# Patient Record
Sex: FEMALE | Race: WHITE | NOT HISPANIC OR LATINO | Employment: OTHER | URBAN - METROPOLITAN AREA
[De-identification: names, ages, dates, MRNs, and addresses within clinical notes are randomized per-mention and may not be internally consistent; named-entity substitution may affect disease eponyms.]

---

## 2017-04-13 ENCOUNTER — ALLSCRIPTS OFFICE VISIT (OUTPATIENT)
Dept: OTHER | Facility: OTHER | Age: 67
End: 2017-04-13

## 2017-04-19 ENCOUNTER — ALLSCRIPTS OFFICE VISIT (OUTPATIENT)
Dept: OTHER | Facility: OTHER | Age: 67
End: 2017-04-19

## 2017-04-24 ENCOUNTER — ALLSCRIPTS OFFICE VISIT (OUTPATIENT)
Dept: OTHER | Facility: OTHER | Age: 67
End: 2017-04-24

## 2017-04-24 ENCOUNTER — APPOINTMENT (OUTPATIENT)
Dept: LAB | Facility: CLINIC | Age: 67
End: 2017-04-24
Payer: MEDICARE

## 2017-04-24 DIAGNOSIS — E78.5 HYPERLIPIDEMIA: ICD-10-CM

## 2017-04-24 DIAGNOSIS — E05.00 THYROTOXICOSIS WITH DIFFUSE GOITER AND WITHOUT THYROID STORM: ICD-10-CM

## 2017-04-24 LAB
CHOLEST SERPL-MCNC: 158 MG/DL (ref 50–200)
HDLC SERPL-MCNC: 72 MG/DL (ref 40–60)
LDLC SERPL CALC-MCNC: 70 MG/DL (ref 0–100)
T4 FREE SERPL-MCNC: 0.96 NG/DL (ref 0.76–1.46)
TRIGL SERPL-MCNC: 80 MG/DL
TSH SERPL DL<=0.05 MIU/L-ACNC: 4.08 UIU/ML (ref 0.36–3.74)

## 2017-04-24 PROCEDURE — 84439 ASSAY OF FREE THYROXINE: CPT

## 2017-04-24 PROCEDURE — 84443 ASSAY THYROID STIM HORMONE: CPT

## 2017-04-24 PROCEDURE — 80061 LIPID PANEL: CPT

## 2017-04-24 PROCEDURE — 36415 COLL VENOUS BLD VENIPUNCTURE: CPT

## 2017-05-10 ENCOUNTER — ALLSCRIPTS OFFICE VISIT (OUTPATIENT)
Dept: OTHER | Facility: OTHER | Age: 67
End: 2017-05-10

## 2017-05-24 ENCOUNTER — GENERIC CONVERSION - ENCOUNTER (OUTPATIENT)
Dept: OTHER | Facility: OTHER | Age: 67
End: 2017-05-24

## 2017-06-01 ENCOUNTER — ALLSCRIPTS OFFICE VISIT (OUTPATIENT)
Dept: OTHER | Facility: OTHER | Age: 67
End: 2017-06-01

## 2017-06-05 ENCOUNTER — TRANSCRIBE ORDERS (OUTPATIENT)
Dept: ADMINISTRATIVE | Facility: HOSPITAL | Age: 67
End: 2017-06-05

## 2017-06-05 ENCOUNTER — HOSPITAL ENCOUNTER (OUTPATIENT)
Dept: MAMMOGRAPHY | Facility: MEDICAL CENTER | Age: 67
Discharge: HOME | End: 2017-06-05
Payer: MEDICARE

## 2017-06-05 DIAGNOSIS — Z12.31 VISIT FOR SCREENING MAMMOGRAM: Primary | ICD-10-CM

## 2017-06-05 DIAGNOSIS — Z12.31 ENCOUNTER FOR MAMMOGRAM TO ESTABLISH BASELINE MAMMOGRAM: Primary | ICD-10-CM

## 2017-06-05 DIAGNOSIS — Z12.31 ENCOUNTER FOR SCREENING MAMMOGRAM FOR MALIGNANT NEOPLASM OF BREAST: ICD-10-CM

## 2017-06-05 PROCEDURE — 77063 BREAST TOMOSYNTHESIS BI: CPT

## 2017-06-05 PROCEDURE — G0202 SCR MAMMO BI INCL CAD: HCPCS

## 2017-06-14 ENCOUNTER — ALLSCRIPTS OFFICE VISIT (OUTPATIENT)
Dept: OTHER | Facility: OTHER | Age: 67
End: 2017-06-14

## 2017-08-11 ENCOUNTER — ALLSCRIPTS OFFICE VISIT (OUTPATIENT)
Dept: OTHER | Facility: OTHER | Age: 67
End: 2017-08-11

## 2017-09-26 ENCOUNTER — APPOINTMENT (OUTPATIENT)
Dept: LAB | Facility: CLINIC | Age: 67
End: 2017-09-26
Payer: MEDICARE

## 2017-09-26 ENCOUNTER — GENERIC CONVERSION - ENCOUNTER (OUTPATIENT)
Dept: OTHER | Facility: OTHER | Age: 67
End: 2017-09-26

## 2017-09-26 ENCOUNTER — ALLSCRIPTS OFFICE VISIT (OUTPATIENT)
Dept: OTHER | Facility: OTHER | Age: 67
End: 2017-09-26

## 2017-09-26 DIAGNOSIS — R39.15 URGENCY OF URINATION: ICD-10-CM

## 2017-09-26 DIAGNOSIS — R35.0 FREQUENCY OF MICTURITION: ICD-10-CM

## 2017-09-26 DIAGNOSIS — E78.5 HYPERLIPIDEMIA: ICD-10-CM

## 2017-09-26 LAB
BACTERIA UR QL AUTO: ABNORMAL /HPF
BILIRUB UR QL STRIP: NEGATIVE
CLARITY UR: ABNORMAL
COLOR UR: ABNORMAL
GLUCOSE UR STRIP-MCNC: NEGATIVE MG/DL
HGB UR QL STRIP.AUTO: ABNORMAL
HYALINE CASTS #/AREA URNS LPF: ABNORMAL /LPF
KETONES UR STRIP-MCNC: NEGATIVE MG/DL
LEUKOCYTE ESTERASE UR QL STRIP: ABNORMAL
NITRITE UR QL STRIP: POSITIVE
NON-SQ EPI CELLS URNS QL MICRO: ABNORMAL /HPF
PH UR STRIP.AUTO: 6.5 [PH] (ref 4.5–8)
PROT UR STRIP-MCNC: NEGATIVE MG/DL
RBC #/AREA URNS AUTO: ABNORMAL /HPF
SP GR UR STRIP.AUTO: 1.01 (ref 1–1.03)
UROBILINOGEN UR QL STRIP.AUTO: 1 E.U./DL
WBC #/AREA URNS AUTO: ABNORMAL /HPF

## 2017-09-26 PROCEDURE — 87186 SC STD MICRODIL/AGAR DIL: CPT

## 2017-09-26 PROCEDURE — 87077 CULTURE AEROBIC IDENTIFY: CPT

## 2017-09-26 PROCEDURE — 87086 URINE CULTURE/COLONY COUNT: CPT

## 2017-09-26 PROCEDURE — 81001 URINALYSIS AUTO W/SCOPE: CPT

## 2017-09-28 ENCOUNTER — GENERIC CONVERSION - ENCOUNTER (OUTPATIENT)
Dept: OTHER | Facility: OTHER | Age: 67
End: 2017-09-28

## 2017-09-28 LAB — BACTERIA UR CULT: NORMAL

## 2017-10-25 ENCOUNTER — ALLSCRIPTS OFFICE VISIT (OUTPATIENT)
Dept: OTHER | Facility: OTHER | Age: 67
End: 2017-10-25

## 2017-10-25 ENCOUNTER — APPOINTMENT (OUTPATIENT)
Dept: LAB | Facility: CLINIC | Age: 67
End: 2017-10-25
Payer: MEDICARE

## 2017-10-25 DIAGNOSIS — E78.5 HYPERLIPIDEMIA: ICD-10-CM

## 2017-10-25 LAB
ALBUMIN SERPL BCP-MCNC: 3.4 G/DL (ref 3.5–5)
ALP SERPL-CCNC: 79 U/L (ref 46–116)
ALT SERPL W P-5'-P-CCNC: 28 U/L (ref 12–78)
ANION GAP SERPL CALCULATED.3IONS-SCNC: 7 MMOL/L (ref 4–13)
AST SERPL W P-5'-P-CCNC: 21 U/L (ref 5–45)
BASOPHILS # BLD AUTO: 0.05 THOUSANDS/ΜL (ref 0–0.1)
BASOPHILS NFR BLD AUTO: 1 % (ref 0–1)
BILIRUB SERPL-MCNC: 0.38 MG/DL (ref 0.2–1)
BILIRUB UR QL STRIP: NEGATIVE
BUN SERPL-MCNC: 20 MG/DL (ref 5–25)
CALCIUM SERPL-MCNC: 9.3 MG/DL (ref 8.3–10.1)
CHLORIDE SERPL-SCNC: 105 MMOL/L (ref 100–108)
CHOLEST SERPL-MCNC: 145 MG/DL (ref 50–200)
CLARITY UR: CLEAR
CO2 SERPL-SCNC: 28 MMOL/L (ref 21–32)
COLOR UR: YELLOW
CREAT SERPL-MCNC: 0.68 MG/DL (ref 0.6–1.3)
EOSINOPHIL # BLD AUTO: 0.21 THOUSAND/ΜL (ref 0–0.61)
EOSINOPHIL NFR BLD AUTO: 3 % (ref 0–6)
ERYTHROCYTE [DISTWIDTH] IN BLOOD BY AUTOMATED COUNT: 12.5 % (ref 11.6–15.1)
GFR SERPL CREATININE-BSD FRML MDRD: 90 ML/MIN/1.73SQ M
GLUCOSE P FAST SERPL-MCNC: 82 MG/DL (ref 65–99)
GLUCOSE UR STRIP-MCNC: NEGATIVE MG/DL
HCT VFR BLD AUTO: 41.8 % (ref 34.8–46.1)
HDLC SERPL-MCNC: 63 MG/DL (ref 40–60)
HGB BLD-MCNC: 14.2 G/DL (ref 11.5–15.4)
HGB UR QL STRIP.AUTO: NEGATIVE
KETONES UR STRIP-MCNC: NEGATIVE MG/DL
LDLC SERPL CALC-MCNC: 64 MG/DL (ref 0–100)
LEUKOCYTE ESTERASE UR QL STRIP: NEGATIVE
LYMPHOCYTES # BLD AUTO: 2.77 THOUSANDS/ΜL (ref 0.6–4.47)
LYMPHOCYTES NFR BLD AUTO: 34 % (ref 14–44)
MCH RBC QN AUTO: 31.6 PG (ref 26.8–34.3)
MCHC RBC AUTO-ENTMCNC: 34 G/DL (ref 31.4–37.4)
MCV RBC AUTO: 93 FL (ref 82–98)
MONOCYTES # BLD AUTO: 0.47 THOUSAND/ΜL (ref 0.17–1.22)
MONOCYTES NFR BLD AUTO: 6 % (ref 4–12)
NEUTROPHILS # BLD AUTO: 4.76 THOUSANDS/ΜL (ref 1.85–7.62)
NEUTS SEG NFR BLD AUTO: 56 % (ref 43–75)
NITRITE UR QL STRIP: NEGATIVE
NRBC BLD AUTO-RTO: 0 /100 WBCS
PH UR STRIP.AUTO: 6.5 [PH] (ref 4.5–8)
PLATELET # BLD AUTO: 250 THOUSANDS/UL (ref 149–390)
PMV BLD AUTO: 9.6 FL (ref 8.9–12.7)
POTASSIUM SERPL-SCNC: 3.8 MMOL/L (ref 3.5–5.3)
PROT SERPL-MCNC: 7.1 G/DL (ref 6.4–8.2)
PROT UR STRIP-MCNC: NEGATIVE MG/DL
RBC # BLD AUTO: 4.49 MILLION/UL (ref 3.81–5.12)
SODIUM SERPL-SCNC: 140 MMOL/L (ref 136–145)
SP GR UR STRIP.AUTO: 1.02 (ref 1–1.03)
T4 FREE SERPL-MCNC: 0.97 NG/DL (ref 0.76–1.46)
TRIGL SERPL-MCNC: 90 MG/DL
TSH SERPL DL<=0.05 MIU/L-ACNC: 3.8 UIU/ML (ref 0.36–3.74)
UROBILINOGEN UR QL STRIP.AUTO: 0.2 E.U./DL
WBC # BLD AUTO: 8.27 THOUSAND/UL (ref 4.31–10.16)

## 2017-10-25 PROCEDURE — 84443 ASSAY THYROID STIM HORMONE: CPT

## 2017-10-25 PROCEDURE — 80053 COMPREHEN METABOLIC PANEL: CPT

## 2017-10-25 PROCEDURE — 36415 COLL VENOUS BLD VENIPUNCTURE: CPT

## 2017-10-25 PROCEDURE — 81003 URINALYSIS AUTO W/O SCOPE: CPT

## 2017-10-25 PROCEDURE — 85025 COMPLETE CBC W/AUTO DIFF WBC: CPT

## 2017-10-25 PROCEDURE — 84439 ASSAY OF FREE THYROXINE: CPT

## 2017-10-25 PROCEDURE — 80061 LIPID PANEL: CPT

## 2017-11-14 ENCOUNTER — GENERIC CONVERSION - ENCOUNTER (OUTPATIENT)
Dept: OTHER | Facility: OTHER | Age: 67
End: 2017-11-14

## 2018-01-10 NOTE — RESULT NOTES
Verified Results  (1) URINE CULTURE [id number] 10:34AM Carmen Collins   TW Order Number: [id number]     Test Name Result Flag Reference   CLINICAL REPORT (Report)     Test:        Urine culture  Specimen Type:   Urine  Specimen Date:   9/26/2017 10:34 AM  Result Date:    9/28/2017 11:32 AM  Result Status:   Final result  Resulting Lab:   [facility] [address] [place] [address]            Tel: [phone]      CULTURE                                       ------------------                                   50,000-59,000 cfu/ml Escherichia coli      SUSCEPTIBILITY                                   ------------------                                                       Escherichia coli  METHOD                 LEANNA  -------------------------------------  -------------------------  AMPICILLIN ($$)             <=8 00 ug/ml Susceptible  AZTREONAM ($$$)             <=8 ug/ml   Susceptible  CEFAZOLIN ($)              <=8 00 ug/ml Susceptible  CIPROFLOXACIN ($)            <=1 00 ug/ml Susceptible  GENTAMICIN ($$)             <=4 ug/ml   Susceptible  LEVOFLOXACIN ($)            <=2 00 ug/ml Susceptible  NITROFURANTOIN             <=32 ug/ml  Susceptible  PIPERACILLIN + TAZOBACTAM ($$$)     <=16 ug/ml  Susceptible  TETRACYCLINE              <=4 ug/ml   Susceptible  TOBRAMYCIN ($)             <=4 ug/ml   Susceptible  TRIMETHOPRIM + SULFAMETHOXAZOLE ($$$)  <=2/38 ug/ml Susceptible

## 2018-01-12 VITALS — SYSTOLIC BLOOD PRESSURE: 156 MMHG | DIASTOLIC BLOOD PRESSURE: 72 MMHG | TEMPERATURE: 98.2 F

## 2018-01-12 NOTE — PROGRESS NOTES
Assessment    1  Encounter for preventive health examination (V70 0) (Z00 00)    Plan  Hyperlipidemia    · (1) CBC/PLT/DIFF; Status:Active; Requested JMI:[id number];    · (1) COMPREHENSIVE METABOLIC PANEL; Status:Active; Requested XER:39GFH3197;    · (1) LIPID PANEL FASTING W DIRECT LDL REFLEX; Status:Active; Requested  YTP:66FNR1120;    · (1) TSH WITH FT4 REFLEX; Status:Active; Requested MXL:22TLB0182;    · (1) URINALYSIS (will reflex a microscopy if leukocytes, occult blood, protein or nitrites  are not within normal limits); Status:Active; Requested QWV:25PQO2147;     Discussion/Summary    With the exception of chronic headaches comma problems appear to be doing well  Routine laboratory testing  Influenza vaccine  Follow-up for your birthday  Impression: Subsequent Annual Wellness Visit, with preventive exam as well as age and risk appropriate counseling completed  Immunizations: influenza vaccination is recommended annually, pneumococcal vaccine due today, hepatitis B vaccination series is not indicated at this time due to the patient's low risk of anu the disease, Zostavax vaccination status is unknown and Tdap vaccination status is unknown  Advance Directive Planning: not complete, she was encouraged to follow-up with me to discuss her questions and/or decisions  History of Present Illness  HPI: Graves' ophthalmopathy currently being treated at [facility]  She has now had surgical treatment for this  A prior history of Graves' disease treated with radioactive iodine so she is actually hypothyroid on replacement  Reflux is treated  Hyperlipidemia is treated  Early 2016 right knee replacement surgery which went very well although she does continue to have some pain  Her principal pain is now with the left knee  Migraines are rare  Mammogram done and up to date  In November 2015 she had Hemoccult positive stool   She follows with Dr Karina Newman and she was called and recommended to go back there  She went and had a colonoscopy which revealed polyps  She will get another one in 2018  Chronic headaches although labeled "migraine "have responded well to Botox injections every 3 months; thus they are really muscle contraction headache  Right now also on Toradol and Compazine and Robaxin  Failed gabapentin  Really prolonged HA gets Decadron  The blood pressure which usually is fine is high today; the patient attributes this to stress and absolutely refuses to take any form of antihypertensive  Welcome to [employer] and Wellness Visits: The patient is being seen for the subsequent annual wellness visit  Medicare Screening and Risk Factors   Medicare Screening Tests Risk Questions   Abdominal aortic aneurysm risk assessment: none indicated  Osteoporosis risk assessment: female gender and over 48years of age  HIV risk assessment: none indicated  Drug and Alcohol Use: The patient is a former cigarette smoker and quit smoking 16 yrs ago  The patient reports never drinking alcohol  She has never used illicit drugs  Diet and Physical Activity: Current diet includes well balanced meals, 2 servings of fruit per day, 1 servings of vegetables per day, 1 servings of meat per day, 2 servings of whole grains per day, 2 servings of dairy products per day and 2 cups of coffee per day  She exercises daily and exercises 5 times per week  Exercise: walking, strength training  Mood Disorder and Cognitive Impairment Screening: PHQ-9 Depression Scale   Over the past 2 weeks, how often have you been bothered by the following problems? 1 ) Little interest or pleasure in doing things? Not at all    2 ) Feeling down, depressed or hopeless? Not at all    3 ) Trouble falling asleep or sleeping too much? Not at all    4 ) Feeling tired or having little energy? Several days  5 ) Poor appetite or overeating?  Not at all    6 ) Feeling bad about yourself, or that you are a failure, or have let yourself or your family down? Not at all    7 ) Trouble concentrating on things, such as reading a newspaper or watching television? Not at all    8 ) Moving or speaking so slowly that other people could have noticed, or the opposite, moving or speaking faster than usual? Not at all    9 ) Thoughts that you would be off dead or of hurting yourself in some way? Not at all  TOTAL SCORE: 1, severity of depression is mild  She denies feeling down, depressed, or hopeless over the past two weeks  She denies feeling little interest or pleasure in doing things over the past two weeks  Cognitive impairment screening: denies difficulty learning/retaining new information, denies difficulty handling complex tasks, denies difficulty with reasoning, denies difficulty with spatial ability and orientation, denies difficulty with language and denies difficulty with behavior  Advance Directives: Advance directives: no living will, no durable power of  for health care directives and no advance directives  Co-Managers and Medical Equipment/Suppliers: See Patient Care Team       Preventive Quality Program 65 and Older: Falls Risk: The patient fell 0 times in the past 12 months  Urinary Incontinence Symptoms includes: no urinary incontinence        Patient Care Team    Care Team Member Role Specialty Office Number   Esau BUSH  Dermatology [phone]   Hannah Man MD  Radiation Oncology [phone]   Valarie Cronin MD  Ophthalmology [phone]  Obstetrics/Gynecology [phone]     Review of Systems    Over the past 2 weeks, how often have you been bothered by the following problems? 1 ) Little interest or pleasure in doing things? Not at all    2 ) Feeling down, depressed or hopeless? Not at all    3 ) Trouble falling asleep or sleeping too much? Not at all    4 ) Feeling tired or having little energy? Several days  5 ) Poor appetite or overeating?  Not at all    6 ) Feeling bad about yourself, or that you are a failure, or have let yourself or your family down? Not at all    7 ) Trouble concentrating on things, such as reading a newspaper or watching television? Not at all    8 ) Moving or speaking so slowly that other people could have noticed, or the opposite, moving or speaking faster than usual? Not at all    9 ) Thoughts that you would be better off dead or of hurting yourself in some way? Not at all  Score 1      Active Problems    1  Acid reflux disease (530 81) (K21 9)   2  Actinic keratosis (702 0) (L57 0)   3  Basal cell carcinoma of skin of forearm (173 61) (C44 611)   4  Bone spur (726 91) (M77 9)   5  Breast screening (V76 10) (Z12 31)   6  Cervicalgia (723 1) (M54 2)   7  Chronic pain of both knees (056 06,857 63) (M25 561,M25 562,G89 29)   8  Colon cancer screening (V76 51) (Z12 11)   9  Flu vaccine need (V04 81) (Z23)   10  Graves' ophthalmopathy (242 00,376 21) (E05 00)   11  Hyperlipidemia (272 4) (E78 5)   12  Hypothyroidism (244 9) (E03 9)   13  Inflamed seborrheic keratosis (702 11) (L82 0)   14  Leukopenia (288 50) (D72 819)   15  Migraine, chronic, without aura, intractable (346 71) (G43 719)   16  Need for pneumococcal vaccination (V03 82) (Z23)   17  Need for revaccination (V05 9) (Z23)   18  No advance directive on file (V49 89) (Z78 9)   19  Palpitations (785 1) (R00 2)   20  Preoperative examination (V72 84) (Z01 818)   21  Primary osteoarthritis of both hips (715 15) (M16 0)   22  Screening for genitourinary condition (V81 6) (Z13 89)   23  Screening for skin condition (V82 0) (Z13 89)   24  Seasonal allergies (477 9) (J30 2)   25  Seborrheic keratosis (702 19) (L82 1)   26  Skin lesion (709 9) (L98 9)   27  Telangiectasia (448 9) (I78 1)   28  Urinary frequency (788 41) (R35 0)   29   Urinary urgency (788 63) (R39 15)    Past Medical History    · History of Arthritis (V13 4)   · History of Flu vaccine need (V04 81) (Z23)   · History of Flu vaccine need (V04 81) (Z23)   · H/O nonmelanoma skin cancer (V10 83) (Z85 828)   · History of Hepatitis, B Virus (070 30)   · History of gastroesophageal reflux (GERD) (V12 79) (Z87 19)   · History of Graves' disease (V12 29) (Z86 39)   · History of migraine (V12 49) (Z86 69)    Surgical History    · History of Appendectomy   · History of Arthroscopy Knee Left   · History of Biopsy Temporal Artery   · History of  Section   · History of Knee Replacement   · History of Surgery Of The Orbits   · History of Tonsillectomy    Family History  Mother    · Family history of Carcinoma Of The Pancreas   · Family history of Mother  At Age 76  Father    · Family history of Atherosclerosis (V17 49)   · Family history of Family Health Status Of Father - Alive  Maternal Grandmother    · Family history of Diabetes Mellitus (V18 0)   · Family history of Maternal Grandmother  At Age 72  Paternal Grandmother    · Family history of Maternal Grandmother  At Age 68  Maternal Grandfather    · Family history of Acute Myocardial Infarction (V17 3)   · Family history of Maternal Grandfather  At Age 61  Paternal Grandfather    · Family history of Cirrhosis   · Family history of Hyperlipidemia   · Family history of Maternal Grandfather  At Age 79    Social History    · Denied: History of Alcohol Use (History)   · Caffeine Use   · Daily Coffee Consumption (2  Cups/Day)   · Denied: History of Drug Use   · Education - Highest Level Achieved (17  Years Completed)   · Former smoker (V15 82) (U19 549)   · Marital History - Currently    · No advance directive on file (V4 89) (Z78 9)   · Retired From Work  The social history was reviewed and updated today  Current Meds   1  Aspirin 81 MG TABS; TAKE 1 TABLET DAILY; Therapy: (Recorded:77Yqi3450) to Recorded   2   Celecoxib 200 MG Oral Capsule; take 1 capsule daily  Requested for: 69SYT0384; Last   Rx:81Kwr0910; Status: [address] xenia Waller Verification Ordered   3  Dexamethasone 2 MG Oral Tablet; TAKE 1 TABLET Daily PRN headache; Therapy: 56LOA0024 to (Evaluate:04Jun2017)  Requested for: 98RRP9563; Last   Rx:01Jun2017 Ordered   4  Ketorolac Tromethamine 10 MG Oral Tablet; TAKE 1 TABLET EVERY 8 HOURS AS   NEEDED FOR PAIN MAX 10 A MONTH;   Therapy: 70IHQ8114 to (Willy Cormier)  Requested for: 12VBE0949; Last   ST:83XLH4086 Ordered   5  Levothyroxine Sodium 75 MCG Oral Tablet; TAKE 1 TABLET DAILY  Requested for:   82UOM7272; Last Rx:15Wun7573 Ordered   6  Dawood-Daphney TABS; TAKE 1 TABLET DAILY; Therapy: (Recorded:46Waw8540) to Recorded   7  Methocarbamol 500 MG Oral Tablet; Take 1 tablet 3 times daily as needed; Therapy: 84KVH0878 to (Marielos Meyers)  Requested for: 92IBR2976; Last   Rx:01Jun2017 Ordered   8  Omeprazole 20 MG Oral Capsule Delayed Release; TAKE 1 CAPSULE DAILY; Therapy: 34LMT5057 to (Last Rx:15Mar2016)  Requested for: 52HKJ3923 Ordered   9  Premarin 0 625 MG/GM Vaginal Cream; USE AS DIRECTED; Therapy: (Recorded:54Gdd1773) to Recorded   10  Probiotic Oral Capsule; Therapy: (Desiree Ji) to Recorded   11  Prochlorperazine Maleate 10 MG Oral Tablet; 1 tablet TID x 2 days, then prn for    headache and nausea (no more than 3 times per week after initial 2 days); Therapy: 83FUK3357 to (Last Rx:77Cgl3711)  Requested for: 71WKT9179 Ordered   12  Rosuvastatin Calcium 20 MG Oral Tablet; TAKE 1 TABLET DAILY; Therapy: 90GIX8350 to (Evaluate:80Jil3691)  Requested for: 22Rwh0710; Last    Rx:32Wzs8998; Status: ACTIVE - Transmit to Sridhar Bush Ordered   13  Triple Omega-3-6-9 Oral Capsule; Therapy: (Recorded:26Apr2016) to Recorded    Allergies    1  Codeine Derivatives   2   FentaNYL Citrate SOLN    Immunizations   1 2    Influenza  19-Nov-2013 20-Nov-2015    PCV  29-Sep-2016     PPSV  Permanently Deferred: Medical Deferral, rvac- no responce from patient, 84Jhu1159 26-Sep-2014    Tdap  not sure when Vitals  Signs    Heart Rate: 66  Systolic: 256  Diastolic: 84  Height: 5 ft 1 75 in  Weight: 108 lb 8 oz  BMI Calculated: 20 01  BSA Calculated: 1 47  O2 Saturation: 98    Results/Data  PHQ-9 Adult Depression Screening 91TBO1163 09:55AM User, Ahs     Test Name Result Flag Reference   PHQ-9 Adult Depression Score 1     Over the last two weeks, how often have you been bothered by any of the following problems? Little interest or pleasure in doing things: Not at all - 0  Feeling down, depressed, or hopeless: Not at all - 0  Trouble falling or staying asleep, or sleeping too much: Not at all - 0  Feeling tired or having little energy: Several days - 1  Poor appetite or over eating: Not at all - 0  Feeling bad about yourself - or that you are a failure or have let yourself or your family down: Not at all - 0  Trouble concentrating on things, such as reading the newspaper or watching television: Not at all - 0  Moving or speaking so slowly that other people could have noticed   Or the opposite -  being so fidgety or restless that you have been moving around a lot more than usual: Not at all - 0  Thoughts that you would be better off dead, or of hurting yourself in some way: Not at all - 0   PHQ-9 Adult Depression Screening Negative     PHQ-9 Difficulty Level Somewhat difficult     PHQ-9 Severity Minimal Depression       Falls Risk Assessment (Dx Z13 89 Screen for Neurologic Disorder) 25Oct2017 09:55AM User, [employer]     Test Name Result Flag Reference   Falls Risk      No falls in the past year       Future Appointments    Date/Time Provider Specialty Site   11/14/2017 01:00 PM Hellen Bradford [facility]rology [address]   06/01/2018 09:00 AM Feliz Calles MD Neurology [address]     Signatures   Electronically signed by : YENNI Whitman ; Oct 25 2017 10:37AM EST                       (Author)

## 2018-01-13 VITALS
SYSTOLIC BLOOD PRESSURE: 162 MMHG | HEART RATE: 55 BPM | WEIGHT: 110 LBS | OXYGEN SATURATION: 99 % | HEIGHT: 62 IN | BODY MASS INDEX: 20.24 KG/M2 | RESPIRATION RATE: 14 BRPM | DIASTOLIC BLOOD PRESSURE: 84 MMHG

## 2018-01-13 VITALS
HEIGHT: 62 IN | HEART RATE: 66 BPM | BODY MASS INDEX: 19.96 KG/M2 | WEIGHT: 108.5 LBS | DIASTOLIC BLOOD PRESSURE: 84 MMHG | SYSTOLIC BLOOD PRESSURE: 150 MMHG | OXYGEN SATURATION: 98 %

## 2018-01-14 VITALS
OXYGEN SATURATION: 97 % | BODY MASS INDEX: 20.08 KG/M2 | HEART RATE: 68 BPM | WEIGHT: 109.13 LBS | DIASTOLIC BLOOD PRESSURE: 60 MMHG | TEMPERATURE: 97.5 F | HEIGHT: 62 IN | SYSTOLIC BLOOD PRESSURE: 140 MMHG

## 2018-01-14 VITALS
WEIGHT: 109.5 LBS | BODY MASS INDEX: 20.03 KG/M2 | OXYGEN SATURATION: 98 % | SYSTOLIC BLOOD PRESSURE: 188 MMHG | HEART RATE: 63 BPM | DIASTOLIC BLOOD PRESSURE: 90 MMHG

## 2018-01-17 NOTE — RESULT NOTES
Verified Results  (1) URINALYSIS (will reflex a microscopy if leukocytes, occult blood, protein or nitrites are not within normal limits) 60Ftn5957 10:34AM Madalyn Credit   TW Order Number: [id number]     Test Name Result Flag Reference   COLOR Dk Yellow     CLARITY Cloudy     SPECIFIC GRAVITY UA 1 012  1 003-1 030   PH UA 6 5  4 5-8 0   LEUKOCYTE ESTERASE UA Moderate A Negative   NITRITE UA Positive A Negative   PROTEIN UA Negative mg/dl  Negative   GLUCOSE UA Negative mg/dl  Negative   KETONES UA Negative mg/dl  Negative   UROBILINOGEN UA 1 0 E U /dl  0 2, 1 0 E U /dl   BILIRUBIN UA Negative  Negative   BLOOD UA Trace A Negative   BACTERIA Occasional /hpf  None Seen, Occasional   EPITHELIAL CELLS None Seen /hpf  None Seen, Occasional   HYALINE CASTS None Seen /lpf  None Seen   RBC UA None Seen /hpf  None Seen, 0-5   WBC UA 30-50 /hpf A None Seen, 0-5, 5-55, 5-65

## 2018-01-22 VITALS — DIASTOLIC BLOOD PRESSURE: 80 MMHG | SYSTOLIC BLOOD PRESSURE: 130 MMHG | TEMPERATURE: 98.1 F

## 2018-03-07 NOTE — PROGRESS NOTES
History of Present Illness    Revaccination   Vaccine Information: Vaccine(s) Given (names): pneumovax [id number]  Unable to reach by phone  Pt called (attempt 1): [phone]  Letter Sent (Regular and Certified): [phone] sd  Other Information: busy signal every time i call phone  no alt phone numbers avail  will send letter [phone] sd  Active Problems    1  Acid reflux disease (530 81) (K21 9)   2  Actinic keratosis (702 0) (L57 0)   3  Basal cell carcinoma of skin of forearm (173 61) (C44 611)   4  Bone spur (726 91) (M77 9)   5  Breast screening (V76 10) (Z12 39)   6  Cervicalgia (723 1) (M54 2)   7  Chronic pain of both knees (430 95,637 05) (M25 561,M25 562,G89 29)   8  Colon cancer screening (V76 51) (Z12 11)   9  Flu vaccine need (V04 81) (Z23)   10  Graves' ophthalmopathy (242 00,376 21) (E05 00)   11  Hyperlipidemia (272 4) (E78 5)   12  Hypothyroidism (244 9) (E03 9)   13  Inflamed seborrheic keratosis (702 11) (L82 0)   14  Leukopenia (288 50) (D72 819)   15  Migraine, chronic, without aura, intractable (346 71) (G43 719)   16  Need for pneumococcal vaccination (V03 82) (Z23)   17  Need for revaccination (V05 9) (Z23)   18  Palpitations (785 1) (R00 2)   19  Preoperative examination (V72 84) (Z01 818)   20  Primary osteoarthritis of both hips (715 15) (M16 0)   21  Screening for skin condition (V82 0) (Z13 89)   22  Seasonal allergies (477 9) (J30 2)   23  Seborrheic keratosis (702 19) (L82 1)   24  Skin lesion (709 9) (L98 9)   25  Telangiectasia (448 9) (I78 1)    Immunizations  Influenza --- Rogelio Parris: [id number] Dessert: 20-Nov-2015   PCV --- Series1: 29-Sep-2016   PPSV --- Series1: 26-Sep-2014   Tdap --- Series1: not sure when     Current Meds   1  Aspirin 81 MG TABS; TAKE 1 TABLET DAILY   2  Celecoxib 200 MG Oral Capsule; take 1 capsule daily   3   Fluticasone Propionate 50 MCG/ACT Nasal Suspension; USE 2 SPRAYS IN EACH   NOSTRIL ONCE DAILY   4  Levothyroxine Sodium 75 MCG Oral Tablet; TAKE 1 TABLET DAILY   5  Dawood-Daphney TABS; TAKE 1 TABLET DAILY   6  Methocarbamol 500 MG Oral Tablet; Take 1 tablet 3 times daily as needed   7  Omeprazole 20 MG Oral Capsule Delayed Release; TAKE 1 CAPSULE DAILY   8  Premarin 0 625 MG/GM Vaginal Cream; USE AS DIRECTED   9  Probiotic Oral Capsule   10  Rosuvastatin Calcium 20 MG Oral Tablet; TAKE 1 TABLET DAILY   11  Triple Omega-3-6-9 Oral Capsule   12  Voltaren 1 % Transdermal Gel; APPLY SPARINGLY TO AFFECTED AREA(S) ONCE DAILY    Allergies    1  Codeine Derivatives   2  FentaNYL Citrate SOLN    Future Appointments    Date/Time Provider Specialty Site   05/11/2017 02:00 PM Annabelle Adame MD Neurology [facility]  [place]   04/24/2017 10:15 AM YENNI Davis  Internal Medicine [facility]   04/11/2017 02:00 PM [facility]   04/19/2017 09:20 AM YENNI Faulkner   Dermatology [facility]     Signatures   Electronically signed by : YENNI Kilpatrick ; Dec 22 2016 12:37PM EST

## 2018-04-13 RX ORDER — FLUTICASONE PROPIONATE 50 MCG
2 SPRAY, SUSPENSION (ML) NASAL DAILY
COMMUNITY
Start: 2017-10-25 | End: 2018-05-31 | Stop reason: SDUPTHER

## 2018-04-13 RX ORDER — DEXAMETHASONE 2 MG/1
1 TABLET ORAL DAILY PRN
COMMUNITY
Start: 2013-12-16 | End: 2018-04-18 | Stop reason: SDUPTHER

## 2018-04-13 RX ORDER — METHOCARBAMOL 500 MG/1
1 TABLET, FILM COATED ORAL 3 TIMES DAILY PRN
COMMUNITY
Start: 2014-10-22 | End: 2018-04-18 | Stop reason: SDUPTHER

## 2018-04-13 RX ORDER — CELECOXIB 200 MG/1
1 CAPSULE ORAL DAILY
COMMUNITY
End: 2018-04-16

## 2018-04-13 RX ORDER — OMEPRAZOLE 20 MG/1
1 CAPSULE, DELAYED RELEASE ORAL DAILY
COMMUNITY
Start: 2014-04-22 | End: 2018-04-16 | Stop reason: SDUPTHER

## 2018-04-13 RX ORDER — PROCHLORPERAZINE MALEATE 10 MG
TABLET ORAL
COMMUNITY
Start: 2017-09-05 | End: 2018-04-18 | Stop reason: SDUPTHER

## 2018-04-13 RX ORDER — LEVOTHYROXINE SODIUM 0.07 MG/1
1 TABLET ORAL DAILY
COMMUNITY
End: 2018-04-16 | Stop reason: SDUPTHER

## 2018-04-13 RX ORDER — ASCORBIC ACID 500 MG/ML
1 INJECTION, SOLUTION INTRAMUSCULAR; INTRAVENOUS; SUBCUTANEOUS DAILY
COMMUNITY
End: 2018-05-31 | Stop reason: CLARIF

## 2018-04-13 RX ORDER — KETOROLAC TROMETHAMINE 10 MG/1
TABLET, FILM COATED ORAL
COMMUNITY
Start: 2014-10-22 | End: 2018-04-18 | Stop reason: SDUPTHER

## 2018-04-13 RX ORDER — ROSUVASTATIN CALCIUM 20 MG/1
1 TABLET, COATED ORAL DAILY
COMMUNITY
Start: 2014-06-11 | End: 2018-04-16 | Stop reason: SDUPTHER

## 2018-04-16 ENCOUNTER — OFFICE VISIT (OUTPATIENT)
Dept: INTERNAL MEDICINE CLINIC | Facility: CLINIC | Age: 68
End: 2018-04-16
Payer: MEDICARE

## 2018-04-16 ENCOUNTER — APPOINTMENT (OUTPATIENT)
Dept: LAB | Facility: CLINIC | Age: 68
End: 2018-04-16
Payer: MEDICARE

## 2018-04-16 ENCOUNTER — ALLSCRIPTS OFFICE VISIT (OUTPATIENT)
Dept: OTHER | Facility: OTHER | Age: 68
End: 2018-04-16

## 2018-04-16 VITALS
HEART RATE: 60 BPM | OXYGEN SATURATION: 99 % | SYSTOLIC BLOOD PRESSURE: 162 MMHG | WEIGHT: 108 LBS | RESPIRATION RATE: 18 BRPM | HEIGHT: 62 IN | BODY MASS INDEX: 19.88 KG/M2 | DIASTOLIC BLOOD PRESSURE: 94 MMHG

## 2018-04-16 DIAGNOSIS — K21.00 REFLUX ESOPHAGITIS: ICD-10-CM

## 2018-04-16 DIAGNOSIS — E89.0 POSTABLATIVE HYPOTHYROIDISM: ICD-10-CM

## 2018-04-16 DIAGNOSIS — M19.90 ARTHRITIS: ICD-10-CM

## 2018-04-16 DIAGNOSIS — E78.2 HYPERLIPIDEMIA, MIXED: Primary | ICD-10-CM

## 2018-04-16 DIAGNOSIS — K21.9 GASTROESOPHAGEAL REFLUX DISEASE, ESOPHAGITIS PRESENCE NOT SPECIFIED: Primary | ICD-10-CM

## 2018-04-16 DIAGNOSIS — I10 ESSENTIAL HYPERTENSION: ICD-10-CM

## 2018-04-16 DIAGNOSIS — I51.89 DIASTOLIC DYSFUNCTION: ICD-10-CM

## 2018-04-16 DIAGNOSIS — E05.00 GRAVES' DISEASE: ICD-10-CM

## 2018-04-16 DIAGNOSIS — G44.209 TENSION HEADACHE: ICD-10-CM

## 2018-04-16 PROBLEM — G43.009 MIGRAINE WITHOUT AURA OR STATUS MIGRAINOSUS: Status: ACTIVE | Noted: 2018-04-16

## 2018-04-16 LAB
ANION GAP SERPL CALCULATED.3IONS-SCNC: 5 MMOL/L (ref 4–13)
BASOPHILS # BLD AUTO: 0.04 THOUSANDS/ΜL (ref 0–0.1)
BASOPHILS NFR BLD AUTO: 1 % (ref 0–1)
BUN SERPL-MCNC: 21 MG/DL (ref 5–25)
CALCIUM SERPL-MCNC: 9.5 MG/DL
CHLORIDE SERPL-SCNC: 103 MMOL/L (ref 100–108)
CO2 SERPL-SCNC: 30 MMOL/L (ref 21–32)
CREAT SERPL-MCNC: 0.7 MG/DL (ref 0.6–1.3)
EOSINOPHIL # BLD AUTO: 0.17 THOUSAND/ΜL (ref 0–0.61)
EOSINOPHIL NFR BLD AUTO: 2 % (ref 0–6)
ERYTHROCYTE [DISTWIDTH] IN BLOOD BY AUTOMATED COUNT: 12.8 % (ref 11.6–15.1)
GFR SERPL CREATININE-BSD FRML MDRD: 89 ML/MIN/1.73SQ M
GLUCOSE P FAST SERPL-MCNC: 83 MG/DL (ref 65–99)
HCT VFR BLD AUTO: 45 % (ref 34.8–46.1)
HGB BLD-MCNC: 14.9 G/DL (ref 11.5–15.4)
LYMPHOCYTES # BLD AUTO: 2.16 THOUSANDS/ΜL (ref 0.6–4.47)
LYMPHOCYTES NFR BLD AUTO: 27 % (ref 14–44)
MCH RBC QN AUTO: 31.8 PG (ref 26.8–34.3)
MCHC RBC AUTO-ENTMCNC: 33.1 G/DL (ref 31.4–37.4)
MCV RBC AUTO: 96 FL (ref 82–98)
MONOCYTES # BLD AUTO: 0.43 THOUSAND/ΜL (ref 0.17–1.22)
MONOCYTES NFR BLD AUTO: 5 % (ref 4–12)
NEUTROPHILS # BLD AUTO: 5.2 THOUSANDS/ΜL (ref 1.85–7.62)
NEUTS SEG NFR BLD AUTO: 65 % (ref 43–75)
NRBC BLD AUTO-RTO: 0 /100 WBCS
NT-PROBNP SERPL-MCNC: 305 PG/ML
PLATELET # BLD AUTO: 248 THOUSANDS/UL (ref 149–390)
PMV BLD AUTO: 9.5 FL (ref 8.9–12.7)
POTASSIUM SERPL-SCNC: 3.7 MMOL/L (ref 3.5–5.3)
RBC # BLD AUTO: 4.69 MILLION/UL (ref 3.81–5.12)
SODIUM SERPL-SCNC: 138 MMOL/L (ref 136–145)
T4 FREE SERPL-MCNC: 0.93 NG/DL (ref 0.76–1.46)
TSH SERPL DL<=0.05 MIU/L-ACNC: 5.71 UIU/ML (ref 0.36–3.74)
WBC # BLD AUTO: 8.01 THOUSAND/UL (ref 4.31–10.16)

## 2018-04-16 PROCEDURE — 84443 ASSAY THYROID STIM HORMONE: CPT

## 2018-04-16 PROCEDURE — 36415 COLL VENOUS BLD VENIPUNCTURE: CPT

## 2018-04-16 PROCEDURE — 80048 BASIC METABOLIC PNL TOTAL CA: CPT

## 2018-04-16 PROCEDURE — 83880 ASSAY OF NATRIURETIC PEPTIDE: CPT

## 2018-04-16 PROCEDURE — 93000 ELECTROCARDIOGRAM COMPLETE: CPT | Performed by: INTERNAL MEDICINE

## 2018-04-16 PROCEDURE — 85025 COMPLETE CBC W/AUTO DIFF WBC: CPT

## 2018-04-16 PROCEDURE — 99214 OFFICE O/P EST MOD 30 MIN: CPT | Performed by: INTERNAL MEDICINE

## 2018-04-16 PROCEDURE — 84439 ASSAY OF FREE THYROXINE: CPT

## 2018-04-16 RX ORDER — METHOCARBAMOL 500 MG/1
500 TABLET, FILM COATED ORAL EVERY 6 HOURS
COMMUNITY
Start: 2016-03-23 | End: 2018-04-16

## 2018-04-16 RX ORDER — ROSUVASTATIN CALCIUM 20 MG/1
20 TABLET, COATED ORAL DAILY
Qty: 90 TABLET | Refills: 3 | Status: SHIPPED | OUTPATIENT
Start: 2018-04-16 | End: 2018-04-17 | Stop reason: SDUPTHER

## 2018-04-16 RX ORDER — ACETAMINOPHEN 500 MG
500 TABLET ORAL EVERY 6 HOURS
COMMUNITY
Start: 2016-03-22

## 2018-04-16 RX ORDER — OMEPRAZOLE 20 MG/1
20 CAPSULE, DELAYED RELEASE ORAL DAILY
Qty: 90 CAPSULE | Refills: 3 | Status: SHIPPED | OUTPATIENT
Start: 2018-04-16 | End: 2018-04-17 | Stop reason: SDUPTHER

## 2018-04-16 RX ORDER — LEVOTHYROXINE SODIUM 0.07 MG/1
75 TABLET ORAL DAILY
Qty: 90 TABLET | Refills: 3 | Status: SHIPPED | OUTPATIENT
Start: 2018-04-16 | End: 2018-04-17 | Stop reason: SDUPTHER

## 2018-04-16 NOTE — PROGRESS NOTES
Assessment/Plan:       Diagnoses and all orders for this visit:    Gastroesophageal reflux disease, esophagitis presence not specified    Graves' disease    Tension headache    Other orders  -     aspirin 81 MG tablet; Take 1 tablet by mouth daily  -     Discontinue: celecoxib (CeleBREX) 200 mg capsule; Take 1 capsule by mouth daily  -     dexamethasone (DECADRON) 2 mg tablet; Take 1 tablet by mouth daily as needed  -     fluticasone (FLONASE) 50 mcg/act nasal spray; 2 sprays into each nostril daily  -     ketorolac (TORADOL) 10 mg tablet; Take by mouth  -     levothyroxine 75 mcg tablet; Take 1 tablet by mouth daily  -     ascorbic acid 500 mg/mL; Take 1 tablet by mouth daily  -     methocarbamol (ROBAXIN) 500 mg tablet; Take 1 tablet by mouth 3 (three) times a day as needed  -     omeprazole (PriLOSEC) 20 mg delayed release capsule; Take 1 capsule by mouth daily  -     conjugated estrogens (PREMARIN) vaginal cream; Insert into the vagina  -     Probiotic Product (PROBIOTIC-10 PO); Take by mouth  -     prochlorperazine (COMPAZINE) 10 mg tablet; Take by mouth  -     rosuvastatin (CRESTOR) 20 MG tablet; Take 1 tablet by mouth daily  -     Omega 3-6-9 Fatty Acids (TRIPLE OMEGA-3-6-9 PO); Take by mouth  -     acetaminophen (TYLENOL) 500 mg tablet; Take 500 mg by mouth every 6 (six) hours  -     Discontinue: methocarbamol (ROBAXIN) 500 mg tablet; Take 500 mg by mouth every 6 (six) hours          Subjective:      Patient ID: Akin Oviedo is a 70 y o  female  Follow-up visit of a 74-year-old very active woman  Chronic headache syndrome known for years  The only thing that works for her is Botox injections given at a 3 month interval   The message here is that she has muscle tension headache    History of nonmelanoma skin cancer    Osteoarthritis but quite functional   The patient's skis 100 days a year  Continuing to exercise 5 days a week        Mammogram up-to-date done yearly   Colonoscopy due this year in November  Followed by Dr Sommer Rizvi  The patient has essential hypertension  It is not high, but she has fought the diagnosis for years, refusing to be treated  She had an echocardiogram done in  documenting grade 1 diastolic dysfunction  Her blood pressure is high today, EKG shows changes suggesting LA 80  She needs to recheck the echo  The following portions of the patient's history were reviewed and updated as appropriate:   She has a past medical history of Arthritis; GERD (gastroesophageal reflux disease); Graves' disease; Hepatitis B virus infection; Migraine; and Nonmelanoma skin cancer  ,   does not have any pertinent problems on file  ,   has a past surgical history that includes Appendectomy; ARTHROSCOPY KNEE (Left); Temporal artery biopsy / ligation;  section; Total knee arthroplasty (Right, 2016); Eye surgery (Bilateral); and Tonsillectomy  ,  family history includes Cirrhosis in her paternal grandfather; Coronary artery disease in her father; Diabetes in her maternal grandmother; Heart attack in her maternal grandfather; Hyperlipidemia in her paternal grandfather; Pancreatic cancer in her mother  ,   reports that she has quit smoking  She has never used smokeless tobacco  She reports that she does not drink alcohol or use drugs  ,  is allergic to codeine and fentanyl     Current Outpatient Prescriptions   Medication Sig Dispense Refill    acetaminophen (TYLENOL) 500 mg tablet Take 500 mg by mouth every 6 (six) hours      aspirin 81 MG tablet Take 1 tablet by mouth daily      conjugated estrogens (PREMARIN) vaginal cream Insert into the vagina      dexamethasone (DECADRON) 2 mg tablet Take 1 tablet by mouth daily as needed      fluticasone (FLONASE) 50 mcg/act nasal spray 2 sprays into each nostril daily      ketorolac (TORADOL) 10 mg tablet Take by mouth      levothyroxine 75 mcg tablet Take 1 tablet by mouth daily      methocarbamol (ROBAXIN) 500 mg tablet Take 1 tablet by mouth 3 (three) times a day as needed      Omega 3-6-9 Fatty Acids (TRIPLE OMEGA-3-6-9 PO) Take by mouth      omeprazole (PriLOSEC) 20 mg delayed release capsule Take 1 capsule by mouth daily      Probiotic Product (PROBIOTIC-10 PO) Take by mouth      prochlorperazine (COMPAZINE) 10 mg tablet Take by mouth      rosuvastatin (CRESTOR) 20 MG tablet Take 1 tablet by mouth daily      ascorbic acid 500 mg/mL Take 1 tablet by mouth daily       No current facility-administered medications for this visit  Review of Systems   Constitutional: Negative for chills and fever  HENT: Negative for sore throat and trouble swallowing  Eyes: Negative for pain  Respiratory: Negative for cough, shortness of breath and wheezing  Gastrointestinal: Negative for abdominal pain, diarrhea, nausea and vomiting  Endocrine: Negative for cold intolerance and heat intolerance  Genitourinary: Negative for dysuria, frequency and pelvic pain  Musculoskeletal: Positive for arthralgias  Negative for joint swelling  Skin: Negative for rash and wound  Allergic/Immunologic: Negative for immunocompromised state  Neurological: Positive for headaches  Negative for dizziness, seizures and syncope  Psychiatric/Behavioral: Negative for dysphoric mood  The patient is not nervous/anxious  Objective:  Vitals:    04/16/18 1013   BP: 162/94   Pulse: 60   Resp: 18   SpO2: 99%      Physical Exam   Constitutional: She is oriented to person, place, and time  She appears well-developed and well-nourished  HENT:   Head: Normocephalic and atraumatic  Eyes: EOM are normal  Pupils are equal, round, and reactive to light  Neck: Normal range of motion  Neck supple  No tracheal deviation present  No thyromegaly present  Cardiovascular: Normal rate, regular rhythm and normal heart sounds  Exam reveals no gallop  No murmur heard  Pulmonary/Chest: No respiratory distress  She has no wheezes  She has no rales     Abdominal: Soft  Bowel sounds are normal  There is no tenderness  Musculoskeletal: Normal range of motion  She exhibits no tenderness or deformity  Neurological: She is alert and oriented to person, place, and time  Coordination normal    Skin: Skin is warm  Psychiatric: She has a normal mood and affect   Judgment normal

## 2018-04-17 ENCOUNTER — TELEPHONE (OUTPATIENT)
Dept: INTERNAL MEDICINE CLINIC | Facility: CLINIC | Age: 68
End: 2018-04-17

## 2018-04-17 DIAGNOSIS — E78.2 HYPERLIPIDEMIA, MIXED: ICD-10-CM

## 2018-04-17 DIAGNOSIS — E89.0 POSTABLATIVE HYPOTHYROIDISM: ICD-10-CM

## 2018-04-17 DIAGNOSIS — K21.00 REFLUX ESOPHAGITIS: ICD-10-CM

## 2018-04-17 RX ORDER — OMEPRAZOLE 20 MG/1
20 CAPSULE, DELAYED RELEASE ORAL DAILY
Qty: 90 CAPSULE | Refills: 0 | Status: SHIPPED | OUTPATIENT
Start: 2018-04-17 | End: 2018-04-18 | Stop reason: SDUPTHER

## 2018-04-17 RX ORDER — ROSUVASTATIN CALCIUM 20 MG/1
20 TABLET, COATED ORAL DAILY
Qty: 90 TABLET | Refills: 0 | Status: SHIPPED | OUTPATIENT
Start: 2018-04-17 | End: 2018-04-18 | Stop reason: SDUPTHER

## 2018-04-17 RX ORDER — LEVOTHYROXINE SODIUM 0.07 MG/1
75 TABLET ORAL DAILY
Qty: 90 TABLET | Refills: 0 | Status: SHIPPED | OUTPATIENT
Start: 2018-04-17 | End: 2018-04-18 | Stop reason: SDUPTHER

## 2018-04-17 NOTE — TELEPHONE ENCOUNTER
PATIENT WAS HERE TO SEE DR AND HER MEDS WERE REFILLED BUT SENT TO THE WRONG PHARMACY  SHE DOES NOT USE [facility]    SHE DOES THE   [facility] BY MAIL  THERE # is [phone]  PLEASE CORRECT THIS

## 2018-04-18 ENCOUNTER — PROCEDURE VISIT (OUTPATIENT)
Dept: NEUROLOGY | Facility: CLINIC | Age: 68
End: 2018-04-18
Payer: MEDICARE

## 2018-04-18 VITALS — SYSTOLIC BLOOD PRESSURE: 151 MMHG | TEMPERATURE: 98.5 F | DIASTOLIC BLOOD PRESSURE: 70 MMHG | HEART RATE: 61 BPM

## 2018-04-18 DIAGNOSIS — G43.709 CHRONIC MIGRAINE WITHOUT AURA WITHOUT STATUS MIGRAINOSUS, NOT INTRACTABLE: Primary | ICD-10-CM

## 2018-04-18 DIAGNOSIS — E89.0 POSTABLATIVE HYPOTHYROIDISM: ICD-10-CM

## 2018-04-18 DIAGNOSIS — K21.00 REFLUX ESOPHAGITIS: ICD-10-CM

## 2018-04-18 DIAGNOSIS — E78.2 HYPERLIPIDEMIA, MIXED: ICD-10-CM

## 2018-04-18 PROCEDURE — 64615 CHEMODENERV MUSC MIGRAINE: CPT | Performed by: PSYCHIATRY & NEUROLOGY

## 2018-04-18 RX ORDER — KETOROLAC TROMETHAMINE 10 MG/1
10 TABLET, FILM COATED ORAL 3 TIMES DAILY PRN
Qty: 10 TABLET | Refills: 3 | Status: SHIPPED | OUTPATIENT
Start: 2018-04-18 | End: 2018-10-19 | Stop reason: SDUPTHER

## 2018-04-18 RX ORDER — DEXAMETHASONE 2 MG/1
2 TABLET ORAL
Qty: 5 TABLET | Refills: 3 | Status: SHIPPED | OUTPATIENT
Start: 2018-04-18 | End: 2018-05-14 | Stop reason: SDUPTHER

## 2018-04-18 RX ORDER — METHOCARBAMOL 500 MG/1
500 TABLET, FILM COATED ORAL 3 TIMES DAILY
Qty: 270 TABLET | Refills: 3 | Status: SHIPPED | OUTPATIENT
Start: 2018-04-18 | End: 2018-10-19 | Stop reason: SDUPTHER

## 2018-04-18 RX ORDER — PROCHLORPERAZINE MALEATE 10 MG
10 TABLET ORAL EVERY 8 HOURS PRN
Qty: 10 TABLET | Refills: 0 | Status: SHIPPED | OUTPATIENT
Start: 2018-04-18 | End: 2018-07-20 | Stop reason: SDUPTHER

## 2018-04-18 NOTE — PROGRESS NOTES
Chemodenervation  Date/Time: 4/18/2018 11:13 AM  Performed by: Rosemary Elder by: Michelle Fortune details:     Prepped With: Alcohol    Anesthesia (see MAR for exact dosages): Anesthesia method:  None  Procedure details:     Position:  Upright  Botox:     Botox Type:  Type A    Brand:  Botox    mL's of Botulinum Toxin:  200    Final Concentration per CC:  200 units    Needle Gauge:  30 G 2 5 inch  Procedures:     Botox Procedures: chronic headache      Indications: migraines    Injection Location:     Head / Face:  L superior cervical paraspinal, R superior cervical paraspinal, L , R , L frontalis, R frontalis, L medial occipitalis, R medial occipitalis, procerus, R temporalis, L temporalis, R superior trapezius and L superior trapezius    L temporalis injection amount:  25 unit(s)    R temporalis injection amount:  25 unit(s)    L medial occipitalis injection amount:  20 unit(s)    R medial occipitalis injection amount:  20 unit(s)    L superior cervical paraspinal injection amount:  10 unit(s)    R superior cervical paraspinal injection amount:  10 unit(s)    L superior trapezius injection amount:  20 unit(s)    R superior trapezius injection amount:  20 unit(s)  Total Units:     Total units used:  200    Total units discarded:  0  Post-procedure details:     Chemodenervation:  Chronic migraine    Patient tolerance of procedure: Tolerated well, no immediate complications  Comments:      5 units in the TMJ bilaterally, 5 units in there [place] muscle bilaterally, 30 units on the apex of his head  This was medically necessary

## 2018-04-18 NOTE — TELEPHONE ENCOUNTER
Pt was here for an appt on 4/16 with dr Karrie Cormier, she has medications refilled  They were supposed to be sent to her mail order pharmacy [facility]  All meds were sent to [facility]  [facility] already called [facility] and canceled the scripts   Please re-send to [facility]     Any questions call pt   [phone]

## 2018-04-19 ENCOUNTER — HOSPITAL ENCOUNTER (OUTPATIENT)
Dept: NON INVASIVE DIAGNOSTICS | Facility: CLINIC | Age: 68
Discharge: HOME | End: 2018-04-19
Payer: MEDICARE

## 2018-04-19 DIAGNOSIS — I51.89 DIASTOLIC DYSFUNCTION: ICD-10-CM

## 2018-04-19 DIAGNOSIS — E78.2 HYPERLIPIDEMIA, MIXED: ICD-10-CM

## 2018-04-19 DIAGNOSIS — E89.0 POSTABLATIVE HYPOTHYROIDISM: ICD-10-CM

## 2018-04-19 DIAGNOSIS — K21.00 REFLUX ESOPHAGITIS: ICD-10-CM

## 2018-04-19 PROCEDURE — 93306 TTE W/DOPPLER COMPLETE: CPT | Performed by: INTERNAL MEDICINE

## 2018-04-19 PROCEDURE — 93306 TTE W/DOPPLER COMPLETE: CPT

## 2018-04-19 RX ORDER — ROSUVASTATIN CALCIUM 20 MG/1
20 TABLET, COATED ORAL DAILY
Qty: 90 TABLET | Refills: 3 | Status: SHIPPED | OUTPATIENT
Start: 2018-04-19 | End: 2018-11-13 | Stop reason: SDUPTHER

## 2018-04-19 RX ORDER — OMEPRAZOLE 20 MG/1
20 CAPSULE, DELAYED RELEASE ORAL DAILY
Qty: 90 CAPSULE | Refills: 3 | Status: SHIPPED | OUTPATIENT
Start: 2018-04-19 | End: 2018-11-26 | Stop reason: SDUPTHER

## 2018-04-19 RX ORDER — LEVOTHYROXINE SODIUM 0.07 MG/1
75 TABLET ORAL DAILY
Qty: 90 TABLET | Refills: 0 | Status: CANCELLED | OUTPATIENT
Start: 2018-04-19

## 2018-04-19 RX ORDER — OMEPRAZOLE 20 MG/1
20 CAPSULE, DELAYED RELEASE ORAL DAILY
Qty: 90 CAPSULE | Refills: 0 | Status: CANCELLED | OUTPATIENT
Start: 2018-04-19

## 2018-04-19 RX ORDER — ROSUVASTATIN CALCIUM 20 MG/1
20 TABLET, COATED ORAL DAILY
Qty: 90 TABLET | Refills: 0 | Status: CANCELLED | OUTPATIENT
Start: 2018-04-19

## 2018-04-19 RX ORDER — LEVOTHYROXINE SODIUM 0.07 MG/1
75 TABLET ORAL DAILY
Qty: 90 TABLET | Refills: 3 | Status: SHIPPED | OUTPATIENT
Start: 2018-04-19 | End: 2018-05-01 | Stop reason: SDUPTHER

## 2018-04-19 NOTE — TELEPHONE ENCOUNTER
Pt called back, left message on the script line stating that [facility] hasnt received any script requests as of today   She needs the following sent to [facility]     fluticasone 50mcg/act   Levothyroxine 75mcg   Omeprazole 20mg  Rosuvastatin 20mg     Any questions call pt   [phone]

## 2018-04-21 ENCOUNTER — TELEPHONE (OUTPATIENT)
Dept: INTERNAL MEDICINE CLINIC | Facility: CLINIC | Age: 68
End: 2018-04-21

## 2018-04-21 NOTE — TELEPHONE ENCOUNTER
----- Message from Carlton Nichols MD sent at 4/21/2018 10:47 AM EDT -----  Echocardiogram shows mild mitral valve prolapse, and diastolic dysfunction  This means that the heart is a little stiff due to the fact of high blood pressure  Requirement here is to get the blood pressure down as low as possible

## 2018-05-01 ENCOUNTER — OFFICE VISIT (OUTPATIENT)
Dept: INTERNAL MEDICINE CLINIC | Facility: CLINIC | Age: 68
End: 2018-05-01
Payer: MEDICARE

## 2018-05-01 VITALS
HEIGHT: 62 IN | RESPIRATION RATE: 18 BRPM | DIASTOLIC BLOOD PRESSURE: 90 MMHG | HEART RATE: 60 BPM | SYSTOLIC BLOOD PRESSURE: 132 MMHG | BODY MASS INDEX: 19.69 KG/M2 | OXYGEN SATURATION: 98 % | WEIGHT: 107 LBS

## 2018-05-01 DIAGNOSIS — I10 ESSENTIAL HYPERTENSION: Primary | ICD-10-CM

## 2018-05-01 DIAGNOSIS — E89.0 POSTABLATIVE HYPOTHYROIDISM: ICD-10-CM

## 2018-05-01 PROCEDURE — 99214 OFFICE O/P EST MOD 30 MIN: CPT | Performed by: PHYSICIAN ASSISTANT

## 2018-05-01 RX ORDER — LEVOTHYROXINE SODIUM 88 UG/1
88 TABLET ORAL DAILY
Qty: 90 TABLET | Refills: 3 | Status: SHIPPED | OUTPATIENT
Start: 2018-05-01 | End: 2018-05-01 | Stop reason: SDUPTHER

## 2018-05-01 RX ORDER — DILTIAZEM HYDROCHLORIDE 120 MG/1
120 CAPSULE, COATED, EXTENDED RELEASE ORAL DAILY
Qty: 30 CAPSULE | Refills: 0 | Status: SHIPPED | OUTPATIENT
Start: 2018-05-01 | End: 2018-05-31 | Stop reason: SDUPTHER

## 2018-05-01 RX ORDER — LEVOTHYROXINE SODIUM 88 UG/1
88 TABLET ORAL DAILY
Qty: 90 TABLET | Refills: 0 | Status: SHIPPED | OUTPATIENT
Start: 2018-05-01 | End: 2018-05-31 | Stop reason: SDUPTHER

## 2018-05-01 NOTE — PROGRESS NOTES
Assessment/Plan:  Increasing her thyroid slightly an adding diltiazem follow-up in a month I reviewed all of her labs and echo with her       Diagnoses and all orders for this visit:    Essential hypertension  -     diltiazem (CARDIZEM CD) 120 mg 24 hr capsule; Take 1 capsule (120 mg total) by mouth daily for 30 days    Postablative hypothyroidism  -     Discontinue: levothyroxine 88 mcg tablet; Take 1 tablet (88 mcg total) by mouth daily for 90 days  -     levothyroxine 88 mcg tablet; Take 1 tablet (88 mcg total) by mouth daily for 90 days        No problem-specific Assessment & Plan notes found for this encounter  Subjective:      Patient ID: Dimitris Knapp is a 70 y o  female  She is here for follow-up  Recent echo showed some minor abnormalities  She has a history of post ablated hypothyroidism  No palpitation weight loss  She is eating well normally active  Feeling slightly sluggish in just not herself over the past few months blood pressures have been persistently high for a couple of years  No chest pain or shortness of breath occasional migraines she manages this with Tylenol she is followed by Neurology and she has had Botox injections which have helped  She is intolerant of nonsteroidals chronic pain arthritis of her knee        The following portions of the patient's history were reviewed and updated as appropriate:   She has a past medical history of Arthritis; GERD (gastroesophageal reflux disease); Graves' disease; Hepatitis B virus infection; Migraine; and Nonmelanoma skin cancer  ,   does not have any pertinent problems on file  ,   has a past surgical history that includes Appendectomy; ARTHROSCOPY KNEE (Left); Temporal artery biopsy / ligation;  section; Total knee arthroplasty (Right, 2016); Eye surgery (Bilateral); and Tonsillectomy  ,  family history includes Cirrhosis in her paternal grandfather; Coronary artery disease in her father; Diabetes in her maternal grandmother; Heart attack in her maternal grandfather; Hyperlipidemia in her paternal grandfather; Pancreatic cancer in her mother  ,   reports that she has quit smoking  She has never used smokeless tobacco  She reports that she does not drink alcohol or use drugs  ,  is allergic to codeine; fentanyl; and methimazole     Current Outpatient Prescriptions   Medication Sig Dispense Refill    acetaminophen (TYLENOL) 500 mg tablet Take 500 mg by mouth every 6 (six) hours      aspirin 81 MG tablet Take 1 tablet by mouth daily      dexamethasone (DECADRON) 2 mg tablet Take 1 tablet (2 mg total) by mouth daily with breakfast 5 tablet 3    diclofenac sodium (VOLTAREN) 1 % Apply 4 g topically 4 (four) times a day 3 Tube 3    fluticasone (FLONASE) 50 mcg/act nasal spray 2 sprays into each nostril daily      ketorolac (TORADOL) 10 mg tablet Take 1 tablet (10 mg total) by mouth 3 (three) times a day as needed for moderate pain 10 tablet 3    levothyroxine 88 mcg tablet Take 1 tablet (88 mcg total) by mouth daily for 90 days 90 tablet 0    methocarbamol (ROBAXIN) 500 mg tablet Take 1 tablet (500 mg total) by mouth 3 (three) times a day 270 tablet 3    Omega 3-6-9 Fatty Acids (TRIPLE OMEGA-3-6-9 PO) Take by mouth      omeprazole (PriLOSEC) 20 mg delayed release capsule Take 1 capsule (20 mg total) by mouth daily 90 capsule 3    Probiotic Product (PROBIOTIC-10 PO) Take by mouth      prochlorperazine (COMPAZINE) 10 mg tablet Take 1 tablet (10 mg total) by mouth every 8 (eight) hours as needed for nausea or vomiting (headache and nausea) Limit 10/month 10 tablet 0    rosuvastatin (CRESTOR) 20 MG tablet Take 1 tablet (20 mg total) by mouth daily 90 tablet 3    ascorbic acid 500 mg/mL Take 1 tablet by mouth daily      cephalexin (KEFLEX) 500 mg capsule   0    conjugated estrogens (PREMARIN) vaginal cream Insert into the vagina      diltiazem (CARDIZEM CD) 120 mg 24 hr capsule Take 1 capsule (120 mg total) by mouth daily for 30 days 30 capsule 0     No current facility-administered medications for this visit  Review of Systems   Constitutional: Negative for activity change, appetite change, chills, diaphoresis, fatigue, fever and unexpected weight change  HENT: Negative for congestion, dental problem, drooling, ear discharge, ear pain, facial swelling, hearing loss, nosebleeds, postnasal drip, rhinorrhea, sinus pain, sinus pressure, sneezing, sore throat, tinnitus, trouble swallowing and voice change  Eyes: Negative for photophobia, pain, discharge, redness, itching and visual disturbance  Respiratory: Negative for apnea, cough, choking, chest tightness, shortness of breath, wheezing and stridor  Cardiovascular: Negative for chest pain, palpitations and leg swelling  Gastrointestinal: Negative for abdominal distention, abdominal pain, anal bleeding, blood in stool, constipation, diarrhea, nausea, rectal pain and vomiting  Endocrine: Negative for cold intolerance, heat intolerance, polydipsia, polyphagia and polyuria  Genitourinary: Negative for decreased urine volume, difficulty urinating, dysuria, enuresis, flank pain, frequency, genital sores, hematuria and urgency  Musculoskeletal: Negative for arthralgias, back pain, gait problem, joint swelling, myalgias, neck pain and neck stiffness  Skin: Negative for color change, pallor, rash and wound  Neurological: Negative for dizziness, tremors, seizures, syncope, facial asymmetry, speech difficulty, weakness, light-headedness, numbness and headaches  Hematological: Negative for adenopathy  Does not bruise/bleed easily  Psychiatric/Behavioral: Negative for agitation, behavioral problems, confusion, decreased concentration, dysphoric mood, hallucinations, self-injury, sleep disturbance and suicidal ideas  The patient is not nervous/anxious and is not hyperactive            Objective:  Vitals:    05/01/18 1046   BP: 132/90   Pulse: 60   Resp: 18   SpO2: 98% Weight: 48 5 kg (107 lb)   Height: 5' 2" (1 575 m)     Body mass index is 19 57 kg/m²  Physical Exam   Constitutional: She is oriented to person, place, and time  She appears well-developed  HENT:   Head: Normocephalic  Right Ear: External ear normal    Left Ear: External ear normal    Mouth/Throat: Oropharynx is clear and moist  No oropharyngeal exudate  Eyes: Conjunctivae are normal  Pupils are equal, round, and reactive to light  Neck: Neck supple  No thyromegaly present  Cardiovascular: Normal rate, regular rhythm, normal heart sounds and intact distal pulses  Pulmonary/Chest: Effort normal and breath sounds normal  No respiratory distress  She has no wheezes  She has no rales  Abdominal: Soft  Bowel sounds are normal  She exhibits no distension  There is no tenderness  There is no rebound  Musculoskeletal: Normal range of motion  Neurological: She is alert and oriented to person, place, and time  She has normal reflexes  She displays normal reflexes  No cranial nerve deficit  She exhibits normal muscle tone  Coordination normal    Skin: Skin is warm and dry

## 2018-05-14 ENCOUNTER — OFFICE VISIT (OUTPATIENT)
Dept: NEUROLOGY | Facility: CLINIC | Age: 68
End: 2018-05-14
Payer: MEDICARE

## 2018-05-14 VITALS
DIASTOLIC BLOOD PRESSURE: 84 MMHG | BODY MASS INDEX: 19.65 KG/M2 | HEART RATE: 67 BPM | HEIGHT: 62 IN | SYSTOLIC BLOOD PRESSURE: 139 MMHG | WEIGHT: 106.8 LBS

## 2018-05-14 DIAGNOSIS — I10 ESSENTIAL HYPERTENSION: ICD-10-CM

## 2018-05-14 DIAGNOSIS — G44.209 TENSION HEADACHE: ICD-10-CM

## 2018-05-14 DIAGNOSIS — E89.0 POSTABLATIVE HYPOTHYROIDISM: ICD-10-CM

## 2018-05-14 DIAGNOSIS — G43.709 CHRONIC MIGRAINE WITHOUT AURA WITHOUT STATUS MIGRAINOSUS, NOT INTRACTABLE: Primary | ICD-10-CM

## 2018-05-14 PROCEDURE — 96372 THER/PROPH/DIAG INJ SC/IM: CPT | Performed by: PHYSICIAN ASSISTANT

## 2018-05-14 PROCEDURE — 99214 OFFICE O/P EST MOD 30 MIN: CPT | Performed by: PHYSICIAN ASSISTANT

## 2018-05-14 RX ORDER — DIVALPROEX SODIUM 250 MG/1
250 TABLET, EXTENDED RELEASE ORAL
Qty: 8 TABLET | Refills: 0 | Status: SHIPPED | OUTPATIENT
Start: 2018-05-14 | End: 2018-11-13 | Stop reason: ALTCHOICE

## 2018-05-14 RX ORDER — KETOROLAC TROMETHAMINE 30 MG/ML
60 INJECTION, SOLUTION INTRAMUSCULAR; INTRAVENOUS ONCE
Status: COMPLETED | OUTPATIENT
Start: 2018-05-14 | End: 2018-05-14

## 2018-05-14 RX ORDER — DEXAMETHASONE 2 MG/1
2 TABLET ORAL
Qty: 5 TABLET | Refills: 0 | Status: SHIPPED | OUTPATIENT
Start: 2018-05-14 | End: 2018-10-19 | Stop reason: SDUPTHER

## 2018-05-14 RX ADMIN — KETOROLAC TROMETHAMINE 60 MG: 30 INJECTION, SOLUTION INTRAMUSCULAR; INTRAVENOUS at 11:24

## 2018-05-14 NOTE — PATIENT INSTRUCTIONS
Continue Botox every 3 months  Continue Diltiazem     Abortive:   We will start Decadron 2 mg for 5 days  Depakote 250 mg, 2 tabs for 3 nights and 1 tab for 2 nights  Toradol 60 mg IM given in office today, do not take again until this evening  You may use the Toradol as needed for migraines  Continue Robaxin as needed for migraines  Continue Prochlorperazine as needed for migraines  Call Monday if no improvement, we may need to add a medication daily until your next Botox  Please limit Tylenol to less than 3 doses a week

## 2018-05-14 NOTE — PROGRESS NOTES
Patient ID: Dione Workman is a 70 y o  female  Assessment/Plan:    No problem-specific Assessment & Plan notes found for this encounter  [facility]}       Subjective:    HPI  We had the pleasure of evaluating Dione Workman in neurological follow up today  As you know she is a 70year old right handed female  She is a retired anesthesia RN and is here today for evaluation of her headaches  She notes that she continues to do well with the Botox injections  She states that she tried to go 5 months in between injections but this did not go well  She states she had a 2 week migraine after trying to do that  She states she took Toradol and Robaxin for this and a few days later when the headache persisted, she took Decadron 2 mg for 3 days and that relieved the headache  She notes she has recently had issues with her teeth and needed a root canal and an extraction  She also has follow-up tomorrow with [facility] [employer]  Chronic Migraine headaches:   What medications do you take or have you taken for your headaches? PREVENTIVE: amitriptyline, Gabapentin, venlafaxine, Tizanidine, atenolol, Voltaren gel, Methocarbamol  ABORTIVE: Toradol, aspirin, dexamethasone, Fioricet  Non-Medical/Alternative Treatments used in the past for headaches: PT    How often do the headaches occur  one a month  What time of the day do the headaches start  during the day  How long do the headaches last - it can last until she takes something for it  It will last for up to two weeks     Are you ever headache free - yes  Where are they located  Top and back of head, neck  What is the intensity of pain  it can get up 7-8/10   Any warning prior to headache and how long do they last - sparkles in her peripheral vision intermittently  Describe your usual headache - Throbbing, Pounding  Associated symptoms:   - Decrease of appetite, nausea  - Photophobia, phonophobia  - Problem with concentration  - prefer to be alone and in a dark room, unable to work  Headache trigger: smells, weather, lack of sleep    {[facility] HPI texts:37688}    {Common ambulatory SmartLinks:19399}         Objective: There were no vitals taken for this visit      Physical Exam    Neurological Exam      ROS:    Review of Systems

## 2018-05-14 NOTE — ASSESSMENT & PLAN NOTE
Continue Botox every 3 months  Continue Diltiazem     Abortive:   We will start Decadron 2 mg for 5 days  Depakote 250 mg, 2 tabs for 3 nights and 1 tab for 2 nights  Toradol 60 mg IM given in office today, do not take again until this evening  You may use the Toradol as needed for migraines  Continue Robaxin as needed for migraines  Prochlorperazine as needed for migraines  Call Monday if no improvement  Please limit Tylenol to less than 3 doses a week

## 2018-05-14 NOTE — PROGRESS NOTES
Patient ID: Susan Kumar is a 70 y o  female  Assessment/Plan:    Chronic migraine without aura without status migrainosus, not intractable  Continue Botox every 3 months  Continue Diltiazem     Abortive: We will start Decadron 2 mg for 5 days  Depakote 250 mg, 2 tabs for 3 nights and 1 tab for 2 nights  Toradol 60 mg IM given in office today, do not take again until this evening  You may use the Toradol as needed for migraines  Continue Robaxin as needed for migraines  Prochlorperazine as needed for migraines  Call Monday if no improvement  Please limit Tylenol to less than 3 doses a week         Problem List Items Addressed This Visit        Endocrine    Postablative hypothyroidism    Relevant Medications    dexamethasone (DECADRON) 2 mg tablet       Cardiovascular and Mediastinum    Essential hypertension    Chronic migraine without aura without status migrainosus, not intractable - Primary     Continue Botox every 3 months  Continue Diltiazem     Abortive: We will start Decadron 2 mg for 5 days  Depakote 250 mg, 2 tabs for 3 nights and 1 tab for 2 nights  Toradol 60 mg IM given in office today, do not take again until this evening  You may use the Toradol as needed for migraines  Continue Robaxin as needed for migraines  Prochlorperazine as needed for migraines  Call Monday if no improvement  Please limit Tylenol to less than 3 doses a week         Relevant Medications    divalproex sodium (DEPAKOTE ER) 250 mg 24 hr tablet    dexamethasone (DECADRON) 2 mg tablet    ketorolac (TORADOL) 60 mg/2 mL IM injection 60 mg       Other    Tension headache    Relevant Medications    divalproex sodium (DEPAKOTE ER) 250 mg 24 hr tablet    ketorolac (TORADOL) 60 mg/2 mL IM injection 60 mg             Subjective:    HPI  We had the pleasure of evaluating Susan Kumar in neurological follow up today  As you know she is a 70year old right handed female   She is a retired anesthesia RN and is here today for evaluation of her headaches  She notes that she continues to do well with the Botox injections  She states that she tried to go 5 months in between injections but this did not go well  She reports that her headaches have not improved since she had her last injection  Chronic Migraine headaches:   What medications do you take or have you taken for your headaches? PREVENTIVE: amitriptyline, Gabapentin, venlafaxine, Tizanidine, atenolol, Voltaren gel, Methocarbamol, Diltiazem  ABORTIVE: Toradol, aspirin, dexamethasone, Fioricet, Compazine, Tylenol-4-6 pills daily  Non-Medical/Alternative Treatments used in the past for headaches: PT     How often do the headaches occur - daily because didn't receive Botox on time  What time of the day do the headaches start - during the day  How long do the headaches last - it can last until she takes something for it  Are you ever headache free - maybe 1 hour during day  Where are they located - Top and back of head, neck  What is the intensity of pain - it can get up 8-9/10, average 2-4/10   Any warning prior to headache and how long do they last - sparkles in her peripheral vision intermittently  Describe your usual headache - Throbbing, Pounding  Associated symptoms:   -       Decrease of appetite, nausea  -       Photophobia, phonophobia  -       Problem with concentration  - Dizziness  - Tinnitus  -       prefer to be alone and in a dark room, unable to work  Headache trigger: smells, weather, lack of sleep      The following portions of the patient's history were reviewed and updated as appropriate: allergies, current medications, past family history, past medical history, past social history, past surgical history and problem list          Objective:    Blood pressure 139/84, pulse 67, height 5' 2" (1 575 m), weight 48 4 kg (106 lb 12 8 oz)  Physical Exam   Constitutional: She appears well-developed and well-nourished  HENT:   Head: Normocephalic and atraumatic     Eyes: EOM are normal  Pupils are equal, round, and reactive to light  Neck: Normal range of motion  Cardiovascular: Normal rate  Pulmonary/Chest: Effort normal    Musculoskeletal: Normal range of motion  Neurological: She has normal strength and normal reflexes  Gait and coordination normal    Skin: Skin is warm and dry  Psychiatric: She has a normal mood and affect  Her speech is normal    Nursing note and vitals reviewed  Neurological Exam    Mental Status  The patient is alert and oriented to person, place, time, and situation  Her recent and remote memory are normal  She has no visuospatial neglect  Her speech is normal  Her language is fluent with no aphasia  She has normal attention span and concentration  She has a normal fund of knowledge  Cranial Nerves    CN II: The patient's visual acuity and visual fields are normal   CN III, IV, VI: The patient's pupils are equally round and reactive to light and ocular movements are normal   CN V: The patient has normal facial sensation  CN VII:  The patient has symmetric facial movement  CN VIII:  The patient's hearing is normal   CN IX, X: The patient has symmetric palate movement and normal gag reflex  CN XI: The patient's shoulder shrug strength is normal   CN XII: The patient's tongue is midline without atrophy or fasciculations  Motor  The patient has normal muscle bulk throughout  Her overall muscle tone is normal throughout  Her strength is 5/5 throughout all four extremities  Sensory  The patient's sensation is normal in all four extremities  She has normal cortical sensation    Reflexes  Deep tendon reflexes are 2+ and symmetric in all four extremities with downgoing toes bilaterally  Gait and Coordination  The patient has normal gait and station and normal casual, toe, heel, and tandem gait  She has normal coordination bilaterally  ROS:    Review of Systems   Constitutional: Negative  HENT: Positive for tinnitus      Eyes: Positive for photophobia  Respiratory: Negative  Cardiovascular: Negative  Gastrointestinal: Negative  Endocrine: Negative  Genitourinary: Negative  Musculoskeletal: Positive for neck pain  Skin: Negative  Allergic/Immunologic: Negative  Neurological: Positive for dizziness and headaches  Hematological: Negative  Psychiatric/Behavioral: Positive for sleep disturbance

## 2018-05-17 ENCOUNTER — DOCUMENTATION (OUTPATIENT)
Dept: NEUROLOGY | Facility: CLINIC | Age: 68
End: 2018-05-17

## 2018-05-17 NOTE — PROGRESS NOTES
Decadron rx was printed at pt appt and was never handed to pt  Rx was printed and voided  I did call in rx to her [facility] listed in the EHR

## 2018-05-21 ENCOUNTER — TELEPHONE (OUTPATIENT)
Dept: NEUROLOGY | Facility: CLINIC | Age: 68
End: 2018-05-21

## 2018-05-21 NOTE — TELEPHONE ENCOUNTER
Patient states she completed her 5 days of decadron and depakote  States she felt a little bit better on Friday but once she was out of Rxs, she felt "like crap again" Saturday  Currently migraine 4/10 with vertigo and nausea  Patient states she is looking for alternative    [phone]

## 2018-05-22 DIAGNOSIS — G43.709 CHRONIC MIGRAINE WITHOUT AURA WITHOUT STATUS MIGRAINOSUS, NOT INTRACTABLE: Primary | ICD-10-CM

## 2018-05-22 NOTE — TELEPHONE ENCOUNTER
Pt made aware  She states that she has been taking robaxin and compazine  She takes them off and on  She took a robaxin this am at 8am   She will take compazine now  Currently 4/10, worse in the afternoon  +nausea, vertigo

## 2018-05-22 NOTE — TELEPHONE ENCOUNTER
She should also call her PCP as they recently (5/1/18) started her on Diltiazem which can cause these side effects    She needs to be on a blood pressure medication but she should discuss with them as well

## 2018-05-22 NOTE — TELEPHONE ENCOUNTER
Called and spoke to pt, she states she had these sx prior to starting new bp med so she does not believe the sx are related   She does have an greg with her PCP 5/30 so she will discuss it then

## 2018-05-31 ENCOUNTER — OFFICE VISIT (OUTPATIENT)
Dept: INTERNAL MEDICINE CLINIC | Facility: CLINIC | Age: 68
End: 2018-05-31
Payer: MEDICARE

## 2018-05-31 VITALS
SYSTOLIC BLOOD PRESSURE: 138 MMHG | DIASTOLIC BLOOD PRESSURE: 78 MMHG | HEART RATE: 72 BPM | OXYGEN SATURATION: 95 % | BODY MASS INDEX: 19.73 KG/M2 | WEIGHT: 107.2 LBS | HEIGHT: 62 IN

## 2018-05-31 DIAGNOSIS — E89.0 POSTABLATIVE HYPOTHYROIDISM: ICD-10-CM

## 2018-05-31 DIAGNOSIS — I10 ESSENTIAL HYPERTENSION: ICD-10-CM

## 2018-05-31 DIAGNOSIS — T78.40XA ALLERGIC STATE, INITIAL ENCOUNTER: Primary | ICD-10-CM

## 2018-05-31 PROCEDURE — 99213 OFFICE O/P EST LOW 20 MIN: CPT | Performed by: INTERNAL MEDICINE

## 2018-05-31 RX ORDER — FLUTICASONE PROPIONATE 50 MCG
2 SPRAY, SUSPENSION (ML) NASAL DAILY
Qty: 16 G | Refills: 0 | Status: SHIPPED | OUTPATIENT
Start: 2018-05-31 | End: 2018-11-13 | Stop reason: SDUPTHER

## 2018-05-31 RX ORDER — DILTIAZEM HYDROCHLORIDE 120 MG/1
120 CAPSULE, COATED, EXTENDED RELEASE ORAL DAILY
Qty: 30 CAPSULE | Refills: 0 | Status: SHIPPED | OUTPATIENT
Start: 2018-05-31 | End: 2018-05-31 | Stop reason: SDUPTHER

## 2018-05-31 RX ORDER — LEVOTHYROXINE SODIUM 88 UG/1
88 TABLET ORAL DAILY
Qty: 90 TABLET | Refills: 0 | Status: CANCELLED | OUTPATIENT
Start: 2018-05-31 | End: 2018-08-29

## 2018-05-31 RX ORDER — LEVOTHYROXINE SODIUM 88 UG/1
88 TABLET ORAL DAILY
Qty: 90 TABLET | Refills: 3 | Status: SHIPPED | OUTPATIENT
Start: 2018-05-31 | End: 2018-11-13 | Stop reason: SDUPTHER

## 2018-05-31 RX ORDER — DILTIAZEM HYDROCHLORIDE 120 MG/1
120 CAPSULE, COATED, EXTENDED RELEASE ORAL DAILY
Qty: 90 CAPSULE | Refills: 3 | Status: SHIPPED | OUTPATIENT
Start: 2018-05-31 | End: 2018-11-26

## 2018-05-31 NOTE — PROGRESS NOTES
Assessment/Plan:       Diagnoses and all orders for this visit:    Allergic state, initial encounter  -     fluticasone (FLONASE) 50 mcg/act nasal spray; 2 sprays into each nostril daily    Essential hypertension  -     diltiazem (CARDIZEM CD) 120 mg 24 hr capsule; Take 1 capsule (120 mg total) by mouth daily for 30 days    Postablative hypothyroidism    Other orders  -     Cancel: levothyroxine 88 mcg tablet; Take 1 tablet (88 mcg total) by mouth daily for 90 days              Subjective:      Patient ID: Alphonse Triana is a 70 y o  female  Essential hypertension and post ablated hypothyroidism    Seen here a couple of weeks ago  The patient had been having severe migraine and blood pressure was a bit up  Diltiazem was added to her regime  Blood pressure is now under good control  Headaches have persisted  For the past several days they have been better; whether or not this is due to diltiazem is unknown what we can be optimistic    Meanwhile, the patient was taking levothyroxine 75 mcg daily  History of radioactive iodine for Graves disease  TSH was a little bit up so the dose of the levothyroxine was increased to 88 mcg  She does not feel any different but does not want to bother with the blood testing today; I would can agree with that  I do not think it will make much of a difference  The following portions of the patient's history were reviewed and updated as appropriate:   She has a past medical history of Arthritis; GERD (gastroesophageal reflux disease); Graves' disease; Hepatitis B virus infection; Migraine; and Nonmelanoma skin cancer  ,   does not have any pertinent problems on file  ,   has a past surgical history that includes Appendectomy; ARTHROSCOPY KNEE (Left); Temporal artery biopsy / ligation;  section; Total knee arthroplasty (Right, 2016); Eye surgery (Bilateral); and Tonsillectomy  ,  family history includes Cirrhosis in her paternal grandfather; Coronary artery disease in her father; Diabetes in her maternal grandmother; Heart attack in her maternal grandfather; Hyperlipidemia in her paternal grandfather; Pancreatic cancer in her mother  ,   reports that she has quit smoking  She has never used smokeless tobacco  She reports that she does not drink alcohol or use drugs  ,  is allergic to codeine; fentanyl; and methimazole     Current Outpatient Prescriptions   Medication Sig Dispense Refill    acetaminophen (TYLENOL) 500 mg tablet Take 500 mg by mouth every 6 (six) hours      aspirin 81 MG tablet Take 1 tablet by mouth daily      dexamethasone (DECADRON) 2 mg tablet Take 1 tablet (2 mg total) by mouth daily with breakfast 5 tablet 0    diclofenac sodium (VOLTAREN) 1 % Apply 4 g topically 4 (four) times a day 3 Tube 3    diltiazem (CARDIZEM CD) 120 mg 24 hr capsule Take 1 capsule (120 mg total) by mouth daily for 30 days 30 capsule 0    divalproex sodium (DEPAKOTE ER) 250 mg 24 hr tablet Take 1 tablet (250 mg total) by mouth daily at bedtime 2 tablets at bedtime for 3 days then 1 tablet at bedtime for 2 days 8 tablet 0    fluticasone (FLONASE) 50 mcg/act nasal spray 2 sprays into each nostril daily      ketorolac (TORADOL) 10 mg tablet Take 1 tablet (10 mg total) by mouth 3 (three) times a day as needed for moderate pain (Patient taking differently: Take 10 mg by mouth as needed for moderate pain  ) 10 tablet 3    levothyroxine 88 mcg tablet Take 1 tablet (88 mcg total) by mouth daily for 90 days 90 tablet 0    methocarbamol (ROBAXIN) 500 mg tablet Take 1 tablet (500 mg total) by mouth 3 (three) times a day (Patient taking differently: Take 500 mg by mouth 2 (two) times a day as needed  ) 270 tablet 3    Omega 3-6-9 Fatty Acids (TRIPLE OMEGA-3-6-9 PO) Take by mouth      omeprazole (PriLOSEC) 20 mg delayed release capsule Take 1 capsule (20 mg total) by mouth daily 90 capsule 3    Probiotic Product (PROBIOTIC-10 PO) Take by mouth      prochlorperazine (COMPAZINE) 10 mg tablet Take 1 tablet (10 mg total) by mouth every 8 (eight) hours as needed for nausea or vomiting (headache and nausea) Limit 10/month 10 tablet 0    rosuvastatin (CRESTOR) 20 MG tablet Take 1 tablet (20 mg total) by mouth daily 90 tablet 3     No current facility-administered medications for this visit  Review of Systems   Respiratory: Negative for cough, chest tightness and wheezing  Cardiovascular: Negative for chest pain, palpitations and leg swelling  Neurological: Positive for headaches  Objective:  Vitals:    05/31/18 1009   BP: 138/78   Pulse: 72   SpO2: 95%      Physical Exam   Constitutional: No distress  A thin patient, approaching underweight, who nevertheless looks well  Appears stated age  No distress noted  HENT:   Head: Normocephalic  Pulmonary/Chest: Effort normal  No respiratory distress  Musculoskeletal: Normal range of motion  Neurological: She is alert  Psychiatric: She has a normal mood and affect   Judgment normal

## 2018-06-06 ENCOUNTER — HOSPITAL ENCOUNTER (OUTPATIENT)
Dept: MAMMOGRAPHY | Facility: MEDICAL CENTER | Age: 68
Discharge: HOME | End: 2018-06-06
Payer: MEDICARE

## 2018-06-06 DIAGNOSIS — Z12.31 ENCOUNTER FOR MAMMOGRAM TO ESTABLISH BASELINE MAMMOGRAM: ICD-10-CM

## 2018-06-06 PROCEDURE — 77063 BREAST TOMOSYNTHESIS BI: CPT

## 2018-06-06 PROCEDURE — 77067 SCR MAMMO BI INCL CAD: CPT

## 2018-07-16 NOTE — PROGRESS NOTES
Chemodenervation  Date/Time: 7/20/2018 11:18 AM  Performed by: Bentley Turcios  Authorized by: Elizabet Martinez details:     Prepped With: Alcohol    Anesthesia (see MAR for exact dosages): Anesthesia method:  None  Procedure details:     Position:  Upright  Botox:     Botox Type:  Type A    Brand:  Botox    mL's of Botulinum Toxin:  200    Final Concentration per CC:  200 units    Needle Gauge:  30 G 2 5 inch  Procedures:     Botox Procedures: chronic headache      Indications: migraines    Injection Location:     Head / Face:  L superior cervical paraspinal, R superior cervical paraspinal, L medial occipitalis, R medial occipitalis, R temporalis, L temporalis, R superior trapezius and L superior trapezius    L temporalis injection amount:  25 unit(s)    R temporalis injection amount:  25 unit(s)    L medial occipitalis injection amount:  20 unit(s)    R medial occipitalis injection amount:  20 unit(s)    L superior cervical paraspinal injection amount:  10 unit(s)    R superior cervical paraspinal injection amount:  10 unit(s)    L superior trapezius injection amount:  20 unit(s)    R superior trapezius injection amount:  20 unit(s)  Total Units:     Total units used:  200    Total units discarded:  0  Post-procedure details:     Chemodenervation:  Chronic migraine    Patient tolerance of procedure: Tolerated well, no immediate complications  Comments:      5 units TMJ bilaterally  5 units [place] bilaterally  30 units on the apex of his head  This was medically necessary

## 2018-07-20 ENCOUNTER — PROCEDURE VISIT (OUTPATIENT)
Dept: NEUROLOGY | Facility: CLINIC | Age: 68
End: 2018-07-20
Payer: MEDICARE

## 2018-07-20 VITALS — SYSTOLIC BLOOD PRESSURE: 135 MMHG | TEMPERATURE: 98 F | DIASTOLIC BLOOD PRESSURE: 67 MMHG | HEART RATE: 69 BPM

## 2018-07-20 DIAGNOSIS — G43.709 CHRONIC MIGRAINE WITHOUT AURA WITHOUT STATUS MIGRAINOSUS, NOT INTRACTABLE: Primary | ICD-10-CM

## 2018-07-20 PROCEDURE — 64615 CHEMODENERV MUSC MIGRAINE: CPT | Performed by: PHYSICIAN ASSISTANT

## 2018-07-20 RX ORDER — PROCHLORPERAZINE MALEATE 10 MG
10 TABLET ORAL EVERY 8 HOURS PRN
Qty: 10 TABLET | Refills: 0 | Status: SHIPPED | OUTPATIENT
Start: 2018-07-20 | End: 2018-10-19 | Stop reason: SDUPTHER

## 2018-10-09 ENCOUNTER — OFFICE VISIT (OUTPATIENT)
Dept: DERMATOLOGY | Facility: CLINIC | Age: 68
End: 2018-10-09
Payer: MEDICARE

## 2018-10-09 DIAGNOSIS — L82.1 SEBORRHEIC KERATOSIS: ICD-10-CM

## 2018-10-09 DIAGNOSIS — L98.9 UNKNOWN SKIN LESION: ICD-10-CM

## 2018-10-09 DIAGNOSIS — Z85.828 HISTORY OF SKIN CANCER: ICD-10-CM

## 2018-10-09 DIAGNOSIS — L57.0 ACTINIC KERATOSIS: Primary | ICD-10-CM

## 2018-10-09 DIAGNOSIS — L56.5 DSAP (DISSEMINATED SUPERFICIAL ACTINIC POROKERATOSIS): ICD-10-CM

## 2018-10-09 DIAGNOSIS — Z13.89 SCREENING FOR SKIN CONDITION: ICD-10-CM

## 2018-10-09 PROCEDURE — 88305 TISSUE EXAM BY PATHOLOGIST: CPT | Performed by: PATHOLOGY

## 2018-10-09 PROCEDURE — 99213 OFFICE O/P EST LOW 20 MIN: CPT | Performed by: DERMATOLOGY

## 2018-10-09 PROCEDURE — 17000 DESTRUCT PREMALG LESION: CPT | Performed by: DERMATOLOGY

## 2018-10-09 PROCEDURE — 11100 PR BIOPSY OF SKIN LESION: CPT | Performed by: DERMATOLOGY

## 2018-10-09 NOTE — PATIENT INSTRUCTIONS
Actinic Keratosis:  Patient advised lesions are precancers  These should resolve with cryosurgery if not to let us know sun block recommended  disseminated superficial actinic porokeratosis advised that this is a genetic condition which causes the dry scaling areas on the usually on the legs and arms no real treatment is  really helpful encourage continue sun protection   skin possible basal cell carcinoma will plan on further treatment pending results probably could be amenable to  Curettage  Seborrheic keratosis patient reassured these are normal growths we acquire with age no treatment needed  History of skin cancer in no recurrence nothing else atypical sunblock recommended follow-up in 1 year  Screening for dermatologic disorders nothing else of concern noted on complete exam follow-up in 1 year  Wound care instructions given to patient  Treatment with Cryotherapy    The doctor has treated your skin with nitrogen, which is 320 degrees Fahrenheit below zero  He has given the treated area "frostbite "    Stinging should subside within a few hours  You can take Tylenol for pain, if needed  Over the next few days, it is normal if the area becomes reddened, a blood blister, or swollen with fluid  If the lesion treated was near the eye - you could get a swollen eye over the next few days  Do not panic! This is all temporary, and will resolve with time  There is no special treatment - just keep the area clean  Makeup and BandAids can be used, if preferred  When the area starts to dry up and peel off, using Vaseline can help healing  It usually takes up to a month for it to heal   Some lesions are recurrent and may require repeat treatments  If a lesion has not healed in one month, please don't hesitate to contact us  If you have any further questions that are not answered here, please call us  [phone]    Thank you for allowing us to care for you

## 2018-10-09 NOTE — PROGRESS NOTES
[facility]  [address] [place] [address]  [phone]  [phone]     MRN: [id number] : 1950  Encounter: [id number]  Patient Information: Jo Ann Winter  Chief complaint:Irritation on legs growth on left shoulder and overall skin check    History of present illness:  70-year-old female who had not seen for year and a half presents for overall checkup patient with several concerns a lesion on her left shoulder that she has noticed for awhile also concerned regarding a rash and irritation on lower leg patient previously treated with 5% fluorouracil with great results on her face  Past Medical History:   Diagnosis Date    Arthritis     GERD (gastroesophageal reflux disease)     Graves' disease     TREATED WITH RADIOACTIVE IODINE ON 13 (10 7 [facility]) RESOLVED:     Hepatitis B virus infection     Migraine     RECEIVED BOTOX INJECTIONS Q 3 MONS BY DR Matilde Alaniz    Nonmelanoma skin cancer     LAST ASSESSED:      Past Surgical History:   Procedure Laterality Date    APPENDECTOMY      ARTHROSCOPY KNEE Left      SECTION      EYE SURGERY Bilateral     SURGERY OF THE ORBITS - ORBITAL DECOMPRESSION    TEMPORAL ARTERY BIOPSY / LIGATION      TONSILLECTOMY      TOTAL KNEE ARTHROPLASTY Right 2016    TKR     Social History   History   Alcohol Use No     Comment: (HISTORY)     History   Drug Use No     History   Smoking Status    Former Smoker   Smokeless Tobacco    Never Used     Family History   Problem Relation Age of Onset    Pancreatic cancer Mother     Coronary artery disease Father     Diabetes Maternal Grandmother     Heart attack Maternal Grandfather         ACUTE MI    Hyperlipidemia Paternal Grandfather     Cirrhosis Paternal Grandfather      Meds/Allergies   Allergies   Allergen Reactions    Codeine GI Intolerance     Other reaction(s): Nausea and/or vomiting    Fentanyl      Action Taken: vomiting; Category: Adverse Reaction;     Methimazole        Meds:  Prior to Admission medications    Medication Sig Start Date End Date Taking?  Authorizing Provider   acetaminophen (TYLENOL) 500 mg tablet Take 500 mg by mouth every 6 (six) hours 3/22/16  Yes Historical Provider, MD   aspirin 81 MG tablet Take 1 tablet by mouth daily   Yes Historical Provider, MD   dexamethasone (DECADRON) 2 mg tablet Take 1 tablet (2 mg total) by mouth daily with breakfast 5/14/18  Yes Major Fairbanks PA-C   diclofenac sodium (VOLTAREN) 1 % Apply 4 g topically 4 (four) times a day 4/18/18  Yes Tiffany Montanez MD   diltiazem (CARDIZEM) 30 mg tablet Take 30 mg by mouth daily   Yes Historical Provider, MD   fluticasone (FLONASE) 50 mcg/act nasal spray 2 sprays into each nostril daily 5/31/18  Yes Shana Vaughan MD   ketorolac (TORADOL) 10 mg tablet Take 1 tablet (10 mg total) by mouth 3 (three) times a day as needed for moderate pain  Patient taking differently: Take 10 mg by mouth as needed for moderate pain   4/18/18  Yes Tiffany Montanez MD   methocarbamol (ROBAXIN) 500 mg tablet Take 1 tablet (500 mg total) by mouth 3 (three) times a day  Patient taking differently: Take 500 mg by mouth 2 (two) times a day as needed   4/18/18  Yes Tiffany Montanez MD   Omega 3-6-9 Fatty Acids (TRIPLE OMEGA-3-6-9 PO) Take by mouth   Yes Historical Provider, MD   omeprazole (PriLOSEC) 20 mg delayed release capsule Take 1 capsule (20 mg total) by mouth daily 4/19/18  Yes Shana Vaughan MD   Probiotic Product (PROBIOTIC-10 PO) Take by mouth   Yes Historical Provider, MD   prochlorperazine (COMPAZINE) 10 mg tablet Take 1 tablet (10 mg total) by mouth every 8 (eight) hours as needed for nausea or vomiting (headache and nausea) Limit 10/month 7/20/18  Yes Major Fairbanks PA-C   rosuvastatin (CRESTOR) 20 MG tablet Take 1 tablet (20 mg total) by mouth daily 4/19/18  Yes Shana Vaughan MD   diltiazem (CARDIZEM CD) 120 mg 24 hr capsule Take 1 capsule (120 mg total) by mouth daily for 30 days 5/31/18 6/30/18  Vimal Gonzalez MD   divalproex sodium (DEPAKOTE ER) 250 mg 24 hr tablet Take 1 tablet (250 mg total) by mouth daily at bedtime 2 tablets at bedtime for 3 days then 1 tablet at bedtime for 2 days  Patient not taking: Reported on 10/9/2018  5/14/18   Jerrica Ingram PA-C   levothyroxine 88 mcg tablet Take 1 tablet (88 mcg total) by mouth daily for 90 days 5/31/18 8/29/18  Vimal Gonzalez MD       Subjective:     Review of Systems:    General: negative for - chills, fatigue, fever,  weight gain or weight loss  Psychological: negative for - anxiety, behavioral disorder, concentration difficulties, decreased libido, depression, irritability, memory difficulties, mood swings, sleep disturbances or suicidal ideation  ENT: negative for - hearing difficulties , nasal congestion, nasal discharge, oral lesions, sinus pain, sneezing, sore throat  Allergy and Immunology: negative for - hives, insect bite sensitivity,  Hematological and Lymphatic: negative for - bleeding problems, blood clots,bruising, swollen lymph nodes  Endocrine: negative for - hair pattern changes, hot flashes, malaise/lethargy, mood swings, palpitations, polydipsia/polyuria, skin changes, temperature intolerance or unexpected weight change  Respiratory: negative for - cough, hemoptysis, orthopnea, shortness of breath, or wheezing  Cardiovascular: negative for - chest pain, dyspnea on exertion, edema,  Gastrointestinal: negative for - abdominal pain, nausea/vomiting  Genito-Urinary: negative for - dysuria, incontinence, irregular/heavy menses or urinary frequency/urgency  Musculoskeletal: negative for - gait disturbance, joint pain, joint stiffness, joint swelling, muscle pain, muscular weakness  Dermatological:  As in HPI  Neurological: negative for confusion, dizziness, headaches, impaired coordination/balance, memory loss, numbness/tingling, seizures, speech problems, tremors or weakness       Objective:    There were no vitals taken for this visit  Physical Exam:    General Appearance:    Alert, cooperative, no distress   Head:    Normocephalic, without obvious abnormality, atraumatic           Skin:   A full skin exam was performed including scalp, head scalp, eyes, ears, nose, lips, neck, chest, axilla, abdomen, back, buttocks, bilateral upper extremities, bilateral lower extremities, hands, feet, fingers, toes, fingernails, and toenails  5 mm pearly papule left posterior shoulder scaling area noted on the mid back platelet scaling areas noted on the lower legs normal keratotic papules with greasy stuck on appearance previous sites of skin cancer well healed without recurrence      Shave Biopsy Procedure Note    Pre-operative Diagnosis: rule out basal cell carcinoma    Plan:  1  Instructed to keep the wound dry and covered for 24 and clean thereafter  2  Warning signs of infection were reviewed  3  Recommended that the patient use OTC acetaminophen as needed for pain  4  Return  Pending results of biopsy(ies)    Locations left posterior    Indications:  Suspicious lesion    Anesthesia: Lidocaine 1% without epinephrine without added sodium bicarbonate    Procedure Details     Patient informed of the risks (including bleeding and infection) and benefits of the   procedure and Verbal informed consent obtained  The lesion and surrounding area were given a sterile prep using alcohol and draped in the usual sterile fashion  A Blue blade razor was used to obtain a specimen  Hemostasis achieved with aluminum chloride  Petrolatum and a sterile dressing applied  The specimen was sent for pathologic examination  The patient tolerated the procedure(s) well  Complications:  none  Cryotherapy Procedure Note    Pre-operative Diagnosis: actinic keratosis    Plan:  1  Instructed to keep the area dry and clean thereafter  Apply petrolatum if area gets crusty        2  Recommended that the patient use acetaminophen  as needed for pain  Locations:  Mid back    Indications: Destruction of  Actinic keratosis x1    Patient informed of risks (permanent scarring, infection, light or dark discoloration, bleeding, infection, weakness, numbness and recurrence of the lesion) and benefits of the procedure and verbal informed consent obtained  The areas are treated with liquid nitrogen therapy, frozen until ice ball extended 2 mm beyond lesion, allowed to thaw, and treated again  The patient tolerated procedure well  The patient was instructed on post-op care, warned that there may be blister formation, redness and pain  Recommend OTC analgesia as needed for pain  Condition:  Stable    Complications:  none  Assessment:     1  Actinic keratosis     2  Seborrheic keratosis     3  Screening for skin condition     4  History of skin cancer     5  DSAP (disseminated superficial actinic porokeratosis)     6  Unknown skin lesion           Plan:   Actinic Keratosis:  Patient advised lesions are precancers  These should resolve with cryosurgery if not to let us know sun block recommended     disseminated superficial actinic porokeratosis advised that this is a genetic condition which causes the dry scaling areas on the usually on the legs and arms no real treatment is  really helpful encourage continue sun protection   skin possible basal cell carcinoma will plan on further treatment pending results probably could be amenable to  Curettage  Seborrheic keratosis patient reassured these are normal growths we acquire with age no treatment needed  History of skin cancer in no recurrence nothing else atypical sunblock recommended follow-up in 1 year  Screening for dermatologic disorders nothing else of concern noted on complete exam follow-up in 1 year      Jennifer Sandhu MD  10/9/2018,1:53 PM    Portions of the record may have been created with voice recognition software   Occasional wrong word or "sound a like" substitutions may have occurred due to the inherent limitations of voice recognition software   Read the chart carefully and recognize, using context, where substitutions have occurred

## 2018-10-16 ENCOUNTER — TELEPHONE (OUTPATIENT)
Dept: DERMATOLOGY | Facility: CLINIC | Age: 68
End: 2018-10-16

## 2018-10-16 NOTE — TELEPHONE ENCOUNTER
----- Message from Duong Najera MD sent at 10/15/2018 12:49 PM EDT -----  Please call her of normal pathology

## 2018-10-19 ENCOUNTER — PROCEDURE VISIT (OUTPATIENT)
Dept: NEUROLOGY | Facility: CLINIC | Age: 68
End: 2018-10-19
Payer: MEDICARE

## 2018-10-19 VITALS — HEART RATE: 70 BPM | TEMPERATURE: 98.2 F | DIASTOLIC BLOOD PRESSURE: 63 MMHG | SYSTOLIC BLOOD PRESSURE: 140 MMHG

## 2018-10-19 DIAGNOSIS — G43.709 CHRONIC MIGRAINE WITHOUT AURA WITHOUT STATUS MIGRAINOSUS, NOT INTRACTABLE: Primary | ICD-10-CM

## 2018-10-19 PROCEDURE — 64615 CHEMODENERV MUSC MIGRAINE: CPT | Performed by: PHYSICIAN ASSISTANT

## 2018-10-19 RX ORDER — DEXAMETHASONE 2 MG/1
2 TABLET ORAL
Qty: 5 TABLET | Refills: 0 | Status: SHIPPED | OUTPATIENT
Start: 2018-10-19 | End: 2019-01-14

## 2018-10-19 RX ORDER — PROCHLORPERAZINE MALEATE 10 MG
10 TABLET ORAL EVERY 8 HOURS PRN
Qty: 10 TABLET | Refills: 0 | Status: SHIPPED | OUTPATIENT
Start: 2018-10-19 | End: 2019-01-14 | Stop reason: SDUPTHER

## 2018-10-19 RX ORDER — KETOROLAC TROMETHAMINE 10 MG/1
10 TABLET, FILM COATED ORAL AS NEEDED
Qty: 10 TABLET | Refills: 0 | Status: SHIPPED | OUTPATIENT
Start: 2018-10-19 | End: 2019-01-28 | Stop reason: SDUPTHER

## 2018-10-19 RX ORDER — METHOCARBAMOL 500 MG/1
500 TABLET, FILM COATED ORAL 3 TIMES DAILY
Qty: 270 TABLET | Refills: 0 | Status: SHIPPED | OUTPATIENT
Start: 2018-10-19 | End: 2019-01-28 | Stop reason: SDUPTHER

## 2018-10-19 NOTE — PROGRESS NOTES
Chemodenervation  Date/Time: 10/19/2018 10:12 AM  Performed by: Chas Garcia by: Sonal Canales details:     Preparation: Patient was prepped and draped in usual sterile fashion      Prepped With: Alcohol    Anesthesia (see MAR for exact dosages): Anesthesia method:  None  Procedure details:     Position:  Upright  Botox:     Botox Type:  Type A    Brand:  Botox    mL's of Botulinum Toxin:  200    Final Concentration per CC:  200 units    Needle Gauge:  30 G 2 5 inch  Procedures:     Botox Procedures: chronic headache      Indications: migraines    Injection Location:     Head / Face:  L superior cervical paraspinal, R superior cervical paraspinal, R medial occipitalis, L medial occipitalis, L temporalis, R temporalis, L superior trapezius and R superior trapezius    L temporalis injection amount:  25 unit(s)    R temporalis injection amount:  25 unit(s)    L medial occipitalis injection amount:  20 unit(s)    R medial occipitalis injection amount:  20 unit(s)    L superior cervical paraspinal injection amount:  10 unit(s)    R superior cervical paraspinal injection amount:  10 unit(s)    L superior trapezius injection amount:  20 unit(s)    R superior trapezius injection amount:  20 unit(s)  Total Units:     Total units used:  200    Total units discarded:  0  Post-procedure details:     Chemodenervation:  Chronic migraine    Facial Nerve Location[de-identified]  Bilateral facial nerve    Patient tolerance of procedure: Tolerated well, no immediate complications  Comments:      5 units TMJ bilaterally  5 units [place] bilaterally  30 units on the apex of his head  All medically necessary

## 2018-11-13 ENCOUNTER — APPOINTMENT (OUTPATIENT)
Dept: LAB | Facility: CLINIC | Age: 68
End: 2018-11-13
Payer: MEDICARE

## 2018-11-13 ENCOUNTER — OFFICE VISIT (OUTPATIENT)
Dept: INTERNAL MEDICINE CLINIC | Facility: CLINIC | Age: 68
End: 2018-11-13
Payer: MEDICARE

## 2018-11-13 VITALS
WEIGHT: 103.8 LBS | HEART RATE: 63 BPM | HEIGHT: 62 IN | BODY MASS INDEX: 19.1 KG/M2 | OXYGEN SATURATION: 97 % | SYSTOLIC BLOOD PRESSURE: 134 MMHG | DIASTOLIC BLOOD PRESSURE: 78 MMHG

## 2018-11-13 DIAGNOSIS — E78.2 HYPERLIPIDEMIA, MIXED: ICD-10-CM

## 2018-11-13 DIAGNOSIS — G43.009 MIGRAINE WITHOUT AURA AND WITHOUT STATUS MIGRAINOSUS, NOT INTRACTABLE: ICD-10-CM

## 2018-11-13 DIAGNOSIS — R11.0 NAUSEA: ICD-10-CM

## 2018-11-13 DIAGNOSIS — E89.0 POSTABLATIVE HYPOTHYROIDISM: ICD-10-CM

## 2018-11-13 DIAGNOSIS — E78.2 MIXED HYPERLIPIDEMIA: ICD-10-CM

## 2018-11-13 DIAGNOSIS — T78.40XA ALLERGIC STATE, INITIAL ENCOUNTER: ICD-10-CM

## 2018-11-13 DIAGNOSIS — G44.209 TENSION HEADACHE: ICD-10-CM

## 2018-11-13 DIAGNOSIS — I10 ESSENTIAL HYPERTENSION: ICD-10-CM

## 2018-11-13 DIAGNOSIS — G43.709 CHRONIC MIGRAINE WITHOUT AURA WITHOUT STATUS MIGRAINOSUS, NOT INTRACTABLE: ICD-10-CM

## 2018-11-13 DIAGNOSIS — M19.90 ARTHRITIS: Primary | ICD-10-CM

## 2018-11-13 LAB
ALBUMIN SERPL BCP-MCNC: 3.8 G/DL (ref 3.5–5)
ALP SERPL-CCNC: 98 U/L (ref 46–116)
ALT SERPL W P-5'-P-CCNC: 37 U/L (ref 12–78)
AMYLASE SERPL-CCNC: 51 IU/L (ref 25–115)
ANION GAP SERPL CALCULATED.3IONS-SCNC: 3 MMOL/L (ref 4–13)
AST SERPL W P-5'-P-CCNC: 30 U/L (ref 5–45)
BASOPHILS # BLD AUTO: 0.06 THOUSANDS/ΜL (ref 0–0.1)
BASOPHILS NFR BLD AUTO: 1 % (ref 0–1)
BILIRUB SERPL-MCNC: 0.25 MG/DL (ref 0.2–1)
BILIRUB UR QL STRIP: NEGATIVE
BUN SERPL-MCNC: 23 MG/DL (ref 5–25)
CALCIUM SERPL-MCNC: 8.7 MG/DL (ref 8.3–10.1)
CHLORIDE SERPL-SCNC: 104 MMOL/L (ref 100–108)
CLARITY UR: CLEAR
CO2 SERPL-SCNC: 29 MMOL/L (ref 21–32)
COLOR UR: YELLOW
CREAT SERPL-MCNC: 0.74 MG/DL (ref 0.6–1.3)
EOSINOPHIL # BLD AUTO: 0.27 THOUSAND/ΜL (ref 0–0.61)
EOSINOPHIL NFR BLD AUTO: 3 % (ref 0–6)
ERYTHROCYTE [DISTWIDTH] IN BLOOD BY AUTOMATED COUNT: 12 % (ref 11.6–15.1)
GFR SERPL CREATININE-BSD FRML MDRD: 83 ML/MIN/1.73SQ M
GLUCOSE SERPL-MCNC: 63 MG/DL (ref 65–140)
GLUCOSE UR STRIP-MCNC: NEGATIVE MG/DL
HCT VFR BLD AUTO: 44.9 % (ref 34.8–46.1)
HGB BLD-MCNC: 14.8 G/DL (ref 11.5–15.4)
HGB UR QL STRIP.AUTO: NEGATIVE
IMM GRANULOCYTES # BLD AUTO: 0.01 THOUSAND/UL (ref 0–0.2)
IMM GRANULOCYTES NFR BLD AUTO: 0 % (ref 0–2)
KETONES UR STRIP-MCNC: NEGATIVE MG/DL
LEUKOCYTE ESTERASE UR QL STRIP: NEGATIVE
LIPASE SERPL-CCNC: 120 U/L (ref 73–393)
LYMPHOCYTES # BLD AUTO: 3.25 THOUSANDS/ΜL (ref 0.6–4.47)
LYMPHOCYTES NFR BLD AUTO: 40 % (ref 14–44)
MCH RBC QN AUTO: 31.2 PG (ref 26.8–34.3)
MCHC RBC AUTO-ENTMCNC: 33 G/DL (ref 31.4–37.4)
MCV RBC AUTO: 95 FL (ref 82–98)
MONOCYTES # BLD AUTO: 0.53 THOUSAND/ΜL (ref 0.17–1.22)
MONOCYTES NFR BLD AUTO: 7 % (ref 4–12)
NEUTROPHILS # BLD AUTO: 4.01 THOUSANDS/ΜL (ref 1.85–7.62)
NEUTS SEG NFR BLD AUTO: 49 % (ref 43–75)
NITRITE UR QL STRIP: NEGATIVE
NRBC BLD AUTO-RTO: 0 /100 WBCS
PH UR STRIP.AUTO: 6 [PH] (ref 4.5–8)
PLATELET # BLD AUTO: 262 THOUSANDS/UL (ref 149–390)
PMV BLD AUTO: 9.3 FL (ref 8.9–12.7)
POTASSIUM SERPL-SCNC: 3.9 MMOL/L (ref 3.5–5.3)
PROT SERPL-MCNC: 7.1 G/DL (ref 6.4–8.2)
PROT UR STRIP-MCNC: NEGATIVE MG/DL
RBC # BLD AUTO: 4.75 MILLION/UL (ref 3.81–5.12)
SODIUM SERPL-SCNC: 136 MMOL/L (ref 136–145)
SP GR UR STRIP.AUTO: 1.02 (ref 1–1.03)
UROBILINOGEN UR QL STRIP.AUTO: 0.2 E.U./DL
WBC # BLD AUTO: 8.13 THOUSAND/UL (ref 4.31–10.16)

## 2018-11-13 PROCEDURE — 84443 ASSAY THYROID STIM HORMONE: CPT

## 2018-11-13 PROCEDURE — 80061 LIPID PANEL: CPT

## 2018-11-13 PROCEDURE — 85025 COMPLETE CBC W/AUTO DIFF WBC: CPT

## 2018-11-13 PROCEDURE — 36415 COLL VENOUS BLD VENIPUNCTURE: CPT

## 2018-11-13 PROCEDURE — 83690 ASSAY OF LIPASE: CPT

## 2018-11-13 PROCEDURE — 80053 COMPREHEN METABOLIC PANEL: CPT

## 2018-11-13 PROCEDURE — 82150 ASSAY OF AMYLASE: CPT

## 2018-11-13 PROCEDURE — 99214 OFFICE O/P EST MOD 30 MIN: CPT | Performed by: INTERNAL MEDICINE

## 2018-11-13 PROCEDURE — 81003 URINALYSIS AUTO W/O SCOPE: CPT | Performed by: INTERNAL MEDICINE

## 2018-11-13 RX ORDER — ROSUVASTATIN CALCIUM 20 MG/1
20 TABLET, COATED ORAL DAILY
Qty: 90 TABLET | Refills: 0 | Status: SHIPPED | OUTPATIENT
Start: 2018-11-13 | End: 2018-11-13 | Stop reason: SDUPTHER

## 2018-11-13 RX ORDER — DILTIAZEM HYDROCHLORIDE 120 MG/1
120 CAPSULE, COATED, EXTENDED RELEASE ORAL DAILY
Qty: 90 CAPSULE | Refills: 3 | Status: CANCELLED | OUTPATIENT
Start: 2018-11-13 | End: 2018-12-13

## 2018-11-13 RX ORDER — LEVOTHYROXINE SODIUM 88 UG/1
88 TABLET ORAL DAILY
Qty: 90 TABLET | Refills: 3 | Status: SHIPPED | OUTPATIENT
Start: 2018-11-13 | End: 2019-05-04

## 2018-11-13 RX ORDER — CELECOXIB 200 MG/1
200 CAPSULE ORAL DAILY
COMMUNITY
End: 2018-11-13 | Stop reason: SDUPTHER

## 2018-11-13 RX ORDER — ROSUVASTATIN CALCIUM 20 MG/1
20 TABLET, COATED ORAL DAILY
Qty: 90 TABLET | Refills: 0 | Status: SHIPPED | OUTPATIENT
Start: 2018-11-13 | End: 2019-01-29 | Stop reason: SDUPTHER

## 2018-11-13 RX ORDER — CELECOXIB 200 MG/1
200 CAPSULE ORAL DAILY
Qty: 90 CAPSULE | Refills: 3 | Status: SHIPPED | OUTPATIENT
Start: 2018-11-13 | End: 2019-01-28

## 2018-11-13 RX ORDER — FLUTICASONE PROPIONATE 50 MCG
2 SPRAY, SUSPENSION (ML) NASAL DAILY
Qty: 1 BOTTLE | Refills: 6 | Status: SHIPPED | OUTPATIENT
Start: 2018-11-13 | End: 2019-05-04 | Stop reason: SDUPTHER

## 2018-11-13 NOTE — PATIENT INSTRUCTIONS
Diltiazem in nausea: The best way to look at this is to get off the drug to see what happens  Go back to the short-acting diltiazem 30 mg  Take 1 3 times a day for 4 days, 1 twice a day for 4 days, 1 once a day for 4 days, and then stop  Monitor your blood pressure at home to make sure it does not go too high  Come back to see me again in about 2 weeks where we can reassess both how you feel and high the blood pressure is  Regarding nausea:  Do some laboratory testing including LFTs and do a complete abdominal ultrasound  We should be able to get this done within the next week or so  Return follow-up visit should be in the several days after Thanksgiving either Friday Saturday or Monday

## 2018-11-13 NOTE — PROGRESS NOTES
Assessment/Plan:       Diagnoses and all orders for this visit:    Arthritis  -     celecoxib (CeleBREX) 200 mg capsule; Take 1 capsule (200 mg total) by mouth daily    Essential hypertension  -     diltiazem (CARDIZEM) 30 mg tablet; 1 3 times a day  Hyperlipidemia, mixed  -     rosuvastatin (CRESTOR) 20 MG tablet; Take 1 tablet (20 mg total) by mouth daily    Postablative hypothyroidism  -     levothyroxine 88 mcg tablet; Take 1 tablet (88 mcg total) by mouth daily for 90 days    Allergic state, initial encounter  -     fluticasone (FLONASE) 50 mcg/act nasal spray; 2 sprays into each nostril daily    Chronic migraine without aura without status migrainosus, not intractable    Migraine without aura and without status migrainosus, not intractable    Nausea  -     CBC and differential; Future  -     Comprehensive metabolic panel; Future  -     Urinalysis with reflex to microscopic  -     Amylase; Future  -     Lipase; Future  -     US abdomen complete; Future    Tension headache    Other orders  -     Discontinue: celecoxib (CeleBREX) 200 mg capsule; Take 200 mg by mouth daily  -     Cancel: diltiazem (CARDIZEM CD) 120 mg 24 hr capsule; Take 1 capsule (120 mg total) by mouth daily for 30 days          Patient Instructions   Diltiazem in nausea: The best way to look at this is to get off the drug to see what happens  Go back to the short-acting diltiazem 30 mg  Take 1 3 times a day for 4 days, 1 twice a day for 4 days, 1 once a day for 4 days, and then stop  Monitor your blood pressure at home to make sure it does not go too high  Come back to see me again in about 2 weeks where we can reassess both how you feel and high the blood pressure is  Regarding nausea:  Do some laboratory testing including LFTs and do a complete abdominal ultrasound  We should be able to get this done within the next week or so      Return follow-up visit should be in the several days after Thanksgiving either Friday Saturday or Monday  Subjective:      Patient ID: Mundo August is a 70 y o  female  Essential hypertension and post ablated hypothyroidism    I saw the patient a number of months ago  She had complaint of migraine headache and was receiving Botox  Her blood pressure was a little up so we decided to try diltiazem  The blood pressure is good but the patient has had a complaint of intermittent episodes of nausea since then  She wonders if it is that drug  In the interval, she is just about headache free  1-2 headaches every 3 months  Reports arthritic pain of the right ankle which is currently treated with p o  Celebrex  An orthopedic physician has mentioned ankle fusion as a potential but the patient does not want to do a because it she skis          The following portions of the patient's history were reviewed and updated as appropriate:   She has a past medical history of Arthritis; GERD (gastroesophageal reflux disease); Graves' disease; Hepatitis B virus infection; Migraine; and Nonmelanoma skin cancer  ,   does not have any pertinent problems on file  ,   has a past surgical history that includes Appendectomy; ARTHROSCOPY KNEE (Left); Temporal artery biopsy / ligation;  section; Total knee arthroplasty (Right, 2016); Eye surgery (Bilateral); and Tonsillectomy  ,  family history includes Cirrhosis in her paternal grandfather; Coronary artery disease in her father; Diabetes in her maternal grandmother; Heart attack in her maternal grandfather; Hyperlipidemia in her paternal grandfather; Pancreatic cancer in her mother  ,   reports that she has quit smoking  She has never used smokeless tobacco  She reports that she does not drink alcohol or use drugs  ,  is allergic to codeine; fentanyl; and methimazole     Current Outpatient Prescriptions   Medication Sig Dispense Refill    acetaminophen (TYLENOL) 500 mg tablet Take 500 mg by mouth every 6 (six) hours      aspirin 81 MG tablet Take 1 tablet by mouth daily      celecoxib (CeleBREX) 200 mg capsule Take 1 capsule (200 mg total) by mouth daily 90 capsule 3    dexamethasone (DECADRON) 2 mg tablet Take 1 tablet (2 mg total) by mouth daily with breakfast 5 tablet 0    diclofenac sodium (VOLTAREN) 1 % Apply 4 g topically 4 (four) times a day 3 Tube 0    diltiazem (CARDIZEM CD) 120 mg 24 hr capsule Take 1 capsule (120 mg total) by mouth daily for 30 days 90 capsule 3    fluticasone (FLONASE) 50 mcg/act nasal spray 2 sprays into each nostril daily 1 Bottle 6    ketorolac (TORADOL) 10 mg tablet Take 1 tablet (10 mg total) by mouth as needed for moderate pain 10 tablet 0    levothyroxine 88 mcg tablet Take 1 tablet (88 mcg total) by mouth daily for 90 days 90 tablet 3    methocarbamol (ROBAXIN) 500 mg tablet Take 1 tablet (500 mg total) by mouth 3 (three) times a day 270 tablet 0    Omega 3-6-9 Fatty Acids (TRIPLE OMEGA-3-6-9 PO) Take by mouth      omeprazole (PriLOSEC) 20 mg delayed release capsule Take 1 capsule (20 mg total) by mouth daily 90 capsule 3    Probiotic Product (PROBIOTIC-10 PO) Take by mouth      prochlorperazine (COMPAZINE) 10 mg tablet Take 1 tablet (10 mg total) by mouth every 8 (eight) hours as needed for nausea or vomiting (headache and nausea) Limit 10/month 10 tablet 0    rosuvastatin (CRESTOR) 20 MG tablet Take 1 tablet (20 mg total) by mouth daily 90 tablet 0    diltiazem (CARDIZEM) 30 mg tablet 1 3 times a day  90 tablet 0     No current facility-administered medications for this visit  Review of Systems   Constitutional: Negative for chills and fever  HENT: Negative for sore throat and trouble swallowing  Eyes: Negative for pain  Respiratory: Negative for cough, shortness of breath and wheezing  Cardiovascular: Negative for chest pain and leg swelling  Gastrointestinal: Positive for nausea  Negative for abdominal pain, diarrhea and vomiting  Endocrine: Negative for cold intolerance and heat intolerance  Genitourinary: Negative for dysuria, frequency and pelvic pain  Musculoskeletal: Negative for arthralgias and joint swelling  Skin: Negative for rash and wound  Allergic/Immunologic: Negative for immunocompromised state  Neurological: Positive for headaches  Negative for dizziness, seizures and syncope  Psychiatric/Behavioral: Negative for dysphoric mood  The patient is not nervous/anxious  Objective:  Vitals:    11/13/18 1251   BP: 134/78   Pulse: 63   SpO2: 97%      Physical Exam   Constitutional: She is oriented to person, place, and time  She appears well-developed and well-nourished  A thin female patient who appears stated age verbalizing above complaints   HENT:   Head: Normocephalic and atraumatic  Eyes: Pupils are equal, round, and reactive to light  EOM are normal    Neck: Normal range of motion  Neck supple  No tracheal deviation present  No thyromegaly present  Cardiovascular: Normal rate, regular rhythm and normal heart sounds  Exam reveals no gallop  No murmur heard  Pulmonary/Chest: No respiratory distress  She has no wheezes  She has no rales  Abdominal: Soft  Bowel sounds are normal  There is no tenderness  Musculoskeletal: Normal range of motion  She exhibits no tenderness or deformity  Neurological: She is alert and oriented to person, place, and time  Coordination normal    Skin: Skin is warm  Psychiatric: She has a normal mood and affect   Judgment normal

## 2018-11-14 ENCOUNTER — TELEPHONE (OUTPATIENT)
Dept: INTERNAL MEDICINE CLINIC | Facility: CLINIC | Age: 68
End: 2018-11-14

## 2018-11-14 DIAGNOSIS — E78.2 MIXED HYPERLIPIDEMIA: Primary | ICD-10-CM

## 2018-11-14 LAB
CHOLEST SERPL-MCNC: 158 MG/DL (ref 50–200)
HDLC SERPL-MCNC: 53 MG/DL (ref 40–60)
LDLC SERPL CALC-MCNC: 84 MG/DL (ref 0–100)
TRIGL SERPL-MCNC: 106 MG/DL
TSH SERPL DL<=0.05 MIU/L-ACNC: 0.97 UIU/ML (ref 0.36–3.74)

## 2018-11-14 NOTE — TELEPHONE ENCOUNTER
Order for TSH and lipid panel placed in the chart with instruction to add to specimen collected yesterday

## 2018-11-16 ENCOUNTER — HOSPITAL ENCOUNTER (OUTPATIENT)
Dept: ULTRASOUND IMAGING | Facility: CLINIC | Age: 68
Discharge: HOME | End: 2018-11-16
Payer: MEDICARE

## 2018-11-16 DIAGNOSIS — R11.0 NAUSEA: ICD-10-CM

## 2018-11-16 PROCEDURE — 76700 US EXAM ABDOM COMPLETE: CPT

## 2018-11-26 ENCOUNTER — OFFICE VISIT (OUTPATIENT)
Dept: INTERNAL MEDICINE CLINIC | Facility: CLINIC | Age: 68
End: 2018-11-26
Payer: MEDICARE

## 2018-11-26 VITALS
DIASTOLIC BLOOD PRESSURE: 80 MMHG | WEIGHT: 104.4 LBS | HEART RATE: 79 BPM | OXYGEN SATURATION: 95 % | BODY MASS INDEX: 19.21 KG/M2 | SYSTOLIC BLOOD PRESSURE: 140 MMHG | HEIGHT: 62 IN

## 2018-11-26 DIAGNOSIS — K21.00 REFLUX ESOPHAGITIS: ICD-10-CM

## 2018-11-26 DIAGNOSIS — I10 ESSENTIAL HYPERTENSION: ICD-10-CM

## 2018-11-26 DIAGNOSIS — K83.9 BILE DUCT ABNORMALITY: Primary | ICD-10-CM

## 2018-11-26 PROCEDURE — 99213 OFFICE O/P EST LOW 20 MIN: CPT | Performed by: INTERNAL MEDICINE

## 2018-11-26 RX ORDER — OMEPRAZOLE 20 MG/1
20 CAPSULE, DELAYED RELEASE ORAL DAILY
Qty: 90 CAPSULE | Refills: 0 | Status: SHIPPED | OUTPATIENT
Start: 2018-11-26 | End: 2019-01-07 | Stop reason: SDUPTHER

## 2018-11-26 RX ORDER — LOSARTAN POTASSIUM 50 MG/1
50 TABLET ORAL DAILY
Qty: 90 TABLET | Refills: 0 | Status: SHIPPED | OUTPATIENT
Start: 2018-11-26 | End: 2018-11-26 | Stop reason: SDUPTHER

## 2018-11-26 RX ORDER — LOSARTAN POTASSIUM 50 MG/1
25 TABLET ORAL DAILY
Qty: 90 TABLET | Refills: 3 | Status: SHIPPED | OUTPATIENT
Start: 2018-11-26 | End: 2018-11-26 | Stop reason: SDUPTHER

## 2018-11-26 RX ORDER — LOSARTAN POTASSIUM 50 MG/1
50 TABLET ORAL DAILY
Qty: 30 TABLET | Refills: 3 | Status: SHIPPED | OUTPATIENT
Start: 2018-11-26 | End: 2019-01-07 | Stop reason: SDUPTHER

## 2018-11-26 NOTE — PROGRESS NOTES
Assessment/Plan:       Diagnoses and all orders for this visit:    Bile duct abnormality  -     MRI abdomen w wo contrast and mrcp; Future    Reflux esophagitis  -     omeprazole (PriLOSEC) 20 mg delayed release capsule; Take 1 capsule (20 mg total) by mouth daily    Essential hypertension  -     Discontinue: losartan (COZAAR) 50 mg tablet; Take 0 5 tablets (25 mg total) by mouth daily  -     Discontinue: losartan (COZAAR) 50 mg tablet; Take 1 tablet (50 mg total) by mouth daily  -     losartan (COZAAR) 50 mg tablet; Take 1 tablet (50 mg total) by mouth daily          Patient Instructions   Nausea has resolved  Headache-we will observe  The patient also gets Botox  Top normal common bile duct-get MRCP  Essential hypertension-start Cozaar 50 mg daily  Follow-up here in several months  Subjective:      Patient ID: Lyla Marsh is a 70 y o  female  This 59-year-old female had been seen a few weeks ago with a complaint of nausea  She felt it might have been related to diltiazem  So, I weaned her off  She comes back today tell me that the nausea as disappeared    Imaging was done which documented top normal common bile duct  No nausea or vomiting at this time  She has never had an assessment of this before  Today she feels well and looks her usual self        The following portions of the patient's history were reviewed and updated as appropriate:   She has a past medical history of Arthritis; GERD (gastroesophageal reflux disease); Graves' disease; Hepatitis B virus infection; Migraine; and Nonmelanoma skin cancer  ,   does not have any pertinent problems on file  ,   has a past surgical history that includes Appendectomy; ARTHROSCOPY KNEE (Left); Temporal artery biopsy / ligation;  section; Total knee arthroplasty (Right, 2016); Eye surgery (Bilateral); and Tonsillectomy  ,  family history includes Cirrhosis in her paternal grandfather; Coronary artery disease in her father; Diabetes in her maternal grandmother; Heart attack in her maternal grandfather; Hyperlipidemia in her paternal grandfather; Pancreatic cancer in her mother  ,   reports that she has quit smoking  She has never used smokeless tobacco  She reports that she does not drink alcohol or use drugs  ,  is allergic to codeine; fentanyl; and methimazole     Current Outpatient Prescriptions   Medication Sig Dispense Refill    acetaminophen (TYLENOL) 500 mg tablet Take 500 mg by mouth every 6 (six) hours      aspirin 81 MG tablet Take 1 tablet by mouth daily      celecoxib (CeleBREX) 200 mg capsule Take 1 capsule (200 mg total) by mouth daily 90 capsule 3    dexamethasone (DECADRON) 2 mg tablet Take 1 tablet (2 mg total) by mouth daily with breakfast 5 tablet 0    diclofenac sodium (VOLTAREN) 1 % Apply 4 g topically 4 (four) times a day 3 Tube 0    fluticasone (FLONASE) 50 mcg/act nasal spray 2 sprays into each nostril daily 1 Bottle 6    ketorolac (TORADOL) 10 mg tablet Take 1 tablet (10 mg total) by mouth as needed for moderate pain 10 tablet 0    levothyroxine 88 mcg tablet Take 1 tablet (88 mcg total) by mouth daily for 90 days 90 tablet 3    losartan (COZAAR) 50 mg tablet Take 1 tablet (50 mg total) by mouth daily 30 tablet 3    methocarbamol (ROBAXIN) 500 mg tablet Take 1 tablet (500 mg total) by mouth 3 (three) times a day 270 tablet 0    Omega 3-6-9 Fatty Acids (TRIPLE OMEGA-3-6-9 PO) Take by mouth      omeprazole (PriLOSEC) 20 mg delayed release capsule Take 1 capsule (20 mg total) by mouth daily 90 capsule 0    Probiotic Product (PROBIOTIC-10 PO) Take by mouth      prochlorperazine (COMPAZINE) 10 mg tablet Take 1 tablet (10 mg total) by mouth every 8 (eight) hours as needed for nausea or vomiting (headache and nausea) Limit 10/month 10 tablet 0    rosuvastatin (CRESTOR) 20 MG tablet Take 1 tablet (20 mg total) by mouth daily 90 tablet 0     No current facility-administered medications for this visit          Review of Systems   Respiratory: Negative for cough and shortness of breath  Gastrointestinal: Negative for abdominal pain, diarrhea, nausea and vomiting  Objective:  Vitals:    11/26/18 1448   BP: 140/80   Pulse: 79   SpO2: 95%      Physical Exam   Constitutional: No distress  An alert thin female appears stated age   Pulmonary/Chest: Effort normal    Abdominal: Soft  Musculoskeletal: Normal range of motion  Neurological: She is alert

## 2018-11-26 NOTE — PATIENT INSTRUCTIONS
Nausea has resolved  Headache-we will observe  The patient also gets Botox  Top normal common bile duct-get MRCP  Essential hypertension-start Cozaar 50 mg daily  Follow-up here in several months

## 2018-11-30 ENCOUNTER — TELEPHONE (OUTPATIENT)
Dept: INTERNAL MEDICINE CLINIC | Facility: CLINIC | Age: 68
End: 2018-11-30

## 2018-11-30 NOTE — TELEPHONE ENCOUNTER
Patient called asking us to fax over her most recent lab work and her MRI order    She is trying to schedule the MRI asap but the facility needs this info before scheduling    [address] [place] [address] in [place], [place]  Fax # [phone]    Patients contact # [phone]

## 2018-12-03 ENCOUNTER — TELEPHONE (OUTPATIENT)
Dept: NEUROLOGY | Facility: CLINIC | Age: 68
End: 2018-12-03

## 2018-12-03 NOTE — TELEPHONE ENCOUNTER
Called pt and left message regarding appt on 10/19/18  MSPQ was not completely filled out   Instructed pt to call us back when possible and to ask for Yuri Denis to help in filling out Medicare questions

## 2018-12-04 ENCOUNTER — TELEPHONE (OUTPATIENT)
Dept: INTERNAL MEDICINE CLINIC | Facility: CLINIC | Age: 68
End: 2018-12-04

## 2018-12-04 NOTE — TELEPHONE ENCOUNTER
Patient wanted to make  appointment to go over CT results  with Dr Amin on Thursday 12/6   Mary Gutierrez Advise he is not in the office but she can see an PA       she only talk with Bola    Claims Loganra would understand?

## 2018-12-06 ENCOUNTER — OFFICE VISIT (OUTPATIENT)
Dept: INTERNAL MEDICINE CLINIC | Facility: CLINIC | Age: 68
End: 2018-12-06
Payer: MEDICARE

## 2018-12-06 VITALS
HEIGHT: 62 IN | TEMPERATURE: 98.2 F | DIASTOLIC BLOOD PRESSURE: 88 MMHG | WEIGHT: 101.4 LBS | BODY MASS INDEX: 18.66 KG/M2 | OXYGEN SATURATION: 98 % | SYSTOLIC BLOOD PRESSURE: 132 MMHG | HEART RATE: 83 BPM

## 2018-12-06 DIAGNOSIS — G43.709 CHRONIC MIGRAINE WITHOUT AURA WITHOUT STATUS MIGRAINOSUS, NOT INTRACTABLE: ICD-10-CM

## 2018-12-06 DIAGNOSIS — R11.0 NAUSEA: Primary | ICD-10-CM

## 2018-12-06 DIAGNOSIS — I10 ESSENTIAL HYPERTENSION: ICD-10-CM

## 2018-12-06 PROCEDURE — 99214 OFFICE O/P EST MOD 30 MIN: CPT | Performed by: PHYSICIAN ASSISTANT

## 2018-12-06 RX ORDER — ONDANSETRON 4 MG/1
4 TABLET, ORALLY DISINTEGRATING ORAL EVERY 6 HOURS PRN
Qty: 20 TABLET | Refills: 0 | Status: SHIPPED | OUTPATIENT
Start: 2018-12-06 | End: 2019-01-14

## 2018-12-06 NOTE — PROGRESS NOTES
Assessment/Plan:  She is constantly nauseated for the last several weeks undergoing a GI workup she is very concerned that she may have a GI malignancy  I reviewed the MRCP with her no sign of any malignancy but she needs to follow up with GI for further workup  Blood pressure is adequately controlled migraines are stable  physical exam is unremarkable       Diagnoses and all orders for this visit:    Nausea  -     ondansetron (ZOFRAN-ODT) 4 mg disintegrating tablet; Take 1 tablet (4 mg total) by mouth every 6 (six) hours as needed for nausea or vomiting    Chronic migraine without aura without status migrainosus, not intractable    Essential hypertension        No problem-specific Assessment & Plan notes found for this encounter  Subjective:      Patient ID: Jackie Thomas is a 70 y o  female  Five week history of nausea a generally constant somewhat relieved with Zofran she has had a workup including ultrasound which showed dilated biliary duct she is here for follow-up and review of her MRCP  She has always nauseated her appetite is not very good  She has a lot of headaches and a previous history of migraine  No diarrhea or vomiting no fevers skin rash or joint complaints        The following portions of the patient's history were reviewed and updated as appropriate:   She has a past medical history of Arthritis; GERD (gastroesophageal reflux disease); Graves' disease; Hepatitis B virus infection; Migraine; and Nonmelanoma skin cancer  ,   does not have any pertinent problems on file  ,   has a past surgical history that includes Appendectomy; ARTHROSCOPY KNEE (Left); Temporal artery biopsy / ligation;  section; Total knee arthroplasty (Right, 2016); Eye surgery (Bilateral); and Tonsillectomy  ,  family history includes Cirrhosis in her paternal grandfather; Coronary artery disease in her father; Diabetes in her maternal grandmother; Heart attack in her maternal grandfather; Hyperlipidemia in her paternal grandfather; Pancreatic cancer in her mother  ,   reports that she has quit smoking  She has never used smokeless tobacco  She reports that she does not drink alcohol or use drugs  ,  is allergic to codeine; fentanyl; and methimazole     Current Outpatient Prescriptions   Medication Sig Dispense Refill    acetaminophen (TYLENOL) 500 mg tablet Take 500 mg by mouth every 6 (six) hours      aspirin 81 MG tablet Take 1 tablet by mouth daily      celecoxib (CeleBREX) 200 mg capsule Take 1 capsule (200 mg total) by mouth daily 90 capsule 3    dexamethasone (DECADRON) 2 mg tablet Take 1 tablet (2 mg total) by mouth daily with breakfast 5 tablet 0    diclofenac sodium (VOLTAREN) 1 % Apply 4 g topically 4 (four) times a day 3 Tube 0    fluticasone (FLONASE) 50 mcg/act nasal spray 2 sprays into each nostril daily 1 Bottle 6    ketorolac (TORADOL) 10 mg tablet Take 1 tablet (10 mg total) by mouth as needed for moderate pain 10 tablet 0    levothyroxine 88 mcg tablet Take 1 tablet (88 mcg total) by mouth daily for 90 days 90 tablet 3    losartan (COZAAR) 50 mg tablet Take 1 tablet (50 mg total) by mouth daily 30 tablet 3    methocarbamol (ROBAXIN) 500 mg tablet Take 1 tablet (500 mg total) by mouth 3 (three) times a day 270 tablet 0    Omega 3-6-9 Fatty Acids (TRIPLE OMEGA-3-6-9 PO) Take by mouth      omeprazole (PriLOSEC) 20 mg delayed release capsule Take 1 capsule (20 mg total) by mouth daily 90 capsule 0    Probiotic Product (PROBIOTIC-10 PO) Take by mouth      prochlorperazine (COMPAZINE) 10 mg tablet Take 1 tablet (10 mg total) by mouth every 8 (eight) hours as needed for nausea or vomiting (headache and nausea) Limit 10/month 10 tablet 0    rosuvastatin (CRESTOR) 20 MG tablet Take 1 tablet (20 mg total) by mouth daily 90 tablet 0    ondansetron (ZOFRAN-ODT) 4 mg disintegrating tablet Take 1 tablet (4 mg total) by mouth every 6 (six) hours as needed for nausea or vomiting 20 tablet 0     No current facility-administered medications for this visit  Review of Systems   Constitutional: Negative for activity change, appetite change, chills, diaphoresis, fatigue, fever and unexpected weight change  HENT: Negative for congestion, dental problem, drooling, ear discharge, ear pain, facial swelling, hearing loss, nosebleeds, postnasal drip, rhinorrhea, sinus pain, sinus pressure, sneezing, sore throat, tinnitus, trouble swallowing and voice change  Eyes: Negative for photophobia, pain, discharge, redness, itching and visual disturbance  Respiratory: Negative for apnea, cough, choking, chest tightness, shortness of breath, wheezing and stridor  Cardiovascular: Negative for chest pain, palpitations and leg swelling  Gastrointestinal: Positive for nausea  Negative for abdominal distention, abdominal pain, anal bleeding, blood in stool, constipation, diarrhea, rectal pain and vomiting  Endocrine: Negative for cold intolerance, heat intolerance, polydipsia, polyphagia and polyuria  Genitourinary: Negative for decreased urine volume, difficulty urinating, dysuria, enuresis, flank pain, frequency, genital sores, hematuria and urgency  Musculoskeletal: Negative for arthralgias, back pain, gait problem, joint swelling, myalgias, neck pain and neck stiffness  Skin: Negative for color change, pallor, rash and wound  Neurological: Negative for dizziness, tremors, seizures, syncope, facial asymmetry, speech difficulty, weakness, light-headedness, numbness and headaches  Hematological: Negative for adenopathy  Does not bruise/bleed easily  Psychiatric/Behavioral: Negative for agitation, behavioral problems, confusion, decreased concentration, dysphoric mood, hallucinations, self-injury, sleep disturbance and suicidal ideas  The patient is not nervous/anxious and is not hyperactive            Objective:  Vitals:    12/06/18 1116   BP: 132/88   BP Location: Left arm   Patient Position: Sitting   Pulse: 83   Temp: 98 2 °F (36 8 °C)   SpO2: 98%   Weight: 46 kg (101 lb 6 4 oz)   Height: 5' 2" (1 575 m)     Body mass index is 18 55 kg/m²  Physical Exam   Constitutional: She is oriented to person, place, and time  She appears well-developed  HENT:   Head: Normocephalic  Right Ear: External ear normal    Left Ear: External ear normal    Mouth/Throat: Oropharynx is clear and moist    Eyes: Pupils are equal, round, and reactive to light  Conjunctivae are normal    Neck: Neck supple  No thyromegaly present  Cardiovascular: Normal rate, regular rhythm, normal heart sounds and intact distal pulses  Pulmonary/Chest: Effort normal and breath sounds normal    Abdominal: Soft  Bowel sounds are normal    Musculoskeletal: Normal range of motion  Neurological: She is alert and oriented to person, place, and time  She has normal reflexes  Skin: Skin is warm and dry

## 2018-12-10 ENCOUNTER — TELEPHONE (OUTPATIENT)
Dept: INTERNAL MEDICINE CLINIC | Facility: CLINIC | Age: 68
End: 2018-12-10

## 2018-12-10 NOTE — TELEPHONE ENCOUNTER
Bebe from [employer] [facility] called requesting lab work for [facility]   She is  having an mri of her abdomin tomorrow at 1 pm  They need her creatine lab work faxed to their office before tomorrow at 1pm  Please fax to     Fax # [phone]  Any questions please contact bebe at   [phone]

## 2018-12-11 DIAGNOSIS — K83.9 BILE DUCT ABNORMALITY: Primary | ICD-10-CM

## 2018-12-17 ENCOUNTER — TELEPHONE (OUTPATIENT)
Dept: NEUROLOGY | Facility: CLINIC | Age: 68
End: 2018-12-17

## 2018-12-17 NOTE — TELEPHONE ENCOUNTER
Patient called back and r/s DANIEL to 1/28/19 at 10:45 am in [place] office  She confirmed date/time/location   Mailing updated appt card to patient

## 2018-12-18 ENCOUNTER — TRANSCRIBE ORDERS (OUTPATIENT)
Dept: ADMINISTRATIVE | Facility: HOSPITAL | Age: 68
End: 2018-12-18

## 2018-12-18 DIAGNOSIS — R10.9 ABDOMINAL PAIN, UNSPECIFIED ABDOMINAL LOCATION: Primary | ICD-10-CM

## 2018-12-18 DIAGNOSIS — R11.0 NAUSEA: ICD-10-CM

## 2018-12-18 DIAGNOSIS — R63.4 WEIGHT LOSS: ICD-10-CM

## 2018-12-20 ENCOUNTER — TELEPHONE (OUTPATIENT)
Dept: INTERNAL MEDICINE CLINIC | Facility: CLINIC | Age: 68
End: 2018-12-20

## 2018-12-24 ENCOUNTER — HOSPITAL ENCOUNTER (OUTPATIENT)
Dept: RADIOLOGY | Facility: HOSPITAL | Age: 68
Discharge: HOME | End: 2018-12-24
Payer: MEDICARE

## 2018-12-24 DIAGNOSIS — R10.9 ABDOMINAL PAIN, UNSPECIFIED ABDOMINAL LOCATION: ICD-10-CM

## 2018-12-24 DIAGNOSIS — R11.0 NAUSEA: ICD-10-CM

## 2018-12-24 PROCEDURE — A9537 TC99M MEBROFENIN: HCPCS

## 2018-12-24 PROCEDURE — 78227 HEPATOBIL SYST IMAGE W/DRUG: CPT

## 2018-12-24 RX ADMIN — SINCALIDE 0.9 MCG: 5 INJECTION, POWDER, LYOPHILIZED, FOR SOLUTION INTRAVENOUS at 14:30

## 2019-01-02 ENCOUNTER — TELEPHONE (OUTPATIENT)
Dept: GASTROENTEROLOGY | Facility: CLINIC | Age: 69
End: 2019-01-02

## 2019-01-07 DIAGNOSIS — I10 ESSENTIAL HYPERTENSION: ICD-10-CM

## 2019-01-07 DIAGNOSIS — K21.00 REFLUX ESOPHAGITIS: ICD-10-CM

## 2019-01-08 ENCOUNTER — HOSPITAL ENCOUNTER (OUTPATIENT)
Dept: RADIOLOGY | Facility: HOSPITAL | Age: 69
Discharge: HOME | End: 2019-01-08
Payer: MEDICARE

## 2019-01-08 DIAGNOSIS — R10.9 ABDOMINAL PAIN, UNSPECIFIED ABDOMINAL LOCATION: ICD-10-CM

## 2019-01-08 DIAGNOSIS — R11.0 NAUSEA: ICD-10-CM

## 2019-01-08 DIAGNOSIS — R63.4 WEIGHT LOSS: ICD-10-CM

## 2019-01-08 PROCEDURE — 78264 GASTRIC EMPTYING IMG STUDY: CPT

## 2019-01-08 PROCEDURE — A9541 TC99M SULFUR COLLOID: HCPCS

## 2019-01-08 RX ORDER — LOSARTAN POTASSIUM 50 MG/1
50 TABLET ORAL DAILY
Qty: 90 TABLET | Refills: 1 | Status: SHIPPED | OUTPATIENT
Start: 2019-01-08 | End: 2019-05-04 | Stop reason: SDUPTHER

## 2019-01-08 RX ORDER — OMEPRAZOLE 20 MG/1
20 CAPSULE, DELAYED RELEASE ORAL DAILY
Qty: 90 CAPSULE | Refills: 1 | Status: SHIPPED | OUTPATIENT
Start: 2019-01-08 | End: 2019-01-28

## 2019-01-09 ENCOUNTER — TELEPHONE (OUTPATIENT)
Dept: INTERNAL MEDICINE CLINIC | Facility: CLINIC | Age: 69
End: 2019-01-09

## 2019-01-09 NOTE — TELEPHONE ENCOUNTER
Patient requested medical records be faxed to [facility], which have been faxed & confirmed received

## 2019-01-14 ENCOUNTER — TELEPHONE (OUTPATIENT)
Dept: GASTROENTEROLOGY | Facility: CLINIC | Age: 69
End: 2019-01-14

## 2019-01-14 ENCOUNTER — OFFICE VISIT (OUTPATIENT)
Dept: GASTROENTEROLOGY | Facility: CLINIC | Age: 69
End: 2019-01-14
Payer: MEDICARE

## 2019-01-14 VITALS
DIASTOLIC BLOOD PRESSURE: 78 MMHG | BODY MASS INDEX: 18.47 KG/M2 | WEIGHT: 101 LBS | SYSTOLIC BLOOD PRESSURE: 118 MMHG | HEART RATE: 76 BPM

## 2019-01-14 DIAGNOSIS — R11.0 NAUSEA: Primary | ICD-10-CM

## 2019-01-14 DIAGNOSIS — R93.89 ABNORMAL CT SCAN: ICD-10-CM

## 2019-01-14 DIAGNOSIS — Z12.11 SCREENING FOR COLON CANCER: ICD-10-CM

## 2019-01-14 PROCEDURE — 99214 OFFICE O/P EST MOD 30 MIN: CPT | Performed by: INTERNAL MEDICINE

## 2019-01-14 RX ORDER — PROCHLORPERAZINE MALEATE 5 MG/1
5 TABLET ORAL EVERY 6 HOURS PRN
Qty: 30 TABLET | Refills: 0 | Status: SHIPPED | OUTPATIENT
Start: 2019-01-14 | End: 2019-01-28 | Stop reason: SDUPTHER

## 2019-01-14 RX ORDER — PROMETHAZINE HYDROCHLORIDE AND CODEINE PHOSPHATE 6.25; 1 MG/5ML; MG/5ML
SOLUTION ORAL
COMMUNITY
End: 2019-01-28

## 2019-01-14 RX ORDER — PROCHLORPERAZINE MALEATE 10 MG
10 TABLET ORAL EVERY 8 HOURS PRN
Qty: 10 TABLET | Refills: 0 | Status: SHIPPED | OUTPATIENT
Start: 2019-01-14 | End: 2019-01-14

## 2019-01-14 NOTE — LETTER
January 14, 2019     Crissie Habermann, MD  [address] [address]    Patient: Lyla Marsh   YOB: 1950   Date of Visit: 1/14/2019       Dear Dr Jhony Cunningham: Thank you for referring Lyla Marsh to me for evaluation  Below are my notes for this consultation  If you have questions, please do not hesitate to call me  I look forward to following your patient along with you  Sincerely,        Kerri Curran DO        CC: No Recipients  Kerri Curran DO  1/14/2019 12:45 PM  Sign at close encounter  [facility] - Outpatient Consultation  Lyla Marsh 70 y o  female MRN: [id number]  Encounter: [id number]          ASSESSMENT AND PLAN:      1  Nausea  -Patient still reporting nausea, worse at night  Will utilize Compazine at bedtime  She is intolerant to Zofran  2  Screening for colon cancer    3  Abnormal CT scan  - Patient is going for EUS tomorrow at [facility] with Dr Duong Harpal  Will await those results  ______________________________________________________________________    HPI:    70year old female who is here for hospital followup  Patient underwent an ERCP on 12/8/18 at [facility] for an abnormal MRCP suggesting dilated intra/extra hepatic ducts  Following her ERCP she was then admitted at [facility] secondary to post ERCP pancreatitis  She also has an IPMN that was seen on MRCP which is being followed now at [facility]  She is still reporting nausea worse at night  She is responding slightly to PPI and Compazine  She is also still reporting RUQ pain  REVIEW OF SYSTEMS:    CONSTITUTIONAL: Denies any fever, chills, rigors, and weight loss  HEENT: No earache or tinnitus  Denies hearing loss or visual disturbances  CARDIOVASCULAR: No chest pain or palpitations  RESPIRATORY: Denies any cough, hemoptysis, shortness of breath or dyspnea on exertion  GASTROINTESTINAL: As noted in the History of Present Illness  GENITOURINARY: No problems with urination  Denies any hematuria or dysuria  NEUROLOGIC: No dizziness or vertigo, denies headaches  MUSCULOSKELETAL: Denies any muscle or joint pain  SKIN: Denies skin rashes or itching  ENDOCRINE: Denies excessive thirst  Denies intolerance to heat or cold  PSYCHOSOCIAL: Denies depression or anxiety  Denies any recent memory loss         Historical Information   Past Medical History:   Diagnosis Date    Arthritis     GERD (gastroesophageal reflux disease)     Graves' disease     TREATED WITH RADIOACTIVE IODINE ON 13 (10 7 MCI) RESOLVED:     Hepatitis B virus infection     Migraine     RECEIVED BOTOX INJECTIONS Q 3 MONS BY DR Dafne Varghese    Nonmelanoma skin cancer     LAST ASSESSED: [id number]     Past Surgical History:   Procedure Laterality Date    APPENDECTOMY      ARTHROSCOPY KNEE Left      SECTION      COLONOSCOPY      Oct/2018    EYE SURGERY Bilateral     SURGERY OF THE ORBITS - ORBITAL DECOMPRESSION    TEMPORAL ARTERY BIOPSY / LIGATION      TONSILLECTOMY      TOTAL KNEE ARTHROPLASTY Right 2016    TKR     Social History   History   Alcohol Use No     Comment: (HISTORY)     History   Drug Use No     History   Smoking Status    Former Smoker   Smokeless Tobacco    Never Used     Family History   Problem Relation Age of Onset    Pancreatic cancer Mother     Coronary artery disease Father     Diabetes Maternal Grandmother     Heart attack Maternal Grandfather         ACUTE MI    Hyperlipidemia Paternal Grandfather     Cirrhosis Paternal Grandfather        Meds/Allergies       Current Outpatient Prescriptions:     acetaminophen (TYLENOL) 500 mg tablet    aspirin 81 MG tablet    celecoxib (CeleBREX) 200 mg capsule    diclofenac sodium (VOLTAREN) 1 %    fluticasone (FLONASE) 50 mcg/act nasal spray    levothyroxine 88 mcg tablet    losartan (COZAAR) 50 mg tablet    omeprazole (PriLOSEC) 20 mg delayed release capsule    Probiotic Product (PROBIOTIC-10 PO)    rosuvastatin (CRESTOR) 20 MG tablet    ketorolac (TORADOL) 10 mg tablet    methocarbamol (ROBAXIN) 500 mg tablet    Omega 3-6-9 Fatty Acids (TRIPLE OMEGA-3-6-9 PO)    prochlorperazine (COMPAZINE) 5 mg tablet    promethazine (PHENERGAN) 25 mg tablet    Promethazine-Codeine 6 25-10 MG/5ML SOLN    Allergies   Allergen Reactions    Codeine GI Intolerance     Other reaction(s): Nausea and/or vomiting    Fentanyl      Action Taken: vomiting; Category: Adverse Reaction;     Methimazole            Objective     Blood pressure 118/78, pulse 76, weight 45 8 kg (101 lb)  Body mass index is 18 47 kg/m²  PHYSICAL EXAM:      General Appearance:   Alert, cooperative, no distress   HEENT:   Normocephalic, atraumatic, anicteric      Neck:  Supple, symmetrical, trachea midline   Lungs:   Clear to auscultation bilaterally; no rales, rhonchi or wheezing; respirations unlabored    Heart[de-identified]   Regular rate and rhythm; no murmur, rub, or gallop  Abdomen:   Soft, non-tender, non-distended; normal bowel sounds; no masses, no organomegaly    Genitalia:   Deferred    Rectal:   Deferred    Extremities:  No cyanosis, clubbing or edema    Pulses:  2+ and symmetric    Skin:  No jaundice, rashes, or lesions    Lymph nodes:  No palpable cervical lymphadenopathy        Lab Results:   No visits with results within 1 Day(s) from this visit     Latest known visit with results is:   Appointment on 11/13/2018   Component Date Value    WBC 11/13/2018 8 13     RBC 11/13/2018 4 75     Hemoglobin 11/13/2018 14 8     Hematocrit 11/13/2018 44 9     MCV 11/13/2018 95     MCH 11/13/2018 31 2     MCHC 11/13/2018 33 0     RDW 11/13/2018 12 0     MPV 11/13/2018 9 3     Platelets 39/57/6482 262     nRBC 11/13/2018 0     Neutrophils Relative 11/13/2018 49     Immat GRANS % 11/13/2018 0     Lymphocytes Relative 11/13/2018 40     Monocytes Relative 11/13/2018 7     Eosinophils Relative 11/13/2018 3     Basophils Relative 11/13/2018 1     Neutrophils Absolute 11/13/2018 4 01     Immature Grans Absolute 11/13/2018 0 01     Lymphocytes Absolute 11/13/2018 3 25     Monocytes Absolute 11/13/2018 0 53     Eosinophils Absolute 11/13/2018 0 27     Basophils Absolute 11/13/2018 0 06     Sodium 11/13/2018 136     Potassium 11/13/2018 3 9     Chloride 11/13/2018 104     CO2 11/13/2018 29     ANION GAP 11/13/2018 3*    BUN 11/13/2018 23     Creatinine 11/13/2018 0 74     Glucose 11/13/2018 63*    Calcium 11/13/2018 8 7     AST 11/13/2018 30     ALT 11/13/2018 37     Alkaline Phosphatase 11/13/2018 98     Total Protein 11/13/2018 7 1     Albumin 11/13/2018 3 8     Total Bilirubin 11/13/2018 0 25     eGFR 11/13/2018 83     Amylase 11/13/2018 51     Lipase 11/13/2018 120     Cholesterol 11/13/2018 158     Triglycerides 11/13/2018 106     HDL, Direct 11/13/2018 53     LDL Calculated 11/13/2018 84     TSH 3RD GENERATON 11/13/2018 0 969          Radiology Results:   Nm Gastric Emptying    Result Date: 1/8/2019  Narrative: GASTRIC EMPTYING STUDY INDICATION: R10 9: Unspecified abdominal pain R11 0: Nausea R63 4: Abnormal weight loss COMPARISON:  None available TECHNIQUE:   The study was performed following the oral administration of 1 08 mCi Tc-99m sulfur colloid combined with scrambled eggs, as part of a standard meal   Following the meal, one minute anterior and posterior images were obtained immediately and  at 0 25 hours, 0 5 hour, 1 hour, 1 5 hour, 2 hour, 3 hour intervals from the time of ingestion  Geometric mean analyses were then performed  As of March 1, 2016, this gastric emptying protocol has been modified and updated  The gastric emptying times  and the normal values reported below reflect the change in protocol   FINDINGS: Gastric emptying at 0 5 hours = 19 (N < 30%) Gastric emptying at 1 hour = 35 % (N = 10 - 70%) Gastric emptying at 2 hours = 69 % (N = > 40%) Gastric emptying at 3 hours = 100 % (N = > 70%) Linear T-1/2 = 92 minutes     Impression: Normal rate of gastric emptying  Workstation performed: [id number]     Nm Hepatobiliary W Rx    Result Date: 12/24/2018  Narrative: HEPATOBILIARY SCAN WITH CHOLECYSTOKININ ADMINISTRATION INDICATION: R10 9: Unspecified abdominal pain R11 0: Nausea COMPARISON:  Abdomen ultrasound 11/16/2018 TECHNIQUE:   Following the intravenous administration of 5 4 mCi Tc-99m labeled mebrofenin, dynamic abdominal images were obtained over a 60 minute time period  Images were performed in AP projection  FINDINGS: There is prompt, uniform accumulation with normal clearance of the radiopharmaceutical by the liver  There is normal filling of the intrahepatic ducts, common bile duct and gallbladder with normal excretion of the radiopharmaceutical into the duodenum  In order to evaluate the contractile response of the gallbladder to  cholecystokinin stimulation, 0 9 mcg sincalide (0 02 mcg/kg) was administered by slow intravenous infusion over 60 minutes  These images demonstrate normal contraction of the gallbladder  The calculated gallbladder ejection fraction is 80 % (N = >38%)  The patient experienced no symptoms after CCK administration  Impression: 1  Normal hepatobiliary study   2  Normal contractile response of the gallbladder to cholecystokinin infusion   (80%) Workstation performed: [id number]

## 2019-01-14 NOTE — PROGRESS NOTES
[facility] - Outpatient Consultation  Emir Valdez 70 y o  female MRN: [id number]  Encounter: [id number]          ASSESSMENT AND PLAN:    1  Nausea  -Patient still reporting nausea, worse at night  Will utilize Compazine at bedtime  She is intolerant to Zofran  2  Screening for colon cancer    3  Abnormal CT scan  - Patient is going for EUS tomorrow at [facility] with Dr Lisset Moseley  Will await those results  ______________________________________________________________________    HPI:    70year old female who is here for hospital followup  Patient underwent an ERCP on 12/8/18 at [facility] for an abnormal MRCP suggesting dilated intra/extra hepatic ducts  Following her ERCP she was then admitted at [facility] secondary to post ERCP pancreatitis  She also has an IPMN that was seen on MRCP which is being followed now at [facility]  She is still reporting nausea worse at night  She is responding slightly to PPI and Compazine  She is also still reporting RUQ pain  REVIEW OF SYSTEMS:    CONSTITUTIONAL: Denies any fever, chills, rigors, and weight loss  HEENT: No earache or tinnitus  Denies hearing loss or visual disturbances  CARDIOVASCULAR: No chest pain or palpitations  RESPIRATORY: Denies any cough, hemoptysis, shortness of breath or dyspnea on exertion  GASTROINTESTINAL: As noted in the History of Present Illness  GENITOURINARY: No problems with urination  Denies any hematuria or dysuria  NEUROLOGIC: No dizziness or vertigo, denies headaches  MUSCULOSKELETAL: Denies any muscle or joint pain  SKIN: Denies skin rashes or itching  ENDOCRINE: Denies excessive thirst  Denies intolerance to heat or cold  PSYCHOSOCIAL: Denies depression or anxiety  Denies any recent memory loss         Historical Information   Past Medical History:   Diagnosis Date    Arthritis     GERD (gastroesophageal reflux disease)     Graves' disease     TREATED WITH RADIOACTIVE IODINE ON 13 (10 7 MCI) RESOLVED:     Hepatitis B virus infection     Migraine     RECEIVED BOTOX INJECTIONS Q 3 MONS BY DR Rey Almazan    Nonmelanoma skin cancer     LAST ASSESSED: [id number]     Past Surgical History:   Procedure Laterality Date    APPENDECTOMY      ARTHROSCOPY KNEE Left      SECTION      COLONOSCOPY      Oct/2018    EYE SURGERY Bilateral     SURGERY OF THE ORBITS - ORBITAL DECOMPRESSION    TEMPORAL ARTERY BIOPSY / LIGATION      TONSILLECTOMY      TOTAL KNEE ARTHROPLASTY Right 2016    TKR     Social History   History   Alcohol Use No     Comment: (HISTORY)     History   Drug Use No     History   Smoking Status    Former Smoker   Smokeless Tobacco    Never Used     Family History   Problem Relation Age of Onset    Pancreatic cancer Mother     Coronary artery disease Father     Diabetes Maternal Grandmother     Heart attack Maternal Grandfather         ACUTE MI    Hyperlipidemia Paternal Grandfather     Cirrhosis Paternal Grandfather        Meds/Allergies       Current Outpatient Prescriptions:     acetaminophen (TYLENOL) 500 mg tablet    aspirin 81 MG tablet    celecoxib (CeleBREX) 200 mg capsule    diclofenac sodium (VOLTAREN) 1 %    fluticasone (FLONASE) 50 mcg/act nasal spray    levothyroxine 88 mcg tablet    losartan (COZAAR) 50 mg tablet    omeprazole (PriLOSEC) 20 mg delayed release capsule    Probiotic Product (PROBIOTIC-10 PO)    rosuvastatin (CRESTOR) 20 MG tablet    ketorolac (TORADOL) 10 mg tablet    methocarbamol (ROBAXIN) 500 mg tablet    Omega 3-6-9 Fatty Acids (TRIPLE OMEGA-3-6-9 PO)    prochlorperazine (COMPAZINE) 5 mg tablet    promethazine (PHENERGAN) 25 mg tablet    Promethazine-Codeine 6 25-10 MG/5ML SOLN    Allergies   Allergen Reactions    Codeine GI Intolerance     Other reaction(s): Nausea and/or vomiting    Fentanyl      Action Taken: vomiting; Category:  Adverse Reaction;     Methimazole            Objective     Blood pressure 118/78, pulse 76, weight 45 8 kg (101 lb)  Body mass index is 18 47 kg/m²  PHYSICAL EXAM:      General Appearance:   Alert, cooperative, no distress   HEENT:   Normocephalic, atraumatic, anicteric      Neck:  Supple, symmetrical, trachea midline   Lungs:   Clear to auscultation bilaterally; no rales, rhonchi or wheezing; respirations unlabored    Heart[de-identified]   Regular rate and rhythm; no murmur, rub, or gallop  Abdomen:   Soft, non-tender, non-distended; normal bowel sounds; no masses, no organomegaly    Genitalia:   Deferred    Rectal:   Deferred    Extremities:  No cyanosis, clubbing or edema    Pulses:  2+ and symmetric    Skin:  No jaundice, rashes, or lesions    Lymph nodes:  No palpable cervical lymphadenopathy        Lab Results:   No visits with results within 1 Day(s) from this visit     Latest known visit with results is:   Appointment on 11/13/2018   Component Date Value    WBC 11/13/2018 8 13     RBC 11/13/2018 4 75     Hemoglobin 11/13/2018 14 8     Hematocrit 11/13/2018 44 9     MCV 11/13/2018 95     MCH 11/13/2018 31 2     MCHC 11/13/2018 33 0     RDW 11/13/2018 12 0     MPV 11/13/2018 9 3     Platelets 09/56/8759 262     nRBC 11/13/2018 0     Neutrophils Relative 11/13/2018 49     Immat GRANS % 11/13/2018 0     Lymphocytes Relative 11/13/2018 40     Monocytes Relative 11/13/2018 7     Eosinophils Relative 11/13/2018 3     Basophils Relative 11/13/2018 1     Neutrophils Absolute 11/13/2018 4 01     Immature Grans Absolute 11/13/2018 0 01     Lymphocytes Absolute 11/13/2018 3 25     Monocytes Absolute 11/13/2018 0 53     Eosinophils Absolute 11/13/2018 0 27     Basophils Absolute 11/13/2018 0 06     Sodium 11/13/2018 136     Potassium 11/13/2018 3 9     Chloride 11/13/2018 104     CO2 11/13/2018 29     ANION GAP 11/13/2018 3*    BUN 11/13/2018 23     Creatinine 11/13/2018 0 74     Glucose 11/13/2018 63*    Calcium 11/13/2018 8 7     AST 11/13/2018 30     ALT 11/13/2018 37     Alkaline Phosphatase 11/13/2018 98     Total Protein 11/13/2018 7 1     Albumin 11/13/2018 3 8     Total Bilirubin 11/13/2018 0 25     eGFR 11/13/2018 83     Amylase 11/13/2018 51     Lipase 11/13/2018 120     Cholesterol 11/13/2018 158     Triglycerides 11/13/2018 106     HDL, Direct 11/13/2018 53     LDL Calculated 11/13/2018 84     TSH 3RD GENERATON 11/13/2018 0 969          Radiology Results:   Nm Gastric Emptying    Result Date: 1/8/2019  Narrative: GASTRIC EMPTYING STUDY INDICATION: R10 9: Unspecified abdominal pain R11 0: Nausea R63 4: Abnormal weight loss COMPARISON:  None available TECHNIQUE:   The study was performed following the oral administration of 1 08 mCi Tc-99m sulfur colloid combined with scrambled eggs, as part of a standard meal   Following the meal, one minute anterior and posterior images were obtained immediately and  at 0 25 hours, 0 5 hour, 1 hour, 1 5 hour, 2 hour, 3 hour intervals from the time of ingestion  Geometric mean analyses were then performed  As of March 1, 2016, this gastric emptying protocol has been modified and updated  The gastric emptying times  and the normal values reported below reflect the change in protocol  FINDINGS: Gastric emptying at 0 5 hours = 19 (N < 30%) Gastric emptying at 1 hour = 35 % (N = 10 - 70%) Gastric emptying at 2 hours = 69 % (N = > 40%) Gastric emptying at 3 hours = 100 % (N = > 70%) Linear T-1/2 = 92 minutes     Impression: Normal rate of gastric emptying  Workstation performed: [id number]     Nm Hepatobiliary W Rx    Result Date: 12/24/2018  Narrative: HEPATOBILIARY SCAN WITH CHOLECYSTOKININ ADMINISTRATION INDICATION: R10 9: Unspecified abdominal pain R11 0: Nausea COMPARISON:  Abdomen ultrasound 11/16/2018 TECHNIQUE:   Following the intravenous administration of 5 4 mCi Tc-99m labeled mebrofenin, dynamic abdominal images were obtained over a 60 minute time period  Images were performed in AP projection  FINDINGS: There is prompt, uniform accumulation with normal clearance of the radiopharmaceutical by the liver  There is normal filling of the intrahepatic ducts, common bile duct and gallbladder with normal excretion of the radiopharmaceutical into the duodenum  In order to evaluate the contractile response of the gallbladder to  cholecystokinin stimulation, 0 9 mcg sincalide (0 02 mcg/kg) was administered by slow intravenous infusion over 60 minutes  These images demonstrate normal contraction of the gallbladder  The calculated gallbladder ejection fraction is 80 % (N = >38%)  The patient experienced no symptoms after CCK administration  Impression: 1  Normal hepatobiliary study   2  Normal contractile response of the gallbladder to cholecystokinin infusion   (80%) Workstation performed: [id number]

## 2019-01-14 NOTE — TELEPHONE ENCOUNTER
----- Message from Aureliano Luther DO sent at 1/11/2019  4:26 PM EST -----  Please tell patient that the GES was normal

## 2019-01-14 NOTE — PATIENT INSTRUCTIONS
Will refill Compazine; if the Compazine stops working then it is ok to utilize the Phenergan script that you have already  Will await records from [facility]; EUS

## 2019-01-22 ENCOUNTER — TELEPHONE (OUTPATIENT)
Dept: GASTROENTEROLOGY | Facility: CLINIC | Age: 69
End: 2019-01-22

## 2019-01-22 DIAGNOSIS — K21.00 REFLUX ESOPHAGITIS: ICD-10-CM

## 2019-01-22 RX ORDER — OMEPRAZOLE 20 MG/1
20 CAPSULE, DELAYED RELEASE ORAL 2 TIMES DAILY
Qty: 180 CAPSULE | Refills: 3 | Status: SHIPPED | OUTPATIENT
Start: 2019-01-22 | End: 2019-05-04

## 2019-01-22 NOTE — TELEPHONE ENCOUNTER
Spoke with patient  Dr Chary Alfaro at [facility] wants patient to increase PPI to BID  Dr Chary Alfaro is faxing his records    Faxed PPI to [facility]

## 2019-01-28 ENCOUNTER — TELEPHONE (OUTPATIENT)
Dept: GASTROENTEROLOGY | Facility: CLINIC | Age: 69
End: 2019-01-28

## 2019-01-28 ENCOUNTER — PROCEDURE VISIT (OUTPATIENT)
Dept: NEUROLOGY | Facility: CLINIC | Age: 69
End: 2019-01-28
Payer: MEDICARE

## 2019-01-28 ENCOUNTER — OFFICE VISIT (OUTPATIENT)
Dept: INTERNAL MEDICINE CLINIC | Facility: CLINIC | Age: 69
End: 2019-01-28
Payer: MEDICARE

## 2019-01-28 ENCOUNTER — TELEPHONE (OUTPATIENT)
Dept: INTERNAL MEDICINE CLINIC | Facility: CLINIC | Age: 69
End: 2019-01-28

## 2019-01-28 ENCOUNTER — APPOINTMENT (OUTPATIENT)
Dept: LAB | Facility: CLINIC | Age: 69
End: 2019-01-28
Payer: MEDICARE

## 2019-01-28 VITALS — SYSTOLIC BLOOD PRESSURE: 122 MMHG | TEMPERATURE: 97.9 F | DIASTOLIC BLOOD PRESSURE: 68 MMHG

## 2019-01-28 VITALS
OXYGEN SATURATION: 97 % | BODY MASS INDEX: 18.66 KG/M2 | WEIGHT: 102 LBS | HEART RATE: 86 BPM | SYSTOLIC BLOOD PRESSURE: 170 MMHG | DIASTOLIC BLOOD PRESSURE: 90 MMHG

## 2019-01-28 DIAGNOSIS — R11.0 NAUSEA: ICD-10-CM

## 2019-01-28 DIAGNOSIS — M35.00 SJOGREN'S SYNDROME, WITH UNSPECIFIED ORGAN INVOLVEMENT (HCC): Primary | ICD-10-CM

## 2019-01-28 DIAGNOSIS — M35.00 SJOGREN'S SYNDROME, WITH UNSPECIFIED ORGAN INVOLVEMENT (HCC): ICD-10-CM

## 2019-01-28 DIAGNOSIS — G43.709 CHRONIC MIGRAINE WITHOUT AURA WITHOUT STATUS MIGRAINOSUS, NOT INTRACTABLE: Primary | ICD-10-CM

## 2019-01-28 PROBLEM — M35.03 SJOGREN'S SYNDROME WITH MYOPATHY (HCC): Status: ACTIVE | Noted: 2019-01-28

## 2019-01-28 LAB — ERYTHROCYTE [SEDIMENTATION RATE] IN BLOOD: 5 MM/HOUR (ref 0–20)

## 2019-01-28 PROCEDURE — 36415 COLL VENOUS BLD VENIPUNCTURE: CPT

## 2019-01-28 PROCEDURE — 85652 RBC SED RATE AUTOMATED: CPT

## 2019-01-28 PROCEDURE — 99213 OFFICE O/P EST LOW 20 MIN: CPT | Performed by: INTERNAL MEDICINE

## 2019-01-28 PROCEDURE — 86038 ANTINUCLEAR ANTIBODIES: CPT

## 2019-01-28 PROCEDURE — 86235 NUCLEAR ANTIGEN ANTIBODY: CPT

## 2019-01-28 PROCEDURE — 64615 CHEMODENERV MUSC MIGRAINE: CPT | Performed by: PHYSICIAN ASSISTANT

## 2019-01-28 PROCEDURE — 86430 RHEUMATOID FACTOR TEST QUAL: CPT

## 2019-01-28 RX ORDER — SUCRALFATE ORAL 1 G/10ML
1 SUSPENSION ORAL 4 TIMES DAILY
Qty: 420 ML | Refills: 0 | Status: SHIPPED | OUTPATIENT
Start: 2019-01-28 | End: 2019-02-05

## 2019-01-28 RX ORDER — KETOROLAC TROMETHAMINE 10 MG/1
10 TABLET, FILM COATED ORAL AS NEEDED
Qty: 10 TABLET | Refills: 0 | Status: SHIPPED | OUTPATIENT
Start: 2019-01-28 | End: 2019-02-05

## 2019-01-28 RX ORDER — PROCHLORPERAZINE MALEATE 5 MG/1
5 TABLET ORAL EVERY 6 HOURS PRN
Qty: 30 TABLET | Refills: 0 | Status: SHIPPED | OUTPATIENT
Start: 2019-01-28 | End: 2019-01-28

## 2019-01-28 RX ORDER — METHOCARBAMOL 500 MG/1
500 TABLET, FILM COATED ORAL 3 TIMES DAILY
Qty: 270 TABLET | Refills: 0 | Status: SHIPPED | OUTPATIENT
Start: 2019-01-28 | End: 2019-02-05

## 2019-01-28 NOTE — TELEPHONE ENCOUNTER
Spoke to pt she has an appt with Dr Juan José Mccurdy today , she thinks there is a mis communication , she has already had a recent colonoscopy with Dr Jeanine Spencer , she had an ercp as well   She will confer with Dr Christian Massey today and follow up if necessary

## 2019-01-28 NOTE — PATIENT INSTRUCTIONS
A patient with persistent nausea  2 certain extent, the label does not matter    Therapeutic trial of Carafate  So when antibody  Follow-up in a week

## 2019-01-28 NOTE — PROGRESS NOTES
Chemodenervation  Date/Time: 1/28/2019 10:49 AM  Performed by: Jose Mcknight  Authorized by: Sophie Sandra details:     Preparation: Patient was prepped and draped in usual sterile fashion      Prepped With: Alcohol    Anesthesia (see MAR for exact dosages): Anesthesia method:  None  Procedure details:     Position:  Upright  Botox:     Botox Type:  Type A    Brand:  Botox    mL's of Botulinum Toxin:  200    Final Concentration per CC:  200 units    Needle Gauge:  30 G 2 5 inch  Procedures:     Botox Procedures: chronic headache      Indications: migraines    Injection Location:     Head / Face:  L superior cervical paraspinal, R superior cervical paraspinal, R medial occipitalis, L medial occipitalis, L temporalis, R temporalis, L superior trapezius and R superior trapezius    L temporalis injection amount:  25 unit(s)    R temporalis injection amount:  25 unit(s)    L medial occipitalis injection amount:  20 unit(s)    R medial occipitalis injection amount:  20 unit(s)    L superior cervical paraspinal injection amount:  10 unit(s)    R superior cervical paraspinal injection amount:  10 unit(s)    L superior trapezius injection amount:  20 unit(s)    R superior trapezius injection amount:  20 unit(s)  Total Units:     Total units used:  200    Total units discarded:  0  Post-procedure details:     Chemodenervation:  Chronic migraine    Facial Nerve Location[de-identified]  Bilateral facial nerve    Patient tolerance of procedure: Tolerated well, no immediate complications  Comments:      5 units TMJ bilaterally  5 units [place] bilaterally  30 units on the apex of his head    All medically necessary

## 2019-01-28 NOTE — PROGRESS NOTES
Assessment/Plan:       Diagnoses and all orders for this visit:    Sjogren's syndrome, with unspecified organ involvement ([facility] [address] )  -     Sjogren's Antibodies; Future  -     ISMAEL Screen w/ Reflex to Titer/Pattern; Future  -     RF Screen w/ Reflex to Titer; Future  -     Sedimentation rate, automated; Future  -     sucralfate (CARAFATE) 1 g/10 mL suspension; Take 10 mL (1 g total) by mouth 4 (four) times a day          There are no Patient Instructions on file for this visit  Subjective:      Patient ID: Stephon Carrillo is a 70 y o  female  This patient was seen back in November  She had a complaint of nausea  The are gin seem to be 1 I tried giving her diltiazem to treat both blood pressure and migraine  That did not work well but she noted onset of what seemed to be intermittent nausea  Instructions were to wean off diltiazem, do some lab testing, and get a hepatic ultrasound  This precipitated a series of events  The ultrasound reported to show a dilated common bile duct  This led to a whole series of examinations  An MRCP showed common bile duct dilatation  Next an ERCP done December 8th  The ERCP was technically difficult and unsuccessful and the patient developed pancreatitis after which  She was hospitalized for 4 days  Seen by GI again  Seen by GI at [address] [place] [address]    The take home message at this point seems to be that no pathology has been identified in the common bile duct  The patient tells me, although I do not have communication from that physician, that the issue P physician told her there was no malignancy in there  The patient did have gastric emptying study and it was normal    She still has persistent nausea  She is on omeprazole b i d  With perhaps 30 percent relief of symptoms  She wonders if she could have so groin syndrome    The answer is possibly but whether not we attach a label to this really does not change the fact that she has nausea    Getting Botox for the migraine          The following portions of the patient's history were reviewed and updated as appropriate:   She has a past medical history of Arthritis; GERD (gastroesophageal reflux disease); Graves' disease; Hepatitis B virus infection; Migraine; and Nonmelanoma skin cancer  ,   does not have any pertinent problems on file  ,   has a past surgical history that includes Appendectomy; ARTHROSCOPY KNEE (Left); Temporal artery biopsy / ligation;  section; Total knee arthroplasty (Right, 2016); Eye surgery (Bilateral); Tonsillectomy; and Colonoscopy  ,  family history includes Cirrhosis in her paternal grandfather; Coronary artery disease in her father; Diabetes in her maternal grandmother; Heart attack in her maternal grandfather; Hyperlipidemia in her paternal grandfather; Pancreatic cancer in her mother  ,   reports that she has quit smoking  She has never used smokeless tobacco  She reports that she does not drink alcohol or use drugs  ,  is allergic to codeine; fentanyl; and methimazole     Current Outpatient Prescriptions   Medication Sig Dispense Refill    acetaminophen (TYLENOL) 500 mg tablet Take 500 mg by mouth every 6 (six) hours      aspirin 81 MG tablet Take 1 tablet by mouth daily      diclofenac sodium (VOLTAREN) 1 % Apply 4 g topically 4 (four) times a day 3 Tube 0    fluticasone (FLONASE) 50 mcg/act nasal spray 2 sprays into each nostril daily 1 Bottle 6    ketorolac (TORADOL) 10 mg tablet Take 1 tablet (10 mg total) by mouth as needed for moderate pain 10 tablet 0    levothyroxine 88 mcg tablet Take 1 tablet (88 mcg total) by mouth daily for 90 days 90 tablet 3    losartan (COZAAR) 50 mg tablet Take 1 tablet (50 mg total) by mouth daily 90 tablet 1    methocarbamol (ROBAXIN) 500 mg tablet Take 1 tablet (500 mg total) by mouth 3 (three) times a day 270 tablet 0    omeprazole (PriLOSEC) 20 mg delayed release capsule Take 1 capsule (20 mg total) by mouth 2 (two) times a day 180 capsule 3    Probiotic Product (PROBIOTIC-10 PO) Take by mouth      rosuvastatin (CRESTOR) 20 MG tablet Take 1 tablet (20 mg total) by mouth daily 90 tablet 0    sucralfate (CARAFATE) 1 g/10 mL suspension Take 10 mL (1 g total) by mouth 4 (four) times a day 420 mL 0     No current facility-administered medications for this visit  Review of Systems   Constitutional: Positive for fatigue and unexpected weight change  Respiratory: Negative for shortness of breath  Cardiovascular: Negative for chest pain  Gastrointestinal: Positive for nausea  Negative for abdominal pain, blood in stool, constipation, diarrhea and vomiting  Neurological: Positive for headaches  Objective:  Vitals:    01/28/19 1532   BP: 170/90   Pulse: 86   SpO2: 97%      Physical Exam   Constitutional: No distress  An alert patient underweight who appears stated age   Cardiovascular: Normal rate  Pulmonary/Chest: Effort normal    Neurological: She is alert  Psychiatric: She has a normal mood and affect   Judgment normal

## 2019-01-28 NOTE — TELEPHONE ENCOUNTER
----- Message from Christa Lezama MD sent at 1/26/2019 10:49 AM EST -----  I do not know where this came from but this is unreadable

## 2019-01-29 ENCOUNTER — TELEPHONE (OUTPATIENT)
Dept: INTERNAL MEDICINE CLINIC | Facility: CLINIC | Age: 69
End: 2019-01-29

## 2019-01-29 DIAGNOSIS — E78.2 HYPERLIPIDEMIA, MIXED: ICD-10-CM

## 2019-01-29 LAB
ENA SS-A AB SER-ACNC: <0.2 AI (ref 0–0.9)
ENA SS-B AB SER-ACNC: <0.2 AI (ref 0–0.9)
RHEUMATOID FACT SER QL LA: NEGATIVE

## 2019-01-29 RX ORDER — ROSUVASTATIN CALCIUM 20 MG/1
20 TABLET, COATED ORAL DAILY
Qty: 90 TABLET | Refills: 1 | Status: SHIPPED | OUTPATIENT
Start: 2019-01-29 | End: 2019-02-05

## 2019-01-29 NOTE — TELEPHONE ENCOUNTER
Refill request     Rosuvastatin 20mg #90 w/ refills   1 tablet daily     Send to [facility] [place]     Any questions call pt   [phone]

## 2019-01-29 NOTE — TELEPHONE ENCOUNTER
----- Message from Jennifer Prather MD sent at 1/29/2019  2:21 PM EST -----  No Sjogren antibody detected

## 2019-01-30 LAB — RYE IGE QN: NEGATIVE

## 2019-02-05 ENCOUNTER — OFFICE VISIT (OUTPATIENT)
Dept: INTERNAL MEDICINE CLINIC | Facility: CLINIC | Age: 69
End: 2019-02-05
Payer: MEDICARE

## 2019-02-05 VITALS
HEART RATE: 80 BPM | WEIGHT: 102.2 LBS | OXYGEN SATURATION: 96 % | DIASTOLIC BLOOD PRESSURE: 90 MMHG | BODY MASS INDEX: 18.69 KG/M2 | SYSTOLIC BLOOD PRESSURE: 140 MMHG

## 2019-02-05 DIAGNOSIS — R11.0 NAUSEA: Primary | ICD-10-CM

## 2019-02-05 PROCEDURE — 99212 OFFICE O/P EST SF 10 MIN: CPT | Performed by: INTERNAL MEDICINE

## 2019-02-05 RX ORDER — PROCHLORPERAZINE MALEATE 5 MG/1
TABLET ORAL
Refills: 0 | COMMUNITY
Start: 2019-01-28 | End: 2019-11-01 | Stop reason: SDUPTHER

## 2019-02-05 NOTE — PROGRESS NOTES
Assessment/Plan:       Diagnoses and all orders for this visit:    Nausea    Other orders  -     prochlorperazine (COMPAZINE) 5 mg tablet; take 1 tablet by mouth every 6 hours if needed for nausea and vomiting          There are no Patient Instructions on file for this visit  Subjective:      Patient ID: Tanja Johnson is a 70 y o  female  Follow-up visit of a 69-year-old female patient with persistent nausea for about 6 months associated with they bile duct abnormality  The bile duct abnormality has been treated [place] as [facility] in [place]; no diagnosis but no evidence of malignancy  Nausea has persisted even though the original theory was it was caused by diltiazem, and diltiazem was discontinued    Comes in after a therapeutic trial of Carafate  This was totally unsuccessful and she has stop that  Has an HUP next week        The following portions of the patient's history were reviewed and updated as appropriate:   She has a past medical history of Arthritis; GERD (gastroesophageal reflux disease); Graves' disease; Hepatitis B virus infection; Migraine; and Nonmelanoma skin cancer  ,   does not have any pertinent problems on file  ,   has a past surgical history that includes Appendectomy; ARTHROSCOPY KNEE (Left); Temporal artery biopsy / ligation;  section; Total knee arthroplasty (Right, 2016); Eye surgery (Bilateral); Tonsillectomy; and Colonoscopy  ,  family history includes Cirrhosis in her paternal grandfather; Coronary artery disease in her father; Diabetes in her maternal grandmother; Heart attack in her maternal grandfather; Hyperlipidemia in her paternal grandfather; Pancreatic cancer in her mother  ,   reports that she has quit smoking  She has never used smokeless tobacco  She reports that she does not drink alcohol or use drugs  ,  is allergic to codeine; fentanyl; and methimazole     Current Outpatient Prescriptions   Medication Sig Dispense Refill    acetaminophen (TYLENOL) 500 mg tablet Take 500 mg by mouth every 6 (six) hours      aspirin 81 MG tablet Take 1 tablet by mouth daily      diclofenac sodium (VOLTAREN) 1 % Apply 4 g topically 4 (four) times a day 3 Tube 0    fluticasone (FLONASE) 50 mcg/act nasal spray 2 sprays into each nostril daily 1 Bottle 6    ketorolac (TORADOL) 10 mg tablet Take 1 tablet (10 mg total) by mouth as needed for moderate pain 10 tablet 0    levothyroxine 88 mcg tablet Take 1 tablet (88 mcg total) by mouth daily for 90 days 90 tablet 3    losartan (COZAAR) 50 mg tablet Take 1 tablet (50 mg total) by mouth daily 90 tablet 1    methocarbamol (ROBAXIN) 500 mg tablet Take 1 tablet (500 mg total) by mouth 3 (three) times a day 270 tablet 0    omeprazole (PriLOSEC) 20 mg delayed release capsule Take 1 capsule (20 mg total) by mouth 2 (two) times a day 180 capsule 3    Probiotic Product (PROBIOTIC-10 PO) Take by mouth      rosuvastatin (CRESTOR) 20 MG tablet Take 1 tablet (20 mg total) by mouth daily 90 tablet 1    prochlorperazine (COMPAZINE) 5 mg tablet take 1 tablet by mouth every 6 hours if needed for nausea and vomiting  0    sucralfate (CARAFATE) 1 g/10 mL suspension Take 10 mL (1 g total) by mouth 4 (four) times a day (Patient not taking: Reported on 2/5/2019 ) 420 mL 0     No current facility-administered medications for this visit  Review of Systems   Gastrointestinal: Positive for nausea  Negative for abdominal pain  Psychiatric/Behavioral: Positive for dysphoric mood  Objective:  Vitals:    02/05/19 1405   BP: 140/90   Pulse: 80   SpO2: 96%      Physical Exam   Constitutional:   Alert thin but not malnourished female patient who appears stated age   Pulmonary/Chest: Effort normal    Psychiatric: She has a normal mood and affect   Judgment normal

## 2019-02-14 ENCOUNTER — TELEPHONE (OUTPATIENT)
Dept: NEUROLOGY | Facility: CLINIC | Age: 69
End: 2019-02-14

## 2019-02-14 NOTE — TELEPHONE ENCOUNTER
RUBEN stevens @[place] is rx nortryptiline for nausea  Pt & dr want Dr Jaime Pineda to be aware  Dr will be sending fax

## 2019-03-05 ENCOUNTER — OFFICE VISIT (OUTPATIENT)
Dept: INTERNAL MEDICINE CLINIC | Facility: CLINIC | Age: 69
End: 2019-03-05
Payer: MEDICARE

## 2019-03-05 VITALS
DIASTOLIC BLOOD PRESSURE: 84 MMHG | HEART RATE: 85 BPM | BODY MASS INDEX: 18.73 KG/M2 | SYSTOLIC BLOOD PRESSURE: 120 MMHG | WEIGHT: 102.4 LBS | OXYGEN SATURATION: 97 %

## 2019-03-05 DIAGNOSIS — R11.0 FUNCTIONAL NAUSEA: Primary | ICD-10-CM

## 2019-03-05 PROCEDURE — 99212 OFFICE O/P EST SF 10 MIN: CPT | Performed by: INTERNAL MEDICINE

## 2019-03-05 RX ORDER — NORTRIPTYLINE HYDROCHLORIDE 25 MG/1
25 CAPSULE ORAL
Refills: 0 | COMMUNITY
Start: 2019-02-15 | End: 2019-05-04

## 2019-03-05 NOTE — PROGRESS NOTES
Assessment/Plan:       Diagnoses and all orders for this visit:    Functional nausea    Other orders  -     nortriptyline (PAMELOR) 25 mg capsule; Take 25 mg by mouth daily at bedtime          Patient Instructions   A functional nausea currently being treated at [facility]  Follow up with me in the fall  Subjective:      Patient ID: Mary Campuzano is a 70 y o  female  A patient with ongoing nausea and GI distress for the past 4 months  She has had in enormous workup including [place]  Diagnosis at this time is functional nausea and treatment has been initiated with nortriptyline  No malignancy, no gastro paresis, no anatomical the arrangement noted of any kind  Her immediate requirement today is documentation for me so she can have her [employer] insurance activated to refund a lift to get price      The following portions of the patient's history were reviewed and updated as appropriate:   She has a past medical history of Arthritis, GERD (gastroesophageal reflux disease), Graves' disease, Hepatitis B virus infection, Migraine, and Nonmelanoma skin cancer  ,  does not have any pertinent problems on file  ,   has a past surgical history that includes Appendectomy; ARTHROSCOPY KNEE (Left); Temporal artery biopsy / ligation;  section; Total knee arthroplasty (Right, 2016); Eye surgery (Bilateral); Tonsillectomy; and Colonoscopy  ,  family history includes Cirrhosis in her paternal grandfather; Coronary artery disease in her father; Diabetes in her maternal grandmother; Heart attack in her maternal grandfather; Hyperlipidemia in her paternal grandfather; Pancreatic cancer in her mother  ,   reports that she has quit smoking  She has never used smokeless tobacco  She reports that she does not drink alcohol or use drugs  ,  is allergic to codeine; fentanyl; and methimazole     Current Outpatient Medications   Medication Sig Dispense Refill    acetaminophen (TYLENOL) 500 mg tablet Take 500 mg by mouth every 6 (six) hours      aspirin 81 MG tablet Take 1 tablet by mouth daily      diclofenac sodium (VOLTAREN) 1 % Apply 4 g topically 4 (four) times a day 3 Tube 0    fluticasone (FLONASE) 50 mcg/act nasal spray 2 sprays into each nostril daily 1 Bottle 6    losartan (COZAAR) 50 mg tablet Take 1 tablet (50 mg total) by mouth daily 90 tablet 1    nortriptyline (PAMELOR) 25 mg capsule Take 25 mg by mouth daily at bedtime  0    omeprazole (PriLOSEC) 20 mg delayed release capsule Take 1 capsule (20 mg total) by mouth 2 (two) times a day 180 capsule 3    Probiotic Product (PROBIOTIC-10 PO) Take by mouth      prochlorperazine (COMPAZINE) 5 mg tablet take 1 tablet by mouth every 6 hours if needed for nausea and vomiting  0    levothyroxine 88 mcg tablet Take 1 tablet (88 mcg total) by mouth daily for 90 days 90 tablet 3     No current facility-administered medications for this visit  Review of Systems   Constitutional: Positive for unexpected weight change  Gastrointestinal: Positive for nausea  Negative for diarrhea  Objective:  Vitals:    03/05/19 1557   BP: 120/84   Pulse: 85   SpO2: 97%      Physical Exam   Constitutional:   An alert female patient clearly underweight who appears stated age   Pulmonary/Chest: Effort normal    Skin: Skin is warm and dry

## 2019-03-05 NOTE — PATIENT INSTRUCTIONS
A functional nausea currently being treated at [facility]  Follow up with me in the fall

## 2019-04-02 ENCOUNTER — OFFICE VISIT (OUTPATIENT)
Dept: DERMATOLOGY | Facility: CLINIC | Age: 69
End: 2019-04-02
Payer: MEDICARE

## 2019-04-02 DIAGNOSIS — L98.9 UNKNOWN SKIN LESION: Primary | ICD-10-CM

## 2019-04-02 DIAGNOSIS — Z12.83 SCREENING FOR SKIN CANCER: ICD-10-CM

## 2019-04-02 PROCEDURE — 88305 TISSUE EXAM BY PATHOLOGIST: CPT | Performed by: PATHOLOGY

## 2019-04-02 PROCEDURE — 99212 OFFICE O/P EST SF 10 MIN: CPT | Performed by: DERMATOLOGY

## 2019-04-02 PROCEDURE — 11102 TANGNTL BX SKIN SINGLE LES: CPT | Performed by: DERMATOLOGY

## 2019-04-02 RX ORDER — TRAZODONE HYDROCHLORIDE 50 MG/1
25 TABLET ORAL DAILY
COMMUNITY
Start: 2019-03-26 | End: 2019-05-04

## 2019-04-09 ENCOUNTER — TELEPHONE (OUTPATIENT)
Dept: DERMATOLOGY | Facility: CLINIC | Age: 69
End: 2019-04-09

## 2019-04-10 ENCOUNTER — TRANSCRIBE ORDERS (OUTPATIENT)
Dept: ADMINISTRATIVE | Facility: HOSPITAL | Age: 69
End: 2019-04-10

## 2019-04-10 DIAGNOSIS — Z12.39 BREAST SCREENING, UNSPECIFIED: Primary | ICD-10-CM

## 2019-04-26 ENCOUNTER — OFFICE VISIT (OUTPATIENT)
Dept: DERMATOLOGY | Facility: CLINIC | Age: 69
End: 2019-04-26
Payer: MEDICARE

## 2019-04-26 DIAGNOSIS — L56.5 DISSEMINATED SUPERFICIAL ACTINIC POROKERATOSIS (DSAP): ICD-10-CM

## 2019-04-26 DIAGNOSIS — L57.0 ACTINIC KERATOSIS: Primary | ICD-10-CM

## 2019-04-26 PROCEDURE — 17000 DESTRUCT PREMALG LESION: CPT | Performed by: DERMATOLOGY

## 2019-04-26 PROCEDURE — 99212 OFFICE O/P EST SF 10 MIN: CPT | Performed by: DERMATOLOGY

## 2019-05-02 ENCOUNTER — PROCEDURE VISIT (OUTPATIENT)
Dept: NEUROLOGY | Facility: CLINIC | Age: 69
End: 2019-05-02
Payer: MEDICARE

## 2019-05-02 VITALS — HEART RATE: 63 BPM | SYSTOLIC BLOOD PRESSURE: 142 MMHG | TEMPERATURE: 98 F | DIASTOLIC BLOOD PRESSURE: 84 MMHG

## 2019-05-02 DIAGNOSIS — G43.709 CHRONIC MIGRAINE WITHOUT AURA WITHOUT STATUS MIGRAINOSUS, NOT INTRACTABLE: Primary | ICD-10-CM

## 2019-05-02 PROCEDURE — 64615 CHEMODENERV MUSC MIGRAINE: CPT | Performed by: PHYSICIAN ASSISTANT

## 2019-05-04 ENCOUNTER — APPOINTMENT (OUTPATIENT)
Dept: LAB | Facility: CLINIC | Age: 69
End: 2019-05-04
Payer: MEDICARE

## 2019-05-04 ENCOUNTER — OFFICE VISIT (OUTPATIENT)
Dept: INTERNAL MEDICINE CLINIC | Facility: CLINIC | Age: 69
End: 2019-05-04
Payer: MEDICARE

## 2019-05-04 ENCOUNTER — TELEPHONE (OUTPATIENT)
Dept: INTERNAL MEDICINE CLINIC | Facility: CLINIC | Age: 69
End: 2019-05-04

## 2019-05-04 VITALS
HEART RATE: 66 BPM | DIASTOLIC BLOOD PRESSURE: 70 MMHG | WEIGHT: 104 LBS | HEIGHT: 62 IN | SYSTOLIC BLOOD PRESSURE: 108 MMHG | BODY MASS INDEX: 19.14 KG/M2

## 2019-05-04 DIAGNOSIS — R11.0 FUNCTIONAL NAUSEA: ICD-10-CM

## 2019-05-04 DIAGNOSIS — E89.0 POSTABLATIVE HYPOTHYROIDISM: ICD-10-CM

## 2019-05-04 DIAGNOSIS — G43.709 CHRONIC MIGRAINE WITHOUT AURA WITHOUT STATUS MIGRAINOSUS, NOT INTRACTABLE: ICD-10-CM

## 2019-05-04 DIAGNOSIS — I10 ESSENTIAL HYPERTENSION: Primary | ICD-10-CM

## 2019-05-04 DIAGNOSIS — E78.2 MIXED HYPERLIPIDEMIA: ICD-10-CM

## 2019-05-04 DIAGNOSIS — T78.40XA ALLERGIC STATE, INITIAL ENCOUNTER: ICD-10-CM

## 2019-05-04 DIAGNOSIS — I10 ESSENTIAL HYPERTENSION: ICD-10-CM

## 2019-05-04 LAB
ALBUMIN SERPL BCP-MCNC: 4 G/DL (ref 3.5–5)
ALP SERPL-CCNC: 104 U/L (ref 46–116)
ALT SERPL W P-5'-P-CCNC: 29 U/L (ref 12–78)
ANION GAP SERPL CALCULATED.3IONS-SCNC: 4 MMOL/L (ref 4–13)
AST SERPL W P-5'-P-CCNC: 24 U/L (ref 5–45)
BASOPHILS # BLD AUTO: 0.09 THOUSANDS/ΜL (ref 0–0.1)
BASOPHILS NFR BLD AUTO: 1 % (ref 0–1)
BILIRUB SERPL-MCNC: 0.5 MG/DL (ref 0.2–1)
BILIRUB UR QL STRIP: NEGATIVE
BUN SERPL-MCNC: 19 MG/DL (ref 5–25)
CALCIUM SERPL-MCNC: 8.9 MG/DL (ref 8.3–10.1)
CHLORIDE SERPL-SCNC: 107 MMOL/L (ref 100–108)
CHOLEST SERPL-MCNC: 274 MG/DL (ref 50–200)
CLARITY UR: CLEAR
CO2 SERPL-SCNC: 28 MMOL/L (ref 21–32)
COLOR UR: YELLOW
CREAT SERPL-MCNC: 0.72 MG/DL (ref 0.6–1.3)
EOSINOPHIL # BLD AUTO: 0.2 THOUSAND/ΜL (ref 0–0.61)
EOSINOPHIL NFR BLD AUTO: 2 % (ref 0–6)
ERYTHROCYTE [DISTWIDTH] IN BLOOD BY AUTOMATED COUNT: 12.3 % (ref 11.6–15.1)
GFR SERPL CREATININE-BSD FRML MDRD: 85 ML/MIN/1.73SQ M
GLUCOSE P FAST SERPL-MCNC: 92 MG/DL (ref 65–99)
GLUCOSE UR STRIP-MCNC: NEGATIVE MG/DL
HCT VFR BLD AUTO: 46.4 % (ref 34.8–46.1)
HDLC SERPL-MCNC: 57 MG/DL (ref 40–60)
HGB BLD-MCNC: 15.2 G/DL (ref 11.5–15.4)
HGB UR QL STRIP.AUTO: NEGATIVE
IMM GRANULOCYTES # BLD AUTO: 0.03 THOUSAND/UL (ref 0–0.2)
IMM GRANULOCYTES NFR BLD AUTO: 0 % (ref 0–2)
KETONES UR STRIP-MCNC: NEGATIVE MG/DL
LDLC SERPL CALC-MCNC: 184 MG/DL (ref 0–100)
LEUKOCYTE ESTERASE UR QL STRIP: NEGATIVE
LYMPHOCYTES # BLD AUTO: 2.61 THOUSANDS/ΜL (ref 0.6–4.47)
LYMPHOCYTES NFR BLD AUTO: 31 % (ref 14–44)
MCH RBC QN AUTO: 31.1 PG (ref 26.8–34.3)
MCHC RBC AUTO-ENTMCNC: 32.8 G/DL (ref 31.4–37.4)
MCV RBC AUTO: 95 FL (ref 82–98)
MONOCYTES # BLD AUTO: 0.6 THOUSAND/ΜL (ref 0.17–1.22)
MONOCYTES NFR BLD AUTO: 7 % (ref 4–12)
NEUTROPHILS # BLD AUTO: 4.87 THOUSANDS/ΜL (ref 1.85–7.62)
NEUTS SEG NFR BLD AUTO: 59 % (ref 43–75)
NITRITE UR QL STRIP: NEGATIVE
NRBC BLD AUTO-RTO: 0 /100 WBCS
PH UR STRIP.AUTO: 6 [PH]
PLATELET # BLD AUTO: 276 THOUSANDS/UL (ref 149–390)
PMV BLD AUTO: 9.5 FL (ref 8.9–12.7)
POTASSIUM SERPL-SCNC: 4.2 MMOL/L (ref 3.5–5.3)
PROT SERPL-MCNC: 7.4 G/DL (ref 6.4–8.2)
PROT UR STRIP-MCNC: NEGATIVE MG/DL
RBC # BLD AUTO: 4.88 MILLION/UL (ref 3.81–5.12)
SODIUM SERPL-SCNC: 139 MMOL/L (ref 136–145)
SP GR UR STRIP.AUTO: 1.01 (ref 1–1.03)
TRIGL SERPL-MCNC: 165 MG/DL
TSH SERPL DL<=0.05 MIU/L-ACNC: 0.87 UIU/ML (ref 0.36–3.74)
UROBILINOGEN UR QL STRIP.AUTO: 0.2 E.U./DL
WBC # BLD AUTO: 8.4 THOUSAND/UL (ref 4.31–10.16)

## 2019-05-04 PROCEDURE — 80053 COMPREHEN METABOLIC PANEL: CPT

## 2019-05-04 PROCEDURE — 85025 COMPLETE CBC W/AUTO DIFF WBC: CPT

## 2019-05-04 PROCEDURE — 80061 LIPID PANEL: CPT

## 2019-05-04 PROCEDURE — 84443 ASSAY THYROID STIM HORMONE: CPT

## 2019-05-04 PROCEDURE — 36415 COLL VENOUS BLD VENIPUNCTURE: CPT

## 2019-05-04 PROCEDURE — 99213 OFFICE O/P EST LOW 20 MIN: CPT | Performed by: INTERNAL MEDICINE

## 2019-05-04 PROCEDURE — 81003 URINALYSIS AUTO W/O SCOPE: CPT | Performed by: INTERNAL MEDICINE

## 2019-05-04 RX ORDER — FLUTICASONE PROPIONATE 50 MCG
2 SPRAY, SUSPENSION (ML) NASAL DAILY
Qty: 3 BOTTLE | Refills: 4 | Status: SHIPPED | OUTPATIENT
Start: 2019-05-04 | End: 2020-06-26 | Stop reason: SDUPTHER

## 2019-05-04 RX ORDER — LEVOTHYROXINE SODIUM 88 UG/1
88 TABLET ORAL
COMMUNITY
End: 2019-05-04

## 2019-05-04 RX ORDER — TRAZODONE HYDROCHLORIDE 50 MG/1
50 TABLET ORAL
Qty: 90 TABLET | Refills: 3 | Status: CANCELLED | OUTPATIENT
Start: 2019-05-04

## 2019-05-04 RX ORDER — TRAZODONE HYDROCHLORIDE 50 MG/1
25 TABLET ORAL 2 TIMES DAILY
Qty: 180 TABLET | Refills: 3 | Status: SHIPPED | OUTPATIENT
Start: 2019-05-04 | End: 2019-05-04 | Stop reason: SDUPTHER

## 2019-05-04 RX ORDER — FAMOTIDINE 20 MG/1
20 TABLET, FILM COATED ORAL 2 TIMES DAILY
Qty: 180 TABLET | Refills: 3 | Status: SHIPPED | OUTPATIENT
Start: 2019-05-04 | End: 2020-03-12 | Stop reason: SDUPTHER

## 2019-05-04 RX ORDER — LEVOTHYROXINE SODIUM 88 UG/1
88 TABLET ORAL DAILY
Qty: 90 TABLET | Refills: 3 | Status: SHIPPED | OUTPATIENT
Start: 2019-05-04 | End: 2020-03-12 | Stop reason: SDUPTHER

## 2019-05-04 RX ORDER — TRAZODONE HYDROCHLORIDE 50 MG/1
50 TABLET ORAL
Qty: 90 TABLET | Refills: 3 | Status: SHIPPED | OUTPATIENT
Start: 2019-05-04 | End: 2020-02-24 | Stop reason: ALTCHOICE

## 2019-05-04 RX ORDER — LOSARTAN POTASSIUM 50 MG/1
50 TABLET ORAL DAILY
Qty: 90 TABLET | Refills: 3 | Status: SHIPPED | OUTPATIENT
Start: 2019-05-04 | End: 2019-11-11 | Stop reason: SDUPTHER

## 2019-06-07 DIAGNOSIS — E78.2 MIXED HYPERLIPIDEMIA: Primary | ICD-10-CM

## 2019-06-07 RX ORDER — ROSUVASTATIN CALCIUM 20 MG/1
20 TABLET, COATED ORAL DAILY
Qty: 90 TABLET | Refills: 3 | Status: SHIPPED | OUTPATIENT
Start: 2019-06-07 | End: 2020-03-12 | Stop reason: SDUPTHER

## 2019-06-15 ENCOUNTER — HOSPITAL ENCOUNTER (OUTPATIENT)
Dept: MAMMOGRAPHY | Facility: MEDICAL CENTER | Age: 69
Discharge: HOME | End: 2019-06-15
Payer: MEDICARE

## 2019-06-15 ENCOUNTER — TRANSCRIBE ORDERS (OUTPATIENT)
Dept: ADMINISTRATIVE | Facility: HOSPITAL | Age: 69
End: 2019-06-15

## 2019-06-15 VITALS — BODY MASS INDEX: 19.32 KG/M2 | WEIGHT: 105 LBS | HEIGHT: 62 IN

## 2019-06-15 DIAGNOSIS — Z12.39 BREAST SCREENING, UNSPECIFIED: ICD-10-CM

## 2019-06-15 DIAGNOSIS — Z12.39 SCREENING FOR BREAST CANCER: Primary | ICD-10-CM

## 2019-06-15 PROCEDURE — 77063 BREAST TOMOSYNTHESIS BI: CPT

## 2019-06-15 PROCEDURE — 77067 SCR MAMMO BI INCL CAD: CPT

## 2019-07-12 ENCOUNTER — DOCUMENTATION (OUTPATIENT)
Dept: NEUROLOGY | Facility: CLINIC | Age: 69
End: 2019-07-12

## 2019-07-12 NOTE — PROGRESS NOTES
Patient is scheduled with Bruce Millan on 8/1/2019 in the [facility] location

## 2019-07-12 NOTE — PROGRESS NOTES
Type Date User Summary Attachment   General 07/12/2019  2:29 PM Renny Akhtar Care Coordination  -   Note    Botox- no authorization needed   Please use our stock      Thank you,     Nancy Mills

## 2019-08-01 ENCOUNTER — PROCEDURE VISIT (OUTPATIENT)
Dept: NEUROLOGY | Facility: CLINIC | Age: 69
End: 2019-08-01
Payer: MEDICARE

## 2019-08-01 VITALS — SYSTOLIC BLOOD PRESSURE: 130 MMHG | TEMPERATURE: 98.3 F | HEART RATE: 60 BPM | DIASTOLIC BLOOD PRESSURE: 76 MMHG

## 2019-08-01 DIAGNOSIS — G43.709 CHRONIC MIGRAINE WITHOUT AURA WITHOUT STATUS MIGRAINOSUS, NOT INTRACTABLE: Primary | ICD-10-CM

## 2019-08-01 PROCEDURE — 64615 CHEMODENERV MUSC MIGRAINE: CPT | Performed by: PHYSICIAN ASSISTANT

## 2019-08-01 NOTE — PROGRESS NOTES
Chemodenervation  Date/Time: 8/1/2019 10:18 AM  Performed by: Karel Kam PA-C  Authorized by: Karel Kam PA-C     Pre-procedure details:     Prepped With: Alcohol    Anesthesia (see MAR for exact dosages): Anesthesia method:  None  Procedure details:     Position:  Upright  Botox:     Botox Type:  Type A    Brand:  Botox    mL's of Botulinum Toxin:  200    Final Concentration per CC:  200 units    Needle Gauge:  30 G 2 5 inch  Procedures:     Botox Procedures: chronic headache      Indications: migraines    Injection Location:     Head / Face:  L superior cervical paraspinal, R superior cervical paraspinal, L medial occipitalis, R medial occipitalis, R temporalis, L temporalis, R superior trapezius and L superior trapezius    L temporalis injection amount:  25 unit(s)    R temporalis injection amount:  25 unit(s)    L medial occipitalis injection amount:  20 unit(s)    R medial occipitalis injection amount:  20 unit(s)    L superior cervical paraspinal injection amount:  10 unit(s)    R superior cervical paraspinal injection amount:  10 unit(s)    L superior trapezius injection amount:  20 unit(s)    R superior trapezius injection amount:  20 unit(s)  Total Units:     Total units used:  200    Total units discarded:  0  Post-procedure details:     Chemodenervation:  Chronic migraine    Facial Nerve Location[de-identified]  Bilateral facial nerve    Patient tolerance of procedure:   Tolerated well, no immediate complications  Comments:      5 units TMJ bilaterally  5 units masseter bilaterally  30 units Jordan  All medically necessary

## 2019-10-11 ENCOUNTER — DOCUMENTATION (OUTPATIENT)
Dept: NEUROLOGY | Facility: CLINIC | Age: 69
End: 2019-10-11

## 2019-10-11 NOTE — PROGRESS NOTES
Referral Notes   Number of Notes: 1   Type Date User Summary Attachment   General 10/11/2019  3:13 PM Christine Rojo care coordination  -   Note    Botox- no authorization needed   Please use our stock      Thank you,        Paulo Hunter

## 2019-10-11 NOTE — PROGRESS NOTES
Patient is scheduled in [facility] on 11/1/19 with Eunice

## 2019-10-15 ENCOUNTER — OFFICE VISIT (OUTPATIENT)
Dept: DERMATOLOGY | Facility: CLINIC | Age: 69
End: 2019-10-15
Payer: MEDICARE

## 2019-10-15 DIAGNOSIS — Z12.83 SCREENING FOR SKIN CANCER: ICD-10-CM

## 2019-10-15 DIAGNOSIS — L56.5 DSAP (DISSEMINATED SUPERFICIAL ACTINIC POROKERATOSIS): Primary | ICD-10-CM

## 2019-10-15 DIAGNOSIS — L82.1 SEBORRHEIC KERATOSIS: ICD-10-CM

## 2019-10-15 DIAGNOSIS — Z85.828 HISTORY OF SKIN CANCER: ICD-10-CM

## 2019-10-15 PROCEDURE — 99213 OFFICE O/P EST LOW 20 MIN: CPT | Performed by: DERMATOLOGY

## 2019-10-15 NOTE — PROGRESS NOTES
[facility]  [address] [place] [id number]  [phone]  [phone]     MRN: [id number] : 1950  Encounter: [id number]  Patient Information: Oriana Hutson  Chief complaint:  Six-month checkup    History of present illness:  58-year-old female with previous history of skin cancer as well as actinic keratosis and noted disseminated superficial actinic porokeratosis    Patient notes some areas on the thighs that are irritated no other concerns noted  Past Medical History:   Diagnosis Date    Arthritis     GERD (gastroesophageal reflux disease)     Graves' disease     TREATED WITH RADIOACTIVE IODINE ON 13 (10 7 MCI) RESOLVED:     Hepatitis B virus infection     Nonmelanoma skin cancer     LAST ASSESSED: [id number]     Past Surgical History:   Procedure Laterality Date    APPENDECTOMY      ARTHROSCOPY KNEE Left      SECTION      COLONOSCOPY      Oct/2018    EYE SURGERY Bilateral     SURGERY OF THE ORBITS - ORBITAL DECOMPRESSION    TEMPORAL ARTERY BIOPSY / LIGATION      TONSILLECTOMY      TOTAL KNEE ARTHROPLASTY Right 2016    TKR     Social History   Social History     Substance and Sexual Activity   Alcohol Use No    Comment: (HISTORY)     Social History     Substance and Sexual Activity   Drug Use No     Social History     Tobacco Use   Smoking Status Former Smoker   Smokeless Tobacco Never Used     Family History   Problem Relation Age of Onset    Pancreatic cancer Mother 76    Coronary artery disease Father     Throat cancer Father     Diabetes Maternal Grandmother     Heart attack Maternal Grandfather         ACUTE MI    Cancer Paternal Grandmother         unknown where    Hyperlipidemia Paternal Grandfather     Cirrhosis Paternal Grandfather     No Known Problems Sister     No Known Problems Sister     Lung cancer Maternal Aunt 76    Cancer Maternal Aunt      Meds/Allergies   Allergies   Allergen Reactions    Fentanyl Vomiting     Action Taken: vomiting; Category: Adverse Reaction;   Protracted    Methimazole     Codeine GI Intolerance and Anxiety     Other reaction(s): Nausea and/or vomiting       Meds:  Prior to Admission medications    Medication Sig Start Date End Date Taking?  Authorizing Provider   acetaminophen (TYLENOL) 500 mg tablet Take 500 mg by mouth every 6 (six) hours 3/22/16  Yes Historical Provider, MD   aspirin 81 MG tablet Take 1 tablet by mouth daily   Yes Historical Provider, MD   diclofenac sodium (VOLTAREN) 1 % Apply 4 g topically 4 (four) times a day 10/19/18  Yes Anna Zelaya PA-C   famotidine (PEPCID) 20 mg tablet Take 1 tablet (20 mg total) by mouth 2 (two) times a day 5/4/19  Yes Jewels Gipson MD   fluticasone [facility]) 50 mcg/act nasal spray 2 sprays into each nostril daily 5/4/19  Yes Jewels Gipson MD   levothyroxine 88 mcg tablet Take 1 tablet (88 mcg total) by mouth daily 5/4/19  Yes Jewels Gipson MD   losartan (COZAAR) 50 mg tablet Take 1 tablet (50 mg total) by mouth daily 5/4/19  Yes Jewels Gipson MD   prochlorperazine (COMPAZINE) 5 mg tablet take 1 tablet by mouth every 6 hours if needed for nausea and vomiting 1/28/19  Yes Historical Provider, MD   rosuvastatin (CRESTOR) 20 MG tablet Take 1 tablet (20 mg total) by mouth daily 6/7/19  Yes Jewels Gipson MD   Probiotic Product (PROBIOTIC-10 PO) Take by mouth    Historical Provider, MD   traZODone (DESYREL) 50 mg tablet Take 1 tablet (50 mg total) by mouth daily at bedtime  Patient not taking: Reported on 10/15/2019 5/4/19   Jewels Gipson MD       Subjective:     Review of Systems:    General: negative for - chills, fatigue, fever,  weight gain or weight loss  Psychological: negative for - anxiety, behavioral disorder, concentration difficulties, decreased libido, depression, irritability, memory difficulties, mood swings, sleep disturbances or suicidal ideation  ENT: negative for - hearing difficulties , nasal congestion, nasal discharge, oral lesions, sinus pain, sneezing, sore throat  Allergy and Immunology: negative for - hives, insect bite sensitivity,  Hematological and Lymphatic: negative for - bleeding problems, blood clots,bruising, swollen lymph nodes  Endocrine: negative for - hair pattern changes, hot flashes, malaise/lethargy, mood swings, palpitations, polydipsia/polyuria, skin changes, temperature intolerance or unexpected weight change  Respiratory: negative for - cough, hemoptysis, orthopnea, shortness of breath, or wheezing  Cardiovascular: negative for - chest pain, dyspnea on exertion, edema,  Gastrointestinal: negative for - abdominal pain, nausea/vomiting  Genito-Urinary: negative for - dysuria, incontinence, irregular/heavy menses or urinary frequency/urgency  Musculoskeletal: negative for - gait disturbance, joint pain, joint stiffness, joint swelling, muscle pain, muscular weakness  Dermatological:  As in HPI  Neurological: negative for confusion, dizziness, headaches, impaired coordination/balance, memory loss, numbness/tingling, seizures, speech problems, tremors or weakness       Objective: There were no vitals taken for this visit  Physical Exam:    General Appearance:    Alert, cooperative, no distress   Head:    Normocephalic, without obvious abnormality, atraumatic           Skin:   A full skin exam was performed including scalp, head scalp, eyes, ears, nose, lips, neck, chest, axilla, abdomen, back, buttocks, bilateral upper extremities, bilateral lower extremities, hands, feet, fingers, toes, fingernails, and toenails previous sites of skin cancer well healed without recurrence normal keratotic papules with greasy stuck on appearance scaling areas on the lower legs mostly on the frontal areas with collarette of scale nothing else markedly atypical     Assessment:     1  DSAP (disseminated superficial actinic porokeratosis)     2  Screening for skin cancer     3   Seborrheic keratosis 4  History of skin cancer           Plan:   Disseminated superficial actinic porokeratosis appears stable no treatment needed  Seborrheic keratosis patient reassured these are normal growths we acquire with age no treatment needed  History of skin cancer in no recurrence nothing else atypical sunblock recommended follow-up in 1 year  Screening for dermatologic disorders nothing else of concern noted on complete exam follow-up in 1 year      Shayy Johnson MD  10/15/2019,11:16 AM    Portions of the record may have been created with voice recognition software   Occasional wrong word or "sound a like" substitutions may have occurred due to the inherent limitations of voice recognition software   Read the chart carefully and recognize, using context, where substitutions have occurred

## 2019-10-15 NOTE — PATIENT INSTRUCTIONS
Disseminated superficial actinic porokeratosis appears stable no treatment needed  Seborrheic keratosis patient reassured these are normal growths we acquire with age no treatment needed  History of skin cancer in no recurrence nothing else atypical sunblock recommended follow-up in 1 year  Screening for dermatologic disorders nothing else of concern noted on complete exam follow-up in 1 year

## 2019-10-27 ENCOUNTER — OFFICE VISIT (OUTPATIENT)
Dept: URGENT CARE | Facility: CLINIC | Age: 69
End: 2019-10-27
Payer: MEDICARE

## 2019-10-27 VITALS
RESPIRATION RATE: 18 BRPM | HEIGHT: 62 IN | TEMPERATURE: 97.6 F | HEART RATE: 94 BPM | SYSTOLIC BLOOD PRESSURE: 150 MMHG | OXYGEN SATURATION: 96 % | DIASTOLIC BLOOD PRESSURE: 86 MMHG | WEIGHT: 103 LBS | BODY MASS INDEX: 18.95 KG/M2

## 2019-10-27 DIAGNOSIS — N39.0 URINARY TRACT INFECTION WITHOUT HEMATURIA, SITE UNSPECIFIED: Primary | ICD-10-CM

## 2019-10-27 LAB
SL AMB  POCT GLUCOSE, UA: ABNORMAL
SL AMB LEUKOCYTE ESTERASE,UA: ABNORMAL
SL AMB POCT BILIRUBIN,UA: ABNORMAL
SL AMB POCT BLOOD,UA: ABNORMAL
SL AMB POCT CLARITY,UA: ABNORMAL
SL AMB POCT COLOR,UA: CLEAR
SL AMB POCT KETONES,UA: ABNORMAL
SL AMB POCT NITRITE,UA: ABNORMAL
SL AMB POCT PH,UA: 6
SL AMB POCT SPECIFIC GRAVITY,UA: 1.01
SL AMB POCT URINE PROTEIN: ABNORMAL
SL AMB POCT UROBILINOGEN: ABNORMAL

## 2019-10-27 PROCEDURE — 87086 URINE CULTURE/COLONY COUNT: CPT | Performed by: PHYSICIAN ASSISTANT

## 2019-10-27 PROCEDURE — 81002 URINALYSIS NONAUTO W/O SCOPE: CPT | Performed by: PHYSICIAN ASSISTANT

## 2019-10-27 PROCEDURE — G0463 HOSPITAL OUTPT CLINIC VISIT: HCPCS | Performed by: PHYSICIAN ASSISTANT

## 2019-10-27 PROCEDURE — 99204 OFFICE O/P NEW MOD 45 MIN: CPT | Performed by: PHYSICIAN ASSISTANT

## 2019-10-27 RX ORDER — CIPROFLOXACIN 500 MG/1
500 TABLET, FILM COATED ORAL EVERY 12 HOURS SCHEDULED
Qty: 14 TABLET | Refills: 0 | Status: SHIPPED | OUTPATIENT
Start: 2019-10-27 | End: 2019-11-03

## 2019-10-27 NOTE — PATIENT INSTRUCTIONS
-Urine culture is pending- I will call you if your antibiotic needs to be changes  -Take antibiotic as directed  -Increase fluids  -Use tylenol/motrin as needed  -Follow-up with PCP within 5-7 days    Go to ER with worsening symptoms, high fever, flank pain, vomiting, or any signs of distress

## 2019-10-27 NOTE — PROGRESS NOTES
[facility]        NAME: Delbert Fortune is a 78 y o  female  : 1950    MRN: [id number]  DATE: 2019  TIME: 10:14 AM    Assessment and Plan   Urinary tract infection without hematuria, site unspecified [N39 0]  1  Urinary tract infection without hematuria, site unspecified  ciprofloxacin (CIPRO) 500 mg tablet         Patient Instructions     -Urine culture is pending- I will call you if your antibiotic needs to be changed  -Take antibiotic as directed  -Increase fluids  -Use tylenol/motrin as needed  -Follow-up with PCP within 5-7 days    Go to ER with worsening symptoms, high fever, flank pain, vomiting, or any signs of distress    Chief Complaint     Chief Complaint   Patient presents with    Urinary Tract Infection     symptomatic 3 days - superpubic pain         History of Present Illness       Patient is a 71-year-old female who presents today for evaluation of urinary symptoms for the past few days  Patient admits to having suprapubic pressure along with urinary urgency and frequency  She states that she took azo yesterday which did offer her some relief  No fevers or chills  No nausea, vomiting or flank pain  Review of Systems   Review of Systems   Constitutional: Negative for chills and fever  Respiratory: Negative for shortness of breath  Cardiovascular: Negative for chest pain  Gastrointestinal: Positive for abdominal pain  Genitourinary: Positive for frequency and urgency  Negative for flank pain  Musculoskeletal: Negative for back pain  All other systems reviewed and are negative          Current Medications       Current Outpatient Medications:     acetaminophen (TYLENOL) 500 mg tablet, Take 500 mg by mouth every 6 (six) hours, Disp: , Rfl:     aspirin 81 MG tablet, Take 1 tablet by mouth daily, Disp: , Rfl:     diclofenac sodium (VOLTAREN) 1 %, Apply 4 g topically 4 (four) times a day, Disp: 3 Tube, Rfl: 0    famotidine (PEPCID) 20 mg tablet, Take 1 tablet (20 mg total) by mouth 2 (two) times a day, Disp: 180 tablet, Rfl: 3    fluticasone (FLONASE) 50 mcg/act nasal spray, 2 sprays into each nostril daily, Disp: 3 Bottle, Rfl: 4    levothyroxine 88 mcg tablet, Take 1 tablet (88 mcg total) by mouth daily, Disp: 90 tablet, Rfl: 3    losartan (COZAAR) 50 mg tablet, Take 1 tablet (50 mg total) by mouth daily, Disp: 90 tablet, Rfl: 3    rosuvastatin (CRESTOR) 20 MG tablet, Take 1 tablet (20 mg total) by mouth daily, Disp: 90 tablet, Rfl: 3    ciprofloxacin (CIPRO) 500 mg tablet, Take 1 tablet (500 mg total) by mouth every 12 (twelve) hours for 7 days, Disp: 14 tablet, Rfl: 0    Probiotic Product (PROBIOTIC-10 PO), Take by mouth, Disp: , Rfl:     prochlorperazine (COMPAZINE) 5 mg tablet, take 1 tablet by mouth every 6 hours if needed for nausea and vomiting, Disp: , Rfl: 0    traZODone (DESYREL) 50 mg tablet, Take 1 tablet (50 mg total) by mouth daily at bedtime (Patient not taking: Reported on 10/15/2019), Disp: 90 tablet, Rfl: 3    Current Allergies     Allergies as of 10/27/2019 - Reviewed 10/27/2019   Allergen Reaction Noted    Fentanyl Vomiting 2016    Methimazole      Codeine GI Intolerance and Anxiety 2017            The following portions of the patient's history were reviewed and updated as appropriate: allergies, current medications, past family history, past medical history, past social history, past surgical history and problem list      Past Medical History:   Diagnosis Date    Arthritis     GERD (gastroesophageal reflux disease)     Graves' disease     TREATED WITH RADIOACTIVE IODINE ON 13 (10 7 MCI) RESOLVED:     Hepatitis B virus infection     Nonmelanoma skin cancer     LAST ASSESSED: [id number]       Past Surgical History:   Procedure Laterality Date    APPENDECTOMY      ARTHROSCOPY KNEE Left      SECTION      COLONOSCOPY      Oct/2018    EYE SURGERY Bilateral     SURGERY OF THE ORBITS - ORBITAL DECOMPRESSION    TEMPORAL ARTERY BIOPSY / LIGATION      TONSILLECTOMY      TOTAL KNEE ARTHROPLASTY Right 03/21/2016    TKR       Family History   Problem Relation Age of Onset    Pancreatic cancer Mother 76    Coronary artery disease Father     Throat cancer Father     Diabetes Maternal Grandmother     Heart attack Maternal Grandfather         ACUTE MI    Cancer Paternal Grandmother         unknown where    Hyperlipidemia Paternal Grandfather     Cirrhosis Paternal Grandfather     No Known Problems Sister     No Known Problems Sister     Lung cancer Maternal Aunt 76    Cancer Maternal Aunt          Medications have been verified  Objective   /86   Pulse 94   Temp 97 6 °F (36 4 °C)   Resp 18   Ht 5' 2" (1 575 m)   Wt 46 7 kg (103 lb)   SpO2 96%   BMI 18 84 kg/m²        Physical Exam     Physical Exam   Constitutional: She is oriented to person, place, and time  She appears well-developed and well-nourished  No distress  Cardiovascular: Normal rate, regular rhythm and normal heart sounds  Pulmonary/Chest: Effort normal and breath sounds normal    Abdominal: Soft  Bowel sounds are normal  There is tenderness in the suprapubic area  There is no CVA tenderness  Neurological: She is alert and oriented to person, place, and time  Skin: Skin is warm and dry  Psychiatric: She has a normal mood and affect  Nursing note and vitals reviewed

## 2019-10-28 ENCOUNTER — HOSPITAL ENCOUNTER (OUTPATIENT)
Dept: CT IMAGING | Facility: HOSPITAL | Age: 69
Discharge: HOME | End: 2019-10-28
Payer: MEDICARE

## 2019-10-28 ENCOUNTER — OFFICE VISIT (OUTPATIENT)
Dept: GASTROENTEROLOGY | Facility: CLINIC | Age: 69
End: 2019-10-28
Payer: MEDICARE

## 2019-10-28 VITALS
WEIGHT: 103.6 LBS | SYSTOLIC BLOOD PRESSURE: 144 MMHG | HEART RATE: 87 BPM | BODY MASS INDEX: 19.06 KG/M2 | HEIGHT: 62 IN | DIASTOLIC BLOOD PRESSURE: 92 MMHG

## 2019-10-28 DIAGNOSIS — K57.92 DIVERTICULITIS: ICD-10-CM

## 2019-10-28 DIAGNOSIS — K57.92 DIVERTICULITIS: Primary | ICD-10-CM

## 2019-10-28 LAB — BACTERIA UR CULT: NORMAL

## 2019-10-28 PROCEDURE — 99213 OFFICE O/P EST LOW 20 MIN: CPT | Performed by: PHYSICIAN ASSISTANT

## 2019-10-28 PROCEDURE — 74176 CT ABD & PELVIS W/O CONTRAST: CPT

## 2019-10-28 RX ORDER — DICYCLOMINE HYDROCHLORIDE 10 MG/1
10 CAPSULE ORAL
Qty: 40 CAPSULE | Refills: 0 | Status: SHIPPED | OUTPATIENT
Start: 2019-10-28 | End: 2019-11-05 | Stop reason: SDUPTHER

## 2019-10-28 NOTE — PROGRESS NOTES
[facility] - Outpatient Follow-up Note  Ashlyn Harris 78 y o  female MRN: [id number]  Encounter: [id number]          ASSESSMENT AND PLAN:      1  Diverticulitis  Will do stat CT abdomen pelvis without contrast   I will call patient this afternoon with results and will likely add Flagyl to her Cipro regimen  Recommended a bland soft diet  Patient will continue to avoid Metamucil and high-fiber  ______________________________________________________________________    SUBJECTIVE:   78-year-old female who is here with left lower quadrant abdominal pain for the past several days  Patient reports that she had acute onset of left lower quadrant abdominal pain about a week and half ago  She reports she put herself on a bland liquid diet and her symptoms actually improved slightly  She reports she then got a bladder infection and was given Cipro 500 mg twice a day  She reports today the pain is returned as constant  She does report some change in her stool consistency  She denies any melena or rectal bleeding  She did have a normal colonoscopy in September of last year  REVIEW OF SYSTEMS IS OTHERWISE NEGATIVE        Historical Information   Past Medical History:   Diagnosis Date    Arthritis     GERD (gastroesophageal reflux disease)     Graves' disease     TREATED WITH RADIOACTIVE IODINE ON 13 (10 7 MCI) RESOLVED:     Hepatitis B virus infection     Nonmelanoma skin cancer     LAST ASSESSED: [id number]     Past Surgical History:   Procedure Laterality Date    APPENDECTOMY      ARTHROSCOPY KNEE Left      SECTION      COLONOSCOPY      Oct/2018    EYE SURGERY Bilateral     SURGERY OF THE ORBITS - ORBITAL DECOMPRESSION    TEMPORAL ARTERY BIOPSY / LIGATION      TONSILLECTOMY      TOTAL KNEE ARTHROPLASTY Right 2016    TKR     Social History   Social History     Substance and Sexual Activity   Alcohol Use No    Comment: (HISTORY)     Social History Substance and Sexual Activity   Drug Use No     Social History     Tobacco Use   Smoking Status Former Smoker   Smokeless Tobacco Never Used     Family History   Problem Relation Age of Onset    Pancreatic cancer Mother 76    Coronary artery disease Father     Throat cancer Father     Diabetes Maternal Grandmother     Heart attack Maternal Grandfather         ACUTE MI    Cancer Paternal Grandmother         unknown where    Hyperlipidemia Paternal Grandfather     Cirrhosis Paternal Grandfather     No Known Problems Sister     No Known Problems Sister     Lung cancer Maternal Aunt 76    Cancer Maternal Aunt        Meds/Allergies       Current Outpatient Medications:     acetaminophen (TYLENOL) 500 mg tablet    aspirin 81 MG tablet    ciprofloxacin (CIPRO) 500 mg tablet    diclofenac sodium (VOLTAREN) 1 %    famotidine (PEPCID) 20 mg tablet    fluticasone (FLONASE) 50 mcg/act nasal spray    levothyroxine 88 mcg tablet    losartan (COZAAR) 50 mg tablet    Probiotic Product (PROBIOTIC-10 PO)    prochlorperazine (COMPAZINE) 5 mg tablet    rosuvastatin (CRESTOR) 20 MG tablet    traZODone (DESYREL) 50 mg tablet    Allergies   Allergen Reactions    Fentanyl Vomiting     Action Taken: vomiting; Category: Adverse Reaction;   Protracted    Methimazole     Codeine GI Intolerance and Anxiety     Other reaction(s): Nausea and/or vomiting           Objective     Blood pressure 144/92, pulse 87, height 5' 2" (1 575 m), weight 47 kg (103 lb 9 6 oz)  Body mass index is 18 95 kg/m²  PHYSICAL EXAM:      General Appearance:   Alert, cooperative, no distress   HEENT:   Normocephalic, atraumatic, anicteric      Neck:  Supple, symmetrical, trachea midline   Lungs:   Clear to auscultation bilaterally; no rales, rhonchi or wheezing; respirations unlabored    Heart[de-identified]   Regular rate and rhythm; no murmur, rub, or gallop     Abdomen:   Soft, non-tender, non-distended; normal bowel sounds; no masses, no organomegaly    Genitalia:   Deferred    Rectal:   Deferred    Extremities:  No cyanosis, clubbing or edema    Pulses:  2+ and symmetric    Skin:  No jaundice, rashes, or lesions    Lymph nodes:  No palpable cervical lymphadenopathy        Lab Results:   No visits with results within 1 Day(s) from this visit  Latest known visit with results is:   Office Visit on 10/27/2019   Component Date Value    LEUKOCYTE ESTERASE,UA 10/27/2019 NA     NITRITE,UA 10/27/2019 NA     SL AMB POCT UROBILINOGEN 10/27/2019 NA     POCT URINE PROTEIN 10/27/2019 NA      PH,UA 10/27/2019 6 0     BLOOD,UA 10/27/2019 NEG     SPECIFIC GRAVITY,UA 10/27/2019 1 01     KETONES,UA 10/27/2019 NA     BILIRUBIN,UA 10/27/2019 NA     GLUCOSE, UA 10/27/2019 NEG      COLOR,UA 10/27/2019 CLEAR     CLARITY,UA 10/27/2019 ORANGE          Radiology Results:   No results found

## 2019-10-28 NOTE — LETTER
October 28, 2019     Melia Sargent MD  [address] [address]    Patient: Marly Tinajero   YOB: 1950   Date of Visit: 10/28/2019       Dear Dr Promise Paris: Thank you for referring Marly Tinajero to me for evaluation  Below are my notes for this consultation  If you have questions, please do not hesitate to call me  I look forward to following your patient along with you  Sincerely,        Bernard Kendrick PA-C        CC: No Recipients  Tanna Ness  10/28/2019 11:05 AM  Sign at close encounter  [facility] - Outpatient Follow-up Note  Marly Tinajero 78 y o  female MRN: [id number]  Encounter: [id number]          ASSESSMENT AND PLAN:      1  Diverticulitis  Will do stat CT abdomen pelvis without contrast   I will call patient this afternoon with results and will likely add Flagyl to her Cipro regimen  Recommended a bland soft diet  Patient will continue to avoid Metamucil and high-fiber  ______________________________________________________________________    SUBJECTIVE:    66-year-old female who is here with left lower quadrant abdominal pain for the past several days  Patient reports that she had acute onset of left lower quadrant abdominal pain about a week and half ago  She reports she put herself on a bland liquid diet and her symptoms actually improved slightly  She reports she then got a bladder infection and was given Cipro 500 mg twice a day  She reports today the pain is returned as constant  She does report some change in her stool consistency  She denies any melena or rectal bleeding  She did have a normal colonoscopy in September of last year  REVIEW OF SYSTEMS IS OTHERWISE NEGATIVE        Historical Information   Past Medical History:   Diagnosis Date    Arthritis     GERD (gastroesophageal reflux disease)     Graves' disease     TREATED WITH RADIOACTIVE IODINE ON 9/13/13 (10 7 [facility]CI) RESOLVED: 2013    Hepatitis B virus infection     Nonmelanoma skin cancer     LAST ASSESSED: [id number]     Past Surgical History:   Procedure Laterality Date    APPENDECTOMY      ARTHROSCOPY KNEE Left      SECTION      COLONOSCOPY      Oct/2018    EYE SURGERY Bilateral     SURGERY OF THE ORBITS - ORBITAL DECOMPRESSION    TEMPORAL ARTERY BIOPSY / LIGATION      TONSILLECTOMY      TOTAL KNEE ARTHROPLASTY Right 2016    TKR     Social History   Social History     Substance and Sexual Activity   Alcohol Use No    Comment: (HISTORY)     Social History     Substance and Sexual Activity   Drug Use No     Social History     Tobacco Use   Smoking Status Former Smoker   Smokeless Tobacco Never Used     Family History   Problem Relation Age of Onset    Pancreatic cancer Mother 76    Coronary artery disease Father     Throat cancer Father     Diabetes Maternal Grandmother     Heart attack Maternal Grandfather         ACUTE MI    Cancer Paternal Grandmother         unknown where    Hyperlipidemia Paternal Grandfather     Cirrhosis Paternal Grandfather     No Known Problems Sister     No Known Problems Sister     Lung cancer Maternal Aunt 76    Cancer Maternal Aunt        Meds/Allergies       Current Outpatient Medications:     acetaminophen (TYLENOL) 500 mg tablet    aspirin 81 MG tablet    ciprofloxacin (CIPRO) 500 mg tablet    diclofenac sodium (VOLTAREN) 1 %    famotidine (PEPCID) 20 mg tablet    fluticasone (FLONASE) 50 mcg/act nasal spray    levothyroxine 88 mcg tablet    losartan (COZAAR) 50 mg tablet    Probiotic Product (PROBIOTIC-10 PO)    prochlorperazine (COMPAZINE) 5 mg tablet    rosuvastatin (CRESTOR) 20 MG tablet    traZODone (DESYREL) 50 mg tablet    Allergies   Allergen Reactions    Fentanyl Vomiting     Action Taken: vomiting; Category:  Adverse Reaction;   Protracted    Methimazole     Codeine GI Intolerance and Anxiety     Other reaction(s): Nausea and/or vomiting Objective     Blood pressure 144/92, pulse 87, height 5' 2" (1 575 m), weight 47 kg (103 lb 9 6 oz)  Body mass index is 18 95 kg/m²  PHYSICAL EXAM:      General Appearance:   Alert, cooperative, no distress   HEENT:   Normocephalic, atraumatic, anicteric      Neck:  Supple, symmetrical, trachea midline   Lungs:   Clear to auscultation bilaterally; no rales, rhonchi or wheezing; respirations unlabored    Heart[de-identified]   Regular rate and rhythm; no murmur, rub, or gallop  Abdomen:   Soft, non-tender, non-distended; normal bowel sounds; no masses, no organomegaly    Genitalia:   Deferred    Rectal:   Deferred    Extremities:  No cyanosis, clubbing or edema    Pulses:  2+ and symmetric    Skin:  No jaundice, rashes, or lesions    Lymph nodes:  No palpable cervical lymphadenopathy        Lab Results:   No visits with results within 1 Day(s) from this visit  Latest known visit with results is:   Office Visit on 10/27/2019   Component Date Value    LEUKOCYTE ESTERASE,UA 10/27/2019 NA     NITRITE,UA 10/27/2019 NA     SL AMB POCT UROBILINOGEN 10/27/2019 NA     POCT URINE PROTEIN 10/27/2019 NA      PH,UA 10/27/2019 6 0     BLOOD,UA 10/27/2019 NEG     SPECIFIC GRAVITY,UA 10/27/2019 1 01     KETONES,UA 10/27/2019 NA     BILIRUBIN,UA 10/27/2019 NA     GLUCOSE, UA 10/27/2019 NEG      COLOR,UA 10/27/2019 CLEAR     CLARITY,UA 10/27/2019 ORANGE          Radiology Results:   No results found

## 2019-10-28 NOTE — PATIENT INSTRUCTIONS
Diverticulitis   WHAT YOU NEED TO KNOW:   Diverticulitis is a condition that causes small pockets along your intestine called diverticula to become inflamed or infected  This is caused by hard bowel movements, food, or bacteria that get stuck in the pockets  DISCHARGE INSTRUCTIONS:   Return to the emergency department if:   · You have bowel movement or foul-smelling discharge leaking from your vagina or in your urine  · You have severe diarrhea  · You urinate less than usual or not at all  · You are not able to have a bowel movement  · You cannot stop vomiting  · You have severe abdominal pain, a fever, and your abdomen is larger than usual      · You have new or increased blood in your bowel movements  Contact your healthcare provider if:   · You have pain when you urinate  · Your symptoms get worse or do not go away  · You have questions or concerns about your condition or care  Medicines:   · Antibiotics  may be given to help treat a bacterial infection  · Prescription pain medicine  may be given  Ask your healthcare provider how to take this medicine safely  Some prescription pain medicines contain acetaminophen  Do not take other medicines that contain acetaminophen without talking to your healthcare provider  Too much acetaminophen may cause liver damage  Prescription pain medicine may cause constipation  Ask your healthcare provider how to prevent or treat constipation  · Take your medicine as directed  Contact your healthcare provider if you think your medicine is not helping or if you have side effects  Tell him or her if you are allergic to any medicine  Keep a list of the medicines, vitamins, and herbs you take  Include the amounts, and when and why you take them  Bring the list or the pill bottles to follow-up visits  Carry your medicine list with you in case of an emergency  Clear liquid diet:  A clear liquid diet includes any liquids that you can see through  Examples include water, ginger-sara, cranberry or apple juice, frozen fruit ice, or broth  Stay on a clear liquid diet until your symptoms are gone, or as directed  Follow up with your healthcare provider as directed: You may need to return for a colonoscopy  When your symptoms are gone, you may need a low-fat, high-fiber diet to prevent diverticulitis from developing again  Your healthcare provider or dietitian can help you create meal plans  Write down your questions so you remember to ask them during your visits  © 2017 [facility] Information is for End User's use only and may not be sold, redistributed or otherwise used for commercial purposes  All illustrations and images included in CareNotes® are the copyrighted property of [employer]  or [employer]  The above information is an  only  It is not intended as medical advice for individual conditions or treatments  Talk to your doctor, nurse or pharmacist before following any medical regimen to see if it is safe and effective for you

## 2019-10-29 ENCOUNTER — TELEPHONE (OUTPATIENT)
Dept: GASTROENTEROLOGY | Facility: CLINIC | Age: 69
End: 2019-10-29

## 2019-10-29 NOTE — TELEPHONE ENCOUNTER
Jorge Cardoso pt - Pt has a question about Cipro, she states her culture came back with no growth and she doesn't want to continue to take it if she doesn't have to  Please call [phone]   Ty

## 2019-10-30 ENCOUNTER — TELEPHONE (OUTPATIENT)
Dept: NEUROLOGY | Facility: CLINIC | Age: 69
End: 2019-10-30

## 2019-11-01 ENCOUNTER — PROCEDURE VISIT (OUTPATIENT)
Dept: NEUROLOGY | Facility: CLINIC | Age: 69
End: 2019-11-01
Payer: MEDICARE

## 2019-11-01 ENCOUNTER — TELEPHONE (OUTPATIENT)
Dept: GASTROENTEROLOGY | Facility: CLINIC | Age: 69
End: 2019-11-01

## 2019-11-01 VITALS — HEART RATE: 72 BPM | SYSTOLIC BLOOD PRESSURE: 140 MMHG | TEMPERATURE: 98.2 F | DIASTOLIC BLOOD PRESSURE: 70 MMHG

## 2019-11-01 DIAGNOSIS — G43.709 CHRONIC MIGRAINE WITHOUT AURA WITHOUT STATUS MIGRAINOSUS, NOT INTRACTABLE: Primary | ICD-10-CM

## 2019-11-01 DIAGNOSIS — G43.009 MIGRAINE WITHOUT AURA AND WITHOUT STATUS MIGRAINOSUS, NOT INTRACTABLE: Primary | ICD-10-CM

## 2019-11-01 PROCEDURE — 64615 CHEMODENERV MUSC MIGRAINE: CPT | Performed by: PHYSICIAN ASSISTANT

## 2019-11-01 RX ORDER — PROCHLORPERAZINE MALEATE 5 MG/1
TABLET ORAL
Qty: 30 TABLET | Refills: 0 | Status: SHIPPED | OUTPATIENT
Start: 2019-11-01 | End: 2020-03-12 | Stop reason: SDUPTHER

## 2019-11-01 NOTE — TELEPHONE ENCOUNTER
Pt called feeling a little better but still is having pain in the in the morning  As per message dated 10/29 pt stop taking the cipro due to no growth found in culture  Please advise  Thanks

## 2019-11-01 NOTE — TELEPHONE ENCOUNTER
Elisa Patel pt - Pt states she is a little better but she is still having pain especially in the morning  Please call [phone]   Ty

## 2019-11-01 NOTE — TELEPHONE ENCOUNTER
Medication refill check list    Correct patient? Yes   Correct medication name, dose, and pill size? Yes   Correct provider? Yes   Last and Next appt  scheduled? Last: 11/1/19  Next: 2/21/19   Right pharmacy listed? Yes   Correct quantity for 30 or 90 days? Yes   Is the patient out of refills? When was it last prescribed? Yes, 1/28/19   Directions match what the patient says they are taking?  Yes   Enough refills? (none for controlled substances, 1 year for routine medications) Yes

## 2019-11-01 NOTE — TELEPHONE ENCOUNTER
Instructed patient to utilize Bentyl 3-4 times daily  She will call back on Monday and let us know how she is doing

## 2019-11-01 NOTE — PROGRESS NOTES
Chemodenervation  Date/Time: 11/1/2019 3:03 PM  Performed by: Britney Escobar PA-C  Authorized by: Britney Escobar PA-C     Pre-procedure details:     Prepped With: Alcohol    Anesthesia (see MAR for exact dosages): Anesthesia method:  None  Procedure details:     Position:  Upright  Botox:     Botox Type:  Type A    Brand:  Botox    mL's of Botulinum Toxin:  200    Final Concentration per CC:  200 units    Needle Gauge:  30 G 2 5 inch  Procedures:     Botox Procedures: chronic headache      Indications: migraines    Injection Location:     Head / Face:  L superior cervical paraspinal, R superior cervical paraspinal, L medial occipitalis, R medial occipitalis, R temporalis, L temporalis, R superior trapezius and L superior trapezius    L temporalis injection amount:  25 unit(s)    R temporalis injection amount:  25 unit(s)    L medial occipitalis injection amount:  20 unit(s)    R medial occipitalis injection amount:  20 unit(s)    L superior cervical paraspinal injection amount:  10 unit(s)    R superior cervical paraspinal injection amount:  10 unit(s)    L superior trapezius injection amount:  20 unit(s)    R superior trapezius injection amount:  20 unit(s)  Total Units:     Total units used:  200    Total units discarded:  0  Post-procedure details:     Chemodenervation:  Chronic migraine    Facial Nerve Location[de-identified]  Bilateral facial nerve    Patient tolerance of procedure:   Tolerated well, no immediate complications  Comments:      5 units TMJ bilaterally  5 units masseter bilaterally  30 units [place]  All medically necessary

## 2019-11-05 ENCOUNTER — TELEPHONE (OUTPATIENT)
Dept: GASTROENTEROLOGY | Facility: CLINIC | Age: 69
End: 2019-11-05

## 2019-11-05 DIAGNOSIS — K57.92 DIVERTICULITIS: ICD-10-CM

## 2019-11-05 RX ORDER — DICYCLOMINE HYDROCHLORIDE 10 MG/1
10 CAPSULE ORAL
Qty: 40 CAPSULE | Refills: 0 | Status: SHIPPED | OUTPATIENT
Start: 2019-11-05 | End: 2021-03-09

## 2019-11-05 NOTE — TELEPHONE ENCOUNTER
Patient is reporting minimal improvement day by day  She does report that her stool quantity seems to be less over the last several days  She is using Bentyl 3 to 4 times a day  I did try to encourage her that this will get better as the days go on  No plans for any colonoscopy or repeat imaging  She has no alarm symptoms

## 2019-11-05 NOTE — TELEPHONE ENCOUNTER
Pt was instructed to utilize bentyl 3-4 times daily and to call back on Monday to let us know how she's doing  Please advise   Ty

## 2019-11-05 NOTE — TELEPHONE ENCOUNTER
Maribel Jacome pt - Pt states she feels the same as she did on Friday, it has not gone away  Some mornings are better than others

## 2019-11-07 ENCOUNTER — TELEPHONE (OUTPATIENT)
Dept: GASTROENTEROLOGY | Facility: CLINIC | Age: 69
End: 2019-11-07

## 2019-11-07 ENCOUNTER — HOSPITAL ENCOUNTER (EMERGENCY)
Facility: HOSPITAL | Age: 69
Discharge: HOME | End: 2019-11-07
Attending: EMERGENCY MEDICINE | Admitting: EMERGENCY MEDICINE
Payer: MEDICARE

## 2019-11-07 ENCOUNTER — APPOINTMENT (EMERGENCY)
Dept: CT IMAGING | Facility: HOSPITAL | Age: 69
End: 2019-11-07
Payer: MEDICARE

## 2019-11-07 VITALS
RESPIRATION RATE: 18 BRPM | OXYGEN SATURATION: 98 % | WEIGHT: 111.11 LBS | HEART RATE: 68 BPM | TEMPERATURE: 97.7 F | SYSTOLIC BLOOD PRESSURE: 164 MMHG | BODY MASS INDEX: 20.32 KG/M2 | DIASTOLIC BLOOD PRESSURE: 70 MMHG

## 2019-11-07 DIAGNOSIS — K57.92 DIVERTICULITIS: Primary | ICD-10-CM

## 2019-11-07 DIAGNOSIS — R10.84 GENERALIZED ABDOMINAL PAIN: Primary | ICD-10-CM

## 2019-11-07 LAB
ALBUMIN SERPL BCP-MCNC: 3.8 G/DL (ref 3.5–5)
ALP SERPL-CCNC: 117 U/L (ref 46–116)
ALT SERPL W P-5'-P-CCNC: 28 U/L (ref 12–78)
ANION GAP SERPL CALCULATED.3IONS-SCNC: 9 MMOL/L (ref 4–13)
AST SERPL W P-5'-P-CCNC: 19 U/L (ref 5–45)
BACTERIA UR QL AUTO: ABNORMAL /HPF
BASOPHILS # BLD AUTO: 0.07 THOUSANDS/ΜL (ref 0–0.1)
BASOPHILS NFR BLD AUTO: 1 % (ref 0–1)
BILIRUB SERPL-MCNC: 0.5 MG/DL (ref 0.2–1)
BILIRUB UR QL STRIP: NEGATIVE
BUN SERPL-MCNC: 14 MG/DL (ref 5–25)
CALCIUM SERPL-MCNC: 9.3 MG/DL (ref 8.3–10.1)
CHLORIDE SERPL-SCNC: 101 MMOL/L (ref 100–108)
CLARITY UR: CLEAR
CO2 SERPL-SCNC: 30 MMOL/L (ref 21–32)
COLOR UR: YELLOW
CREAT SERPL-MCNC: 0.83 MG/DL (ref 0.6–1.3)
EOSINOPHIL # BLD AUTO: 0.23 THOUSAND/ΜL (ref 0–0.61)
EOSINOPHIL NFR BLD AUTO: 2 % (ref 0–6)
ERYTHROCYTE [DISTWIDTH] IN BLOOD BY AUTOMATED COUNT: 12.6 % (ref 11.6–15.1)
GFR SERPL CREATININE-BSD FRML MDRD: 72 ML/MIN/1.73SQ M
GLUCOSE SERPL-MCNC: 103 MG/DL (ref 65–140)
GLUCOSE UR STRIP-MCNC: NEGATIVE MG/DL
HCT VFR BLD AUTO: 47.8 % (ref 34.8–46.1)
HGB BLD-MCNC: 16 G/DL (ref 11.5–15.4)
HGB UR QL STRIP.AUTO: NEGATIVE
IMM GRANULOCYTES # BLD AUTO: 0.05 THOUSAND/UL (ref 0–0.2)
IMM GRANULOCYTES NFR BLD AUTO: 0 % (ref 0–2)
KETONES UR STRIP-MCNC: NEGATIVE MG/DL
LEUKOCYTE ESTERASE UR QL STRIP: ABNORMAL
LIPASE SERPL-CCNC: 77 U/L (ref 73–393)
LYMPHOCYTES # BLD AUTO: 2.73 THOUSANDS/ΜL (ref 0.6–4.47)
LYMPHOCYTES NFR BLD AUTO: 20 % (ref 14–44)
MCH RBC QN AUTO: 31.6 PG (ref 26.8–34.3)
MCHC RBC AUTO-ENTMCNC: 33.5 G/DL (ref 31.4–37.4)
MCV RBC AUTO: 95 FL (ref 82–98)
MONOCYTES # BLD AUTO: 1.07 THOUSAND/ΜL (ref 0.17–1.22)
MONOCYTES NFR BLD AUTO: 8 % (ref 4–12)
NEUTROPHILS # BLD AUTO: 9.26 THOUSANDS/ΜL (ref 1.85–7.62)
NEUTS SEG NFR BLD AUTO: 69 % (ref 43–75)
NITRITE UR QL STRIP: NEGATIVE
NON-SQ EPI CELLS URNS QL MICRO: ABNORMAL /HPF
NRBC BLD AUTO-RTO: 0 /100 WBCS
PH UR STRIP.AUTO: 6 [PH]
PLATELET # BLD AUTO: 313 THOUSANDS/UL (ref 149–390)
PMV BLD AUTO: 9.1 FL (ref 8.9–12.7)
POTASSIUM SERPL-SCNC: 3.8 MMOL/L (ref 3.5–5.3)
PROT SERPL-MCNC: 7.7 G/DL (ref 6.4–8.2)
PROT UR STRIP-MCNC: NEGATIVE MG/DL
RBC # BLD AUTO: 5.06 MILLION/UL (ref 3.81–5.12)
RBC #/AREA URNS AUTO: ABNORMAL /HPF
SODIUM SERPL-SCNC: 140 MMOL/L (ref 136–145)
SP GR UR STRIP.AUTO: 1.01 (ref 1–1.03)
UROBILINOGEN UR QL STRIP.AUTO: 0.2 E.U./DL
WBC # BLD AUTO: 13.41 THOUSAND/UL (ref 4.31–10.16)
WBC #/AREA URNS AUTO: ABNORMAL /HPF

## 2019-11-07 PROCEDURE — 99284 EMERGENCY DEPT VISIT MOD MDM: CPT | Performed by: EMERGENCY MEDICINE

## 2019-11-07 PROCEDURE — 99284 EMERGENCY DEPT VISIT MOD MDM: CPT

## 2019-11-07 PROCEDURE — 96361 HYDRATE IV INFUSION ADD-ON: CPT

## 2019-11-07 PROCEDURE — 80053 COMPREHEN METABOLIC PANEL: CPT | Performed by: EMERGENCY MEDICINE

## 2019-11-07 PROCEDURE — 96360 HYDRATION IV INFUSION INIT: CPT

## 2019-11-07 PROCEDURE — 81001 URINALYSIS AUTO W/SCOPE: CPT | Performed by: EMERGENCY MEDICINE

## 2019-11-07 PROCEDURE — 83690 ASSAY OF LIPASE: CPT | Performed by: EMERGENCY MEDICINE

## 2019-11-07 PROCEDURE — 74177 CT ABD & PELVIS W/CONTRAST: CPT

## 2019-11-07 PROCEDURE — 85025 COMPLETE CBC W/AUTO DIFF WBC: CPT | Performed by: EMERGENCY MEDICINE

## 2019-11-07 PROCEDURE — 36415 COLL VENOUS BLD VENIPUNCTURE: CPT | Performed by: EMERGENCY MEDICINE

## 2019-11-07 RX ORDER — AMOXICILLIN AND CLAVULANATE POTASSIUM 875; 125 MG/1; MG/1
1 TABLET, FILM COATED ORAL EVERY 12 HOURS
Qty: 14 TABLET | Refills: 0 | Status: SHIPPED | OUTPATIENT
Start: 2019-11-07 | End: 2019-11-14

## 2019-11-07 RX ORDER — HYOSCYAMINE SULFATE 0.125 MG
0.12 TABLET ORAL EVERY 4 HOURS PRN
Qty: 60 TABLET | Refills: 0 | Status: SHIPPED | OUTPATIENT
Start: 2019-11-07 | End: 2020-02-18

## 2019-11-07 RX ORDER — OLANZAPINE 10 MG/1
10 TABLET, ORALLY DISINTEGRATING ORAL ONCE
Status: COMPLETED | OUTPATIENT
Start: 2019-11-07 | End: 2019-11-07

## 2019-11-07 RX ORDER — AMOXICILLIN AND CLAVULANATE POTASSIUM 875; 125 MG/1; MG/1
1 TABLET, FILM COATED ORAL ONCE
Status: COMPLETED | OUTPATIENT
Start: 2019-11-08 | End: 2019-11-07

## 2019-11-07 RX ADMIN — AMOXICILLIN AND CLAVULANATE POTASSIUM 1 TABLET: 875; 125 TABLET, FILM COATED ORAL at 23:50

## 2019-11-07 RX ADMIN — SODIUM CHLORIDE 1000 ML: 0.9 INJECTION, SOLUTION INTRAVENOUS at 22:07

## 2019-11-07 RX ADMIN — IOHEXOL 100 ML: 350 INJECTION, SOLUTION INTRAVENOUS at 22:43

## 2019-11-07 RX ADMIN — OLANZAPINE 10 MG: 10 TABLET, ORALLY DISINTEGRATING ORAL at 22:08

## 2019-11-07 NOTE — TELEPHONE ENCOUNTER
Patient has hx of functional nausea s/p office visit 10/28 for suspected diverticulitis  Last colonoscopy was documented as normal in September of 2018  CT scan 10/28 showed no acute intra-abdominal abnormality  Gastric emptying, hepatobiliary study both normal  She was see  by Sridevi for pancreatic cyst     Patient called to report persistent symptoms RLQ/suprapubic pain/cramping "7/10" with soft brown bm 3-4x/day, despite taking bentyl 4x/day  She said she does get relief with bm  Denies fever,  rectal bleeding, rectal pressure has resolved  She said she feels better on liquids and  is taking pepcid 2x/day and metamucil  symtpoms worsen with solid intake  I recommended she follow low fiber diet and hold metamucil as this is a fiber supplement  She said she is willing to start flagyl if needed  Please provide recommendation  Thank you

## 2019-11-07 NOTE — TELEPHONE ENCOUNTER
Donato Cabot pt - Pt states she is no better, she has been miserable yesterday and today, please call [phone]   Ty

## 2019-11-07 NOTE — TELEPHONE ENCOUNTER
There is no indication for Flagyl at this point  Would switch to Levsin and maintain a low rougahage/bland diet through th weekend  If developing watery diarrhea would need stool cultures    This is Tiffs patients so will defer longer term management to her

## 2019-11-07 NOTE — TELEPHONE ENCOUNTER
Patient is aware of recommendation and will try levsin  Please have Yvonne review this communication thread and call patient with any changes  Please send script for levsin to [facility]  Thank you

## 2019-11-10 NOTE — ED PROVIDER NOTES
History  Chief Complaint   Patient presents with    Abdominal Pain     onset 3 weeks ago pt saw GI and was perscribed bentyl but not helping and the pain is getting worse, denies nausea and vomiting      77 yo F presenting to the emergency department for eval of abd pain  Onset was about 1 week ago, seen by GI, given bentyl which has not helped much, it has progressed in severity and localized to the LLQ without radiation  No changes in bowel or bladder habits, noblood in stool, fevers, chills, nausea or vomiting  Prior to Admission Medications   Prescriptions Last Dose Informant Patient Reported? Taking? Probiotic Product (PROBIOTIC-10 PO)  Self Yes No   Sig: Take by mouth   acetaminophen (TYLENOL) 500 mg tablet  Self Yes No   Sig: Take 500 mg by mouth every 6 (six) hours   aspirin 81 MG tablet  Self Yes No   Sig: Take 1 tablet by mouth daily   diclofenac sodium (VOLTAREN) 1 %  Self No No   Sig: Apply 4 g topically 4 (four) times a day   dicyclomine (BENTYL) 10 mg capsule   No No   Sig: Take 1 capsule (10 mg total) by mouth 4 (four) times a day (before meals and at bedtime)   famotidine (PEPCID) 20 mg tablet  Self No No   Sig: Take 1 tablet (20 mg total) by mouth 2 (two) times a day   fluticasone (FLONASE) 50 mcg/act nasal spray  Self No No   Si sprays into each nostril daily   hyoscyamine (ANASPAZ,LEVSIN) 0 125 MG tablet   No No   Sig: Take 1 tablet (0 125 mg total) by mouth every 4 (four) hours as needed for cramping   levothyroxine 88 mcg tablet  Self No No   Sig: Take 1 tablet (88 mcg total) by mouth daily   losartan (COZAAR) 50 mg tablet  Self No No   Sig: Take 1 tablet (50 mg total) by mouth daily   prochlorperazine (COMPAZINE) 5 mg tablet   No No   Sig: Take 1 tab by mouth every 6 hours if needed for nausea and vomiting     rosuvastatin (CRESTOR) 20 MG tablet  Self No No   Sig: Take 1 tablet (20 mg total) by mouth daily   traZODone (DESYREL) 50 mg tablet  Self No No   Sig: Take 1 tablet (50 mg total) by mouth daily at bedtime      Facility-Administered Medications: None       Past Medical History:   Diagnosis Date    Arthritis     GERD (gastroesophageal reflux disease)     Graves' disease     TREATED WITH RADIOACTIVE IODINE ON 13 (10 7 [facility]) RESOLVED:     Hepatitis B virus infection     Nonmelanoma skin cancer     LAST ASSESSED: [id number]       Past Surgical History:   Procedure Laterality Date    APPENDECTOMY      ARTHROSCOPY KNEE Left      SECTION      COLONOSCOPY      Oct/2018    EYE SURGERY Bilateral     SURGERY OF THE ORBITS - ORBITAL DECOMPRESSION    TEMPORAL ARTERY BIOPSY / LIGATION      TONSILLECTOMY      TOTAL KNEE ARTHROPLASTY Right 2016    TKR       Family History   Problem Relation Age of Onset    Pancreatic cancer Mother 76    Coronary artery disease Father     Throat cancer Father     Diabetes Maternal Grandmother     Heart attack Maternal Grandfather         ACUTE MI    Cancer Paternal Grandmother         unknown where    Hyperlipidemia Paternal Grandfather     Cirrhosis Paternal Grandfather     No Known Problems Sister     No Known Problems Sister     Lung cancer Maternal Aunt 76    Cancer Maternal Aunt      I have reviewed and agree with the history as documented  Social History     Tobacco Use    Smoking status: Former Smoker    Smokeless tobacco: Never Used   Substance Use Topics    Alcohol use: No     Comment: (HISTORY)    Drug use: No        Review of Systems   Constitutional: Negative for appetite change, chills, fatigue and fever  HENT: Negative for sneezing and sore throat  Eyes: Negative for visual disturbance  Respiratory: Negative for cough, choking, chest tightness, shortness of breath and wheezing  Cardiovascular: Negative for chest pain and palpitations  Gastrointestinal: Positive for abdominal pain  Negative for constipation, diarrhea, nausea and vomiting     Genitourinary: Negative for difficulty urinating and dysuria  Neurological: Negative for dizziness, weakness, light-headedness, numbness and headaches  All other systems reviewed and are negative  Physical Exam  Physical Exam   Constitutional: She is oriented to person, place, and time  She appears well-developed and well-nourished  No distress  HENT:   Head: Normocephalic and atraumatic  Mouth/Throat: Oropharynx is clear and moist    Eyes: Pupils are equal, round, and reactive to light  EOM are normal    Neck: No JVD present  No tracheal deviation present  Cardiovascular: Normal rate, regular rhythm, normal heart sounds and intact distal pulses  Exam reveals no gallop and no friction rub  No murmur heard  Pulmonary/Chest: Effort normal and breath sounds normal  No respiratory distress  She has no wheezes  She has no rales  Abdominal: Soft  Bowel sounds are normal  She exhibits no distension  There is tenderness in the left lower quadrant  There is no rebound and no guarding  Neurological: She is alert and oriented to person, place, and time  No cranial nerve deficit  She exhibits normal muscle tone  Skin: Skin is warm and dry  She is not diaphoretic  No pallor  Psychiatric: She has a normal mood and affect  Her behavior is normal    Nursing note and vitals reviewed        Vital Signs  ED Triage Vitals   Temperature Pulse Respirations Blood Pressure SpO2   11/07/19 2112 11/07/19 2112 11/07/19 2112 11/07/19 2112 11/07/19 2112   97 7 °F (36 5 °C) 79 20 140/70 98 %      Temp Source Heart Rate Source Patient Position - Orthostatic VS BP Location FiO2 (%)   11/07/19 2112 11/07/19 2112 11/07/19 2112 11/07/19 2112 --   Oral Monitor Sitting Left arm       Pain Score       11/07/19 2313       3           Vitals:    11/07/19 2112 11/07/19 2313 11/07/19 2330   BP: 140/70 159/70 164/70   Pulse: 79 68 68   Patient Position - Orthostatic VS: Sitting           Visual Acuity      ED Medications  Medications   sodium chloride 0 9 % bolus 1,000 mL (0 mL Intravenous Stopped 11/7/19 2352)   OLANZapine (ZyPREXA ZYDIS) dispersible tablet 10 mg (10 mg Oral Given 11/7/19 2208)   iohexol (OMNIPAQUE) 350 MG/ML injection (MULTI-DOSE) 100 mL (100 mL Intravenous Given 11/7/19 2243)   amoxicillin-clavulanate (AUGMENTIN) 875-125 mg per tablet 1 tablet (1 tablet Oral Given 11/7/19 2350)       Diagnostic Studies  Results Reviewed     Procedure Component Value Units Date/Time    Comprehensive metabolic panel [[id number]]  (Abnormal) Collected:  11/07/19 2205    Lab Status:  Final result Specimen:  Blood from Arm, Right Updated:  11/07/19 2231     Sodium 140 mmol/L      Potassium 3 8 mmol/L      Chloride 101 mmol/L      CO2 30 mmol/L      ANION GAP 9 mmol/L      BUN 14 mg/dL      Creatinine 0 83 mg/dL      Glucose 103 mg/dL      Calcium 9 3 mg/dL      AST 19 U/L      ALT 28 U/L      Alkaline Phosphatase 117 U/L      Total Protein 7 7 g/dL      Albumin 3 8 g/dL      Total Bilirubin 0 50 mg/dL      eGFR 72 ml/min/1 73sq m     Narrative:       [place] guidelines for Chronic Kidney Disease (CKD):     Stage 1 with normal or high GFR (GFR > 90 mL/min/1 73 square meters)    Stage 2 Mild CKD (GFR = 60-89 mL/min/1 73 square meters)    Stage 3A Moderate CKD (GFR = 45-59 mL/min/1 73 square meters)    Stage 3B Moderate CKD (GFR = 30-44 mL/min/1 73 square meters)    Stage 4 Severe CKD (GFR = 15-29 mL/min/1 73 square meters)    Stage 5 End Stage CKD (GFR <15 mL/min/1 73 square meters)  Note: GFR calculation is accurate only with a steady state creatinine    Lipase [[id number]]  (Normal) Collected:  11/07/19 2205    Lab Status:  Final result Specimen:  Blood from Arm, Right Updated:  11/07/19 2231     Lipase 77 u/L     Urine Microscopic [[id number]]  (Abnormal) Collected:  11/07/19 2206    Lab Status:  Final result Specimen:  Urine, Clean Catch Updated:  11/07/19 2227     RBC, UA None Seen /hpf      WBC, UA 4-10 /hpf      Epithelial Cells Occasional /hpf Bacteria, UA Occasional /hpf     UA w Reflex to Microscopic w Reflex to Culture [[id number]]  (Abnormal) Collected:  11/07/19 2206    Lab Status:  Final result Specimen:  Urine, Clean Catch Updated:  11/07/19 2216     Color, UA Yellow     Clarity, UA Clear     Specific Gravity, UA 1 015     pH, UA 6 0     Leukocytes, UA Trace     Nitrite, UA Negative     Protein, UA Negative mg/dl      Glucose, UA Negative mg/dl      Ketones, UA Negative mg/dl      Urobilinogen, UA 0 2 E U /dl      Bilirubin, UA Negative     Blood, UA Negative    CBC and differential [[id number]]  (Abnormal) Collected:  11/07/19 2205    Lab Status:  Final result Specimen:  Blood from Arm, Right Updated:  11/07/19 2214     WBC 13 41 Thousand/uL      RBC 5 06 Million/uL      Hemoglobin 16 0 g/dL      Hematocrit 47 8 %      MCV 95 fL      MCH 31 6 pg      MCHC 33 5 g/dL      RDW 12 6 %      MPV 9 1 fL      Platelets 692 Thousands/uL      nRBC 0 /100 WBCs      Neutrophils Relative 69 %      Immat GRANS % 0 %      Lymphocytes Relative 20 %      Monocytes Relative 8 %      Eosinophils Relative 2 %      Basophils Relative 1 %      Neutrophils Absolute 9 26 Thousands/µL      Immature Grans Absolute 0 05 Thousand/uL      Lymphocytes Absolute 2 73 Thousands/µL      Monocytes Absolute 1 07 Thousand/µL      Eosinophils Absolute 0 23 Thousand/µL      Basophils Absolute 0 07 Thousands/µL                  CT abdomen pelvis with contrast   Final Result by Vincenzo Collazo DO (11/07 2340)      Colonic diverticulosis with findings suspicious for acute sigmoid diverticulitis in the appropriate clinical setting as described  No discrete pericolonic abscess is seen  Hepatic and renal cysts, and other findings as above                    Workstation performed: [id number]                    Procedures  Procedures       ED Course  ED Course as of Nov 10 1457   Thu Nov 07, 2019   2234 WBC, UA(!): 4-10   2234 Bacteria, UA: Occasional   2235 Will probably treat for uti given the pts sx    WBC(!): 13 41           Identification of Seniors at Risk      Most Recent Value   (ISAR) Identification of Seniors at Risk   Before the illness or injury that brought you to the Emergency, did you need someone to help you on a regular basis? 0 Filed at: 11/07/2019 2113   In the last 24 hours, have you needed more help than usual?  0 Filed at: 11/07/2019 2113   Have you been hospitalized for one or more nights during the past 6 months? 0 Filed at: 11/07/2019 2113   In general, do you see well?  0 Filed at: 11/07/2019 2113   In general, do you have serious problems with your memory? 0 Filed at: 11/07/2019 2113   Do you take more than three different medications every day?  0 Filed at: 11/07/2019 2113   ISAR Score  0 Filed at: 11/07/2019 2113                          MDM  Number of Diagnoses or Management Options  Diverticulitis:   Diagnosis management comments: 78 yof with abd pain will check labs, ct scan give fluids, painmeds,       Disposition  Final diagnoses:   Diverticulitis     Time reflects when diagnosis was documented in both MDM as applicable and the Disposition within this note     Time User Action Codes Description Comment    11/7/2019 11:46 PM Lyle Lucia Add [K57 [id number]] Diverticulitis       ED Disposition     ED Disposition Condition Date/Time Comment    Discharge Stable Thu Nov 7, 2019 11:45 PM Gabriel Bile discharge to home/self care              Follow-up Information     Follow up With Specialties Details Why Christopher Grace MD Internal Medicine   [address] [address]  [phone]            Discharge Medication List as of 11/7/2019 11:47 PM      START taking these medications    Details   amoxicillin-clavulanate (AUGMENTIN) 875-125 mg per tablet Take 1 tablet by mouth every 12 (twelve) hours for 7 days, Starting Thu 11/7/2019, Until Thu 11/14/2019, Print         CONTINUE these medications which have NOT CHANGED    Details acetaminophen (TYLENOL) 500 mg tablet Take 500 mg by mouth every 6 (six) hours, Starting Tue 3/22/2016, Historical Med      aspirin 81 MG tablet Take 1 tablet by mouth daily, Historical Med      diclofenac sodium (VOLTAREN) 1 % Apply 4 g topically 4 (four) times a day, Starting Fri 10/19/2018, Normal      dicyclomine (BENTYL) 10 mg capsule Take 1 capsule (10 mg total) by mouth 4 (four) times a day (before meals and at bedtime), Starting Tue 11/5/2019, Normal      famotidine (PEPCID) 20 mg tablet Take 1 tablet (20 mg total) by mouth 2 (two) times a day, Starting Sat 5/4/2019, Normal      fluticasone (FLONASE) 50 mcg/act nasal spray 2 sprays into each nostril daily, Starting Sat 5/4/2019, Normal      hyoscyamine (ANASPAZ,LEVSIN) 0 125 MG tablet Take 1 tablet (0 125 mg total) by mouth every 4 (four) hours as needed for cramping, Starting Thu 11/7/2019, Normal      levothyroxine 88 mcg tablet Take 1 tablet (88 mcg total) by mouth daily, Starting Sat 5/4/2019, Normal      losartan (COZAAR) 50 mg tablet Take 1 tablet (50 mg total) by mouth daily, Starting Sat 5/4/2019, Normal      Probiotic Product (PROBIOTIC-10 PO) Take by mouth, Historical Med      prochlorperazine (COMPAZINE) 5 mg tablet Take 1 tab by mouth every 6 hours if needed for nausea and vomiting , Normal      rosuvastatin (CRESTOR) 20 MG tablet Take 1 tablet (20 mg total) by mouth daily, Starting Fri 6/7/2019, Normal      traZODone (DESYREL) 50 mg tablet Take 1 tablet (50 mg total) by mouth daily at bedtime, Starting Sat 5/4/2019, Normal           No discharge procedures on file      ED Provider  Electronically Signed by           Vanessa Oviedo MD  11/10/19 7608

## 2019-11-11 ENCOUNTER — OFFICE VISIT (OUTPATIENT)
Dept: INTERNAL MEDICINE CLINIC | Facility: CLINIC | Age: 69
End: 2019-11-11
Payer: MEDICARE

## 2019-11-11 VITALS
DIASTOLIC BLOOD PRESSURE: 76 MMHG | HEART RATE: 85 BPM | SYSTOLIC BLOOD PRESSURE: 120 MMHG | WEIGHT: 104 LBS | HEIGHT: 62 IN | OXYGEN SATURATION: 96 % | BODY MASS INDEX: 19.14 KG/M2

## 2019-11-11 DIAGNOSIS — I10 ESSENTIAL HYPERTENSION: Primary | ICD-10-CM

## 2019-11-11 DIAGNOSIS — I10 ESSENTIAL HYPERTENSION: ICD-10-CM

## 2019-11-11 PROCEDURE — 99213 OFFICE O/P EST LOW 20 MIN: CPT | Performed by: INTERNAL MEDICINE

## 2019-11-11 RX ORDER — LOSARTAN POTASSIUM 50 MG/1
TABLET ORAL
Qty: 135 TABLET | Refills: 3 | Status: SHIPPED | OUTPATIENT
Start: 2019-11-11 | End: 2020-03-12 | Stop reason: SDUPTHER

## 2019-11-11 NOTE — PATIENT INSTRUCTIONS
Intermittently elevated blood pressure  Try increasing losartan to 75 mg daily  Watch for potential side effect of weak dizzy and lightheaded  Plan to see me for next scheduled visit in April  Call if anything goes wrong 1 way or another with the blood pressure

## 2019-11-11 NOTE — PROGRESS NOTES
Assessment/Plan:       Diagnoses and all orders for this visit:    Essential hypertension                Subjective:      Patient ID: Mary Dawson is a 78 y o  female  A patient with essential hypertension  She has had type physician visits for various reasons with other doctors and blood pressure has been noted to be high  In particular, she actually went to an emergency room with abdominal pain was diagnosed with diverticulitis and is on antibiotic  There, her blood pressure was 160/90   Her usual medication is losartan 50 mg daily  Of course today her blood pressure here is fine  I rechecked it and got 122/68  The following portions of the patient's history were reviewed and updated as appropriate:   She has a past medical history of Arthritis, GERD (gastroesophageal reflux disease), Graves' disease, Hepatitis B virus infection, and Nonmelanoma skin cancer  ,  does not have any pertinent problems on file  ,   has a past surgical history that includes Appendectomy; ARTHROSCOPY KNEE (Left); Temporal artery biopsy / ligation;  section; Total knee arthroplasty (Right, 2016); Eye surgery (Bilateral); Tonsillectomy; and Colonoscopy  ,  family history includes Cancer in her maternal aunt and paternal grandmother; Cirrhosis in her paternal grandfather; Coronary artery disease in her father; Diabetes in her maternal grandmother; Heart attack in her maternal grandfather; Hyperlipidemia in her paternal grandfather; Lung cancer (age of onset: 76) in her maternal aunt; No Known Problems in her sister and sister; Pancreatic cancer (age of onset: 76) in her mother; Throat cancer in her father  ,   reports that she quit smoking about 19 years ago  She has never used smokeless tobacco  She reports that she does not drink alcohol or use drugs  ,  is allergic to fentanyl; methimazole; and codeine     Current Outpatient Medications   Medication Sig Dispense Refill    acetaminophen (TYLENOL) 500 mg tablet Take 500 mg by mouth every 6 (six) hours      amoxicillin-clavulanate (AUGMENTIN) 875-125 mg per tablet Take 1 tablet by mouth every 12 (twelve) hours for 7 days 14 tablet 0    aspirin 81 MG tablet Take 1 tablet by mouth daily      diclofenac sodium (VOLTAREN) 1 % Apply 4 g topically 4 (four) times a day 3 Tube 0    dicyclomine (BENTYL) 10 mg capsule Take 1 capsule (10 mg total) by mouth 4 (four) times a day (before meals and at bedtime) (Patient taking differently: Take 10 mg by mouth 4 (four) times a day (before meals and at bedtime) ) 40 capsule 0    famotidine (PEPCID) 20 mg tablet Take 1 tablet (20 mg total) by mouth 2 (two) times a day 180 tablet 3    fluticasone (FLONASE) 50 mcg/act nasal spray 2 sprays into each nostril daily 3 Bottle 4    levothyroxine 88 mcg tablet Take 1 tablet (88 mcg total) by mouth daily 90 tablet 3    losartan (COZAAR) 50 mg tablet Take 1 tablet (50 mg total) by mouth daily 90 tablet 3    Probiotic Product (PROBIOTIC-10 PO) Take by mouth      prochlorperazine (COMPAZINE) 5 mg tablet Take 1 tab by mouth every 6 hours if needed for nausea and vomiting  (Patient taking differently: Take 1 tab by mouth every 6 hours if needed for nausea and vomiting ) 30 tablet 0    rosuvastatin (CRESTOR) 20 MG tablet Take 1 tablet (20 mg total) by mouth daily 90 tablet 3    hyoscyamine (ANASPAZ,LEVSIN) 0 125 MG tablet Take 1 tablet (0 125 mg total) by mouth every 4 (four) hours as needed for cramping (Patient not taking: Reported on 11/11/2019) 60 tablet 0    traZODone (DESYREL) 50 mg tablet Take 1 tablet (50 mg total) by mouth daily at bedtime 90 tablet 3     No current facility-administered medications for this visit  Review of Systems   Respiratory: Negative for cough and shortness of breath  Cardiovascular: Negative for chest pain and leg swelling  Neurological: Negative for headaches           Objective:  Vitals:    11/11/19 1554   BP: 120/76   Pulse: 85   SpO2: 96% Physical Exam   Constitutional: She is oriented to person, place, and time  Alert female patient who appears to be stated age  She has the pinched facies of scleroderma   Cardiovascular: Normal rate  Pulmonary/Chest: Effort normal    Neurological: She is alert and oriented to person, place, and time  Patient Instructions    Intermittently elevated blood pressure  Try increasing losartan to 75 mg daily  Watch for potential side effect of weak dizzy and lightheaded  Plan to see me for next scheduled visit in April  Call if anything goes wrong 1 way or another with the blood pressure

## 2019-11-13 DIAGNOSIS — K57.92 DIVERTICULITIS: Primary | ICD-10-CM

## 2019-11-13 DIAGNOSIS — B37.9 YEAST INFECTION: Primary | ICD-10-CM

## 2019-11-13 RX ORDER — METRONIDAZOLE 500 MG/1
500 TABLET ORAL EVERY 8 HOURS SCHEDULED
Qty: 30 TABLET | Refills: 0 | Status: SHIPPED | OUTPATIENT
Start: 2019-11-13 | End: 2019-11-23

## 2019-11-13 RX ORDER — CIPROFLOXACIN 500 MG/1
500 TABLET, FILM COATED ORAL EVERY 12 HOURS SCHEDULED
Qty: 20 TABLET | Refills: 0 | Status: SHIPPED | OUTPATIENT
Start: 2019-11-13 | End: 2019-11-23

## 2019-11-13 NOTE — TELEPHONE ENCOUNTER
Spoke with patient she was in the emergency room last Thursday and diagnosed with acute diverticulitis in the elevated white count  Patient was started on Augmentin and she is not doing well  She is afraid to eat at this point because it causes worsening pain  Will stop patient Augmentin and start Cipro Flagyl course  Patient will call back by Friday if she is not feeling any better

## 2019-11-13 NOTE — TELEPHONE ENCOUNTER
Called pt and she said Augmentin is not working  Pt said ER doc stated if Augmentin is not working there is another med she can be put on  Pt is not doing well and wants to know if can have another antibiotic  Pt is in much pain  Pt said Ct with con showed the divicultitotis  and white count up      Routing to pa  Thank you

## 2019-11-13 NOTE — TELEPHONE ENCOUNTER
Shannon Kent pt - Pt went to ER Thursday, they put her on Augmentin, pt only has a day left of medication and she is not doing well  They did a CT at hospital  Pt would like to speak to Shannon Kent  Please call [phone]   Ty

## 2019-11-13 NOTE — TELEPHONE ENCOUNTER
Patient called lmom - patient is concerned  Has not heard from [facility]  What is patient to do  Does not want to go back to hospital  Patient is just has bad today, 11/13/19, ahas she was last Thursday   Please call Patient at [phone]

## 2019-11-15 NOTE — TELEPHONE ENCOUNTER
Spoke with patient  Encourage patient to  Pedialyte be drinking plenty of fluids  Encourage the brat diet to advance as tolerated  I have encouraged her that if the pain worsens over the weekend she should go back to the emergency room for further evaluation  She is only on day 2 of Cipro Flagyl

## 2019-11-18 ENCOUNTER — TELEPHONE (OUTPATIENT)
Dept: GASTROENTEROLOGY | Facility: CLINIC | Age: 69
End: 2019-11-18

## 2019-11-18 RX ORDER — FLUCONAZOLE 100 MG/1
100 TABLET ORAL DAILY
Qty: 3 TABLET | Refills: 0 | Status: SHIPPED | OUTPATIENT
Start: 2019-11-18 | End: 2019-11-21

## 2019-11-18 NOTE — TELEPHONE ENCOUNTER
Pt states that her abdominal pain is better however she now has thrush and a yeast infection, she would like medication called into the pharmacy  Please call [phone]   Ty

## 2019-11-18 NOTE — TELEPHONE ENCOUNTER
Called pt and she wanted me to let you know that she has a yeast infection and thrush in her mouth from the antibiotics  Her abdominal pain has subsided but wants medication for the yeast infection and thrush  Please advise

## 2020-02-03 ENCOUNTER — DOCUMENTATION (OUTPATIENT)
Dept: NEUROLOGY | Facility: CLINIC | Age: 70
End: 2020-02-03

## 2020-02-03 NOTE — PROGRESS NOTES
Type Date User Summary Attachment   General 02/03/2020 10:10 AM Karthik Tirado care coordination  -   Note    Botox- no authorization needed   Please use our stock      Thank you,     Bartolo Keen

## 2020-02-18 DIAGNOSIS — F51.02 ADJUSTMENT INSOMNIA: Primary | ICD-10-CM

## 2020-02-18 RX ORDER — ZOLPIDEM TARTRATE 5 MG/1
5 TABLET ORAL
Qty: 30 TABLET | Refills: 2 | Status: SHIPPED | OUTPATIENT
Start: 2020-02-18 | End: 2020-03-12 | Stop reason: SDUPTHER

## 2020-02-20 ENCOUNTER — TELEPHONE (OUTPATIENT)
Dept: NEUROLOGY | Facility: CLINIC | Age: 70
End: 2020-02-20

## 2020-02-20 NOTE — TELEPHONE ENCOUNTER
Patient called back and will not be able to make it tomorrow as she will not have a ride to office from [place]  Rescheduled patient to  2/7/27/2020 at 1130 am in [facility]

## 2020-02-20 NOTE — TELEPHONE ENCOUNTER
Spoke with patient would like change time of appointment on 2- please give her a call back [phone] (4) excellent

## 2020-02-27 ENCOUNTER — PROCEDURE VISIT (OUTPATIENT)
Dept: NEUROLOGY | Facility: CLINIC | Age: 70
End: 2020-02-27
Payer: MEDICARE

## 2020-02-27 VITALS — DIASTOLIC BLOOD PRESSURE: 84 MMHG | TEMPERATURE: 99 F | SYSTOLIC BLOOD PRESSURE: 142 MMHG | HEART RATE: 85 BPM

## 2020-02-27 DIAGNOSIS — G43.709 CHRONIC MIGRAINE WITHOUT AURA WITHOUT STATUS MIGRAINOSUS, NOT INTRACTABLE: Primary | ICD-10-CM

## 2020-02-27 PROCEDURE — 64615 CHEMODENERV MUSC MIGRAINE: CPT | Performed by: PHYSICIAN ASSISTANT

## 2020-02-27 RX ORDER — ALPRAZOLAM 0.25 MG/1
0.25 TABLET ORAL 3 TIMES DAILY PRN
Qty: 45 TABLET | Refills: 0 | Status: SHIPPED | OUTPATIENT
Start: 2020-02-27 | End: 2020-03-12 | Stop reason: SDUPTHER

## 2020-02-27 NOTE — PROGRESS NOTES
Chemodenervation  Date/Time: 2/27/2020 11:37 AM  Performed by: James Suero PA-C  Authorized by: James Suero PA-C     Pre-procedure details:     Prepped With: Alcohol    Anesthesia (see MAR for exact dosages): Anesthesia method:  None  Procedure details:     Position:  Upright  Botox:     Botox Type:  Type A    Brand:  Botox    mL's of Botulinum Toxin:  200    Final Concentration per CC:  200 units    Needle Gauge:  30 G 2 5 inch  Procedures:     Botox Procedures: chronic headache      Indications: migraines    Injection Location:     Head / Face:  L superior cervical paraspinal, R superior cervical paraspinal, L medial occipitalis, R medial occipitalis, R temporalis, L temporalis, R superior trapezius and L superior trapezius    L temporalis injection amount:  25 unit(s)    R temporalis injection amount:  25 unit(s)    L medial occipitalis injection amount:  20 unit(s)    R medial occipitalis injection amount:  20 unit(s)    L superior cervical paraspinal injection amount:  10 unit(s)    R superior cervical paraspinal injection amount:  10 unit(s)    L superior trapezius injection amount:  20 unit(s)    R superior trapezius injection amount:  20 unit(s)  Total Units:     Total units used:  200    Total units discarded:  0  Post-procedure details:     Chemodenervation:  Chronic migraine    Facial Nerve Location[de-identified]  Bilateral facial nerve    Patient tolerance of procedure:   Tolerated well, no immediate complications  Comments:      5 units TMJ bilaterally  5 units [place] bilaterally  30 units [place]  All medically necessary

## 2020-03-09 ENCOUNTER — OFFICE VISIT (OUTPATIENT)
Dept: INTERNAL MEDICINE CLINIC | Facility: CLINIC | Age: 70
End: 2020-03-09
Payer: MEDICARE

## 2020-03-09 VITALS
HEART RATE: 78 BPM | OXYGEN SATURATION: 95 % | HEIGHT: 62 IN | SYSTOLIC BLOOD PRESSURE: 140 MMHG | WEIGHT: 98.8 LBS | DIASTOLIC BLOOD PRESSURE: 80 MMHG | BODY MASS INDEX: 18.18 KG/M2

## 2020-03-09 DIAGNOSIS — F43.21 GRIEF REACTION: Primary | ICD-10-CM

## 2020-03-09 PROCEDURE — 99212 OFFICE O/P EST SF 10 MIN: CPT | Performed by: INTERNAL MEDICINE

## 2020-03-09 PROCEDURE — 3077F SYST BP >= 140 MM HG: CPT | Performed by: INTERNAL MEDICINE

## 2020-03-09 PROCEDURE — 4040F PNEUMOC VAC/ADMIN/RCVD: CPT | Performed by: INTERNAL MEDICINE

## 2020-03-09 PROCEDURE — 1036F TOBACCO NON-USER: CPT | Performed by: INTERNAL MEDICINE

## 2020-03-09 PROCEDURE — 3008F BODY MASS INDEX DOCD: CPT | Performed by: INTERNAL MEDICINE

## 2020-03-09 PROCEDURE — 3079F DIAST BP 80-89 MM HG: CPT | Performed by: INTERNAL MEDICINE

## 2020-03-09 PROCEDURE — 1160F RVW MEDS BY RX/DR IN RCRD: CPT | Performed by: INTERNAL MEDICINE

## 2020-03-10 PROBLEM — F43.21 GRIEF REACTION: Status: ACTIVE | Noted: 2020-03-10

## 2020-03-10 PROBLEM — F43.20 GRIEF REACTION: Status: ACTIVE | Noted: 2020-03-10

## 2020-03-10 NOTE — PROGRESS NOTES
Assessment/Plan:       Diagnoses and all orders for this visit:    Grief reaction                Subjective:      Patient ID: Erika Krishnan is a 78 y o  female  Here to talk  The patient's   Alonso's Day   He was in perfect health  There were skiing together, in a telecabina  At [facility]  He just stopped breathing  No! , no gasp, not a sound  He just stopped   CPR was done  He was shocked  Nothing worked  He was gone from the get go     quite distressing an upsetting of course     I have given her zolpidem and Xanax on a temporary basis to try to get through this  She will prevail but it will take a while      The following portions of the patient's history were reviewed and updated as appropriate:   She has a past medical history of Arthritis, GERD (gastroesophageal reflux disease), Graves' disease, Hepatitis B virus infection, and Nonmelanoma skin cancer  ,  does not have any pertinent problems on file  ,   has a past surgical history that includes Appendectomy; ARTHROSCOPY KNEE (Left); Temporal artery biopsy / ligation;  section; Total knee arthroplasty (Right, 2016); Eye surgery (Bilateral); Tonsillectomy; and Colonoscopy  ,  family history includes Cancer in her maternal aunt and paternal grandmother; Cirrhosis in her paternal grandfather; Coronary artery disease in her father; Diabetes in her maternal grandmother; Heart attack in her maternal grandfather; Hyperlipidemia in her paternal grandfather; Lung cancer (age of onset: 76) in her maternal aunt; No Known Problems in her sister and sister; Pancreatic cancer (age of onset: 76) in her mother; Throat cancer in her father  ,   reports that she quit smoking about 20 years ago  She has never used smokeless tobacco  She reports that she does not drink alcohol or use drugs  ,  is allergic to fentanyl; methimazole; and codeine     Current Outpatient Medications   Medication Sig Dispense Refill    acetaminophen (TYLENOL) 500 mg tablet Take 500 mg by mouth every 6 (six) hours      ALPRAZolam (XANAX) 0 25 mg tablet Take 1 tablet (0 25 mg total) by mouth 3 (three) times a day as needed for anxiety 45 tablet 0    aspirin 81 MG tablet Take 1 tablet by mouth daily      diclofenac sodium (VOLTAREN) 1 % Apply 4 g topically 4 (four) times a day 3 Tube 0    famotidine (PEPCID) 20 mg tablet Take 1 tablet (20 mg total) by mouth 2 (two) times a day 180 tablet 3    fluticasone (FLONASE) 50 mcg/act nasal spray 2 sprays into each nostril daily 3 Bottle 4    levothyroxine 88 mcg tablet Take 1 tablet (88 mcg total) by mouth daily 90 tablet 3    losartan (COZAAR) 50 mg tablet 1-1/2 tablets by mouth daily 135 tablet 3    Probiotic Product (PROBIOTIC-10 PO) Take by mouth      prochlorperazine (COMPAZINE) 5 mg tablet Take 1 tab by mouth every 6 hours if needed for nausea and vomiting  (Patient taking differently: Take 1 tab by mouth every 6 hours if needed for nausea and vomiting ) 30 tablet 0    rosuvastatin (CRESTOR) 20 MG tablet Take 1 tablet (20 mg total) by mouth daily 90 tablet 3    zolpidem (AMBIEN) 5 mg tablet Take 1 tablet (5 mg total) by mouth daily at bedtime as needed for sleep 30 tablet 2    dicyclomine (BENTYL) 10 mg capsule Take 1 capsule (10 mg total) by mouth 4 (four) times a day (before meals and at bedtime) (Patient not taking: Reported on 3/9/2020) 40 capsule 0     No current facility-administered medications for this visit  Review of Systems   Psychiatric/Behavioral: Positive for dysphoric mood  Objective:  Vitals:    03/09/20 1534   BP: 140/80   Pulse: 78   SpO2: 95%      Physical Exam   Constitutional: She appears well-developed and well-nourished  A female patient who appears to be stated age crying during the  visit   Psychiatric: Her speech is normal and behavior is normal  Judgment and thought content normal  Cognition and memory are normal  She exhibits a depressed mood  Patient Instructions    Will continue temporary use of antianxiety medication and sleepers

## 2020-03-11 ENCOUNTER — TELEPHONE (OUTPATIENT)
Dept: INTERNAL MEDICINE CLINIC | Facility: CLINIC | Age: 70
End: 2020-03-11

## 2020-03-11 NOTE — TELEPHONE ENCOUNTER
Pt was in to see Dr Fredrick Elias on Monday during downtime   Needs the following medications refilled:    Alprazolam 0 25mg   1 tablet 3 times a day PRN     Zolpidem 5mg   1 tablet daily HS PRN     Send to [facility]

## 2020-03-11 NOTE — TELEPHONE ENCOUNTER
Patient has refills on zolpidem so I told her to call the pharmacy  Just received 45 pills of Alprazolam by another provider on 2/27/2020 not sure if you want to send that to the pharmacy    Please advise

## 2020-03-12 DIAGNOSIS — E78.2 MIXED HYPERLIPIDEMIA: ICD-10-CM

## 2020-03-12 DIAGNOSIS — I10 ESSENTIAL HYPERTENSION: ICD-10-CM

## 2020-03-12 DIAGNOSIS — G43.009 MIGRAINE WITHOUT AURA AND WITHOUT STATUS MIGRAINOSUS, NOT INTRACTABLE: ICD-10-CM

## 2020-03-12 DIAGNOSIS — F51.02 ADJUSTMENT INSOMNIA: ICD-10-CM

## 2020-03-12 DIAGNOSIS — E89.0 POSTABLATIVE HYPOTHYROIDISM: ICD-10-CM

## 2020-03-12 DIAGNOSIS — R11.0 FUNCTIONAL NAUSEA: ICD-10-CM

## 2020-03-12 DIAGNOSIS — G43.709 CHRONIC MIGRAINE WITHOUT AURA WITHOUT STATUS MIGRAINOSUS, NOT INTRACTABLE: ICD-10-CM

## 2020-03-12 RX ORDER — FAMOTIDINE 20 MG/1
20 TABLET, FILM COATED ORAL 2 TIMES DAILY
Qty: 180 TABLET | Refills: 3 | Status: SHIPPED | OUTPATIENT
Start: 2020-03-12 | End: 2020-03-31 | Stop reason: SDUPTHER

## 2020-03-12 RX ORDER — LEVOTHYROXINE SODIUM 88 UG/1
88 TABLET ORAL DAILY
Qty: 90 TABLET | Refills: 3 | Status: SHIPPED | OUTPATIENT
Start: 2020-03-12 | End: 2020-03-31 | Stop reason: SDUPTHER

## 2020-03-12 RX ORDER — ROSUVASTATIN CALCIUM 20 MG/1
20 TABLET, COATED ORAL DAILY
Qty: 90 TABLET | Refills: 3 | Status: SHIPPED | OUTPATIENT
Start: 2020-03-12 | End: 2020-03-31 | Stop reason: SDUPTHER

## 2020-03-12 RX ORDER — PROCHLORPERAZINE MALEATE 5 MG/1
TABLET ORAL
Qty: 30 TABLET | Refills: 0 | Status: SHIPPED | OUTPATIENT
Start: 2020-03-12 | End: 2020-05-28 | Stop reason: SDUPTHER

## 2020-03-12 RX ORDER — ALPRAZOLAM 0.25 MG/1
0.25 TABLET ORAL 3 TIMES DAILY PRN
Qty: 45 TABLET | Refills: 0 | Status: SHIPPED | OUTPATIENT
Start: 2020-03-12 | End: 2020-06-11 | Stop reason: SDUPTHER

## 2020-03-12 RX ORDER — ZOLPIDEM TARTRATE 5 MG/1
5 TABLET ORAL
Qty: 30 TABLET | Refills: 2 | Status: SHIPPED | OUTPATIENT
Start: 2020-03-12 | End: 2020-06-11 | Stop reason: SDUPTHER

## 2020-03-12 RX ORDER — LOSARTAN POTASSIUM 50 MG/1
TABLET ORAL
Qty: 135 TABLET | Refills: 3 | Status: SHIPPED | OUTPATIENT
Start: 2020-03-12 | End: 2020-03-31 | Stop reason: SDUPTHER

## 2020-03-31 DIAGNOSIS — E89.0 POSTABLATIVE HYPOTHYROIDISM: ICD-10-CM

## 2020-03-31 DIAGNOSIS — E78.2 MIXED HYPERLIPIDEMIA: ICD-10-CM

## 2020-03-31 DIAGNOSIS — R11.0 FUNCTIONAL NAUSEA: ICD-10-CM

## 2020-03-31 DIAGNOSIS — I10 ESSENTIAL HYPERTENSION: ICD-10-CM

## 2020-03-31 RX ORDER — LOSARTAN POTASSIUM 50 MG/1
TABLET ORAL
Qty: 135 TABLET | Refills: 3 | Status: SHIPPED | OUTPATIENT
Start: 2020-03-31 | End: 2021-03-09 | Stop reason: SDUPTHER

## 2020-03-31 RX ORDER — LEVOTHYROXINE SODIUM 88 UG/1
88 TABLET ORAL DAILY
Qty: 90 TABLET | Refills: 3 | Status: SHIPPED | OUTPATIENT
Start: 2020-03-31 | End: 2021-03-09 | Stop reason: SDUPTHER

## 2020-03-31 RX ORDER — FAMOTIDINE 20 MG/1
20 TABLET, FILM COATED ORAL 2 TIMES DAILY
Qty: 180 TABLET | Refills: 3 | Status: SHIPPED | OUTPATIENT
Start: 2020-03-31 | End: 2021-03-09 | Stop reason: SDUPTHER

## 2020-03-31 RX ORDER — ROSUVASTATIN CALCIUM 20 MG/1
20 TABLET, COATED ORAL DAILY
Qty: 90 TABLET | Refills: 3 | Status: SHIPPED | OUTPATIENT
Start: 2020-03-31 | End: 2021-03-09 | Stop reason: SDUPTHER

## 2020-03-31 NOTE — TELEPHONE ENCOUNTER
NEEDS  FAMOTIDINE 20MG  LOSARTAN 50MG  LEVOTHYROXINE 88MCG  ROSUVASTATIN 20MG  MEDS BY MAIL NHUNG   [phone]

## 2020-04-28 ENCOUNTER — DOCUMENTATION (OUTPATIENT)
Dept: NEUROLOGY | Facility: CLINIC | Age: 70
End: 2020-04-28

## 2020-05-28 ENCOUNTER — PROCEDURE VISIT (OUTPATIENT)
Dept: NEUROLOGY | Facility: CLINIC | Age: 70
End: 2020-05-28
Payer: MEDICARE

## 2020-05-28 VITALS — TEMPERATURE: 99.1 F | HEART RATE: 79 BPM | SYSTOLIC BLOOD PRESSURE: 152 MMHG | DIASTOLIC BLOOD PRESSURE: 72 MMHG

## 2020-05-28 DIAGNOSIS — G43.009 MIGRAINE WITHOUT AURA AND WITHOUT STATUS MIGRAINOSUS, NOT INTRACTABLE: ICD-10-CM

## 2020-05-28 DIAGNOSIS — G43.709 CHRONIC MIGRAINE WITHOUT AURA WITHOUT STATUS MIGRAINOSUS, NOT INTRACTABLE: Primary | ICD-10-CM

## 2020-05-28 PROCEDURE — 64615 CHEMODENERV MUSC MIGRAINE: CPT | Performed by: PHYSICIAN ASSISTANT

## 2020-05-28 RX ORDER — DEXAMETHASONE 2 MG/1
2 TABLET ORAL
Qty: 5 TABLET | Refills: 0 | Status: SHIPPED | OUTPATIENT
Start: 2020-05-28 | End: 2021-08-17

## 2020-05-28 RX ORDER — PROCHLORPERAZINE MALEATE 5 MG/1
TABLET ORAL
Qty: 30 TABLET | Refills: 0 | Status: SHIPPED | OUTPATIENT
Start: 2020-05-28 | End: 2022-03-17 | Stop reason: SDUPTHER

## 2020-05-28 RX ORDER — KETOROLAC TROMETHAMINE 10 MG/1
10 TABLET, FILM COATED ORAL EVERY 6 HOURS PRN
Qty: 10 TABLET | Refills: 0 | Status: SHIPPED | OUTPATIENT
Start: 2020-05-28

## 2020-06-11 ENCOUNTER — TELEPHONE (OUTPATIENT)
Dept: INTERNAL MEDICINE CLINIC | Facility: CLINIC | Age: 70
End: 2020-06-11

## 2020-06-11 ENCOUNTER — OFFICE VISIT (OUTPATIENT)
Dept: INTERNAL MEDICINE CLINIC | Facility: CLINIC | Age: 70
End: 2020-06-11
Payer: MEDICARE

## 2020-06-11 VITALS
OXYGEN SATURATION: 98 % | DIASTOLIC BLOOD PRESSURE: 80 MMHG | BODY MASS INDEX: 17.66 KG/M2 | HEART RATE: 60 BPM | SYSTOLIC BLOOD PRESSURE: 110 MMHG | HEIGHT: 62 IN | WEIGHT: 96 LBS | TEMPERATURE: 98.1 F

## 2020-06-11 DIAGNOSIS — G43.709 CHRONIC MIGRAINE WITHOUT AURA WITHOUT STATUS MIGRAINOSUS, NOT INTRACTABLE: ICD-10-CM

## 2020-06-11 DIAGNOSIS — F51.02 ADJUSTMENT INSOMNIA: ICD-10-CM

## 2020-06-11 DIAGNOSIS — F43.21 GRIEF REACTION: Primary | ICD-10-CM

## 2020-06-11 DIAGNOSIS — Z12.31 ENCOUNTER FOR SCREENING MAMMOGRAM FOR BREAST CANCER: Primary | ICD-10-CM

## 2020-06-11 PROCEDURE — 1125F AMNT PAIN NOTED PAIN PRSNT: CPT | Performed by: INTERNAL MEDICINE

## 2020-06-11 PROCEDURE — 99213 OFFICE O/P EST LOW 20 MIN: CPT | Performed by: INTERNAL MEDICINE

## 2020-06-11 PROCEDURE — G0439 PPPS, SUBSEQ VISIT: HCPCS | Performed by: INTERNAL MEDICINE

## 2020-06-11 PROCEDURE — 1170F FXNL STATUS ASSESSED: CPT | Performed by: INTERNAL MEDICINE

## 2020-06-11 PROCEDURE — 3008F BODY MASS INDEX DOCD: CPT | Performed by: INTERNAL MEDICINE

## 2020-06-11 PROCEDURE — 1036F TOBACCO NON-USER: CPT | Performed by: INTERNAL MEDICINE

## 2020-06-11 PROCEDURE — 3074F SYST BP LT 130 MM HG: CPT | Performed by: INTERNAL MEDICINE

## 2020-06-11 PROCEDURE — 4040F PNEUMOC VAC/ADMIN/RCVD: CPT | Performed by: INTERNAL MEDICINE

## 2020-06-11 PROCEDURE — 3079F DIAST BP 80-89 MM HG: CPT | Performed by: INTERNAL MEDICINE

## 2020-06-11 RX ORDER — ZOLPIDEM TARTRATE 5 MG/1
5 TABLET ORAL
Qty: 30 TABLET | Refills: 2 | Status: SHIPPED | OUTPATIENT
Start: 2020-06-11 | End: 2020-09-05 | Stop reason: SDUPTHER

## 2020-06-11 RX ORDER — ALPRAZOLAM 0.25 MG/1
0.25 TABLET ORAL 3 TIMES DAILY PRN
Qty: 45 TABLET | Refills: 0 | Status: SHIPPED | OUTPATIENT
Start: 2020-06-11 | End: 2020-09-28 | Stop reason: ALTCHOICE

## 2020-06-11 NOTE — TELEPHONE ENCOUNTER
Patient saw  today & forgot to ask that he put RX for mammo 3 D bi lateral in patient's chart  Call to let her know it has been done    Her appt is Tuesday 6/16

## 2020-06-16 ENCOUNTER — HOSPITAL ENCOUNTER (OUTPATIENT)
Dept: MAMMOGRAPHY | Facility: MEDICAL CENTER | Age: 70
Discharge: HOME | End: 2020-06-16
Payer: MEDICARE

## 2020-06-16 VITALS — BODY MASS INDEX: 17.66 KG/M2 | HEIGHT: 62 IN | WEIGHT: 96 LBS

## 2020-06-16 DIAGNOSIS — Z12.39 SCREENING FOR BREAST CANCER: ICD-10-CM

## 2020-06-16 PROCEDURE — 77067 SCR MAMMO BI INCL CAD: CPT

## 2020-06-16 PROCEDURE — 77063 BREAST TOMOSYNTHESIS BI: CPT

## 2020-06-17 ENCOUNTER — TELEPHONE (OUTPATIENT)
Dept: INTERNAL MEDICINE CLINIC | Facility: CLINIC | Age: 70
End: 2020-06-17

## 2020-06-26 ENCOUNTER — TELEPHONE (OUTPATIENT)
Dept: INTERNAL MEDICINE CLINIC | Facility: CLINIC | Age: 70
End: 2020-06-26

## 2020-06-26 DIAGNOSIS — T78.40XA ALLERGIC STATE, INITIAL ENCOUNTER: ICD-10-CM

## 2020-06-26 RX ORDER — FLUTICASONE PROPIONATE 50 MCG
2 SPRAY, SUSPENSION (ML) NASAL DAILY
Qty: 3 BOTTLE | Refills: 4 | Status: SHIPPED | OUTPATIENT
Start: 2020-06-26 | End: 2020-06-29 | Stop reason: SDUPTHER

## 2020-06-29 DIAGNOSIS — T78.40XA ALLERGIC STATE, INITIAL ENCOUNTER: ICD-10-CM

## 2020-06-29 RX ORDER — FLUTICASONE PROPIONATE 50 MCG
2 SPRAY, SUSPENSION (ML) NASAL DAILY
Qty: 3 BOTTLE | Refills: 4 | Status: SHIPPED | OUTPATIENT
Start: 2020-06-29 | End: 2021-08-16 | Stop reason: SDUPTHER

## 2020-07-22 ENCOUNTER — DOCUMENTATION (OUTPATIENT)
Dept: NEUROLOGY | Facility: CLINIC | Age: 70
End: 2020-07-22

## 2020-07-22 NOTE — PROGRESS NOTES
Type Date User Summary Attachment   General 07/22/2020 10:51 AM Naty Rg care coordination  -   Note    Botox- no authorization needed   Please use our stock      Thank you,     Abraham Dock

## 2020-09-03 ENCOUNTER — PROCEDURE VISIT (OUTPATIENT)
Dept: NEUROLOGY | Facility: CLINIC | Age: 70
End: 2020-09-03
Payer: MEDICARE

## 2020-09-03 VITALS — DIASTOLIC BLOOD PRESSURE: 67 MMHG | HEART RATE: 77 BPM | TEMPERATURE: 97.8 F | SYSTOLIC BLOOD PRESSURE: 147 MMHG

## 2020-09-03 DIAGNOSIS — G43.709 CHRONIC MIGRAINE WITHOUT AURA WITHOUT STATUS MIGRAINOSUS, NOT INTRACTABLE: Primary | ICD-10-CM

## 2020-09-03 PROCEDURE — 64615 CHEMODENERV MUSC MIGRAINE: CPT | Performed by: PHYSICIAN ASSISTANT

## 2020-09-03 NOTE — PROGRESS NOTES
Chemodenervation    Date/Time: 9/3/2020 11:08 AM  Performed by: Tatianna Arroyo PA-C  Authorized by: Tatianna Arroyo PA-C     Pre-procedure details:     Preparation: Patient was prepped and draped in usual sterile fashion    Anesthesia (see MAR for exact dosages): Anesthesia method:  None  Procedure details:     Position:  Upright  Botox:     Brand:  Botox    mL's of Botulinum Toxin:  200    Final Concentration per CC:  50 units    Needle Gauge:  30 G 2 5 inch  Procedures:     Botox Procedures: chronic headache    Injection Location:     Head / Face:  L inferior cervical paraspinal, R inferior cervical paraspinal, L medial occipitalis, R medial occipitalis, L temporalis, R temporalis, R superior trapezius and L superior trapezius    L temporalis injection amount:  25 unit(s)    R temporalis injection amount:  25 unit(s)    L medial occipitalis injection amount:  20 unit(s)    R medial occipitalis injection amount:  20 unit(s)    L inferior cervical paraspinal injection amount:  10 unit(s)    R inferior cervical paraspinal injection amount:  10 unit(s)    L superior trapezius injection amount:  20 unit(s)    R superior trapezius injection amount:  20 unit(s)  Total Units:     Total units used:  200    Total units discarded:  0  Post-procedure details:     Chemodenervation:  Chronic migraine    Facial Nerve Location[de-identified]  Bilateral facial nerve    Patient tolerance of procedure:   Tolerated well, no immediate complications  Comments:      5 units TMJ bilaterally  5 units [place] bilaterally  30 units [place]  All medically necessary

## 2020-09-05 ENCOUNTER — OFFICE VISIT (OUTPATIENT)
Dept: INTERNAL MEDICINE CLINIC | Facility: CLINIC | Age: 70
End: 2020-09-05
Payer: MEDICARE

## 2020-09-05 VITALS
WEIGHT: 103.4 LBS | DIASTOLIC BLOOD PRESSURE: 82 MMHG | BODY MASS INDEX: 19.03 KG/M2 | TEMPERATURE: 97.9 F | HEART RATE: 67 BPM | SYSTOLIC BLOOD PRESSURE: 130 MMHG | HEIGHT: 62 IN | OXYGEN SATURATION: 98 %

## 2020-09-05 DIAGNOSIS — F43.21 GRIEF REACTION: ICD-10-CM

## 2020-09-05 DIAGNOSIS — F51.02 ADJUSTMENT INSOMNIA: ICD-10-CM

## 2020-09-05 PROCEDURE — 99213 OFFICE O/P EST LOW 20 MIN: CPT | Performed by: INTERNAL MEDICINE

## 2020-09-05 RX ORDER — ZOLPIDEM TARTRATE 5 MG/1
5 TABLET ORAL
Qty: 90 TABLET | Refills: 1 | Status: SHIPPED | OUTPATIENT
Start: 2020-09-05 | End: 2021-02-26 | Stop reason: SDUPTHER

## 2020-09-05 RX ORDER — ALPRAZOLAM 0.25 MG/1
0.25 TABLET ORAL 3 TIMES DAILY PRN
Qty: 45 TABLET | Refills: 0 | Status: CANCELLED | OUTPATIENT
Start: 2020-09-05

## 2020-09-05 NOTE — PROGRESS NOTES
Assessment/Plan:       Diagnoses and all orders for this visit:    Adjustment insomnia  -     zolpidem (AMBIEN) 5 mg tablet; Take 1 tablet (5 mg total) by mouth daily at bedtime as needed for sleep    Grief reaction  -     sertraline (ZOLOFT) 50 mg tablet; Take 1 tablet (50 mg total) by mouth daily    Other orders  -     Cancel: ALPRAZolam (XANAX) 0 25 mg tablet; Take 1 tablet (0 25 mg total) by mouth 3 (three) times a day as needed for anxiety                Subjective:      Patient ID: Mundo August is a 69 y o  female  Continued severe grief reaction following the unexpected sudden death of her  well with her on a ski lift at [facility]! While grief is to be anticipated the patient thinks that she needs some intervention to try to alleviate the severity of the symptoms  She has components of anxiety and depression and insomnia  Recommendations will include Zoloft 50 on a regular basis, Ambien HS, intermittent Xanax p r n       I have known her for years and abuse potential is low  Does not use alcohol nor illicit drugs  The following portions of the patient's history were reviewed and updated as appropriate:   She has a past medical history of Arthritis, GERD (gastroesophageal reflux disease), Graves' disease, Hepatitis B virus infection, and Nonmelanoma skin cancer  ,  does not have any pertinent problems on file  ,   has a past surgical history that includes Appendectomy; ARTHROSCOPY KNEE (Left); Temporal artery biopsy / ligation;  section; Total knee arthroplasty (Right, 2016); Eye surgery (Bilateral); Tonsillectomy; and Colonoscopy  ,  family history includes Cancer in her maternal aunt and paternal grandmother; Cirrhosis in her paternal grandfather; Coronary artery disease in her father; Diabetes in her maternal grandmother; Heart attack in her maternal grandfather; Hyperlipidemia in her paternal grandfather; Lung cancer (age of onset: 76) in her maternal aunt;  No Known Problems in her sister and sister; Pancreatic cancer (age of onset: 76) in her mother; Throat cancer in her father  ,   reports that she quit smoking about 20 years ago  She has never used smokeless tobacco  She reports that she does not drink alcohol or use drugs  ,  is allergic to fentanyl; methimazole; and codeine     Current Outpatient Medications   Medication Sig Dispense Refill    acetaminophen (TYLENOL) 500 mg tablet Take 500 mg by mouth every 6 (six) hours       ALPRAZolam (XANAX) 0 25 mg tablet Take 1 tablet (0 25 mg total) by mouth 3 (three) times a day as needed for anxiety 45 tablet 0    aspirin 81 MG tablet Take 1 tablet by mouth daily      dexamethasone (DECADRON) 2 mg tablet Take 1 tablet (2 mg total) by mouth daily with breakfast (Patient taking differently: Take 2 mg by mouth daily with breakfast ) 5 tablet 0    diclofenac sodium (VOLTAREN) 1 % Apply 4 g topically 4 (four) times a day 3 Tube 0    famotidine (PEPCID) 20 mg tablet Take 1 tablet (20 mg total) by mouth 2 (two) times a day 180 tablet 3    fluticasone (FLONASE) 50 mcg/act nasal spray 2 sprays into each nostril daily 3 Bottle 4    ketorolac (TORADOL) 10 mg tablet Take 1 tablet (10 mg total) by mouth every 6 (six) hours as needed for moderate pain (Patient taking differently: Take 10 mg by mouth every 6 (six) hours as needed for moderate pain ) 10 tablet 0    levothyroxine 88 mcg tablet Take 1 tablet (88 mcg total) by mouth daily 90 tablet 3    losartan (Cozaar) 50 mg tablet 1-1/2 tablets by mouth daily 135 tablet 3    Probiotic Product (PROBIOTIC-10 PO) Take by mouth      prochlorperazine (COMPAZINE) 5 mg tablet Take 1 tab by mouth every 6 hours if needed for nausea and vomiting   (Patient taking differently: Take 1 tab by mouth every 6 hours if needed for nausea and vomiting ) 30 tablet 0    rosuvastatin (CRESTOR) 20 MG tablet Take 1 tablet (20 mg total) by mouth daily 90 tablet 3    sertraline (ZOLOFT) 50 mg tablet Take 1 tablet (50 mg total) by mouth daily 90 tablet 3    zolpidem (AMBIEN) 5 mg tablet Take 1 tablet (5 mg total) by mouth daily at bedtime as needed for sleep 90 tablet 1    dicyclomine (BENTYL) 10 mg capsule Take 1 capsule (10 mg total) by mouth 4 (four) times a day (before meals and at bedtime) (Patient not taking: Reported on 3/9/2020) 40 capsule 0     No current facility-administered medications for this visit  Review of Systems   Psychiatric/Behavioral: Positive for dysphoric mood  All other systems reviewed and are negative  Objective:  Vitals:    09/05/20 0936   BP: 130/82   Pulse: 67   Temp: 97 9 °F (36 6 °C)   SpO2: 98%      Physical Exam  Constitutional:       Appearance: Normal appearance  Cardiovascular:      Rate and Rhythm: Normal rate  Pulmonary:      Effort: Pulmonary effort is normal    Neurological:      General: No focal deficit present  Mental Status: She is alert  Psychiatric:         Mood and Affect: Mood normal            Patient Instructions     Expected progression of her grief reaction  Acceptance is starting      I will see her again for her birthday

## 2020-09-05 NOTE — PATIENT INSTRUCTIONS
Expected progression of her grief reaction  Acceptance is starting      I will see her again for her birthday

## 2020-09-28 ENCOUNTER — TELEMEDICINE (OUTPATIENT)
Dept: NEUROLOGY | Facility: CLINIC | Age: 70
End: 2020-09-28
Payer: MEDICARE

## 2020-09-28 ENCOUNTER — TELEPHONE (OUTPATIENT)
Dept: NEUROLOGY | Facility: CLINIC | Age: 70
End: 2020-09-28

## 2020-09-28 VITALS — HEIGHT: 62 IN | WEIGHT: 103 LBS | BODY MASS INDEX: 18.95 KG/M2

## 2020-09-28 DIAGNOSIS — G43.709 CHRONIC MIGRAINE WITHOUT AURA WITHOUT STATUS MIGRAINOSUS, NOT INTRACTABLE: Primary | ICD-10-CM

## 2020-09-28 DIAGNOSIS — G43.709 CHRONIC MIGRAINE WITHOUT AURA WITHOUT STATUS MIGRAINOSUS, NOT INTRACTABLE: ICD-10-CM

## 2020-09-28 PROCEDURE — 99214 OFFICE O/P EST MOD 30 MIN: CPT | Performed by: PSYCHIATRY & NEUROLOGY

## 2020-09-28 NOTE — PATIENT INSTRUCTIONS
Chronic migraine headaches which have improved significantly with the use of Botox  Preventive therapy for headaches:   - continue Botox q 3 months   Abortive therapy for headaches:   - patient would like to try Ubrelvey  Will send out Ubrelvey 50mg once at onset may repeat in 2 hours  Max 200mg per day  Other new drugs:  (will send this information to patient if above fails)   Nurtec ODT - Taken at the onset of aura or migraine headache  Max dose of 75 mg 1 tab in 24 hours  The medication does not work like sumatriptan thus should be tolerated better than sumatriptan  Relevant information for patient regarding taking Reyvow:   Reyvow:   What is Saad Thanh? Saad Thanh is a prescription medicine used for the acute treatment of migraine attacks with or without aura in adults  What are the possible side effects of REYVOW? Saad Thanh can cause serious side effects including:  See Dank Peraza is the most important information I should know about REYVOW?   serotonin syndrome  Serotonin syndrome is a rare but serious problem that can happen in people using REYVOW, especially if Saad Thanh is used with anti-depressant medicines called SSRIs or SNRIs  Call your healthcare provider right away if you have any of the following symptoms of serotonin syndrome: ? mental changes such as seeing things that are not there (hallucinations), agitation, or coma ? fast heartbeat ? changes in blood pressure  ? high body temperature ? tight muscles ? trouble walking ? nausea, vomiting, or diarrhea  medication overuse headache  Some people who take medicines like REYVOW for the acute treatment of migraine attacks for 10 or more days each month may have worse headaches (medication overuse headache)  If your headaches get worse, your healthcare provider may decide to stop your treatment with Saad Thanh   The most common side effects of REYVOW include:  dizziness  sleepiness  numbness  feeling tired  tingling Tell your healthcare provider if you have any side effect that bothers you or that does not go away  NERIVIO:  What is Nerivio? - It is a  wireless remote electrical neuromodulation device for the acute treatment of migraine with or without aura in patients 25years of age or older who do not have chronic migraine  This prescription device is self-applied to the upper-arm and should be used in the home environment at the onset of migraine headache or aura  Is it covered by my insurance? - It is not but for about 12 treatments it is 99 dollars  Headache management instructions  - When patient has a moderate to severe headache, they should seek rest, initiate relaxation and apply cold compresses to the head  - Maintain regular sleep schedule  Adults need at least 7-8 hours of uninterrupted a night  - Limit over the counter medications such as Tylenol, Ibuprofen, Aleve, Excedrin  (No more than 2- 3 times a week or max 10 a month)  - Maintain headache diary  Free IRVING for a smart phone, which can be used is "Migraine tonya"  - Limit caffeine to 1-2 cups 8 to 16 oz a day or less  - Avoid dietary trigger  (aged cheese, peanuts, MSG, aspartame and nitrates)  - Patient is to have regular frequent meals to prevent headache onset  - Please drink at least 64 ounces of water a day to help remain hydrated  Please call with any questions or concerns   Office number is [phone]

## 2020-09-28 NOTE — PROGRESS NOTES
Review of Systems   Constitutional: Negative  HENT: Negative  Eyes: Negative  Respiratory: Negative  Cardiovascular: Negative  Gastrointestinal: Negative  Endocrine: Negative  Genitourinary: Negative  Musculoskeletal: Negative  Skin: Negative  Allergic/Immunologic: Negative  Neurological: Positive for headaches  Hematological: Negative  Psychiatric/Behavioral: Positive for sleep disturbance  The patient is nervous/anxious

## 2020-09-28 NOTE — TELEPHONE ENCOUNTER
MD Sabas Joiner MA               Good morning,   6 month follow up with Flakita baird   Please mail information to the patient     Thank you

## 2020-09-28 NOTE — PROGRESS NOTES
Virtual Regular Visit       Problem List Items Addressed This Visit        Cardiovascular and Mediastinum    Chronic migraine without aura without status migrainosus, not intractable - Primary    Relevant Medications    Ubrogepant (UBRELVY) 50 MG tablet          Chronic migraine headaches which have improved significantly with the use of Botox  Preventive therapy for headaches:   - continue Botox q 3 months   Abortive therapy for headaches:   - patient would like to try Ubrelvey  Will send out Ubrelvey 50mg once at onset may repeat in 2 hours  Max 200mg per day  Other new drugs:  (will send this information to patient if above fails)   Nurtec ODT - Taken at the onset of aura or migraine headache  Max dose of 75 mg 1 tab in 24 hours  The medication does not work like sumatriptan thus should be tolerated better than sumatriptan  Relevant information for patient regarding taking Reyvow:   Reyvow:   What is Jones Seip? Jones Seip is a prescription medicine used for the acute treatment of migraine attacks with or without aura in adults  What are the possible side effects of REYVOW? Jones Seip can cause serious side effects including:  See Irma Escobar is the most important information I should know about REYVOW?   serotonin syndrome  Serotonin syndrome is a rare but serious problem that can happen in people using REYVOW, especially if Jones Seip is used with anti-depressant medicines called SSRIs or SNRIs  Call your healthcare provider right away if you have any of the following symptoms of serotonin syndrome: ? mental changes such as seeing things that are not there (hallucinations), agitation, or coma ? fast heartbeat ? changes in blood pressure  ? high body temperature ? tight muscles ? trouble walking ? nausea, vomiting, or diarrhea  medication overuse headache   Some people who take medicines like REYVOW for the acute treatment of migraine attacks for 10 or more days each month may have worse headaches (medication overuse headache)  If your headaches get worse, your healthcare provider may decide to stop your treatment with Dorian Livers  The most common side effects of REYVOW include:  dizziness  sleepiness  numbness  feeling tired  tingling Tell your healthcare provider if you have any side effect that bothers you or that does not go away  NERIVIO:  What is Nerivio? - It is a  wireless remote electrical neuromodulation device for the acute treatment of migraine with or without aura in patients 25years of age or older who do not have chronic migraine  This prescription device is self-applied to the upper-arm and should be used in the home environment at the onset of migraine headache or aura  Is it covered by my insurance? - It is not but for about 12 treatments it is 99 dollars  Headache management instructions  - When patient has a moderate to severe headache, they should seek rest, initiate relaxation and apply cold compresses to the head  - Maintain regular sleep schedule  Adults need at least 7-8 hours of uninterrupted a night  - Limit over the counter medications such as Tylenol, Ibuprofen, Aleve, Excedrin  (No more than 2- 3 times a week or max 10 a month)  - Maintain headache diary  Free IRVING for a smart phone, which can be used is "Migraine tonya"  - Limit caffeine to 1-2 cups 8 to 16 oz a day or less  - Avoid dietary trigger  (aged cheese, peanuts, MSG, aspartame and nitrates)  - Patient is to have regular frequent meals to prevent headache onset  - Please drink at least 64 ounces of water a day to help remain hydrated  Please call with any questions or concerns   Office number is [phone] Monitoring Program report was reviewed and was appropriate   _____________________________________________________________________________________________________________________________________________         Reason for visit is headaches    Encounter provider Tasha Chandler MD    Provider located at [address] [place] [address] [place] [address] [phone]      Recent Visits  No visits were found meeting these conditions  Showing recent visits within past 7 days and meeting all other requirements     Today's Visits  Date Type Provider Dept   09/28/20 Telemedicine Nina Medina MD [facility] [place]   Showing today's visits and meeting all other requirements     Future Appointments  No visits were found meeting these conditions  Showing future appointments within next 150 days and meeting all other requirements        After connecting through [employer], the patient was identified by name and date of birth  Jo Ann Winter was informed that this is a telemedicine visit and that the visit is being conducted through [employer] which may not be secure and therefore, might not be HIPAA-compliant  My office door was closed  No one else was in the room  She acknowledged consent and understanding of privacy and security of the video platform  The patient has agreed to participate and understands they can discontinue the visit at any time  It was my intent to perform this visit via video technology but the patient was not able to do a video connection so the visit was completed via audio telephone only  Patient is aware this is a billable service  Subjective    We had the pleasure of evaluating Jo Ann Winter in neurological follow up today  As you know she is a 71 year old right handed female  She is a retired anesthesia RN and is here today for evaluation of her headaches  Her  passed away in feb from cardiac arrest  This has been devastating for her and that has exacerbated her headaches  Chronic Migraine headaches:   What medications do you take or have you taken for your headaches?    PREVENTIVE: amitriptyline, Gabapentin, venlafaxine, Tizanidine, atenolol, Voltaren gel, Methocarbamol, Diltiazem  ABORTIVE: Toradol, aspirin, dexamethasone, Fioricet, Compazine, Tylenol-4-6 pills daily  Non-Medical/Alternative Treatments used in the past for headaches: PT     Current pain level: 0/10    How often do the headaches occur - one a month or one every two to three months         Are you ever headache free - maybe 1 hour during day       Any warning prior to headache and how long do they last -       -  sparkles in her peripheral vision      Intermittently - only has had this two times and has not had this          for along time    What time of the day do the headaches start - during the day    How long do the headaches last - typically few hours but at time 2-5 days  At onst she is taking her abortive medication       Where are they located - Top and back of head, neck    What is the intensity of pain - it can get up 8-9/10, average 2-4/10     Describe your usual headache - Throbbing, Pounding    Associated symptoms:   -       Decrease of appetite, nausea  -       Photophobia, phonophobia  -       Problem with concentration  -       Dizziness  -       Tinnitus  -       prefer to be alone and in a dark room, unable to work    Headache trigger: smells, weather, lack of sleep       Past Medical History:   Diagnosis Date    Arthritis     GERD (gastroesophageal reflux disease)     Graves' disease     TREATED WITH RADIOACTIVE IODINE ON 13 (10 7 MCI) RESOLVED:     Hepatitis B virus infection     Nonmelanoma skin cancer     LAST ASSESSED: [id number]       Past Surgical History:   Procedure Laterality Date    APPENDECTOMY      ARTHROSCOPY KNEE Left      SECTION      COLONOSCOPY      Oct/2018    EYE SURGERY Bilateral     SURGERY OF THE ORBITS - ORBITAL DECOMPRESSION    TEMPORAL ARTERY BIOPSY / LIGATION      TONSILLECTOMY      TOTAL KNEE ARTHROPLASTY Right 2016    TKR       Current Outpatient Medications   Medication Sig Dispense Refill    acetaminophen (TYLENOL) 500 mg tablet Take 500 mg by mouth every 6 (six) hours       aspirin 81 MG tablet Take 1 tablet by mouth daily      dexamethasone (DECADRON) 2 mg tablet Take 1 tablet (2 mg total) by mouth daily with breakfast (Patient taking differently: Take 2 mg by mouth daily with breakfast ) 5 tablet 0    diclofenac sodium (VOLTAREN) 1 % Apply 4 g topically 4 (four) times a day (Patient taking differently: Apply 4 g topically 4 (four) times a day as needed ) 3 Tube 0    famotidine (PEPCID) 20 mg tablet Take 1 tablet (20 mg total) by mouth 2 (two) times a day 180 tablet 3    fluticasone (FLONASE) 50 mcg/act nasal spray 2 sprays into each nostril daily 3 Bottle 4    ketorolac (TORADOL) 10 mg tablet Take 1 tablet (10 mg total) by mouth every 6 (six) hours as needed for moderate pain (Patient taking differently: Take 10 mg by mouth every 6 (six) hours as needed for moderate pain ) 10 tablet 0    levothyroxine 88 mcg tablet Take 1 tablet (88 mcg total) by mouth daily 90 tablet 3    losartan (Cozaar) 50 mg tablet 1-1/2 tablets by mouth daily 135 tablet 3    Probiotic Product (PROBIOTIC-10 PO) Take by mouth      prochlorperazine (COMPAZINE) 5 mg tablet Take 1 tab by mouth every 6 hours if needed for nausea and vomiting  (Patient taking differently: Take 1 tab by mouth every 6 hours if needed for nausea and vomiting ) 30 tablet 0    rosuvastatin (CRESTOR) 20 MG tablet Take 1 tablet (20 mg total) by mouth daily 90 tablet 3    sertraline (ZOLOFT) 50 mg tablet Take 1 tablet (50 mg total) by mouth daily 90 tablet 3    zolpidem (AMBIEN) 5 mg tablet Take 1 tablet (5 mg total) by mouth daily at bedtime as needed for sleep 90 tablet 1    dicyclomine (BENTYL) 10 mg capsule Take 1 capsule (10 mg total) by mouth 4 (four) times a day (before meals and at bedtime) (Patient not taking: Reported on 3/9/2020) 40 capsule 0    Ubrogepant (UBRELVY) 50 MG tablet Take 1 tablet (50 mg) one time as needed for migraine   May repeat one additional tablet (50 mg) at least two hours after the first dose  Do not use more than two doses per day, or for more than eight days per month  8 tablet 0     No current facility-administered medications for this visit  Allergies   Allergen Reactions    Fentanyl Vomiting     Action Taken: vomiting; Category: Adverse Reaction;   Protracted    Methimazole     Codeine GI Intolerance and Anxiety     Other reaction(s): Nausea and/or vomiting       Review of Systems  Constitutional: Negative  HENT: Negative  Eyes: Negative  Respiratory: Negative  Cardiovascular: Negative  Gastrointestinal: Negative  Endocrine: Negative  Genitourinary: Negative  Musculoskeletal: Negative  Skin: Negative  Allergic/Immunologic: Negative  Neurological: Positive for headaches  Hematological: Negative  Psychiatric/Behavioral: Positive for sleep disturbance  The patient is nervous/anxious  I spent 25 minutes with the patient during this visit today in which greater than 50% of this time was spent in counseling/coordination of care regarding Diagnostic results, Prognosis, Risks and benefits of treatment options, Instructions for management, Patient education, Importance of treatment compliance, Risk factor reductions, Impressions and Plan of care as above        Author:  Alma Delia Chappell MD

## 2020-09-28 NOTE — TELEPHONE ENCOUNTER
Pt calls in to state that Ubrelvy that was sent to [facility] will cost her 174 for a month supply  She called [facility] and they believe it would cost her less if it was sent to them  Script must be sent as a 90 day supply  She is aware that she cannot take more than 24 tabs in 90 days  Script is pending

## 2020-10-21 ENCOUNTER — DOCUMENTATION (OUTPATIENT)
Dept: NEUROLOGY | Facility: CLINIC | Age: 70
End: 2020-10-21

## 2020-11-05 ENCOUNTER — OFFICE VISIT (OUTPATIENT)
Dept: GASTROENTEROLOGY | Facility: CLINIC | Age: 70
End: 2020-11-05
Payer: MEDICARE

## 2020-11-05 VITALS
BODY MASS INDEX: 18.92 KG/M2 | WEIGHT: 102.8 LBS | SYSTOLIC BLOOD PRESSURE: 144 MMHG | DIASTOLIC BLOOD PRESSURE: 78 MMHG | TEMPERATURE: 97.2 F | HEART RATE: 88 BPM | HEIGHT: 62 IN

## 2020-11-05 DIAGNOSIS — K57.92 DIVERTICULITIS: Primary | ICD-10-CM

## 2020-11-05 PROCEDURE — 99213 OFFICE O/P EST LOW 20 MIN: CPT | Performed by: PHYSICIAN ASSISTANT

## 2020-11-05 RX ORDER — CIPROFLOXACIN 500 MG/1
500 TABLET, FILM COATED ORAL EVERY 12 HOURS SCHEDULED
Qty: 20 TABLET | Refills: 0 | Status: SHIPPED | OUTPATIENT
Start: 2020-11-05 | End: 2020-11-15

## 2020-11-05 RX ORDER — METRONIDAZOLE 500 MG/1
500 TABLET ORAL EVERY 8 HOURS SCHEDULED
Qty: 30 TABLET | Refills: 0 | Status: SHIPPED | OUTPATIENT
Start: 2020-11-05 | End: 2020-11-15

## 2020-11-16 ENCOUNTER — TELEPHONE (OUTPATIENT)
Dept: GASTROENTEROLOGY | Facility: CLINIC | Age: 70
End: 2020-11-16

## 2020-11-20 ENCOUNTER — TELEPHONE (OUTPATIENT)
Dept: NEUROLOGY | Facility: CLINIC | Age: 70
End: 2020-11-20

## 2020-12-03 ENCOUNTER — PROCEDURE VISIT (OUTPATIENT)
Dept: NEUROLOGY | Facility: CLINIC | Age: 70
End: 2020-12-03
Payer: MEDICARE

## 2020-12-03 VITALS — SYSTOLIC BLOOD PRESSURE: 148 MMHG | TEMPERATURE: 98.6 F | HEART RATE: 69 BPM | DIASTOLIC BLOOD PRESSURE: 70 MMHG

## 2020-12-03 DIAGNOSIS — G43.709 CHRONIC MIGRAINE WITHOUT AURA WITHOUT STATUS MIGRAINOSUS, NOT INTRACTABLE: Primary | ICD-10-CM

## 2020-12-03 PROCEDURE — 64615 CHEMODENERV MUSC MIGRAINE: CPT | Performed by: PHYSICIAN ASSISTANT

## 2021-01-15 ENCOUNTER — DOCUMENTATION (OUTPATIENT)
Dept: NEUROLOGY | Facility: CLINIC | Age: 71
End: 2021-01-15

## 2021-01-15 NOTE — PROGRESS NOTES
Type Date User Summary Attachment   General 01/15/2021 12:32 PM Terry Restrepo care coordination  -   Note    Botox- no authorization needed   Please use our stock      Thank you     Stephanie Lind

## 2021-01-15 NOTE — PROGRESS NOTES
Patient is scheduled with Alphonse Bardales on 3/4/2021 in the [facility]

## 2021-02-19 ENCOUNTER — TELEPHONE (OUTPATIENT)
Dept: NEUROLOGY | Facility: CLINIC | Age: 71
End: 2021-02-19

## 2021-02-19 NOTE — TELEPHONE ENCOUNTER
Called to remind patient of their upcoming appointment with Joe Naidu in [facility]  Voicemail left to advise patient to call back with any urgent symptoms or concerns  Patient also made aware that we will be calling back two days prior to appointment to complete the COVID screening

## 2021-02-24 DIAGNOSIS — F51.02 ADJUSTMENT INSOMNIA: ICD-10-CM

## 2021-02-25 RX ORDER — ZOLPIDEM TARTRATE 5 MG/1
5 TABLET ORAL
Qty: 90 TABLET | Refills: 1 | OUTPATIENT
Start: 2021-02-25

## 2021-02-26 DIAGNOSIS — F51.02 ADJUSTMENT INSOMNIA: ICD-10-CM

## 2021-02-26 RX ORDER — ZOLPIDEM TARTRATE 5 MG/1
5 TABLET ORAL
Qty: 90 TABLET | Refills: 1 | Status: SHIPPED | OUTPATIENT
Start: 2021-02-26 | End: 2021-03-09 | Stop reason: SDUPTHER

## 2021-02-26 RX ORDER — ZOLPIDEM TARTRATE 5 MG/1
5 TABLET ORAL
Qty: 90 TABLET | Refills: 1 | Status: SHIPPED | OUTPATIENT
Start: 2021-02-26 | End: 2021-02-26

## 2021-02-26 NOTE — TELEPHONE ENCOUNTER
Med refill on:  zolpidem (AMBIEN) 5 mg tablet 90 day   90 day supply  [facility]   This is her second request

## 2021-03-01 ENCOUNTER — TELEPHONE (OUTPATIENT)
Dept: INTERNAL MEDICINE CLINIC | Facility: CLINIC | Age: 71
End: 2021-03-01

## 2021-03-04 ENCOUNTER — PROCEDURE VISIT (OUTPATIENT)
Dept: NEUROLOGY | Facility: CLINIC | Age: 71
End: 2021-03-04
Payer: MEDICARE

## 2021-03-04 VITALS — HEART RATE: 71 BPM | TEMPERATURE: 98.9 F | SYSTOLIC BLOOD PRESSURE: 120 MMHG | DIASTOLIC BLOOD PRESSURE: 70 MMHG

## 2021-03-04 DIAGNOSIS — I10 ESSENTIAL HYPERTENSION: ICD-10-CM

## 2021-03-04 DIAGNOSIS — G43.709 CHRONIC MIGRAINE WITHOUT AURA WITHOUT STATUS MIGRAINOSUS, NOT INTRACTABLE: Primary | ICD-10-CM

## 2021-03-04 PROCEDURE — 64615 CHEMODENERV MUSC MIGRAINE: CPT | Performed by: PHYSICIAN ASSISTANT

## 2021-03-04 NOTE — PROGRESS NOTES
Universal Protocol   Consent: Verbal consent obtained  Written consent obtained  Risks and benefits: risks, benefits and alternatives were discussed  Consent given by: patient  Time out: Immediately prior to procedure a "time out" was called to verify the correct patient, procedure, equipment, support staff and site/side marked as required  Patient understanding: patient states understanding of the procedure being performed  Patient consent: the patient's understanding of the procedure matches consent given  Procedure consent: procedure consent matches procedure scheduled  Relevant documents: relevant documents present and verified  Patient identity confirmed: verbally with patient        Chemodenervation     Date/Time 3/4/2021 10:44 AM     Performed by  Sourav Moses PA-C     Authorized by Sourav Moses PA-C        Pre-procedure details      Prepped With: Alcohol     Anesthesia  (see MAR for exact dosages):      Anesthesia method:  None   Procedure details     Position:  Upright   Botox     Botox Type:  Type A    Brand:  Botox    mL's of Botulinum Toxin:  200    Final Concentration per CC:  50 units    Needle Gauge:  30 G 2 5 inch   Procedures     Botox Procedures: chronic headache      Indications: migraines     Injection Location      Head / Face:  L superior cervical paraspinal, R superior cervical paraspinal, L medial occipitalis, R medial occipitalis, R temporalis, L temporalis, R superior trapezius and L superior trapezius    L temporalis injection amount:  25 unit(s)    R temporalis injection amount:  25 unit(s)    L medial occipitalis injection amount:  20 unit(s)    R medial occipitalis injection amount:  20 unit(s)    L superior cervical paraspinal injection amount:  10 unit(s)    R superior cervical paraspinal injection amount:  10 unit(s)    L superior trapezius injection amount:  20 unit(s)    R superior trapezius injection amount:  20 unit(s)   Total Units     Total units used:  200    Total units discarded:  0   Post-procedure details      Chemodenervation:  Chronic migraine    Facial Nerve Location[de-identified]  Bilateral facial nerve    Patient tolerance of procedure:   Tolerated well, no immediate complications   Comments      TMJ 5 units bilaterally  masseter 5 units bilaterally  [place] 30 units  All medically necessary

## 2021-03-04 NOTE — PROGRESS NOTES
[facility]  PATIENT:  Erich Vee  MRN:  [id number]  :  1950  DATE OF SERVICE:  3/4/2021      Assessment/Plan:     No problem-specific Assessment & Plan notes found for this encounter  {Assess/PlanSmartLinks:71737}        History of Present Illness: We had the pleasure of evaluating Erich Vee in neurological follow up  today for headaches  As you know,  {He/she (caps):84354} is a 69 y o  {RIGHT/LEFT:} handed female  Current medical illnesses:  QTc  History Tobacco use:    What medications do you take or have you taken for your headaches? Current Preventive:   ***  Current Abortive:   ***     Prior Preventive:   ***  Prior Abortive:   ***     Interval updates as of 3/4/2021:  ***    What is your current pain level ? ***  How often do the headaches occur? ***  Are you ever headache free? {YES/NO:03377}    Headache history with updates: Alternative therapies used in the past for headaches? ***  Headache are worse if the patient: cough, sneeze, bending over, Exertion  Headache triggers:  ***    Aura/warning and how long does it last ? {YES/NO:15770} *** when does it start? How long does it last before pain? Continue through pain? What time of the day do the headaches start? ***    How long do the headaches last?   ***    Describe your usual headache ? Throbbing, pounding, pressure, squeezing, dull, nagging, ice-pick like, stabbing, sharp, shooting, electric, tight band    Where is your headache located? ***    What is the intensity of pain?    ***    Associated symptoms:   - Decrease of appetite, nausea, vomiting, diarrhea  - Photophobia, phonophobia, sensitivity to smell   - Problem with concentration  - Blurred vision, change in pupil size, Ptosis, facial droop  - Red ear   - Lacrimation, runny or stuffed-up nose, flushing of face  - Tinnitus, light-headed or dizzy, stiff or sore neck,   - Hands or feet tingle or feel numb, prefer to be alone and in a dark room, unable to work    Number of days missed per month because of headaches:  Work (or school) days: ***  Social or Family activities: ***    What time of the year do headaches occur more frequently? {Headache time related:26904}  Have you seen someone else for headaches or pain? {YES /DR:76535}  Have you had trigger point injection performed and how often? {YES /DD:75601}  Have you had Botox injection performed and how often? {YES /UU:37196}   Have you had epidural injections or transforaminal injections performed? {YES /UH:74592}    Have you used CBD or THC for your headaches and how often? {YES [id number]    Are you current pregnant or planning on getting pregnant? {YES /MW:75614}    Have you ever had any Brain imaging? {Yes     SA:06418} {reviewed images:14939}    Reviewed old notes from physician seen in the past- see above HPI for summary of previous encounters         Past Medical History:   Diagnosis Date    Arthritis     GERD (gastroesophageal reflux disease)     Graves' disease     TREATED WITH RADIOACTIVE IODINE ON 9/13/13 (10 7 MCI) RESOLVED: 2013    Hepatitis B virus infection     Nonmelanoma skin cancer     LAST ASSESSED: [id number]       Patient Active Problem List   Diagnosis    Arthritis    Migraine without aura or status migrainosus    GERD (gastroesophageal reflux disease)    Tension headache    Postablative hypothyroidism    Diastolic dysfunction    Essential hypertension    Chronic migraine without aura without status migrainosus, not intractable    Functional nausea    Bile duct abnormality    Mixed hyperlipidemia    Grief reaction    Adjustment insomnia       Medications:      Current Outpatient Medications   Medication Sig Dispense Refill    acetaminophen (TYLENOL) 500 mg tablet Take 500 mg by mouth every 6 (six) hours       aspirin 81 MG tablet Take 1 tablet by mouth daily      dexamethasone (DECADRON) 2 mg tablet Take 1 tablet (2 mg total) by mouth daily with breakfast (Patient taking differently: Take 2 mg by mouth daily with breakfast ) 5 tablet 0    diclofenac sodium (VOLTAREN) 1 % Apply 4 g topically 4 (four) times a day (Patient taking differently: Apply 4 g topically 4 (four) times a day as needed ) 3 Tube 0    dicyclomine (BENTYL) 10 mg capsule Take 1 capsule (10 mg total) by mouth 4 (four) times a day (before meals and at bedtime) 40 capsule 0    famotidine (PEPCID) 20 mg tablet Take 1 tablet (20 mg total) by mouth 2 (two) times a day 180 tablet 3    fluticasone (FLONASE) 50 mcg/act nasal spray 2 sprays into each nostril daily 3 Bottle 4    ketorolac (TORADOL) 10 mg tablet Take 1 tablet (10 mg total) by mouth every 6 (six) hours as needed for moderate pain (Patient taking differently: Take 10 mg by mouth every 6 (six) hours as needed for moderate pain ) 10 tablet 0    levothyroxine 88 mcg tablet Take 1 tablet (88 mcg total) by mouth daily 90 tablet 3    losartan (Cozaar) 50 mg tablet 1-1/2 tablets by mouth daily 135 tablet 3    Probiotic Product (PROBIOTIC-10 PO) Take by mouth      prochlorperazine (COMPAZINE) 5 mg tablet Take 1 tab by mouth every 6 hours if needed for nausea and vomiting  (Patient taking differently: Take 1 tab by mouth every 6 hours if needed for nausea and vomiting ) 30 tablet 0    rosuvastatin (CRESTOR) 20 MG tablet Take 1 tablet (20 mg total) by mouth daily 90 tablet 3    sertraline (ZOLOFT) 50 mg tablet Take 1 tablet (50 mg total) by mouth daily 90 tablet 3    Ubrogepant (UBRELVY) 100 MG tablet Take 1 tab once prn for migraine  May repeat in 2 hours if needed  Max 2 doses per day  Max 8 days per month  90 day supply  24 tablet 3    zolpidem (AMBIEN) 5 mg tablet Take 1 tablet (5 mg total) by mouth daily at bedtime as needed for sleep 90 tablet 1     No current facility-administered medications for this visit  Allergies:       Allergies   Allergen Reactions    Fentanyl Vomiting     Action Taken: vomiting; Category: Adverse Reaction;   Protracted    Methimazole     Codeine GI Intolerance and Anxiety     Other reaction(s): Nausea and/or vomiting       Family History:     Family History   Problem Relation Age of Onset    Pancreatic cancer Mother 76    Coronary artery disease Father     Throat cancer Father     Diabetes Maternal Grandmother     Heart attack Maternal Grandfather         ACUTE MI    Cancer Paternal Grandmother         unknown where    Hyperlipidemia Paternal Grandfather     Cirrhosis Paternal Grandfather     No Known Problems Sister     No Known Problems Sister     Lung cancer Maternal Aunt 76    Cancer Maternal Aunt        Social History:     Social History     Socioeconomic History    Marital status:       Spouse name: Not on file    Number of children: Not on file    Years of education: 16    Highest education level: Not on file   Occupational History     Comment: RETIRED   Social Needs    Financial resource strain: Not on file    Food insecurity     Worry: Not on file     Inability: Not on file    Transportation needs     Medical: Not on file     Non-medical: Not on file   Tobacco Use    Smoking status: Former Smoker     Quit date:      Years since quittin 1    Smokeless tobacco: Never Used   Substance and Sexual Activity    Alcohol use: No     Comment: (HISTORY)    Drug use: No    Sexual activity: Yes     Partners: Male   Lifestyle    Physical activity     Days per week: 0 days     Minutes per session: 0 min    Stress: Very much   Relationships    Social connections     Talks on phone: Not on file     Gets together: Not on file     Attends Church service: Not on file     Active member of club or organization: Not on file     Attends meetings of clubs or organizations: Not on file     Relationship status: Not on file    Intimate partner violence     Fear of current or ex partner: Not on file     Emotionally abused: Not on file     Physically abused: Not on file Forced sexual activity: Not on file   Other Topics Concern    Not on file   Social History Narrative    CAFFEINE USE    DAILY COFFEE CONSUMPTION (2 CUPS/DAY)    NO ADVANCE DIRECTIVE ON FILIE         Objective:   Physical Exam:                                                                   Vitals: There were no vitals taken for this visit  BP Readings from Last 3 Encounters:   12/03/20 148/70   11/05/20 144/78   09/05/20 130/82     Pulse Readings from Last 3 Encounters:   12/03/20 69   11/05/20 88   09/05/20 67                 Review of Systems:   Review of Systems    I personally reviewed the ROS entered by the MA    I spent *** minutes in face-to-face discussion regarding  the pathophysiology of {Desc; his/her:99015} current symptoms and further plan, as well as counseling, educating, and coordinating the patient's care including {COUNSELING REASONS FOR TIME BASED DOCUMENTATION:51682} and spent *** minutes non-face to face    Author:  Orlando Denson PA-C 3/4/2021 10:20 AM

## 2021-03-05 DIAGNOSIS — Z23 ENCOUNTER FOR IMMUNIZATION: ICD-10-CM

## 2021-03-09 ENCOUNTER — OFFICE VISIT (OUTPATIENT)
Dept: INTERNAL MEDICINE CLINIC | Facility: CLINIC | Age: 71
End: 2021-03-09
Payer: MEDICARE

## 2021-03-09 ENCOUNTER — APPOINTMENT (OUTPATIENT)
Dept: LAB | Facility: CLINIC | Age: 71
End: 2021-03-09
Payer: MEDICARE

## 2021-03-09 VITALS
BODY MASS INDEX: 19.63 KG/M2 | DIASTOLIC BLOOD PRESSURE: 80 MMHG | SYSTOLIC BLOOD PRESSURE: 110 MMHG | HEART RATE: 82 BPM | HEIGHT: 61 IN | OXYGEN SATURATION: 98 % | WEIGHT: 104 LBS | TEMPERATURE: 98 F

## 2021-03-09 DIAGNOSIS — E89.0 POSTABLATIVE HYPOTHYROIDISM: ICD-10-CM

## 2021-03-09 DIAGNOSIS — I10 ESSENTIAL HYPERTENSION: ICD-10-CM

## 2021-03-09 DIAGNOSIS — I51.89 DIASTOLIC DYSFUNCTION: ICD-10-CM

## 2021-03-09 DIAGNOSIS — G43.709 CHRONIC MIGRAINE WITHOUT AURA WITHOUT STATUS MIGRAINOSUS, NOT INTRACTABLE: Primary | ICD-10-CM

## 2021-03-09 DIAGNOSIS — Z11.59 ENCOUNTER FOR HEPATITIS C SCREENING TEST FOR LOW RISK PATIENT: ICD-10-CM

## 2021-03-09 DIAGNOSIS — E78.2 MIXED HYPERLIPIDEMIA: ICD-10-CM

## 2021-03-09 DIAGNOSIS — R11.0 FUNCTIONAL NAUSEA: ICD-10-CM

## 2021-03-09 DIAGNOSIS — F51.02 ADJUSTMENT INSOMNIA: ICD-10-CM

## 2021-03-09 DIAGNOSIS — F43.21 GRIEF REACTION: ICD-10-CM

## 2021-03-09 PROBLEM — K50.00 CROHN'S DISEASE OF SMALL INTESTINE WITHOUT COMPLICATION (HCC): Status: ACTIVE | Noted: 2021-03-09

## 2021-03-09 LAB
ALBUMIN SERPL BCP-MCNC: 4.3 G/DL (ref 3.5–5)
ALP SERPL-CCNC: 118 U/L (ref 46–116)
ALT SERPL W P-5'-P-CCNC: 27 U/L (ref 12–78)
ANION GAP SERPL CALCULATED.3IONS-SCNC: 5 MMOL/L (ref 4–13)
AST SERPL W P-5'-P-CCNC: 20 U/L (ref 5–45)
BASOPHILS # BLD AUTO: 0.08 THOUSANDS/ΜL (ref 0–0.1)
BASOPHILS NFR BLD AUTO: 1 % (ref 0–1)
BILIRUB SERPL-MCNC: 0.46 MG/DL (ref 0.2–1)
BILIRUB UR QL STRIP: NEGATIVE
BUN SERPL-MCNC: 18 MG/DL (ref 5–25)
CALCIUM SERPL-MCNC: 9.6 MG/DL (ref 8.3–10.1)
CHLORIDE SERPL-SCNC: 108 MMOL/L (ref 100–108)
CHOLEST SERPL-MCNC: 226 MG/DL (ref 50–200)
CLARITY UR: CLEAR
CO2 SERPL-SCNC: 27 MMOL/L (ref 21–32)
COLOR UR: YELLOW
CREAT SERPL-MCNC: 0.82 MG/DL (ref 0.6–1.3)
EOSINOPHIL # BLD AUTO: 0.14 THOUSAND/ΜL (ref 0–0.61)
EOSINOPHIL NFR BLD AUTO: 2 % (ref 0–6)
ERYTHROCYTE [DISTWIDTH] IN BLOOD BY AUTOMATED COUNT: 12.3 % (ref 11.6–15.1)
GFR SERPL CREATININE-BSD FRML MDRD: 72 ML/MIN/1.73SQ M
GLUCOSE P FAST SERPL-MCNC: 101 MG/DL (ref 65–99)
GLUCOSE UR STRIP-MCNC: NEGATIVE MG/DL
HCT VFR BLD AUTO: 51 % (ref 34.8–46.1)
HCV AB SER QL: NORMAL
HDLC SERPL-MCNC: 64 MG/DL
HGB BLD-MCNC: 16.8 G/DL (ref 11.5–15.4)
HGB UR QL STRIP.AUTO: NEGATIVE
IMM GRANULOCYTES # BLD AUTO: 0.03 THOUSAND/UL (ref 0–0.2)
IMM GRANULOCYTES NFR BLD AUTO: 0 % (ref 0–2)
KETONES UR STRIP-MCNC: NEGATIVE MG/DL
LDLC SERPL CALC-MCNC: 127 MG/DL (ref 0–100)
LEUKOCYTE ESTERASE UR QL STRIP: NEGATIVE
LYMPHOCYTES # BLD AUTO: 2.25 THOUSANDS/ΜL (ref 0.6–4.47)
LYMPHOCYTES NFR BLD AUTO: 26 % (ref 14–44)
MCH RBC QN AUTO: 31.9 PG (ref 26.8–34.3)
MCHC RBC AUTO-ENTMCNC: 32.9 G/DL (ref 31.4–37.4)
MCV RBC AUTO: 97 FL (ref 82–98)
MONOCYTES # BLD AUTO: 0.49 THOUSAND/ΜL (ref 0.17–1.22)
MONOCYTES NFR BLD AUTO: 6 % (ref 4–12)
NEUTROPHILS # BLD AUTO: 5.73 THOUSANDS/ΜL (ref 1.85–7.62)
NEUTS SEG NFR BLD AUTO: 65 % (ref 43–75)
NITRITE UR QL STRIP: NEGATIVE
NRBC BLD AUTO-RTO: 0 /100 WBCS
PH UR STRIP.AUTO: 6 [PH]
PLATELET # BLD AUTO: 320 THOUSANDS/UL (ref 149–390)
PMV BLD AUTO: 9.4 FL (ref 8.9–12.7)
POTASSIUM SERPL-SCNC: 4 MMOL/L (ref 3.5–5.3)
PROT SERPL-MCNC: 8.1 G/DL (ref 6.4–8.2)
PROT UR STRIP-MCNC: NEGATIVE MG/DL
RBC # BLD AUTO: 5.27 MILLION/UL (ref 3.81–5.12)
SODIUM SERPL-SCNC: 140 MMOL/L (ref 136–145)
SP GR UR STRIP.AUTO: 1.02 (ref 1–1.03)
TRIGL SERPL-MCNC: 175 MG/DL
TSH SERPL DL<=0.05 MIU/L-ACNC: 1.14 UIU/ML (ref 0.36–3.74)
UROBILINOGEN UR QL STRIP.AUTO: 0.2 E.U./DL
WBC # BLD AUTO: 8.72 THOUSAND/UL (ref 4.31–10.16)

## 2021-03-09 PROCEDURE — 86803 HEPATITIS C AB TEST: CPT

## 2021-03-09 PROCEDURE — 80053 COMPREHEN METABOLIC PANEL: CPT

## 2021-03-09 PROCEDURE — 84443 ASSAY THYROID STIM HORMONE: CPT

## 2021-03-09 PROCEDURE — 36415 COLL VENOUS BLD VENIPUNCTURE: CPT

## 2021-03-09 PROCEDURE — 81003 URINALYSIS AUTO W/O SCOPE: CPT | Performed by: INTERNAL MEDICINE

## 2021-03-09 PROCEDURE — 80061 LIPID PANEL: CPT

## 2021-03-09 PROCEDURE — 85025 COMPLETE CBC W/AUTO DIFF WBC: CPT

## 2021-03-09 PROCEDURE — 99214 OFFICE O/P EST MOD 30 MIN: CPT | Performed by: INTERNAL MEDICINE

## 2021-03-09 RX ORDER — FAMOTIDINE 20 MG/1
20 TABLET, FILM COATED ORAL 2 TIMES DAILY
Qty: 180 TABLET | Refills: 3 | Status: SHIPPED | OUTPATIENT
Start: 2021-03-09 | End: 2021-12-20 | Stop reason: SDUPTHER

## 2021-03-09 RX ORDER — LOSARTAN POTASSIUM 50 MG/1
TABLET ORAL
Qty: 135 TABLET | Refills: 3 | Status: SHIPPED | OUTPATIENT
Start: 2021-03-09 | End: 2021-12-20 | Stop reason: SDUPTHER

## 2021-03-09 RX ORDER — ZOLPIDEM TARTRATE 5 MG/1
5 TABLET ORAL
Qty: 30 TABLET | Refills: 1 | Status: SHIPPED | OUTPATIENT
Start: 2021-03-09 | End: 2021-06-21 | Stop reason: SDUPTHER

## 2021-03-09 RX ORDER — ROSUVASTATIN CALCIUM 20 MG/1
20 TABLET, COATED ORAL DAILY
Qty: 90 TABLET | Refills: 3 | Status: SHIPPED | OUTPATIENT
Start: 2021-03-09 | End: 2021-12-20 | Stop reason: SDUPTHER

## 2021-03-09 RX ORDER — LEVOTHYROXINE SODIUM 88 UG/1
88 TABLET ORAL DAILY
Qty: 90 TABLET | Refills: 3 | Status: SHIPPED | OUTPATIENT
Start: 2021-03-09 | End: 2021-12-20 | Stop reason: SDUPTHER

## 2021-03-09 NOTE — PATIENT INSTRUCTIONS
Doing as well as can be expected   Laboratory testing requested  Medications renewed    revisit yearly or as needed

## 2021-03-09 NOTE — PROGRESS NOTES
Assessment/Plan:       Diagnoses and all orders for this visit:    Chronic migraine without aura without status migrainosus, not intractable    Essential hypertension  -     Lipid Panel with Direct LDL reflex; Future  -     CBC and differential; Future  -     Comprehensive metabolic panel; Future  -     TSH, 3rd generation with Free T4 reflex; Future  -     UA (URINE) with reflex to Scope  -     losartan (Cozaar) 50 mg tablet; 1-1/2 tablets by mouth daily    Postablative hypothyroidism  -     levothyroxine 88 mcg tablet; Take 1 tablet (88 mcg total) by mouth daily    Mixed hyperlipidemia  -     Lipid Panel with Direct LDL reflex; Future  -     CBC and differential; Future  -     Comprehensive metabolic panel; Future  -     TSH, 3rd generation with Free T4 reflex; Future  -     UA (URINE) with reflex to Scope  -     rosuvastatin (CRESTOR) 20 MG tablet; Take 1 tablet (20 mg total) by mouth daily    Diastolic dysfunction  -     Lipid Panel with Direct LDL reflex; Future  -     CBC and differential; Future  -     Comprehensive metabolic panel; Future  -     TSH, 3rd generation with Free T4 reflex; Future  -     UA (URINE) with reflex to Scope    Grief reaction  -     sertraline (ZOLOFT) 50 mg tablet; Take 1 tablet (50 mg total) by mouth daily    Adjustment insomnia  -     zolpidem (AMBIEN) 5 mg tablet; Take 1 tablet (5 mg total) by mouth daily at bedtime as needed for sleep    Encounter for hepatitis C screening test for low risk patient  -     Hepatitis C antibody; Future    Functional nausea  -     famotidine (PEPCID) 20 mg tablet; Take 1 tablet (20 mg total) by mouth 2 (two) times a day                Subjective:      Patient ID: Jesús Vences is a 69 y o  female  A 50-year-old female   Her   just about a year ago quite unexpectedly and she has been battling with the expected grief reaction  This is probably about word ought to be     Chronic migraine this her major symptom    She sees neurology has had numerous procedures  Right now she is getting Botox and taking over LV  This has afforded some relief     Hypertension treated with losartan  Associated diastolic dysfunction noted  No symptoms referable to this     Hyperlipidemia treated with atorvastatin     Post ablated hypothyroidism on levothyroxine needs a recheck     Colonoscopy in 2018   Mammograms done yearly     Overall, physically she feels reasonably well and is coping      The following portions of the patient's history were reviewed and updated as appropriate:   She has a past medical history of Arthritis, GERD (gastroesophageal reflux disease), Graves' disease, Hepatitis B virus infection, and Nonmelanoma skin cancer  ,  does not have any pertinent problems on file  ,   has a past surgical history that includes Appendectomy; ARTHROSCOPY KNEE (Left); Temporal artery biopsy / ligation;  section; Total knee arthroplasty (Right, 2016); Eye surgery (Bilateral); Tonsillectomy; and Colonoscopy  ,  family history includes Cancer in her maternal aunt and paternal grandmother; Cirrhosis in her paternal grandfather; Coronary artery disease in her father; Diabetes in her maternal grandmother; Heart attack in her maternal grandfather; Hyperlipidemia in her paternal grandfather; Lung cancer (age of onset: 76) in her maternal aunt; No Known Problems in her sister and sister; Pancreatic cancer (age of onset: 76) in her mother; Throat cancer in her father  ,   reports that she quit smoking about 21 years ago  She has never used smokeless tobacco  She reports that she does not drink alcohol or use drugs  ,  is allergic to fentanyl; methimazole; and codeine     Current Outpatient Medications   Medication Sig Dispense Refill    acetaminophen (TYLENOL) 500 mg tablet Take 500 mg by mouth every 6 (six) hours       aspirin 81 MG tablet Take 1 tablet by mouth daily      dexamethasone (DECADRON) 2 mg tablet Take 1 tablet (2 mg total) by mouth daily with breakfast (Patient taking differently: Take 2 mg by mouth daily with breakfast ) 5 tablet 0    diclofenac sodium (VOLTAREN) 1 % Apply 4 g topically 4 (four) times a day (Patient taking differently: Apply 4 g topically 4 (four) times a day as needed ) 3 Tube 0    famotidine (PEPCID) 20 mg tablet Take 1 tablet (20 mg total) by mouth 2 (two) times a day 180 tablet 3    fluticasone (FLONASE) 50 mcg/act nasal spray 2 sprays into each nostril daily 3 Bottle 4    ketorolac (TORADOL) 10 mg tablet Take 1 tablet (10 mg total) by mouth every 6 (six) hours as needed for moderate pain (Patient taking differently: Take 10 mg by mouth every 6 (six) hours as needed for moderate pain ) 10 tablet 0    levothyroxine 88 mcg tablet Take 1 tablet (88 mcg total) by mouth daily 90 tablet 3    losartan (Cozaar) 50 mg tablet 1-1/2 tablets by mouth daily 135 tablet 3    Probiotic Product (PROBIOTIC-10 PO) Take by mouth      prochlorperazine (COMPAZINE) 5 mg tablet Take 1 tab by mouth every 6 hours if needed for nausea and vomiting  (Patient taking differently: Take 1 tab by mouth every 6 hours if needed for nausea and vomiting ) 30 tablet 0    rosuvastatin (CRESTOR) 20 MG tablet Take 1 tablet (20 mg total) by mouth daily 90 tablet 3    sertraline (ZOLOFT) 50 mg tablet Take 1 tablet (50 mg total) by mouth daily 90 tablet 3    Ubrogepant (UBRELVY) 100 MG tablet Take 1 tab once prn for migraine  May repeat in 2 hours if needed  Max 2 doses per day  Max 8 days per month  90 day supply  24 tablet 3    zolpidem (AMBIEN) 5 mg tablet Take 1 tablet (5 mg total) by mouth daily at bedtime as needed for sleep 30 tablet 1     No current facility-administered medications for this visit  Review of Systems   Neurological: Positive for headaches  Psychiatric/Behavioral: Positive for dysphoric mood  All other systems reviewed and are negative          Objective:  Vitals:    03/09/21 0758   BP: 110/80   Pulse: 82   Temp: 98 °F (36 7 °C) SpO2: 98%      Physical Exam  Constitutional:       Appearance: She is well-developed  Comments:  A thin female patient who appears the stated age   HENT:      Head: Normocephalic and atraumatic  Eyes:      Pupils: Pupils are equal, round, and reactive to light  Neck:      Musculoskeletal: Normal range of motion and neck supple  Thyroid: No thyromegaly  Trachea: No tracheal deviation  Cardiovascular:      Rate and Rhythm: Normal rate and regular rhythm  Heart sounds: Normal heart sounds  No murmur  No gallop  Pulmonary:      Effort: No respiratory distress  Breath sounds: No wheezing or rales  Abdominal:      General: Bowel sounds are normal       Palpations: Abdomen is soft  Tenderness: There is no abdominal tenderness  Musculoskeletal: Normal range of motion  General: No tenderness or deformity  Skin:     General: Skin is warm  Neurological:      Mental Status: She is alert and oriented to person, place, and time  Coordination: Coordination normal    Psychiatric:         Judgment: Judgment normal            Patient Instructions    Doing as well as can be expected   Laboratory testing requested  Medications renewed    revisit yearly or as needed

## 2021-03-18 ENCOUNTER — IMMUNIZATIONS (OUTPATIENT)
Dept: FAMILY MEDICINE CLINIC | Facility: HOSPITAL | Age: 71
End: 2021-03-18

## 2021-03-18 DIAGNOSIS — Z23 ENCOUNTER FOR IMMUNIZATION: Primary | ICD-10-CM

## 2021-03-18 PROCEDURE — 91300 SARS-COV-2 / COVID-19 MRNA VACCINE (PFIZER-BIONTECH) 30 MCG: CPT

## 2021-03-18 PROCEDURE — 0001A SARS-COV-2 / COVID-19 MRNA VACCINE (PFIZER-BIONTECH) 30 MCG: CPT

## 2021-03-24 ENCOUNTER — TELEPHONE (OUTPATIENT)
Dept: NEUROLOGY | Facility: CLINIC | Age: 71
End: 2021-03-24

## 2021-03-24 DIAGNOSIS — M19.90 ARTHRITIS: Primary | ICD-10-CM

## 2021-03-24 DIAGNOSIS — G44.209 TENSION HEADACHE: ICD-10-CM

## 2021-04-05 ENCOUNTER — OFFICE VISIT (OUTPATIENT)
Dept: DERMATOLOGY | Facility: CLINIC | Age: 71
End: 2021-04-05
Payer: MEDICARE

## 2021-04-05 VITALS — TEMPERATURE: 97.4 F

## 2021-04-05 DIAGNOSIS — L56.5 DSAP (DISSEMINATED SUPERFICIAL ACTINIC POROKERATOSIS): Primary | ICD-10-CM

## 2021-04-05 DIAGNOSIS — Z85.828 HISTORY OF SKIN CANCER: ICD-10-CM

## 2021-04-05 DIAGNOSIS — L82.1 SEBORRHEIC KERATOSIS: ICD-10-CM

## 2021-04-05 DIAGNOSIS — Z12.83 SCREENING FOR SKIN CANCER: ICD-10-CM

## 2021-04-05 PROCEDURE — 99213 OFFICE O/P EST LOW 20 MIN: CPT | Performed by: DERMATOLOGY

## 2021-04-05 NOTE — PATIENT INSTRUCTIONS
Disseminated superficial actinic porokeratosis appears stable no treatment indicated continue sun protection  Seborrheic keratosis patient reassured these are normal growths we acquire with age no treatment needed  History of skin cancer in no recurrence nothing else atypical sunblock recommended follow-up in 1 year  Screening for dermatologic disorders nothing else of concern noted on complete exam follow-up in 1 year

## 2021-04-05 NOTE — PROGRESS NOTES
[facility]  [address] [place] [address]  [place] [address]  [phone]  [phone]     MRN: [id number] : 1950  Encounter: [id number]  Patient Information: Shazia Calles  Chief complaint:  yearly check up    History of present illness:  79-year-old female presents for overall skin check previous history of actinic keratosis and nonmelanoma skin cancer known disseminated superficial actinic porokeratosis    Patient complains of lesion on the forehead  Past Medical History:   Diagnosis Date    Arthritis     GERD (gastroesophageal reflux disease)     Graves' disease     TREATED WITH RADIOACTIVE IODINE ON 13 (10 7 MCI) RESOLVED:     Hepatitis B virus infection     Nonmelanoma skin cancer     LAST ASSESSED: [id number]     Past Surgical History:   Procedure Laterality Date    APPENDECTOMY      ARTHROSCOPY KNEE Left      SECTION      COLONOSCOPY      Oct/2018    EYE SURGERY Bilateral     SURGERY OF THE ORBITS - ORBITAL DECOMPRESSION    TEMPORAL ARTERY BIOPSY / LIGATION      TONSILLECTOMY      TOTAL KNEE ARTHROPLASTY Right 2016    TKR     Social History   Social History     Substance and Sexual Activity   Alcohol Use No    Comment: (HISTORY)     Social History     Substance and Sexual Activity   Drug Use No     Social History     Tobacco Use   Smoking Status Former Smoker    Quit date:     Years since quittin 2   Smokeless Tobacco Never Used     Family History   Problem Relation Age of Onset    Pancreatic cancer Mother 76    Coronary artery disease Father     Throat cancer Father     Diabetes Maternal Grandmother     Heart attack Maternal Grandfather         ACUTE MI    Cancer Paternal Grandmother         unknown where    Hyperlipidemia Paternal Grandfather     Cirrhosis Paternal Grandfather     No Known Problems Sister     No Known Problems Sister     Lung cancer Maternal Aunt 76    Cancer Maternal Aunt Meds/Allergies   Allergies   Allergen Reactions    Fentanyl Vomiting     Action Taken: vomiting; Category: Adverse Reaction;   Protracted    Methimazole     Codeine GI Intolerance and Anxiety     Other reaction(s): Nausea and/or vomiting       Meds:  Prior to Admission medications    Medication Sig Start Date End Date Taking? Authorizing Provider   acetaminophen (TYLENOL) 500 mg tablet Take 500 mg by mouth every 6 (six) hours  3/22/16  Yes Historical Provider, MD   aspirin 81 MG tablet Take 1 tablet by mouth daily   Yes Historical Provider, MD   dexamethasone (DECADRON) 2 mg tablet Take 1 tablet (2 mg total) by mouth daily with breakfast  Patient taking differently: Take 2 mg by mouth daily with breakfast  5/28/20  Yes Kaylah Woods, PA-C   diclofenac sodium (VOLTAREN) 1 % Apply 4 g topically 4 (four) times a day  Patient taking differently: Apply 4 g topically 4 (four) times a day as needed  5/28/20  Yes Kaylah Woods, PA-C   famotidine (PEPCID) 20 mg tablet Take 1 tablet (20 mg total) by mouth 2 (two) times a day 3/9/21  Yes Won Mcgarry MD   fluticasone Su Loosen) 50 mcg/act nasal spray 2 sprays into each nostril daily 6/29/20  Yes Won Mcgarry MD   ketorolac (TORADOL) 10 mg tablet Take 1 tablet (10 mg total) by mouth every 6 (six) hours as needed for moderate pain  Patient taking differently: Take 10 mg by mouth every 6 (six) hours as needed for moderate pain  5/28/20  Yes Kaylah Woods, PAAndrzejC   levothyroxine 88 mcg tablet Take 1 tablet (88 mcg total) by mouth daily 3/9/21  Yes Won Mcgarry MD   losartan (Cozaar) 50 mg tablet 1-1/2 tablets by mouth daily 3/9/21  Yes Won Mcgarry MD   Probiotic Product (PROBIOTIC-10 PO) Take by mouth   Yes Historical Provider, MD   prochlorperazine (COMPAZINE) 5 mg tablet Take 1 tab by mouth every 6 hours if needed for nausea and vomiting    Patient taking differently: Take 1 tab by mouth every 6 hours if needed for nausea and vomiting  5/28/20  Yes Александрata Peggy, ANNA MARIE   rosuvastatin (CRESTOR) 20 MG tablet Take 1 tablet (20 mg total) by mouth daily 3/9/21  Yes Cristal Johnson MD   sertraline (ZOLOFT) 50 mg tablet Take 1 tablet (50 mg total) by mouth daily 3/9/21  Yes Cristal Johnson MD   Ubrogepant (UBRELVY) 100 MG tablet Take 1 tab once prn for migraine  May repeat in 2 hours if needed  Max 2 doses per day  Max 8 days per month  90 day supply   12/3/20  Yes Deidre Thomas PA-C   zolpidem (AMBIEN) 5 mg tablet Take 1 tablet (5 mg total) by mouth daily at bedtime as needed for sleep 3/9/21  Yes Cristal Johnson MD   Diclofenac Sodium (VOLTAREN) 1 % Apply 4 g topically 4 (four) times a day  Patient not taking: Reported on 4/5/2021 3/24/21   Deidre Thomas PA-C       Subjective:     Review of Systems:    General: negative for - chills, fatigue, fever,  weight gain or weight loss  Psychological: negative for - anxiety, behavioral disorder, concentration difficulties, decreased libido, depression, irritability, memory difficulties, mood swings, sleep disturbances or suicidal ideation  ENT: negative for - hearing difficulties , nasal congestion, nasal discharge, oral lesions, sinus pain, sneezing, sore throat  Allergy and Immunology: negative for - hives, insect bite sensitivity,  Hematological and Lymphatic: negative for - bleeding problems, blood clots,bruising, swollen lymph nodes  Endocrine: negative for - hair pattern changes, hot flashes, malaise/lethargy, mood swings, palpitations, polydipsia/polyuria, skin changes, temperature intolerance or unexpected weight change  Respiratory: negative for - cough, hemoptysis, orthopnea, shortness of breath, or wheezing  Cardiovascular: negative for - chest pain, dyspnea on exertion, edema,  Gastrointestinal: negative for - abdominal pain, nausea/vomiting  Genito-Urinary: negative for - dysuria, incontinence, irregular/heavy menses or urinary frequency/urgency  Musculoskeletal: negative for - gait disturbance, joint pain, joint stiffness, joint swelling, muscle pain, muscular weakness  Dermatological:  As in HPI  Neurological: negative for confusion, dizziness, headaches, impaired coordination/balance, memory loss, numbness/tingling, seizures, speech problems, tremors or weakness       Objective:   Temp (!) 97 4 °F (36 3 °C) (Temporal)     Physical Exam:    General Appearance:    Alert, cooperative, no distress   Head:    Normocephalic, without obvious abnormality, atraumatic           Skin:   A full skin exam was performed including scalp, head scalp, eyes, ears, nose, lips, neck, chest, axilla, abdomen, back, buttocks, bilateral upper extremities, bilateral lower extremities, hands, feet, fingers, toes, fingernails, and toenails no signs of any actinic keratosis noted normal keratotic papules with greasy stuck on appearance platelike scaling areas noted mainly on the legs occasion on the arms no signs of any atypia to nothing else of concern noted on complete exam previous sites skin cancer well healed without recurrence     Assessment:     1  DSAP (disseminated superficial actinic porokeratosis)     2  Seborrheic keratosis     3  History of skin cancer     4   Screening for skin cancer           Plan:   Disseminated superficial actinic porokeratosis appears stable no treatment indicated continue sun protection  Seborrheic keratosis patient reassured these are normal growths we acquire with age no treatment needed  History of skin cancer in no recurrence nothing else atypical sunblock recommended follow-up in 1 year  Screening for dermatologic disorders nothing else of concern noted on complete exam follow-up in 1 year      Izzy Alaniz MD  4/5/2021,11:05 AM    Portions of the record may have been created with voice recognition software   Occasional wrong word or "sound a like" substitutions may have occurred due to the inherent limitations of voice recognition software   Read the chart carefully and recognize, using context, where substitutions have occurred

## 2021-04-08 ENCOUNTER — IMMUNIZATIONS (OUTPATIENT)
Dept: FAMILY MEDICINE CLINIC | Facility: HOSPITAL | Age: 71
End: 2021-04-08

## 2021-04-08 DIAGNOSIS — Z23 ENCOUNTER FOR IMMUNIZATION: Primary | ICD-10-CM

## 2021-04-08 PROCEDURE — 91300 SARS-COV-2 / COVID-19 MRNA VACCINE (PFIZER-BIONTECH) 30 MCG: CPT

## 2021-04-08 PROCEDURE — 0002A SARS-COV-2 / COVID-19 MRNA VACCINE (PFIZER-BIONTECH) 30 MCG: CPT

## 2021-04-27 ENCOUNTER — DOCUMENTATION (OUTPATIENT)
Dept: NEUROLOGY | Facility: CLINIC | Age: 71
End: 2021-04-27

## 2021-04-27 NOTE — PROGRESS NOTES
Type Date User Summary Attachment   General 04/27/2021  2:23 PM Gui Chen care coordination  -   Note    Botox- no authorization needed   Please use our stock      Thank you,     Lyle Fernandez

## 2021-04-27 NOTE — PROGRESS NOTES
Patient is scheduled with Boogie Torres on 6/17/2021 in the [facility] location

## 2021-04-29 ENCOUNTER — OFFICE VISIT (OUTPATIENT)
Dept: NEUROLOGY | Facility: CLINIC | Age: 71
End: 2021-04-29
Payer: MEDICARE

## 2021-04-29 VITALS
HEART RATE: 66 BPM | WEIGHT: 103 LBS | DIASTOLIC BLOOD PRESSURE: 73 MMHG | HEIGHT: 61 IN | SYSTOLIC BLOOD PRESSURE: 141 MMHG | BODY MASS INDEX: 19.45 KG/M2

## 2021-04-29 DIAGNOSIS — G43.709 CHRONIC MIGRAINE WITHOUT AURA WITHOUT STATUS MIGRAINOSUS, NOT INTRACTABLE: Primary | ICD-10-CM

## 2021-04-29 DIAGNOSIS — G44.209 TENSION HEADACHE: ICD-10-CM

## 2021-04-29 PROCEDURE — 99214 OFFICE O/P EST MOD 30 MIN: CPT | Performed by: PSYCHIATRY & NEUROLOGY

## 2021-04-29 RX ORDER — CYPROHEPTADINE HYDROCHLORIDE 4 MG/1
TABLET ORAL
Qty: 60 TABLET | Refills: 3 | Status: SHIPPED | OUTPATIENT
Start: 2021-04-29 | End: 2021-08-17

## 2021-04-29 NOTE — ASSESSMENT & PLAN NOTE
Patient with a history of Graves disease and chronic migraines who presents for follow-up on her migraines  Currently she is getting headaches once a month or 2 headaches in a period of 3 months  Ubrelvy 100 mg spaced out by two hours seems to reduce the length of her headache to 7 days, as compared to 2-3 weeks previously  Patient does not take Eda Marlon two consecutive days in a row  Plan:  · At this time advised Agata Lewis to adjust the way she is [place]   · She may take it at headache onset and two hours later if still in pain, no more than 200 mg in a 24 hour period  · If the next day, headache continues, she may take another dose 24 hours after last dose and a second dose two hours after that one   · The goal is to not take more than 3 consecutive headaches days   · Will start her on cyproheptadine   She is to take 1 tab at bedtime, if no side effects may increase to two tabs at bedtime   · May consider Emgality, Ajovy or Raynette Starr in the future only if insurance approves it alongside botox  · May consider cognitive behavioral therapy   · Refill sent for Voltaren   · Follow up in 6 months

## 2021-04-29 NOTE — PROGRESS NOTES
Patient ID: Guy Momin is a 66 y o  female  Assessment/Plan:    Chronic migraine without aura without status migrainosus, not intractable  Patient with a history of Graves disease and chronic migraines who presents for follow-up on her migraines  Currently she is getting headaches once a month or 2 headaches in a period of 3 months  Ubrelvy 100 mg spaced out by two hours seems to reduce the length of her headache to 7 days, as compared to 2-3 weeks previously  Patient does not take Pearlene Alec two consecutive days in a row  Plan:  · At this time advised Fei Vicenteuber to adjust the way she is Mary Rizzo   · She may take it at headache onset and two hours later if still in pain, no more than 200 mg in a 24 hour period  · If the next day, headache continues, she may take another dose 24 hours after last dose and a second dose two hours after that one   · The goal is to not take more than 3 consecutive headaches days   · Will start her on cyproheptadine  She is to take 1 tab at bedtime, if no side effects may increase to two tabs at bedtime   · May consider Emgality, Ajovy or [address] [place] [address] Road in the future only if insurance approves it alongside botox  · May consider cognitive behavioral therapy   · Refill sent for Voltaren   · Follow up in 6 months        Diagnoses and all orders for this visit:    Chronic migraine without aura without status migrainosus, not intractable  -     cyproheptadine (PERIACTIN) 4 mg tablet; 1 tab HS X 1-2 weeks, if not better you can increase to 2 tabs HS  Arthritis  -     Diclofenac Sodium (VOLTAREN) 1 %; Apply 4 g topically 4 (four) times a day    Tension headache  -     Diclofenac Sodium (VOLTAREN) 1 %; Apply 4 g topically 4 (four) times a day         Subjective:    HPI     Patient is a very pleasant 69-year-old female with past medical history of Graves disease and chronic migraines who presents for follow-up on her migraines  Patient was last seen in September of last year by Dr Evaristo Walker  Around this time patient had been started on Ubrelvy 50 mg however this dose did not help her migraines very much  She was increased to 100 mg and although she states that they do not resolve completely, it does help reducing the length of the migraine  They typically last many weeks for her but on the Ubrelvy they last approximately 1 week  She takes the [place] and [place] only as an abortive when she feels her migraine coming on  She takes the Ubrelvy 100 mg once, then she waits two hours and takes a second dose  Patient dose not take a dose the next day  Patient states that her migraines start with neck pain  She is currently being treated with Botox which does help significantly  Currently she is getting headaches once a month or 2 headaches in a period of 3 months  She endorses vertigo and nausea with her migraines for which she takes compazine  Denies vomiting  Rarely gets sparkles in peripheral vision    Patient unfortunately lost her  of 42 years last year  She has been having problems adjusting  She has been struggling keeping her weight on because of loss of appetite  Patient endorses having trouble sleeping  She used to be an early riser however now she is waking up earlier than usual around 2:30- 3:30 a m  With the use of Ambien she is able to fall asleep quickly however after approximately 4 hour she wakes up and has difficulty falling back asleep  Patient has her son and some friends in [place] for support however she is unable to see them frequently because of covid restrictions  She does not see a psychiatrist nor a counselor  Started on Zoloft by her PCP, which helps with episodes of sadness  Struggling to work out like she used to  Now doing exercise on the treadmill for 5 days a week  In terms of her PMH 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  Had radiation followed by surgery for this  She states that her vision is now fine   She has dry eye and early onset cataracts  She is sensitive to light continuously and always wears sunglasses  Previous medications:     Preventative:   Amitriptyline- cardiac arrhythmia  Gabapentin- did not tolerate   Venlafaxine  Tizanidine   atenolol  Voltaren gel  Methocarbamol  Diltiazem    Abortive: Toradol  Aspirin  Dexamethasone  Fioricet  Compazine  Tylenol-4-6 pills daily    The following portions of the patient's history were reviewed and updated as appropriate: allergies, current medications, past family history, past medical history, past social history, past surgical history and problem list and ros  Past Medical History:   Diagnosis Date    Arthritis     GERD (gastroesophageal reflux disease)     Graves' disease     TREATED WITH RADIOACTIVE IODINE ON 13 (10 7 MCI) RESOLVED:     Hepatitis B virus infection     Nonmelanoma skin cancer     LAST ASSESSED: [id number]       Past Surgical History:   Procedure Laterality Date    APPENDECTOMY      ARTHROSCOPY KNEE Left      SECTION      COLONOSCOPY      Oct/2018    EYE SURGERY Bilateral     SURGERY OF THE ORBITS - ORBITAL DECOMPRESSION    TEMPORAL ARTERY BIOPSY / LIGATION      TONSILLECTOMY      TOTAL KNEE ARTHROPLASTY Right 2016    TKR       Social History     Socioeconomic History    Marital status:       Spouse name: None    Number of children: None    Years of education: 16    Highest education level: None   Occupational History     Comment: RETIRED   Social Needs    Financial resource strain: None    Food insecurity     Worry: None     Inability: None    Transportation needs     Medical: None     Non-medical: None   Tobacco Use    Smoking status: Former Smoker     Quit date: 2000     Years since quittin 3    Smokeless tobacco: Never Used   Substance and Sexual Activity    Alcohol use: No     Comment: (HISTORY)    Drug use: No    Sexual activity: Yes     Partners: Male   Lifestyle    Physical activity     Days per week: 0 days     Minutes per session: 0 min    Stress: Very much   Relationships    Social connections     Talks on phone: None     Gets together: None     Attends Religion service: None     Active member of club or organization: None     Attends meetings of clubs or organizations: None     Relationship status: None    Intimate partner violence     Fear of current or ex partner: None     Emotionally abused: None     Physically abused: None     Forced sexual activity: None   Other Topics Concern    None   Social History Narrative    CAFFEINE USE    DAILY COFFEE CONSUMPTION (2 CUPS/DAY)    NO ADVANCE DIRECTIVE ON FILIE       Family History   Problem Relation Age of Onset    Pancreatic cancer Mother 76    Coronary artery disease Father     Throat cancer Father     Diabetes Maternal Grandmother     Heart attack Maternal Grandfather         ACUTE MI    Cancer Paternal Grandmother         unknown where    Hyperlipidemia Paternal Grandfather     Cirrhosis Paternal Grandfather     No Known Problems Sister     No Known Problems Sister     Lung cancer Maternal Aunt 76    Cancer Maternal Aunt          Current Outpatient Medications:     acetaminophen (TYLENOL) 500 mg tablet, Take 500 mg by mouth every 6 (six) hours , Disp: , Rfl:     aspirin 81 MG tablet, Take 1 tablet by mouth daily, Disp: , Rfl:     dexamethasone (DECADRON) 2 mg tablet, Take 1 tablet (2 mg total) by mouth daily with breakfast (Patient taking differently: Take 2 mg by mouth daily with breakfast ), Disp: 5 tablet, Rfl: 0    diclofenac sodium (VOLTAREN) 1 %, Apply 4 g topically 4 (four) times a day (Patient taking differently: Apply 4 g topically 4 (four) times a day as needed ), Disp: 3 Tube, Rfl: 0    famotidine (PEPCID) 20 mg tablet, Take 1 tablet (20 mg total) by mouth 2 (two) times a day, Disp: 180 tablet, Rfl: 3    fluticasone (FLONASE) 50 mcg/act nasal spray, 2 sprays into each nostril daily, Disp: 3 Bottle, Rfl: 4    ketorolac (TORADOL) 10 mg tablet, Take 1 tablet (10 mg total) by mouth every 6 (six) hours as needed for moderate pain (Patient taking differently: Take 10 mg by mouth every 6 (six) hours as needed for moderate pain ), Disp: 10 tablet, Rfl: 0    levothyroxine 88 mcg tablet, Take 1 tablet (88 mcg total) by mouth daily, Disp: 90 tablet, Rfl: 3    losartan (Cozaar) 50 mg tablet, 1-1/2 tablets by mouth daily, Disp: 135 tablet, Rfl: 3    Probiotic Product (PROBIOTIC-10 PO), Take by mouth, Disp: , Rfl:     prochlorperazine (COMPAZINE) 5 mg tablet, Take 1 tab by mouth every 6 hours if needed for nausea and vomiting  (Patient taking differently: Take 1 tab by mouth every 6 hours if needed for nausea and vomiting ), Disp: 30 tablet, Rfl: 0    rosuvastatin (CRESTOR) 20 MG tablet, Take 1 tablet (20 mg total) by mouth daily, Disp: 90 tablet, Rfl: 3    sertraline (ZOLOFT) 50 mg tablet, Take 1 tablet (50 mg total) by mouth daily, Disp: 90 tablet, Rfl: 3    Ubrogepant (UBRELVY) 100 MG tablet, Take 1 tab once prn for migraine  May repeat in 2 hours if needed  Max 2 doses per day  Max 8 days per month  90 day supply  , Disp: 24 tablet, Rfl: 3    zolpidem (AMBIEN) 5 mg tablet, Take 1 tablet (5 mg total) by mouth daily at bedtime as needed for sleep, Disp: 30 tablet, Rfl: 1    cyproheptadine (PERIACTIN) 4 mg tablet, 1 tab HS X 1-2 weeks, if not better you can increase to 2 tabs HS , Disp: 60 tablet, Rfl: 3    Diclofenac Sodium (VOLTAREN) 1 %, Apply 4 g topically 4 (four) times a day, Disp: 150 g, Rfl: 2    Allergies   Allergen Reactions    Fentanyl Vomiting     Action Taken: vomiting; Category: Adverse Reaction;   Protracted    Methimazole     Codeine GI Intolerance and Anxiety     Other reaction(s): Nausea and/or vomiting        Blood pressure 141/73, pulse 66, height 5' 1 25" (1 556 m), weight 46 7 kg (103 lb)  Objective:    Blood pressure 141/73, pulse 66, height 5' 1 25" (1 556 m), weight 46 7 kg (103 lb)      Physical Exam  Constitutional:       General: She is awake  Eyes:      General: Lids are normal       Extraocular Movements: Extraocular movements intact  Neurological:      Mental Status: She is alert  Psychiatric:         Speech: Speech normal          Neurological Exam  Mental Status  Awake and alert  Speech is normal  Language is fluent with no aphasia  Patient tearful   Cranial Nerves  CN III, IV, VI: Extraocular movements intact bilaterally  Normal lids and orbits bilaterally  CN VII: Full and symmetric facial movement  Motor    Moving all extremities independently   Coordination  Did not appreciate any loss of coordination with fine motor movements   Gait  Casual gait is normal including stance, stride, and arm swing  ROS:    Review of Systems   Constitutional: Negative  Negative for appetite change and fever  HENT: Negative  Negative for hearing loss, tinnitus, trouble swallowing and voice change  Eyes: Positive for photophobia and pain  Respiratory: Negative  Negative for shortness of breath  Cardiovascular: Negative  Negative for palpitations  Gastrointestinal: Negative  Negative for nausea and vomiting  Endocrine: Negative  Negative for cold intolerance  Genitourinary: Negative  Negative for dysuria, frequency and urgency  Musculoskeletal: Positive for myalgias  Negative for neck pain  Skin: Negative  Negative for rash  Neurological: Positive for headaches  Negative for dizziness, tremors, seizures, syncope, facial asymmetry, speech difficulty, weakness, light-headedness and numbness  Hematological: Negative  Does not bruise/bleed easily  Psychiatric/Behavioral: Positive for sleep disturbance  Negative for confusion and hallucinations

## 2021-05-10 ENCOUNTER — OFFICE VISIT (OUTPATIENT)
Dept: OBGYN CLINIC | Facility: CLINIC | Age: 71
End: 2021-05-10
Payer: MEDICARE

## 2021-05-10 VITALS
WEIGHT: 105 LBS | HEIGHT: 61 IN | BODY MASS INDEX: 19.83 KG/M2 | DIASTOLIC BLOOD PRESSURE: 82 MMHG | SYSTOLIC BLOOD PRESSURE: 118 MMHG

## 2021-05-10 DIAGNOSIS — Z78.0 ASYMPTOMATIC POSTMENOPAUSAL STATUS: ICD-10-CM

## 2021-05-10 DIAGNOSIS — Z01.419 ENCOUNTER FOR GYNECOLOGICAL EXAMINATION WITHOUT ABNORMAL FINDING: Primary | ICD-10-CM

## 2021-05-10 DIAGNOSIS — Z12.31 SCREENING MAMMOGRAM, ENCOUNTER FOR: ICD-10-CM

## 2021-05-10 PROCEDURE — G0101 CA SCREEN;PELVIC/BREAST EXAM: HCPCS | Performed by: NURSE PRACTITIONER

## 2021-05-10 NOTE — PROGRESS NOTES
Diagnoses and all orders for this visit:    Encounter for gynecological examination without abnormal finding    Asymptomatic postmenopausal status        -     Declines DXA referral    Screening mammogram, encounter for  -     Mammo screening bilateral w 3d & cad; Future      Call as needed, encouraged calcium/vit D in her diet, call with any PMB, all questions answered      Pleasant 66 y o  postmenopausal female here for annual exam  She denies postmenopausal bleeding  Denies history of abnormal pap smears, last Pap  Normal, agrees to no further Paps  Denies vaginal issues  Denies pelvic pain  Denies postmenopausal issues  Not sexually active,  in   Last colonoscopy done 2018, due in 5 yrs  DXA referral declined by the patient because she refuses medications for this   Last mammogram 2020    Past Medical History:   Diagnosis Date    Arthritis     GERD (gastroesophageal reflux disease)     Graves' disease     TREATED WITH RADIOACTIVE IODINE ON 13 (10 7 MCI) RESOLVED:     Hepatitis B virus infection     Nonmelanoma skin cancer     LAST ASSESSED: [id number]     Past Surgical History:   Procedure Laterality Date    APPENDECTOMY      ARTHROSCOPY KNEE Left      SECTION      COLONOSCOPY      Oct/2018    EYE SURGERY Bilateral     SURGERY OF THE ORBITS - ORBITAL DECOMPRESSION    TEMPORAL ARTERY BIOPSY / LIGATION      TONSILLECTOMY      TOTAL KNEE ARTHROPLASTY Right 2016    TKR     Family History   Problem Relation Age of Onset    Pancreatic cancer Mother 76    Coronary artery disease Father     Throat cancer Father     Diabetes Maternal Grandmother     Heart attack Maternal Grandfather         ACUTE MI    Cancer Paternal Grandmother         unknown where    Hyperlipidemia Paternal Grandfather     Cirrhosis Paternal Grandfather     No Known Problems Sister     No Known Problems Sister     Lung cancer Maternal Aunt 76    Cancer Maternal Aunt      Social History     Tobacco Use    Smoking status: Former Smoker     Quit date:      Years since quittin 3    Smokeless tobacco: Never Used   Substance Use Topics    Alcohol use: No     Comment: (HISTORY)    Drug use: No       Current Outpatient Medications:     acetaminophen (TYLENOL) 500 mg tablet, Take 500 mg by mouth every 6 (six) hours , Disp: , Rfl:     aspirin 81 MG tablet, Take 1 tablet by mouth daily, Disp: , Rfl:     dexamethasone (DECADRON) 2 mg tablet, Take 1 tablet (2 mg total) by mouth daily with breakfast (Patient taking differently: Take 2 mg by mouth daily with breakfast ), Disp: 5 tablet, Rfl: 0    diclofenac sodium (VOLTAREN) 1 %, Apply 4 g topically 4 (four) times a day (Patient taking differently: Apply 4 g topically 4 (four) times a day as needed ), Disp: 3 Tube, Rfl: 0    Diclofenac Sodium (VOLTAREN) 1 %, Apply 4 g topically 4 (four) times a day, Disp: 150 g, Rfl: 2    famotidine (PEPCID) 20 mg tablet, Take 1 tablet (20 mg total) by mouth 2 (two) times a day, Disp: 180 tablet, Rfl: 3    fluticasone (FLONASE) 50 mcg/act nasal spray, 2 sprays into each nostril daily, Disp: 3 Bottle, Rfl: 4    ketorolac (TORADOL) 10 mg tablet, Take 1 tablet (10 mg total) by mouth every 6 (six) hours as needed for moderate pain (Patient taking differently: Take 10 mg by mouth every 6 (six) hours as needed for moderate pain ), Disp: 10 tablet, Rfl: 0    levothyroxine 88 mcg tablet, Take 1 tablet (88 mcg total) by mouth daily, Disp: 90 tablet, Rfl: 3    losartan (Cozaar) 50 mg tablet, 1-1/2 tablets by mouth daily, Disp: 135 tablet, Rfl: 3    Probiotic Product (PROBIOTIC-10 PO), Take by mouth, Disp: , Rfl:     prochlorperazine (COMPAZINE) 5 mg tablet, Take 1 tab by mouth every 6 hours if needed for nausea and vomiting   (Patient taking differently: Take 1 tab by mouth every 6 hours if needed for nausea and vomiting ), Disp: 30 tablet, Rfl: 0    rosuvastatin (CRESTOR) 20 MG tablet, Take 1 tablet (20 mg total) by mouth daily, Disp: 90 tablet, Rfl: 3    sertraline (ZOLOFT) 50 mg tablet, Take 1 tablet (50 mg total) by mouth daily, Disp: 90 tablet, Rfl: 3    Ubrogepant (UBRELVY) 100 MG tablet, Take 1 tab once prn for migraine  May repeat in 2 hours if needed  Max 2 doses per day  Max 8 days per month  90 day supply  , Disp: 24 tablet, Rfl: 3    zolpidem (AMBIEN) 5 mg tablet, Take 1 tablet (5 mg total) by mouth daily at bedtime as needed for sleep, Disp: 30 tablet, Rfl: 1    cyproheptadine (PERIACTIN) 4 mg tablet, 1 tab HS X 1-2 weeks, if not better you can increase to 2 tabs HS , Disp: 60 tablet, Rfl: 3  Patient Active Problem List    Diagnosis Date Noted    Adjustment insomnia 2020    Grief reaction 03/10/2020    Mixed hyperlipidemia 2019    Bile duct abnormality 2018    Functional nausea 2018    Chronic migraine without aura without status migrainosus, not intractable 2018    Arthritis 2018    Migraine without aura or status migrainosus 2018    GERD (gastroesophageal reflux disease) 2018    Tension headache 2018    Postablative hypothyroidism     Diastolic dysfunction     Essential hypertension 2018       Allergies   Allergen Reactions    Fentanyl Vomiting     Action Taken: vomiting; Category: Adverse Reaction;   Protracted    Methimazole     Codeine GI Intolerance and Anxiety     Other reaction(s): Nausea and/or vomiting       OB History    Para Term  AB Living   1 1 1     1   SAB TAB Ectopic Multiple Live Births                  # Outcome Date GA Lbr Yuniel/2nd Weight Sex Delivery Anes PTL Lv   1 Term              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, having a hard time    Vitals:    05/10/21 1007   BP: 118/82   BP Location: Left arm   Patient Position: Sitting   Cuff Size: Standard   Weight: 47 6 kg (105 lb)   Height: 5' 1" (1 549 m)     Body mass index is 19 84 kg/m²      Review of Systems Constitutional: Negative for chills, fatigue, fever and unexpected weight change  Respiratory: Negative for shortness of breath  Gastrointestinal: Negative for anal bleeding, blood in stool, constipation and diarrhea  Genitourinary: Negative for difficulty urinating, dysuria and hematuria  Physical Exam   Constitutional: She appears well-developed and well-nourished  No distress  HENT: atraumatic, EOMI  Head: Normocephalic  Neck: Normal range of motion  Neck supple  Pulmonary: Effort normal   Breasts: bilateral without masses, skin changes or nipple discharge  Bilaterally soft and warm to touch  No areas of erythema or pain  Abdominal: Soft  Pelvic exam was performed with patient supine  No labial fusion  There is no rash, tenderness, lesion or injury on the right labia  There is no rash, tenderness, lesion or injury on the left labia  Urethral meatus does not show any tenderness, inflammation or discharge  Palpation of midline bladder without pain or discomfort  Uterus is not deviated, not enlarged, not fixed and not tender  Cervix exhibits no motion tenderness, no discharge and no friability  Right adnexum displays no mass, no tenderness and no fullness  Left adnexum displays no mass, no tenderness and no fullness  No erythema or tenderness in the vagina  No foreign body in the vagina  No signs of injury around the vagina or anus  Perineum without lesions, signs of injury, erythema or swelling  No vaginal discharge found  Mild atrophy, tolerated white spec  Lymphadenopathy:        Right: No inguinal adenopathy present  Left: No inguinal adenopathy present

## 2021-05-10 NOTE — PATIENT INSTRUCTIONS
Breast Self Exam for Women   WHAT YOU NEED TO KNOW:   What is a breast self-exam (BSE)? A BSE is a way to check your breasts for lumps and other changes  Regular BSEs can help you know how your breasts normally look and feel  Most breast lumps or changes are not cancer, but you should always have them checked by a healthcare provider  Your healthcare provider can also watch you do a BSE and can tell you if you are doing your BSE correctly  Why should I do a BSE? Breast cancer is the most common type of cancer in women  Even if you have mammograms, you may still want to do a BSE regularly  If you know how your breasts normally feel and look, it may help you know when to contact your healthcare provider  Mammograms can miss some cancers  You may find a lump during a BSE that did not show up on a mammogram   When should I do a BSE? If you have periods, you may want to do your BSE 1 week after your period ends  This is the time when your breasts may be the least swollen, lumpy, or tender  You can do regular BSEs even if you are breastfeeding or have breast implants  How should I do a BSE? · Look at your breasts in a mirror  Look at the size and shape of each breast and nipple  Check for swelling, lumps, dimpling, scaly skin, or other skin changes  Look for nipple changes, such as a nipple that is painful or beginning to pull inward  Gently squeeze both nipples and check to see if fluid (that is not breast milk) comes out of them  If you find any of these or other breast changes, contact your healthcare provider  Check your breasts while you sit or  the following 3 positions:    ? Hang your arms down at your sides  ? Raise your hands and join them behind your head  ? Put firm pressure with your hands on your hips  Bend slightly forward while you look at your breasts in the mirror  · Lie down and feel your breasts    When you lie down, your breast tissue spreads out evenly over your chest  This makes it easier for you to feel for lumps and anything that may not be normal for your breasts  Do a BSE on one breast at a time  ? Place a small pillow or towel under your left shoulder  Put your left arm behind your head  ? Use the 3 middle fingers of your right hand  Use your fingertip pads, on the top of your fingers  Your fingertip pad is the most sensitive part of your finger  ? Use small circles to feel your breast tissue  Use your fingertip pads to make dime-sized, overlapping circles on your breast and armpits  Use light, medium, and firm pressure  First, press lightly  Second, press with medium pressure to feel a little deeper into the breast  Last, use firm pressure to feel deep within your breast     ? Examine your entire breast area  Examine the breast area from above the breast to below the breast where you feel only ribs  Make small circles with your fingertips, starting in the middle of your armpit  Make circles going up and down the breast area  Continue toward your breast and all the way across it  Examine the area from your armpit all the way over to the middle of your chest (breastbone)  Stop at the middle of your chest     ? Move the pillow or towel to your right shoulder, and put your right arm behind your head  Use the 3 fingertip pads of your left hand, and repeat the above steps to do a BSE on your right breast   What else can I do to check for breast problems or cancer? Talk to your healthcare provider about mammograms  A mammogram is an x-ray of your breasts to screen for breast cancer or other problems  Your provider can tell you the benefits and risks of mammograms  The first mammogram is usually at age 39 or 48  Your provider may recommend you start at 36 or younger if your risk for breast cancer is high  Mammograms usually continue every 1 to 2 years until age 76  When should I call my doctor? · You find any lumps or changes in your breasts      · You have breast pain or fluid coming from your nipples  · You have questions or concerns or concerns about your condition or care  CARE AGREEMENT:   You have the right to help plan your care  Learn about your health condition and how it may be treated  Discuss treatment options with your healthcare providers to decide what care you want to receive  You always have the right to refuse treatment  The above information is an  only  It is not intended as medical advice for individual conditions or treatments  Talk to your doctor, nurse or pharmacist before following any medical regimen to see if it is safe and effective for you  © Copyright [employer] 2021 Information is for End User's use only and may not be sold, redistributed or otherwise used for commercial purposes   All illustrations and images included in CareNotes® are the copyrighted property of A [employer] M , Inc  or [address] [place], [place] [address]james singh

## 2021-06-17 ENCOUNTER — PROCEDURE VISIT (OUTPATIENT)
Dept: NEUROLOGY | Facility: CLINIC | Age: 71
End: 2021-06-17
Payer: MEDICARE

## 2021-06-17 VITALS — SYSTOLIC BLOOD PRESSURE: 169 MMHG | TEMPERATURE: 97.5 F | DIASTOLIC BLOOD PRESSURE: 75 MMHG | HEART RATE: 61 BPM

## 2021-06-17 DIAGNOSIS — G43.709 CHRONIC MIGRAINE WITHOUT AURA WITHOUT STATUS MIGRAINOSUS, NOT INTRACTABLE: Primary | ICD-10-CM

## 2021-06-17 PROCEDURE — 64615 CHEMODENERV MUSC MIGRAINE: CPT | Performed by: PHYSICIAN ASSISTANT

## 2021-06-17 NOTE — PROGRESS NOTES
Universal Protocol   Consent: Verbal consent obtained  Written consent obtained  Risks and benefits: risks, benefits and alternatives were discussed  Consent given by: patient  Time out: Immediately prior to procedure a "time out" was called to verify the correct patient, procedure, equipment, support staff and site/side marked as required  Patient understanding: patient states understanding of the procedure being performed  Patient consent: the patient's understanding of the procedure matches consent given  Procedure consent: procedure consent matches procedure scheduled  Relevant documents: relevant documents present and verified  Patient identity confirmed: verbally with patient        Chemodenervation     Date/Time 6/17/2021 10:59 AM     Performed by  Willian Samaniego PA-C     Authorized by Willian Samaniego PA-C        Pre-procedure details      Prepped With: Alcohol     Anesthesia  (see MAR for exact dosages):      Anesthesia method:  None   Procedure details     Position:  Upright   Botox     Botox Type:  Type A    Brand:  Botox    mL's of Botulinum Toxin:  200    Final Concentration per CC:  50 units    Needle Gauge:  30 G 2 5 inch   Procedures     Botox Procedures: chronic headache      Indications: migraines     Injection Location      Head / Face:  L superior cervical paraspinal, R superior cervical paraspinal, L medial occipitalis, R medial occipitalis, R temporalis, L temporalis, R superior trapezius and L superior trapezius    L temporalis injection amount:  25 unit(s)    R temporalis injection amount:  25 unit(s)    L medial occipitalis injection amount:  20 unit(s)    R medial occipitalis injection amount:  20 unit(s)    L superior cervical paraspinal injection amount:  10 unit(s)    R superior cervical paraspinal injection amount:  10 unit(s)    L superior trapezius injection amount:  20 unit(s)    R superior trapezius injection amount:  20 unit(s)   Total Units     Total units used:  200    Total units discarded:  0   Post-procedure details      Chemodenervation:  Chronic migraine    Facial Nerve Location[de-identified]  Bilateral facial nerve    Patient tolerance of procedure:   Tolerated well, no immediate complications   Comments      TMJ 5 units bilaterally  masseter 5 units bilaterally  [place] 30 units  All medically necessary

## 2021-06-29 DIAGNOSIS — F51.02 ADJUSTMENT INSOMNIA: ICD-10-CM

## 2021-06-29 RX ORDER — ZOLPIDEM TARTRATE 5 MG/1
5 TABLET ORAL
Qty: 30 TABLET | Refills: 5 | OUTPATIENT
Start: 2021-06-29

## 2021-07-14 ENCOUNTER — HOSPITAL ENCOUNTER (OUTPATIENT)
Dept: MAMMOGRAPHY | Facility: CLINIC | Age: 71
Discharge: HOME | End: 2021-07-14
Payer: MEDICARE

## 2021-07-14 VITALS — BODY MASS INDEX: 19.83 KG/M2 | WEIGHT: 105 LBS | HEIGHT: 61 IN

## 2021-07-14 DIAGNOSIS — Z12.31 ENCOUNTER FOR SCREENING MAMMOGRAM FOR BREAST CANCER: ICD-10-CM

## 2021-07-14 PROCEDURE — 77067 SCR MAMMO BI INCL CAD: CPT

## 2021-07-14 PROCEDURE — 77063 BREAST TOMOSYNTHESIS BI: CPT

## 2021-08-03 ENCOUNTER — OFFICE VISIT (OUTPATIENT)
Dept: OBGYN CLINIC | Facility: CLINIC | Age: 71
End: 2021-08-03
Payer: MEDICARE

## 2021-08-03 VITALS
WEIGHT: 103 LBS | SYSTOLIC BLOOD PRESSURE: 151 MMHG | BODY MASS INDEX: 18.95 KG/M2 | DIASTOLIC BLOOD PRESSURE: 78 MMHG | HEIGHT: 62 IN | HEART RATE: 65 BPM

## 2021-08-03 DIAGNOSIS — M65.341 TRIGGER RING FINGER OF RIGHT HAND: Primary | ICD-10-CM

## 2021-08-03 PROCEDURE — 20550 NJX 1 TENDON SHEATH/LIGAMENT: CPT | Performed by: ORTHOPAEDIC SURGERY

## 2021-08-03 PROCEDURE — 99204 OFFICE O/P NEW MOD 45 MIN: CPT | Performed by: ORTHOPAEDIC SURGERY

## 2021-08-03 RX ORDER — LIDOCAINE HYDROCHLORIDE 10 MG/ML
0.5 INJECTION, SOLUTION INFILTRATION; PERINEURAL
Status: COMPLETED | OUTPATIENT
Start: 2021-08-03 | End: 2021-08-03

## 2021-08-03 RX ORDER — TRIAMCINOLONE ACETONIDE 40 MG/ML
20 INJECTION, SUSPENSION INTRA-ARTICULAR; INTRAMUSCULAR
Status: COMPLETED | OUTPATIENT
Start: 2021-08-03 | End: 2021-08-03

## 2021-08-03 RX ADMIN — TRIAMCINOLONE ACETONIDE 20 MG: 40 INJECTION, SUSPENSION INTRA-ARTICULAR; INTRAMUSCULAR at 09:57

## 2021-08-03 RX ADMIN — LIDOCAINE HYDROCHLORIDE 0.5 ML: 10 INJECTION, SOLUTION INFILTRATION; PERINEURAL at 09:57

## 2021-08-03 NOTE — PROGRESS NOTES
CHIEF COMPLAINT:  Chief Complaint   Patient presents with    Right Hand - Pain, Locking, Clicking       SUBJECTIVE:  Corrina Regan is a 66y o  year old RHD female who presents for initial evaluation of her right hand due to pain in line with her long finger  Patient states her symptoms started 3 weeks ago after doing extensive gardening  She has been applying Voltaren gel and splinting her finger at night  She denies any numbness or tingling  Denies any locking of any other digit  She is retired RN         PAST MEDICAL HISTORY:  Past Medical History:   Diagnosis Date    Arthritis     GERD (gastroesophageal reflux disease)     Graves' disease     TREATED WITH RADIOACTIVE IODINE ON 13 (10 7 MCI) RESOLVED:     Hepatitis B virus infection     Nonmelanoma skin cancer     LAST ASSESSED: [id number]       PAST SURGICAL HISTORY:  Past Surgical History:   Procedure Laterality Date    APPENDECTOMY      ARTHROSCOPY KNEE Left      SECTION      COLONOSCOPY      Oct/2018    EYE SURGERY Bilateral     SURGERY OF THE ORBITS - ORBITAL DECOMPRESSION    TEMPORAL ARTERY BIOPSY / LIGATION      TONSILLECTOMY      TOTAL KNEE ARTHROPLASTY Right 2016    TKR       FAMILY HISTORY:  Family History   Problem Relation Age of Onset    Pancreatic cancer Mother 76    Coronary artery disease Father     Throat cancer Father     Diabetes Maternal Grandmother     Heart attack Maternal Grandfather         ACUTE MI    Cancer Paternal Grandmother         unknown where    Hyperlipidemia Paternal Grandfather     Cirrhosis Paternal Grandfather     No Known Problems Sister     No Known Problems Sister     Lung cancer Maternal Aunt 76    Cancer Maternal Aunt        SOCIAL HISTORY:  Social History     Tobacco Use    Smoking status: Former Smoker     Quit date:      Years since quittin 6    Smokeless tobacco: Never Used   Vaping Use    Vaping Use: Never used   Substance Use Topics    Alcohol use: No     Comment: (HISTORY)    Drug use: No       MEDICATIONS:    Current Outpatient Medications:     acetaminophen (TYLENOL) 500 mg tablet, Take 500 mg by mouth every 6 (six) hours , Disp: , Rfl:     aspirin 81 MG tablet, Take 1 tablet by mouth daily, Disp: , Rfl:     Diclofenac Sodium (VOLTAREN) 1 %, Apply 4 g topically 4 (four) times a day, Disp: 150 g, Rfl: 2    famotidine (PEPCID) 20 mg tablet, Take 1 tablet (20 mg total) by mouth 2 (two) times a day, Disp: 180 tablet, Rfl: 3    fluticasone (FLONASE) 50 mcg/act nasal spray, 2 sprays into each nostril daily, Disp: 3 Bottle, Rfl: 4    Galcanezumab-gnlm 120 MG/ML SOAJ, Inject 120 mg under the skin every 30 (thirty) days, Disp: 1 mL, Rfl: 11    ketorolac (TORADOL) 10 mg tablet, Take 1 tablet (10 mg total) by mouth every 6 (six) hours as needed for moderate pain (Patient taking differently: Take 10 mg by mouth every 6 (six) hours as needed for moderate pain ), Disp: 10 tablet, Rfl: 0    levothyroxine 88 mcg tablet, Take 1 tablet (88 mcg total) by mouth daily, Disp: 90 tablet, Rfl: 3    losartan (Cozaar) 50 mg tablet, 1-1/2 tablets by mouth daily, Disp: 135 tablet, Rfl: 3    Probiotic Product (PROBIOTIC-10 PO), Take by mouth, Disp: , Rfl:     prochlorperazine (COMPAZINE) 5 mg tablet, Take 1 tab by mouth every 6 hours if needed for nausea and vomiting  (Patient taking differently: Take 1 tab by mouth every 6 hours if needed for nausea and vomiting ), Disp: 30 tablet, Rfl: 0    rosuvastatin (CRESTOR) 20 MG tablet, Take 1 tablet (20 mg total) by mouth daily, Disp: 90 tablet, Rfl: 3    sertraline (ZOLOFT) 50 mg tablet, Take 1 tablet (50 mg total) by mouth daily, Disp: 90 tablet, Rfl: 3    Ubrogepant (UBRELVY) 100 MG tablet, Take 1 tab once prn for migraine  May repeat in 2 hours if needed  Max 2 doses per day  Max 8 days per month  90 day supply  , Disp: 24 tablet, Rfl: 3    zolpidem (AMBIEN) 5 mg tablet, Take 1 tablet (5 mg total) by mouth daily at bedtime as needed for sleep, Disp: 30 tablet, Rfl: 5    cyproheptadine (PERIACTIN) 4 mg tablet, 1 tab HS X 1-2 weeks, if not better you can increase to 2 tabs HS  (Patient not taking: Reported on 8/3/2021), Disp: 60 tablet, Rfl: 3    dexamethasone (DECADRON) 2 mg tablet, Take 1 tablet (2 mg total) by mouth daily with breakfast (Patient not taking: Reported on 8/3/2021), Disp: 5 tablet, Rfl: 0    diclofenac sodium (VOLTAREN) 1 %, Apply 4 g topically 4 (four) times a day (Patient not taking: Reported on 8/3/2021), Disp: 3 Tube, Rfl: 0    ALLERGIES:  Allergies   Allergen Reactions    Fentanyl Vomiting     Action Taken: vomiting; Category: Adverse Reaction;   Protracted    Methimazole     Codeine GI Intolerance and Anxiety     Other reaction(s): Nausea and/or vomiting       REVIEW OF SYSTEMS:  Review of Systems    VITALS:  Vitals:    08/03/21 0921   BP: 151/78   Pulse: 65       LABS:  HgA1c: No results found for: HGBA1C  BMP:   Lab Results   Component Value Date    GLUCOSE 91 11/20/2015    CALCIUM 9 6 03/09/2021     11/20/2015    K 4 0 03/09/2021    CO2 27 03/09/2021     03/09/2021    BUN 18 03/09/2021    CREATININE 0 82 03/09/2021       _____________________________________________________  PHYSICAL EXAMINATION:  General: well developed and well nourished, alert, oriented times 3 and appears comfortable  Psychiatric: Normal  HEENT: Trachea Midline, No torticollis  Pulmonary: No audible wheezing or strider  Cardiovascular: No discernable arrhythmia   Skin: No Erythema, No Lacerations, No Fluctuation, No Ulcerations  Neurovascular: Sensation Intact to the Median, Ulnar, Radial Nerve, Motor Intact to the Median, Ulnar, Radial Nerve and Pulses Intact    MUSCULOSKELETAL EXAMINATION:  right  long finger:  Positive palpable nodule over the A1 pulley  Positive tenderness to palpation over A1 pulley  Negative catching   Positive clicking         ___________________________________________________  STUDIES REVIEWED:  No studies reviewed  PROCEDURES PERFORMED:  Hand/upper extremity injection: R ring A1  Universal Protocol:  Risks and benefits: risks, benefits and alternatives were discussed  Consent given by: patient  Time out: Immediately prior to procedure a "time out" was called to verify the correct patient, procedure, equipment, support staff and site/side marked as required  Timeout called at: 8/3/2021 9:54 AM   Site marked: the operative site was marked  Patient identity confirmed: verbally with patient    Supporting Documentation  Indications: diagnostic, pain and tendon swelling   Procedure Details  Condition:trigger finger Location: ring finger - R ring A1   Preparation: Patient was prepped and draped in the usual sterile fashion  Needle size: 25 G  Ultrasound guidance: no  Approach: volar  Medications administered: 20 mg triamcinolone acetonide 40 mg/mL; 0 5 mL lidocaine 1 %    Patient tolerance: patient tolerated the procedure well with no immediate complications  Dressing:  Sterile dressing applied              _____________________________________________________  ASSESSMENT/PLAN:      Diagnoses and all orders for this visit:    Trigger ring finger of right hand  -     Hand/upper extremity injection: R ring A1     right hand long trigger finger  She was offered, accepted, performed injection of cortisone for symptomatic relief  Patient tolerated procedure well  Post injection protocol advised  Follow Up:  Return in about 2 weeks (around 8/17/2021) for re-check  Work/school status:   no restrictions    To Do Next Visit:  Re-evaluation of current issue    General Discussions:  Trigger Finger: The anatomy and physiology of trigger finger was discussed with the patient today in the office  Edema and increased contact pressure within the flexor tendons at the A1 pulley can cause pain, crepitation, and triggering or locking of the digit resulting in limitation of function    Symptoms can occur at anytime but are typically worse in the morning or after a brief rest from repetitive activity  Treatment options include resting/nighttime MP blocking splints to decrease edema, oral anti-inflammatory medications, home or formal therapy exercises, up to 2 steroid injections within the tendon sheath, or surgical release  While majority of patients do respond to conservative treatment, up to 20% may require surgical release       Operative Discussions:   not scheduled    Scribe Attestation    I,:  Gutierrez Montalvo am acting as a scribe while in the presence of the attending physician :       I,:  Jimena Gillis MD personally performed the services described in this documentation    as scribed in my presence :

## 2021-08-03 NOTE — PATIENT INSTRUCTIONS
What is it TRIGGER FINGER? Stenosing tenosynovitis, commonly known as trigger finger or trigger thumb, involves the pulleys and tendons in the hand that bend the fingers  The tendons work like long ropes connecting the muscles of the forearm with the bones of the fingers and thumb  In the finger, the pulleys are a series of rings that form a tunnel through which the tendons must glide, much like the guides on a fishing sumeet through which the line (or tendon) must pass  These pulleys hold the tendons close against the bone  The tendons and the tunnel have a slick lining that allows easy gliding of the tendon through the pulleys (see Figure 1)  Trigger finger/thumb occurs when the pulley at the base of the finger becomes too thick and constricting around the tendon, making it hard for the tendon to move freely through the pulley  Sometimes the tendon develops a nodule (knot) or swelling of its lining  Because of the increased resistance to the gliding of the tendon through the  pulley, one may feel pain, popping, or a catching feeling in the finger or thumb (see Figure 2)  When the tendon catches, it produces irritation and more swelling  This causes a vicious cycle of triggering, irritation, and swelling  Sometimes the finger becomes stuck or locked, and is hard to straighten or bend  What causes it? Causes for this condition are not always clear  Some trigger fingers are associated with medical conditions such as rheumatoid arthritis, gout, and diabetes  Local trauma to the palm/base of the finger may be a factor on occasion, but in most cases there is not a clear cause  Signs and symptoms   Trigger finger/thumb may start with discomfort felt at the base of the finger or thumb, where they join the palm  This area is often tender to local pressure  A nodule may sometimes be found in this area   When the finger begins to trigger or lock, the patient may think the  problem is at the middle knuckle of the finger or the tip knuckle of the thumb, since the tendon that is sticking is the one that moves these joints  Treatment  The goal of treatment in trigger finger/thumb is to eliminate the catching or locking and allow full movement of the finger or thumb without discomfort  Swelling around the flexor tendon and tendon sheath must be reduced to allow smooth gliding of the tendon  The wearing of a splint or taking an oral anti-inflammatory medication may sometimes  help  Treatment may also include changing activities to reduce swelling  An injection of steroid into the area around the tendon and pulley is often effective in relieving the trigger finger/thumb  If non-surgical forms of treatment do not relieve the symptoms, surgery may be recommended  This surgery is performed as an outpatient, usually with simple local anesthesia  The goal of surgery is to open the pulley at the base of the finger so that the tendon can glide more freely (see Figure 3)  Active motion of the finger generally begins immediately after surgery  Normal use of the hand can usually be resumed once comfort permits  Some patients may feel tenderness, discomfort, and swelling about the area of their surgery longer than others  Occasionally, hand therapy is required after surgery to regain  better use  © 2012 [employer]  www handcare  org

## 2021-08-16 DIAGNOSIS — J31.0 CHRONIC RHINITIS: Primary | ICD-10-CM

## 2021-08-17 ENCOUNTER — OFFICE VISIT (OUTPATIENT)
Dept: OBGYN CLINIC | Facility: CLINIC | Age: 71
End: 2021-08-17
Payer: MEDICARE

## 2021-08-17 VITALS
HEART RATE: 65 BPM | WEIGHT: 103 LBS | BODY MASS INDEX: 18.95 KG/M2 | DIASTOLIC BLOOD PRESSURE: 76 MMHG | SYSTOLIC BLOOD PRESSURE: 127 MMHG | HEIGHT: 62 IN

## 2021-08-17 DIAGNOSIS — M65.341 TRIGGER RING FINGER OF RIGHT HAND: Primary | ICD-10-CM

## 2021-08-17 PROCEDURE — 99213 OFFICE O/P EST LOW 20 MIN: CPT | Performed by: ORTHOPAEDIC SURGERY

## 2021-08-17 NOTE — PROGRESS NOTES
CHIEF COMPLAINT:  Chief Complaint   Patient presents with    Right Ring Finger - Follow-up       SUBJECTIVE:  Portia Mason is a 66y o  year old female who presents for follow-up regarding right ring finger trigger finger  She states that she has about 75% better in terms of her pain  She never really notice significant clicking or locking symptoms  She denies any numbness or tingling        PAST MEDICAL HISTORY:  Past Medical History:   Diagnosis Date    Arthritis     GERD (gastroesophageal reflux disease)     Graves' disease     TREATED WITH RADIOACTIVE IODINE ON 13 (10 7 MCI) RESOLVED:     Hepatitis B virus infection     Nonmelanoma skin cancer     LAST ASSESSED: [id number]       PAST SURGICAL HISTORY:  Past Surgical History:   Procedure Laterality Date    APPENDECTOMY      ARTHROSCOPY KNEE Left      SECTION      COLONOSCOPY      Oct/2018    EYE SURGERY Bilateral     SURGERY OF THE ORBITS - ORBITAL DECOMPRESSION    TEMPORAL ARTERY BIOPSY / LIGATION      TONSILLECTOMY      TOTAL KNEE ARTHROPLASTY Right 2016    TKR       FAMILY HISTORY:  Family History   Problem Relation Age of Onset    Pancreatic cancer Mother 76    Coronary artery disease Father     Throat cancer Father     Diabetes Maternal Grandmother     Heart attack Maternal Grandfather         ACUTE MI    Cancer Paternal Grandmother         unknown where    Hyperlipidemia Paternal Grandfather     Cirrhosis Paternal Grandfather     No Known Problems Sister     No Known Problems Sister     Lung cancer Maternal Aunt 76    Cancer Maternal Aunt        SOCIAL HISTORY:  Social History     Tobacco Use    Smoking status: Former Smoker     Quit date:      Years since quittin 6    Smokeless tobacco: Never Used   Vaping Use    Vaping Use: Never used   Substance Use Topics    Alcohol use: No     Comment: (HISTORY)    Drug use: No       MEDICATIONS:    Current Outpatient Medications:     acetaminophen (TYLENOL) 500 mg tablet, Take 500 mg by mouth every 6 (six) hours , Disp: , Rfl:     aspirin 81 MG tablet, Take 1 tablet by mouth daily, Disp: , Rfl:     Diclofenac Sodium (VOLTAREN) 1 %, Apply 4 g topically 4 (four) times a day, Disp: 150 g, Rfl: 2    famotidine (PEPCID) 20 mg tablet, Take 1 tablet (20 mg total) by mouth 2 (two) times a day, Disp: 180 tablet, Rfl: 3    fluticasone (FLONASE) 50 mcg/act nasal spray, 2 sprays into each nostril daily, Disp: 3 Bottle, Rfl: 4    Galcanezumab-gnlm 120 MG/ML SOAJ, Inject 120 mg under the skin every 30 (thirty) days, Disp: 1 mL, Rfl: 11    ketorolac (TORADOL) 10 mg tablet, Take 1 tablet (10 mg total) by mouth every 6 (six) hours as needed for moderate pain (Patient taking differently: Take 10 mg by mouth every 6 (six) hours as needed for moderate pain ), Disp: 10 tablet, Rfl: 0    levothyroxine 88 mcg tablet, Take 1 tablet (88 mcg total) by mouth daily, Disp: 90 tablet, Rfl: 3    losartan (Cozaar) 50 mg tablet, 1-1/2 tablets by mouth daily, Disp: 135 tablet, Rfl: 3    Probiotic Product (PROBIOTIC-10 PO), Take by mouth, Disp: , Rfl:     prochlorperazine (COMPAZINE) 5 mg tablet, Take 1 tab by mouth every 6 hours if needed for nausea and vomiting  (Patient taking differently: Take 1 tab by mouth every 6 hours if needed for nausea and vomiting ), Disp: 30 tablet, Rfl: 0    rosuvastatin (CRESTOR) 20 MG tablet, Take 1 tablet (20 mg total) by mouth daily, Disp: 90 tablet, Rfl: 3    sertraline (ZOLOFT) 50 mg tablet, Take 1 tablet (50 mg total) by mouth daily, Disp: 90 tablet, Rfl: 3    Ubrogepant (UBRELVY) 100 MG tablet, Take 1 tab once prn for migraine  May repeat in 2 hours if needed  Max 2 doses per day  Max 8 days per month  90 day supply  , Disp: 24 tablet, Rfl: 3    zolpidem (AMBIEN) 5 mg tablet, Take 1 tablet (5 mg total) by mouth daily at bedtime as needed for sleep, Disp: 30 tablet, Rfl: 5    ALLERGIES:  Allergies   Allergen Reactions    Fentanyl Vomiting Action Taken: vomiting; Category: Adverse Reaction;   Protracted    Methimazole     Codeine GI Intolerance and Anxiety     Other reaction(s): Nausea and/or vomiting       REVIEW OF SYSTEMS:  Review of Systems  ROS:   General: no fever, no chills  HEENT:  No loss of hearing or eyesight problems  Eyes:  No red eyes  Respiratory:  No coughing, shortness of breath or wheezing  Cardiovascular:  No chest pain, no palpitations  GI:  Abdomen soft nontender, denies nausea  Endocrine:  No muscle weakness, no frequent urination, no excessive thirst  Urinary:  No dysuria, no incontinence  Musculoskeletal: see HPI and PE  SKIN:  No skin rash, no dry skin  Neurological:  No headaches, no confusion  Psychiatric:  No suicide thoughts, no anxiety, no depression  Review of all other systems is negative    VITALS:  Vitals:    08/17/21 0853   BP: 127/76   Pulse: 65       LABS:  HgA1c: No results found for: HGBA1C  BMP:   Lab Results   Component Value Date    GLUCOSE 91 11/20/2015    CALCIUM 9 6 03/09/2021     11/20/2015    K 4 0 03/09/2021    CO2 27 03/09/2021     03/09/2021    BUN 18 03/09/2021    CREATININE 0 82 03/09/2021       _____________________________________________________  PHYSICAL EXAMINATION:  General: well developed and well nourished, alert, oriented times 3 and appears comfortable  Psychiatric: Normal  HEENT: Trachea Midline, No torticollis  Pulmonary: No audible wheezing or respiratory distress   Skin: No masses, erythema, lacerations, fluctation, ulcerations  Neurovascular: Sensation Intact to the Median, Ulnar, Radial Nerve, Motor Intact to the Median, Ulnar, Radial Nerve and Pulses Intact    MUSCULOSKELETAL EXAMINATION:  right ring finger:  Positive palpable nodule over the A1 pulley  Negative tenderness to palpation over A1 pulley  Negative clicking  Negative catching        ___________________________________________________  STUDIES REVIEWED:  No studies reviewed         PROCEDURES PERFORMED:  Procedures  No Procedures performed today    _____________________________________________________  ASSESSMENT/PLAN:      Diagnoses and all orders for this visit:    Trigger ring finger of right hand  -patient was advised to use heat massage over the area  -she was advised that we can try another cortisone injection if she would like to avoid surgical intervention   -she was advised to give us a call if the symptoms were to persist or get worse   -she will follow-up with us as needed        Follow Up:  Return if symptoms worsen or fail to improve  Work/school status:  No restrictions    To Do Next Visit:  Re-evaluation of current issue    General Discussions:  Trigger Finger: The anatomy and physiology of trigger finger was discussed with the patient today in the office  Edema and increased contact pressure within the flexor tendons at the A1 pulley can cause pain, crepitation, and triggering or locking of the digit resulting in limitation of function  Symptoms can occur at anytime but are typically worse in the morning or after a brief rest from repetitive activity  Treatment options include resting/nighttime MP blocking splints to decrease edema, oral anti-inflammatory medications, home or formal therapy exercises, up to 2 steroid injections within the tendon sheath, or surgical release  While majority of patients do respond to conservative treatment, up to 20% may require surgical release  Scribe Attestation    I,:  Hans Holliday PA-C am acting as a scribe while in the presence of the attending physician :       I,:  Yashira Delaney MD personally performed the services described in this documentation    as scribed in my presence :           Portions of the record may have been created with voice recognition software  Occasional wrong word or "sound a like" substitutions may have occurred due to the inherent limitations of voice recognition software    Read the chart carefully and recognize, using context, where substitutions have occurred

## 2021-08-18 RX ORDER — FLUTICASONE PROPIONATE 50 MCG
2 SPRAY, SUSPENSION (ML) NASAL DAILY
Qty: 1 G | Refills: 11 | Status: SHIPPED | OUTPATIENT
Start: 2021-08-18 | End: 2021-12-20

## 2021-08-20 ENCOUNTER — IMMUNIZATIONS (OUTPATIENT)
Dept: FAMILY MEDICINE CLINIC | Facility: HOSPITAL | Age: 71
End: 2021-08-20

## 2021-08-20 DIAGNOSIS — Z23 ENCOUNTER FOR IMMUNIZATION: Primary | ICD-10-CM

## 2021-08-20 PROCEDURE — 0001A SARS-COV-2 / COVID-19 MRNA VACCINE (PFIZER-BIONTECH) 30 MCG: CPT

## 2021-08-20 PROCEDURE — 91300 SARS-COV-2 / COVID-19 MRNA VACCINE (PFIZER-BIONTECH) 30 MCG: CPT

## 2021-09-16 ENCOUNTER — PROCEDURE VISIT (OUTPATIENT)
Dept: NEUROLOGY | Facility: CLINIC | Age: 71
End: 2021-09-16
Payer: MEDICARE

## 2021-09-16 VITALS — DIASTOLIC BLOOD PRESSURE: 72 MMHG | HEART RATE: 68 BPM | TEMPERATURE: 97.7 F | SYSTOLIC BLOOD PRESSURE: 140 MMHG

## 2021-09-16 DIAGNOSIS — G43.709 CHRONIC MIGRAINE WITHOUT AURA WITHOUT STATUS MIGRAINOSUS, NOT INTRACTABLE: Primary | ICD-10-CM

## 2021-09-16 PROCEDURE — 64615 CHEMODENERV MUSC MIGRAINE: CPT | Performed by: PHYSICIAN ASSISTANT

## 2021-09-16 NOTE — PROGRESS NOTES
Universal Protocol   Consent: Verbal consent obtained  Written consent obtained  Risks and benefits: risks, benefits and alternatives were discussed  Consent given by: patient  Time out: Immediately prior to procedure a "time out" was called to verify the correct patient, procedure, equipment, support staff and site/side marked as required  Patient understanding: patient states understanding of the procedure being performed  Patient consent: the patient's understanding of the procedure matches consent given  Procedure consent: procedure consent matches procedure scheduled  Relevant documents: relevant documents present and verified  Patient identity confirmed: verbally with patient        Chemodenervation     Date/Time 9/16/2021 8:29 AM     Performed by  Jody Thorpe PA-C     Authorized by Jody Thorpe PA-C        Pre-procedure details      Prepped With: Alcohol     Anesthesia  (see MAR for exact dosages):      Anesthesia method:  None   Procedure details     Position:  Upright   Botox     Botox Type:  Type A    Brand:  Botox    mL's of Botulinum Toxin:  200    Final Concentration per CC:  50 units    Needle Gauge:  30 G 2 5 inch   Procedures     Botox Procedures: chronic headache      Indications: migraines     Injection Location      Head / Face:  L superior cervical paraspinal, R superior cervical paraspinal, L medial occipitalis, R medial occipitalis, R temporalis, L temporalis, R superior trapezius and L superior trapezius    L temporalis injection amount:  25 unit(s)    R temporalis injection amount:  25 unit(s)    L medial occipitalis injection amount:  20 unit(s)    R medial occipitalis injection amount:  20 unit(s)    L superior cervical paraspinal injection amount:  10 unit(s)    R superior cervical paraspinal injection amount:  10 unit(s)    L superior trapezius injection amount:  20 unit(s)    R superior trapezius injection amount:  20 unit(s)   Total Units     Total units used:  200    Total units discarded:  0   Post-procedure details      Chemodenervation:  Chronic migraine    Facial Nerve Location[de-identified]  Bilateral facial nerve    Patient tolerance of procedure:   Tolerated well, no immediate complications   Comments      5 units TMJ bilaterally  5 units masseter bilaterally  30 units apex  All medically necessary

## 2021-11-03 DIAGNOSIS — K57.92 DIVERTICULITIS: Primary | ICD-10-CM

## 2021-11-03 RX ORDER — METRONIDAZOLE 500 MG/1
500 TABLET ORAL EVERY 8 HOURS SCHEDULED
Qty: 30 TABLET | Refills: 0 | Status: SHIPPED | OUTPATIENT
Start: 2021-11-03 | End: 2021-11-13

## 2021-11-03 RX ORDER — CIPROFLOXACIN 750 MG/1
750 TABLET, FILM COATED ORAL EVERY 12 HOURS SCHEDULED
Qty: 20 TABLET | Refills: 0 | Status: SHIPPED | OUTPATIENT
Start: 2021-11-03 | End: 2021-11-13

## 2021-11-19 ENCOUNTER — OFFICE VISIT (OUTPATIENT)
Dept: GASTROENTEROLOGY | Facility: CLINIC | Age: 71
End: 2021-11-19
Payer: MEDICARE

## 2021-11-19 VITALS
WEIGHT: 108 LBS | HEART RATE: 72 BPM | BODY MASS INDEX: 19.88 KG/M2 | DIASTOLIC BLOOD PRESSURE: 72 MMHG | SYSTOLIC BLOOD PRESSURE: 148 MMHG | HEIGHT: 62 IN | OXYGEN SATURATION: 94 %

## 2021-11-19 DIAGNOSIS — K57.32 DIVERTICULITIS OF LARGE INTESTINE WITHOUT PERFORATION OR ABSCESS WITHOUT BLEEDING: Primary | ICD-10-CM

## 2021-11-19 PROCEDURE — 99213 OFFICE O/P EST LOW 20 MIN: CPT | Performed by: PHYSICIAN ASSISTANT

## 2021-12-16 ENCOUNTER — PROCEDURE VISIT (OUTPATIENT)
Dept: NEUROLOGY | Facility: CLINIC | Age: 71
End: 2021-12-16
Payer: MEDICARE

## 2021-12-16 VITALS — SYSTOLIC BLOOD PRESSURE: 140 MMHG | TEMPERATURE: 97.6 F | HEART RATE: 75 BPM | DIASTOLIC BLOOD PRESSURE: 68 MMHG

## 2021-12-16 DIAGNOSIS — G43.709 CHRONIC MIGRAINE WITHOUT AURA WITHOUT STATUS MIGRAINOSUS, NOT INTRACTABLE: Primary | ICD-10-CM

## 2021-12-16 PROCEDURE — 64615 CHEMODENERV MUSC MIGRAINE: CPT | Performed by: PHYSICIAN ASSISTANT

## 2021-12-20 ENCOUNTER — CONSULT (OUTPATIENT)
Dept: INTERNAL MEDICINE CLINIC | Facility: CLINIC | Age: 71
End: 2021-12-20
Payer: MEDICARE

## 2021-12-20 VITALS
HEIGHT: 62 IN | BODY MASS INDEX: 19.77 KG/M2 | OXYGEN SATURATION: 97 % | WEIGHT: 107.4 LBS | HEART RATE: 68 BPM | SYSTOLIC BLOOD PRESSURE: 122 MMHG | DIASTOLIC BLOOD PRESSURE: 86 MMHG

## 2021-12-20 DIAGNOSIS — E78.2 MIXED HYPERLIPIDEMIA: ICD-10-CM

## 2021-12-20 DIAGNOSIS — Z01.818 PREOP GENERAL PHYSICAL EXAM: Primary | ICD-10-CM

## 2021-12-20 DIAGNOSIS — E89.0 POSTABLATIVE HYPOTHYROIDISM: ICD-10-CM

## 2021-12-20 DIAGNOSIS — R11.0 FUNCTIONAL NAUSEA: ICD-10-CM

## 2021-12-20 DIAGNOSIS — I10 ESSENTIAL HYPERTENSION: ICD-10-CM

## 2021-12-20 DIAGNOSIS — F43.21 GRIEF REACTION: ICD-10-CM

## 2021-12-20 DIAGNOSIS — G43.709 CHRONIC MIGRAINE WITHOUT AURA WITHOUT STATUS MIGRAINOSUS, NOT INTRACTABLE: ICD-10-CM

## 2021-12-20 PROCEDURE — 99215 OFFICE O/P EST HI 40 MIN: CPT | Performed by: NURSE PRACTITIONER

## 2021-12-20 RX ORDER — ROSUVASTATIN CALCIUM 20 MG/1
20 TABLET, COATED ORAL DAILY
Qty: 90 TABLET | Refills: 3 | Status: SHIPPED | OUTPATIENT
Start: 2021-12-20 | End: 2022-03-17 | Stop reason: SDUPTHER

## 2021-12-20 RX ORDER — FAMOTIDINE 20 MG/1
20 TABLET, FILM COATED ORAL 2 TIMES DAILY
Qty: 180 TABLET | Refills: 3 | Status: SHIPPED | OUTPATIENT
Start: 2021-12-20 | End: 2022-03-17 | Stop reason: SDUPTHER

## 2021-12-20 RX ORDER — LEVOTHYROXINE SODIUM 88 UG/1
88 TABLET ORAL DAILY
Qty: 90 TABLET | Refills: 3 | Status: SHIPPED | OUTPATIENT
Start: 2021-12-20 | End: 2022-03-17 | Stop reason: SDUPTHER

## 2021-12-20 RX ORDER — LOSARTAN POTASSIUM 50 MG/1
TABLET ORAL
Qty: 135 TABLET | Refills: 3 | Status: SHIPPED | OUTPATIENT
Start: 2021-12-20 | End: 2022-03-17 | Stop reason: SDUPTHER

## 2022-01-25 ENCOUNTER — OFFICE VISIT (OUTPATIENT)
Dept: NEUROLOGY | Facility: CLINIC | Age: 72
End: 2022-01-25
Payer: MEDICARE

## 2022-01-25 ENCOUNTER — TELEPHONE (OUTPATIENT)
Dept: NEUROLOGY | Facility: CLINIC | Age: 72
End: 2022-01-25

## 2022-01-25 VITALS
HEART RATE: 81 BPM | HEIGHT: 62 IN | SYSTOLIC BLOOD PRESSURE: 120 MMHG | BODY MASS INDEX: 19.69 KG/M2 | DIASTOLIC BLOOD PRESSURE: 70 MMHG | WEIGHT: 107 LBS | TEMPERATURE: 97.3 F

## 2022-01-25 DIAGNOSIS — G43.709 CHRONIC MIGRAINE WITHOUT AURA WITHOUT STATUS MIGRAINOSUS, NOT INTRACTABLE: Primary | ICD-10-CM

## 2022-01-25 PROCEDURE — 99213 OFFICE O/P EST LOW 20 MIN: CPT

## 2022-01-25 NOTE — ASSESSMENT & PLAN NOTE
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  She is here today for a six-month follow-up for her chronic migraines as well as Botox reauthorization  She has been on Botox for chronic migraines since as early as 2016 every 3 months  Since her last visit she was started on Emgality and uses Ubrelvy as needed as an abortive agent  At her last visit her migraines were improved to once a month or 2 migraines every 3 months, lasting up to 1 week at a time however she is now even further improved only experiencing 1 migraine every 3 months, lasting 2-3 days at a time  Her [place] is effective, although she tells me it does not work right away  When she does experience a migraine she still has vertigo and nausea associated with it  She is no longer using Tylenol or any other over-the-counter medications  She was prescribed cyproheptadine at her last appointment as well, however she tried this and experienced extreme dry mouth and has since discontinued the same  She continues with difficulty with sleep  She is an "early riser" she tells me and frequently wakes between 3:00-4:30 am and has difficulty going back to sleep, so gets up and starts her day  However, she tells me after doing this for 5-6 days, she does generally have 1 day a week where she will sleep until 6-7 am "catching up on sleep"  She has used [place] in the past however was referred to see sleep medicine and suggested to go for cognitive behavioral therapy and she is not interested in doing either  At this time Delta Muniz is thrilled with her migraine control  As such, we will make no changes to her medications at this time  She will continue with Botox every 3 months, Emgality monthly, and Ubrelvy as needed  She was educated today on good sleep hygiene, and suggested to try melatonin 3-5 mg at bedtime to see if this assists her with better sleep    She would like to keep her Botox appointments with Sophy Calix, and will see me in between at our [address] [place] [address] office for her office follow-up visits, as it is closer to her home  She will also continue follow-up with Ophthalmology closely for her thyroid eye disease and cataracts

## 2022-01-25 NOTE — PROGRESS NOTES
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  She is here today for a six-month follow-up for her chronic migraines as well as Botox reauthorization  She has been on Botox for chronic migraines since as early as 2016 every 3 months  Since her last visit she was started on Emgality and uses Ubrelvy as needed as an abortive agent  At her last visit her migraines were improved to once a month or 2 migraines every 3 months, lasting up to 1 week at a time however she is now even further improved only experiencing 1 migraine every 3 months, lasting 2-3 days at a time  Her [place] is effective, although she tells me it does not work right away  When she does experience a migraine she still has vertigo and nausea associated with it  She is no longer using Tylenol or any other over-the-counter medications  She was prescribed cyproheptadine at her last appointment as well, however she tried this and experienced extreme dry mouth and has since discontinued the same  She continues with difficulty with sleep  She is an "early riser" she tells me and frequently wakes between 3:00-4:30 am and has difficulty going back to sleep, so gets up and starts her day  However, she tells me after doing this for 5-6 days, she does generally have 1 day a week where she will sleep until 6-7 am "catching up on sleep"  She has used [place] in the past however was referred to see sleep medicine and suggested to go for cognitive behavioral therapy and she is not interested in doing either  At this time Irene Tapia is thrilled with her migraine control  As such, we will make no changes to her medications at this time  She will continue with Botox every 3 months, Emgality monthly, and Ubrelvy as needed    She was educated today on good sleep hygiene, and suggested to try melatonin 3-5 mg at bedtime to see if this assists her with better sleep  She would like to keep her Botox appointments with Amita Artis, and will see me in between at our [address] [place] [address] office for her office follow-up visits, as it is closer to her home  She will also continue follow-up with Ophthalmology closely for her thyroid eye disease and cataracts  Diagnoses and all orders for this visit:    Chronic migraine without aura without status migrainosus, not intractable           Subjective:    HPI 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, 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  She is here today for a six-month follow-up for her chronic migraines as well as Botox reauthorization  She has been on Botox for chronic migraines since as early as 2016 every 3 months  Since her last visit she was started on Emgality and uses Ubrelvy as needed as an abortive agent  At her last visit her migraines were improved to once a month or 2 migraines every 3 months, lasting up to 1 week at a time however she is now even further improved only experiencing 1 migraine every 3 months, lasting 2-3 days at a time  Her [place] Starling is effective, although she tells me it does not work right away  When she does experience a migraine she still has vertigo and nausea associated with  She is no longer using Tylenol or any other over-the-counter medications  She was prescribed cyproheptadine at her last appointment as well, however she tried this and experienced extreme dry mouth and has since discontinued the same  There were prior concerns regarding her appetite since her  passed away  It appears she has gained back about 5 lb, she states she is supplementing with protein shakes  She does have history of Grave's disease and has thyroid eye/dry eye    She also recently had bilateral eye cataract surgery, as such her eyes are always sensitive to light and she routinely wears sunglasses to protect her eyes  She follows closely with Ophthalmology and uses eye drops throughout the day  She is fully vaccinated against COVID and has a booster shot  She denies any neurologic complaints today other than that she continues with difficulty with sleep  She is an "early riser" she tells me and frequently wakes between 3:00-4:30 am and has difficulty going back to sleep, so gets up and starts her day  However, she tells me after doing this for 5-6 days, she does generally have 1 day a week where she will sleep until 6-7 am "catching up on sleep"  She has used [place] in the past however was referred to see sleep medicine and suggested to go for cognitive behavioral therapy and she is not interested in doing either  Information carried over from prior visit:    Previous medications:     Preventative:   Amitriptyline- cardiac arrhythmia  Gabapentin- did not tolerate   Venlafaxine  Tizanidine   atenolol  Voltaren gel  Methocarbamol  Diltiazem     Abortive: Toradol  Aspirin  Dexamethasone  Fioricet  Compazine  Tylenol      The following portions of the patient's history were reviewed and updated as appropriate: current medications, past family history, past medical history, past social history, past surgical history and problem list          Objective:    Blood pressure 120/70, pulse 81, temperature (!) 97 3 °F (36 3 °C), temperature source Temporal, height 5' 1 5" (1 562 m), weight 48 5 kg (107 lb), not currently breastfeeding  Neurological Exam     On neurological examination patient is alert, awake, oriented and in no distress  Speech is fluent without dysarthria or aphasia  Cranial nerves 2-12 were symmetrically intact bilaterally  No evidence of any focal weakness or sensory loss in the upper or lower extremities  Motor testing reveals 5/5 strength of the bilateral upper and lower extremities  There was no pronator drift  No fasciculations present  No abnormal involuntary movements  Finger- to-nose reveals no tremor or ataxia and intact proprioceptive function, no dysmetria was noted  Sensation was intact to vibration, light touch, and temperature in bilateral upper and lower extremities  Deep tendon reflexes were 2+ and symmetric in the bilateral upper and lower extremities  Able to rise easily without assistance from a seated position  Casual gait is normal including stance, stride, and arm swing  There is no tenderness on palpation of the cervical spinous process, with no radicular pain noted  ROS:    Review of Systems   Constitutional: Negative  Negative for appetite change and fever  HENT: Negative  Negative for hearing loss, tinnitus, trouble swallowing and voice change  Eyes: Negative  Negative for photophobia and pain  Respiratory: Negative  Negative for shortness of breath  Cardiovascular: Negative  Negative for palpitations  Gastrointestinal: Negative  Negative for nausea and vomiting  Endocrine: Negative  Negative for cold intolerance  Genitourinary: Negative  Negative for dysuria, frequency and urgency  Musculoskeletal: Negative  Negative for myalgias and neck pain  Skin: Negative  Negative for rash  Neurological: Negative  Negative for dizziness, tremors, seizures, syncope, facial asymmetry, speech difficulty, weakness, light-headedness, numbness and headaches  Hematological: Negative  Does not bruise/bleed easily  Psychiatric/Behavioral: Positive for sleep disturbance  Negative for confusion and hallucinations  Reviewed ROS as entered by medical assistant

## 2022-01-25 NOTE — PROGRESS NOTES
Patient ID: Kay Andujar is a 66 y o  female  Assessment/Plan:    No problem-specific Assessment & Plan notes found for this encounter  {Assess/PlanSmartLinks:48425}       Subjective:    HPI    {[facility] HPI texts:05836}    {Common ambulatory SmartLinks:83018}         Objective:    Blood pressure 120/70, pulse 81, temperature (!) 97 3 °F (36 3 °C), temperature source Temporal, height 5' 1 5" (1 562 m), weight 48 5 kg (107 lb), not currently breastfeeding  Physical Exam    Neurological Exam      ROS:    Review of Systems   Constitutional: Negative  Negative for appetite change and fever  HENT: Negative  Negative for hearing loss, tinnitus, trouble swallowing and voice change  Eyes: Negative  Negative for photophobia and pain  Respiratory: Negative  Negative for shortness of breath  Cardiovascular: Negative  Negative for palpitations  Gastrointestinal: Negative  Negative for nausea and vomiting  Endocrine: Negative  Negative for cold intolerance  Genitourinary: Negative  Negative for dysuria, frequency and urgency  Musculoskeletal: Negative  Negative for myalgias and neck pain  Skin: Negative  Negative for rash  Neurological: Negative  Negative for dizziness, tremors, seizures, syncope, facial asymmetry, speech difficulty, weakness, light-headedness, numbness and headaches  Hematological: Negative  Does not bruise/bleed easily  Psychiatric/Behavioral: Positive for sleep disturbance  Negative for confusion and hallucinations

## 2022-01-25 NOTE — PATIENT INSTRUCTIONS
Continue your medications unchanged  Try Melatonin 3-5 mg at bedtime   Continue Botox, as next scheduled

## 2022-01-26 ENCOUNTER — OFFICE VISIT (OUTPATIENT)
Dept: OBGYN CLINIC | Facility: CLINIC | Age: 72
End: 2022-01-26
Payer: MEDICARE

## 2022-01-26 VITALS
HEART RATE: 71 BPM | RESPIRATION RATE: 17 BRPM | HEIGHT: 62 IN | DIASTOLIC BLOOD PRESSURE: 70 MMHG | SYSTOLIC BLOOD PRESSURE: 110 MMHG | BODY MASS INDEX: 19.69 KG/M2 | WEIGHT: 107 LBS

## 2022-01-26 DIAGNOSIS — M65.341 TRIGGER RING FINGER OF RIGHT HAND: Primary | ICD-10-CM

## 2022-01-26 DIAGNOSIS — Z01.818 PRE-OP TESTING: ICD-10-CM

## 2022-01-26 PROCEDURE — 20550 NJX 1 TENDON SHEATH/LIGAMENT: CPT | Performed by: SURGERY

## 2022-01-26 PROCEDURE — 99214 OFFICE O/P EST MOD 30 MIN: CPT | Performed by: SURGERY

## 2022-01-26 RX ADMIN — DEXAMETHASONE SODIUM PHOSPHATE 40 MG: 100 INJECTION INTRAMUSCULAR; INTRAVENOUS at 11:41

## 2022-01-26 RX ADMIN — LIDOCAINE HYDROCHLORIDE 1 ML: 10 INJECTION, SOLUTION INFILTRATION; PERINEURAL at 11:41

## 2022-01-26 NOTE — PROGRESS NOTES
Zahra BUSH  Attending, Orthopaedic Surgery  Hand, Wrist, and Elbow Surgery  [facility]      ORTHOPAEDIC HAND, WRIST, AND ELBOW OFFICE  VISIT       ASSESSMENT/PLAN:      66year old female here for her right ring trigger finger  She has tried non operative treatments, cortisone injection, with partial relief  Patient is an avid skier and wishes to proceed with surgery post ski season  She would like to proceed with a 2nd cortisone injection today to help with her pain  She tolerated the injection well  The anatomy and physiology of trigger finger was discussed with the patient today in the office  Edema and increased contact pressure within the flexor tendons at the A1 pulley can cause pain, crepitation, and limitation of function  Treatment options include resting MP blocking splints to decrease edema, oral anti-inflammatory medications, home or formal therapy exercises, up to 2 steroid injections within the tendon sheath, or surgical release  While majority of patients do respond to conservative treatment, up to 20% may require surgical release  She will be wrapped up for 5 days, after 5 days surgical dressing comes off  She can wash with warm water and mild soap, but do not submerge in bath or dirty dish water  Suture will come out at 10-14 days  Patient wishes to proceed with the surgery under local        The patient verbalized understanding of exam findings and treatment plan  We engaged in the shared decision-making process and treatment options were discussed at length with the patient  Surgical and conservative management discussed today along with risks and benefits  Diagnoses and all orders for this visit:    Trigger ring finger of right hand        Follow Up:  Return for Follow up post- op  To Do Next Visit:  Re-evaluation of current issue      Operative Discussions:  Trigger Finger Release:  The anatomy and physiology of trigger finger was discussed with the patient today in the office  Edema and increased contact pressure within the flexor tendons at the A1 pulley can cause pain, crepitation, and limitation of function  Treatment options include resting MP blocking splints to decrease edema, oral anti-inflammatory medications, home or formal therapy exercises, up to 2 steroid injections or surgical release  While majority of patients do respond to conservative treatment, up to 20% may require surgical release  The patient has elected release of the trigger finger  The patient has elected to undergo a release of the A1 pulley (trigger finger)  A small incision will be made over the palmar aspect of the hand, the tendon sheath holding the flexor tendons will be released  In the postoperative period, light activities are allowed immediately, driving is allowed when narcotic medication has stopped, and the incision may get wet after 2 days  Heavy activities (lifting more than approximately 10 pounds) will be allowed after the follow up appointment in 1-2 weeks  While the pain and discomfort within the wrist typically improves rapidly, some residual discomfort may be present for up to 6 weeks  The nodule that is typically palpable in the palmar aspect of the hand will not be removed, as this would necessitate removal of a portion of the flexor tendon, however the catching, clicking, and locking should resolve  Approximate success rate is 98%  The risks and benefits of the procedure were explained to the patient, which include, but are not limited to: Bleeding, infection, recurrence, pain, scar, damage to tendons, damage to nerves, and damage to blood vessels, need for future surgery and complications related to anesthesia  If bony work is done, risks also include malunion and nonunion  These risks, along with alternative conservative treatment options, and postoperative protocols were voiced back and understood by the patient    All questions were answered to the patient's satisfaction  The patient agrees to comply with a standard postoperative protocol, and is willing to proceed  Education was provided via written and auditory forms  There were no barriers to learning  Written handouts regarding wound care, incision and scar care, and general preoperative information, as well as risks and benefits were provided to the patient       ____________________________________________________________________________________________________________________________________________      CHIEF COMPLAINT:  Chief Complaint   Patient presents with    Right Ring Finger - Locking, Pain       SUBJECTIVE:  Ammy Mccloud is a 66y o  year old RHD female who presents today for initial evaluation for her right ring trigger finger  Patient previously was treating with Dr Wells Sandifer gave her 1 cortisone injection to the pulley, 2021  Patient states that she had a 75% relief  At this time patient is interested in surgical intervention         Pain/symptom timing:  Worse during the day when active  Pain/symptom context:  Worse with activites and work  Pain/symptom modifying factors:  Rest makes better, activities make worse  Pain/symptom associated signs/symptoms: none    Prior treatment   · NSAIDsNo   · Injections Yes   · Bracing/Orthotics No    Physical Therapy No     I have personally reviewed all the relevant PMH, PSH, SH, FH, Medications and allergies      PAST MEDICAL HISTORY:  Past Medical History:   Diagnosis Date    Arthritis     GERD (gastroesophageal reflux disease)     Graves' disease     TREATED WITH RADIOACTIVE IODINE ON 13 (10 7 MCI) RESOLVED:     Hepatitis B virus infection     Nonmelanoma skin cancer     LAST ASSESSED: [id number]       PAST SURGICAL HISTORY:  Past Surgical History:   Procedure Laterality Date    APPENDECTOMY      ARTHROSCOPY KNEE Left     CATARACT EXTRACTION, BILATERAL       SECTION      COLONOSCOPY      Oct/2018    EYE SURGERY Bilateral     SURGERY OF THE ORBITS - ORBITAL DECOMPRESSION    TEMPORAL ARTERY BIOPSY / LIGATION      TONSILLECTOMY      TOTAL KNEE ARTHROPLASTY Right 2016    TKR       FAMILY HISTORY:  Family History   Problem Relation Age of Onset    Pancreatic cancer Mother 76    Coronary artery disease Father     Throat cancer Father     Diabetes Maternal Grandmother     Heart attack Maternal Grandfather         ACUTE MI    Cancer Paternal Grandmother         unknown where    Hyperlipidemia Paternal Grandfather     Cirrhosis Paternal Grandfather     No Known Problems Sister     No Known Problems Sister     Lung cancer Maternal Aunt 76    Cancer Maternal Aunt        SOCIAL HISTORY:  Social History     Tobacco Use    Smoking status: Former Smoker     Quit date:      Years since quittin 0    Smokeless tobacco: Never Used   Vaping Use    Vaping Use: Never used   Substance Use Topics    Alcohol use: No     Comment: (HISTORY)    Drug use: No       MEDICATIONS:    Current Outpatient Medications:     acetaminophen (TYLENOL) 500 mg tablet, Take 500 mg by mouth every 6 (six) hours , Disp: , Rfl:     aspirin 81 MG tablet, Take 1 tablet by mouth daily, Disp: , Rfl:     Diclofenac Sodium (VOLTAREN) 1 %, Apply 4 g topically 4 (four) times a day, Disp: 150 g, Rfl: 2    famotidine (PEPCID) 20 mg tablet, Take 1 tablet (20 mg total) by mouth 2 (two) times a day, Disp: 180 tablet, Rfl: 3    Galcanezumab-gnlm 120 MG/ML SOAJ, Inject 120 mg under the skin every 30 (thirty) days, Disp: 1 mL, Rfl: 11    ketorolac (TORADOL) 10 mg tablet, Take 1 tablet (10 mg total) by mouth every 6 (six) hours as needed for moderate pain (Patient taking differently: Take 10 mg by mouth every 6 (six) hours as needed for moderate pain ), Disp: 10 tablet, Rfl: 0    levothyroxine 88 mcg tablet, Take 1 tablet (88 mcg total) by mouth daily, Disp: 90 tablet, Rfl: 3    losartan (Cozaar) 50 mg tablet, 1-1/2 tablets by mouth daily, Disp: 135 tablet, Rfl: 3    Probiotic Product (PROBIOTIC-10 PO), Take by mouth, Disp: , Rfl:     prochlorperazine (COMPAZINE) 5 mg tablet, Take 1 tab by mouth every 6 hours if needed for nausea and vomiting  (Patient taking differently: Take 1 tab by mouth every 6 hours if needed for nausea and vomiting ), Disp: 30 tablet, Rfl: 0    rosuvastatin (CRESTOR) 20 MG tablet, Take 1 tablet (20 mg total) by mouth daily, Disp: 90 tablet, Rfl: 3    sertraline (ZOLOFT) 50 mg tablet, Take 1 tablet (50 mg total) by mouth daily, Disp: 90 tablet, Rfl: 3    Ubrogepant (UBRELVY) 100 MG tablet, Take 1 tab once prn for migraine  May repeat in 2 hours if needed  Max 2 doses per day  Max 8 days per month  90 day supply  , Disp: 24 tablet, Rfl: 3    ALLERGIES:  Allergies   Allergen Reactions    Fentanyl Vomiting     Action Taken: vomiting; Category: Adverse Reaction;   Protracted    Methimazole     Codeine GI Intolerance and Anxiety     Other reaction(s): Nausea and/or vomiting           REVIEW OF SYSTEMS:  Review of Systems   Constitutional: Negative for chills, fever and unexpected weight change  HENT: Negative for hearing loss, nosebleeds and sore throat  Eyes: Negative for pain, redness and visual disturbance  Respiratory: Negative for cough, shortness of breath and wheezing  Cardiovascular: Negative for chest pain, palpitations and leg swelling  Gastrointestinal: Negative for abdominal pain and nausea  Genitourinary: Negative for dyspareunia, dysuria and frequency  Skin: Negative for rash and wound  Neurological: Negative for dizziness, numbness and headaches  Psychiatric/Behavioral: Negative for decreased concentration and suicidal ideas  The patient is not nervous/anxious          VITALS:  Vitals:    01/26/22 1102   BP: 110/70   Pulse: 71   Resp: 17       LABS:  HgA1c: No results found for: HGBA1C  BMP:   Lab Results   Component Value Date    GLUCOSE 91 11/20/2015    CALCIUM 9 6 03/09/2021     11/20/2015 K 4 0 03/09/2021    CO2 27 03/09/2021     03/09/2021    BUN 18 03/09/2021    CREATININE 0 82 03/09/2021       _____________________________________________________  PHYSICAL EXAMINATION:  General: well developed and well nourished, alert, oriented times 3 and appears comfortable  Psychiatric: Normal  HEENT: Normocephalic, Atraumatic Trachea Midline, No torticollis  Pulmonary: No audible wheezing or respiratory distress   Abdomen/GI: Non tender, non distended   Cardiovascular: No pitting edema, 2+ radial pulse   Skin: No masses, erythema, lacerations, fluctation, ulcerations  Neurovascular: Sensation Intact to the Median, Ulnar, Radial Nerve, Motor Intact to the Median, Ulnar, Radial Nerve and Pulses Intact  Musculoskeletal: Normal, except as noted in detailed exam and in HPI  MUSCULOSKELETAL EXAMINATION:  right ring finger:  Positive palpable nodule over the A1 pulley  Positive tenderness to palpation over A1 pulley  Negative catching  Positive clicking       ___________________________________________________  STUDIES REVIEWED:  No images reviewed at today's visit    PROCEDURES PERFORMED:  Hand/upper extremity injection: R ring A1  Universal Protocol:  Consent: Verbal consent obtained  Risks and benefits: risks, benefits and alternatives were discussed  Consent given by: patient  Time out: Immediately prior to procedure a "time out" was called to verify the correct patient, procedure, equipment, support staff and site/side marked as required    Timeout called at: 1/26/2022 11:35 AM   Patient understanding: patient states understanding of the procedure being performed  Site marked: the operative site was marked  Patient identity confirmed: verbally with patient    Supporting Documentation  Indications: tendon swelling and pain   Procedure Details  Condition:trigger finger Location: ring finger - R ring A1   Preparation: Patient was prepped and draped in the usual sterile fashion  Needle size: 25 G  Ultrasound guidance: no  Approach: volar  Medications administered: 1 mL lidocaine 1 %; 40 mg dexamethasone 100 mg/10 mL    Patient tolerance: patient tolerated the procedure well with no immediate complications  Dressing:  Sterile dressing applied         _____________________________________________________      Scribe Attestation    I,:  Adalgisa Champion am acting as a scribe while in the presence of the attending physician :       I,:  Heather Caceres MD personally performed the services described in this documentation    as scribed in my presence :

## 2022-01-26 NOTE — PATIENT INSTRUCTIONS
What to Expect After Hand Surgery  Below are typical postoperative expectations and recommendations for our patients having hand/elbow surgery  Please understand, however, that every case and every patient are different so these recommendations are subject to change on an individual basis  Please be flexible if you are told something different on the day of surgery and understand that we do so with your best interest at heart  Postoperative care:    Elevate your hand above your elbow for 24-48 hours after surgery to help with swelling   Place your hand and arm over your head with motion at your shoulder three times a day   Do NOT apply any cream/ointment/oil to your incisions including antibiotic ointment, peroxide, etc     Do NOT soak your hands in standing water (dishwater, tubs, Jacuzzi's, pools, etc ) until given permission (typically 2-3 weeks after injury)   What to watch out for:    Increased numbness or tingling of your hand or fingers that is not relieved with elevation   Increasing pain that is not controlled with medication   Difficulty chewing, breathing, swallowing   Chest pains or shortness of breath   Fever over 101 4 degrees  Bandages:    Please keep bandages clean and dry, you will need to cover bandages with plastic bag or cast bag when showering  Any sutures will be removed in the office, please do not try and remove these on your own  Any steri-strips will fall off on their own or we will remove them in the office as well   For smaller procedures (carpal tunnel, trigger fingers, deQuervain's release, etc ) you will be able to remove the bandages 5-7 days from surgery  Once the bandages are removed you will be able to wash the hand and take short showers  Avoid soaking the wounds in any standing water (no dirty dishes, no pools/baths/hot tubs/ocean water, etc) until you are seen at your follow up visit      For fractures, tendon repairs, and other larger procedures we will typically have you keep the bandages on until we see you back in the office at your follow up visit  In some cases we may have you meet with occupational therapy before we see you back  If this is the case the therapist will remove your bandages for you and the above wound care instructions will apply   We emphasize that you do not put anything on the surgical wounds including neosporin, lotions, oils, peroxide, etc  These can delay wound healing and open you up to infections  Bandaids are okay in some cases, but should only be in place for a short time as they can hold moisture in and break down the wound  Motion and activity:    We encourage you to move any non-splinted fingers into a full tight fist as soon as possible after surgery unless otherwise specified by the surgical team  Hands get very stiff very quickly, so keep them moving!  Weight bearing status will depend on your surgery and will be specified in your postoperative instructions  Frequently we advise no lifting over 1-2 pounds with the operative hand, this allows you to perform activities of daily living (eating, brushing teeth/hair, etc), but also protects you from damaging the surgical site  In some cases we advise that you do not lift anything with the operative hand, but this will be specified in your instructions day of surgery   Depending on your job and what surgery you had done some patients can return to work shortly after surgery  Typically if your job involves heavy lifting, griping, or manual labor we advise that you do not work until we see you back for your first follow up visit  These jobs carry a high risk of surgical site infections and wound healing complications    Pain medication:    We will prescribe you a one-time script of narcotic pain medications for postoperative pain, the amount will depend on what surgery was done   In addition to this we typically recommend Tylenol and Ibuprofen/naproxen for pain control, these medications work best when alternated every 3-4 hours   Medications will vary between patients, so if you have any allergies or concerns please let us know and we can adjust our recommendations as needed   Please let us know if you already take narcotic pain medicine regularly or if you are already established with pain management  Often times in these cases you may have signed a pain agreement with the prescribing provider and we will need to discuss with them regarding your postoperative care  Follow-up:    Most follow up appointments are made when you schedule surgery, if you do not have a follow up by the time you schedule surgery please call the office to make an appointment  Typical follow up is 10-14 days from surgery unless otherwise specified

## 2022-01-27 RX ORDER — LIDOCAINE HYDROCHLORIDE 10 MG/ML
1 INJECTION, SOLUTION INFILTRATION; PERINEURAL
Status: COMPLETED | OUTPATIENT
Start: 2022-01-26 | End: 2022-01-26

## 2022-01-27 RX ORDER — DEXAMETHASONE SODIUM PHOSPHATE 100 MG/10ML
40 INJECTION INTRAMUSCULAR; INTRAVENOUS
Status: COMPLETED | OUTPATIENT
Start: 2022-01-26 | End: 2022-01-26

## 2022-01-27 NOTE — H&P
Markel BUSH  Attending, Orthopaedic Surgery  Hand, Wrist, and Elbow Surgery  [facility]      ORTHOPAEDIC HAND, WRIST, AND ELBOW OFFICE  VISIT       ASSESSMENT/PLAN:      66year old female here for her right ring trigger finger  She has tried non operative treatments, cortisone injection, with partial relief  Patient is an avid skier and wishes to proceed with surgery post ski season  She would like to proceed with a 2nd cortisone injection today to help with her pain  She tolerated the injection well  The anatomy and physiology of trigger finger was discussed with the patient today in the office  Edema and increased contact pressure within the flexor tendons at the A1 pulley can cause pain, crepitation, and limitation of function  Treatment options include resting MP blocking splints to decrease edema, oral anti-inflammatory medications, home or formal therapy exercises, up to 2 steroid injections within the tendon sheath, or surgical release  While majority of patients do respond to conservative treatment, up to 20% may require surgical release  She will be wrapped up for 5 days, after 5 days surgical dressing comes off  She can wash with warm water and mild soap, but do not submerge in bath or dirty dish water  Suture will come out at 10-14 days  Patient wishes to proceed with the surgery under local        The patient verbalized understanding of exam findings and treatment plan  We engaged in the shared decision-making process and treatment options were discussed at length with the patient  Surgical and conservative management discussed today along with risks and benefits  Diagnoses and all orders for this visit:    Trigger ring finger of right hand        Follow Up:  Return for Follow up post- op  To Do Next Visit:  Re-evaluation of current issue      Operative Discussions:  Trigger Finger Release:  The anatomy and physiology of trigger finger was discussed with the patient today in the office  Edema and increased contact pressure within the flexor tendons at the A1 pulley can cause pain, crepitation, and limitation of function  Treatment options include resting MP blocking splints to decrease edema, oral anti-inflammatory medications, home or formal therapy exercises, up to 2 steroid injections or surgical release  While majority of patients do respond to conservative treatment, up to 20% may require surgical release  The patient has elected release of the trigger finger  The patient has elected to undergo a release of the A1 pulley (trigger finger)  A small incision will be made over the palmar aspect of the hand, the tendon sheath holding the flexor tendons will be released  In the postoperative period, light activities are allowed immediately, driving is allowed when narcotic medication has stopped, and the incision may get wet after 2 days  Heavy activities (lifting more than approximately 10 pounds) will be allowed after the follow up appointment in 1-2 weeks  While the pain and discomfort within the wrist typically improves rapidly, some residual discomfort may be present for up to 6 weeks  The nodule that is typically palpable in the palmar aspect of the hand will not be removed, as this would necessitate removal of a portion of the flexor tendon, however the catching, clicking, and locking should resolve  Approximate success rate is 98%  The risks and benefits of the procedure were explained to the patient, which include, but are not limited to: Bleeding, infection, recurrence, pain, scar, damage to tendons, damage to nerves, and damage to blood vessels, need for future surgery and complications related to anesthesia  If bony work is done, risks also include malunion and nonunion  These risks, along with alternative conservative treatment options, and postoperative protocols were voiced back and understood by the patient    All questions were answered to the patient's satisfaction  The patient agrees to comply with a standard postoperative protocol, and is willing to proceed  Education was provided via written and auditory forms  There were no barriers to learning  Written handouts regarding wound care, incision and scar care, and general preoperative information, as well as risks and benefits were provided to the patient       ____________________________________________________________________________________________________________________________________________      CHIEF COMPLAINT:  Chief Complaint   Patient presents with    Right Ring Finger - Locking, Pain       SUBJECTIVE:  Beba Hassan is a 66y o  year old RHD female who presents today for initial evaluation for her right ring trigger finger  Patient previously was treating with Dr Michelle Fry gave her 1 cortisone injection to the pulley, 2021  Patient states that she had a 75% relief  At this time patient is interested in surgical intervention         Pain/symptom timing:  Worse during the day when active  Pain/symptom context:  Worse with activites and work  Pain/symptom modifying factors:  Rest makes better, activities make worse  Pain/symptom associated signs/symptoms: none    Prior treatment   · NSAIDsNo   · Injections Yes   · Bracing/Orthotics No    Physical Therapy No     I have personally reviewed all the relevant PMH, PSH, SH, FH, Medications and allergies      PAST MEDICAL HISTORY:  Past Medical History:   Diagnosis Date    Arthritis     GERD (gastroesophageal reflux disease)     Graves' disease     TREATED WITH RADIOACTIVE IODINE ON 13 (10 7 MCI) RESOLVED:     Hepatitis B virus infection     Nonmelanoma skin cancer     LAST ASSESSED: [id number]       PAST SURGICAL HISTORY:  Past Surgical History:   Procedure Laterality Date    APPENDECTOMY      ARTHROSCOPY KNEE Left     CATARACT EXTRACTION, BILATERAL       SECTION      COLONOSCOPY      Oct/2018    EYE SURGERY Bilateral     SURGERY OF THE ORBITS - ORBITAL DECOMPRESSION    TEMPORAL ARTERY BIOPSY / LIGATION      TONSILLECTOMY      TOTAL KNEE ARTHROPLASTY Right 2016    TKR       FAMILY HISTORY:  Family History   Problem Relation Age of Onset    Pancreatic cancer Mother 76    Coronary artery disease Father     Throat cancer Father     Diabetes Maternal Grandmother     Heart attack Maternal Grandfather         ACUTE MI    Cancer Paternal Grandmother         unknown where    Hyperlipidemia Paternal Grandfather     Cirrhosis Paternal Grandfather     No Known Problems Sister     No Known Problems Sister     Lung cancer Maternal Aunt 76    Cancer Maternal Aunt        SOCIAL HISTORY:  Social History     Tobacco Use    Smoking status: Former Smoker     Quit date:      Years since quittin 0    Smokeless tobacco: Never Used   Vaping Use    Vaping Use: Never used   Substance Use Topics    Alcohol use: No     Comment: (HISTORY)    Drug use: No       MEDICATIONS:    Current Outpatient Medications:     acetaminophen (TYLENOL) 500 mg tablet, Take 500 mg by mouth every 6 (six) hours , Disp: , Rfl:     aspirin 81 MG tablet, Take 1 tablet by mouth daily, Disp: , Rfl:     Diclofenac Sodium (VOLTAREN) 1 %, Apply 4 g topically 4 (four) times a day, Disp: 150 g, Rfl: 2    famotidine (PEPCID) 20 mg tablet, Take 1 tablet (20 mg total) by mouth 2 (two) times a day, Disp: 180 tablet, Rfl: 3    Galcanezumab-gnlm 120 MG/ML SOAJ, Inject 120 mg under the skin every 30 (thirty) days, Disp: 1 mL, Rfl: 11    ketorolac (TORADOL) 10 mg tablet, Take 1 tablet (10 mg total) by mouth every 6 (six) hours as needed for moderate pain (Patient taking differently: Take 10 mg by mouth every 6 (six) hours as needed for moderate pain ), Disp: 10 tablet, Rfl: 0    levothyroxine 88 mcg tablet, Take 1 tablet (88 mcg total) by mouth daily, Disp: 90 tablet, Rfl: 3    losartan (Cozaar) 50 mg tablet, 1-1/2 tablets by mouth daily, Disp: 135 tablet, Rfl: 3    Probiotic Product (PROBIOTIC-10 PO), Take by mouth, Disp: , Rfl:     prochlorperazine (COMPAZINE) 5 mg tablet, Take 1 tab by mouth every 6 hours if needed for nausea and vomiting  (Patient taking differently: Take 1 tab by mouth every 6 hours if needed for nausea and vomiting ), Disp: 30 tablet, Rfl: 0    rosuvastatin (CRESTOR) 20 MG tablet, Take 1 tablet (20 mg total) by mouth daily, Disp: 90 tablet, Rfl: 3    sertraline (ZOLOFT) 50 mg tablet, Take 1 tablet (50 mg total) by mouth daily, Disp: 90 tablet, Rfl: 3    Ubrogepant (UBRELVY) 100 MG tablet, Take 1 tab once prn for migraine  May repeat in 2 hours if needed  Max 2 doses per day  Max 8 days per month  90 day supply  , Disp: 24 tablet, Rfl: 3    ALLERGIES:  Allergies   Allergen Reactions    Fentanyl Vomiting     Action Taken: vomiting; Category: Adverse Reaction;   Protracted    Methimazole     Codeine GI Intolerance and Anxiety     Other reaction(s): Nausea and/or vomiting           REVIEW OF SYSTEMS:  Review of Systems   Constitutional: Negative for chills, fever and unexpected weight change  HENT: Negative for hearing loss, nosebleeds and sore throat  Eyes: Negative for pain, redness and visual disturbance  Respiratory: Negative for cough, shortness of breath and wheezing  Cardiovascular: Negative for chest pain, palpitations and leg swelling  Gastrointestinal: Negative for abdominal pain and nausea  Genitourinary: Negative for dyspareunia, dysuria and frequency  Skin: Negative for rash and wound  Neurological: Negative for dizziness, numbness and headaches  Psychiatric/Behavioral: Negative for decreased concentration and suicidal ideas  The patient is not nervous/anxious          VITALS:  Vitals:    01/26/22 1102   BP: 110/70   Pulse: 71   Resp: 17       LABS:  HgA1c: No results found for: HGBA1C  BMP:   Lab Results   Component Value Date    GLUCOSE 91 11/20/2015    CALCIUM 9 6 03/09/2021     11/20/2015 K 4 0 03/09/2021    CO2 27 03/09/2021     03/09/2021    BUN 18 03/09/2021    CREATININE 0 82 03/09/2021       _____________________________________________________  PHYSICAL EXAMINATION:  General: well developed and well nourished, alert, oriented times 3 and appears comfortable  Psychiatric: Normal  HEENT: Normocephalic, Atraumatic Trachea Midline, No torticollis  Pulmonary: No audible wheezing or respiratory distress   Abdomen/GI: Non tender, non distended   Cardiovascular: No pitting edema, 2+ radial pulse   Skin: No masses, erythema, lacerations, fluctation, ulcerations  Neurovascular: Sensation Intact to the Median, Ulnar, Radial Nerve, Motor Intact to the Median, Ulnar, Radial Nerve and Pulses Intact  Musculoskeletal: Normal, except as noted in detailed exam and in HPI  MUSCULOSKELETAL EXAMINATION:  right ring finger:  Positive palpable nodule over the A1 pulley  Positive tenderness to palpation over A1 pulley  Negative catching  Positive clicking       ___________________________________________________  STUDIES REVIEWED:  No images reviewed at today's visit    PROCEDURES PERFORMED:  Hand/upper extremity injection: R ring A1  Universal Protocol:  Consent: Verbal consent obtained  Risks and benefits: risks, benefits and alternatives were discussed  Consent given by: patient  Time out: Immediately prior to procedure a "time out" was called to verify the correct patient, procedure, equipment, support staff and site/side marked as required    Timeout called at: 1/26/2022 11:35 AM   Patient understanding: patient states understanding of the procedure being performed  Site marked: the operative site was marked  Patient identity confirmed: verbally with patient    Supporting Documentation  Indications: tendon swelling and pain   Procedure Details  Condition:trigger finger Location: ring finger - R ring A1   Preparation: Patient was prepped and draped in the usual sterile fashion  Needle size: 25 G  Ultrasound guidance: no  Approach: volar  Medications administered: 1 mL lidocaine 1 %; 40 mg dexamethasone 100 mg/10 mL    Patient tolerance: patient tolerated the procedure well with no immediate complications  Dressing:  Sterile dressing applied         _____________________________________________________      Scribe Attestation    I,:  Grace Cruz am acting as a scribe while in the presence of the attending physician :       I,:  Paul Thomas MD personally performed the services described in this documentation    as scribed in my presence :

## 2022-03-17 ENCOUNTER — OFFICE VISIT (OUTPATIENT)
Dept: INTERNAL MEDICINE CLINIC | Facility: CLINIC | Age: 72
End: 2022-03-17
Payer: MEDICARE

## 2022-03-17 ENCOUNTER — APPOINTMENT (OUTPATIENT)
Dept: LAB | Facility: CLINIC | Age: 72
End: 2022-03-17
Payer: MEDICARE

## 2022-03-17 ENCOUNTER — TELEPHONE (OUTPATIENT)
Dept: INTERNAL MEDICINE CLINIC | Facility: CLINIC | Age: 72
End: 2022-03-17

## 2022-03-17 VITALS
WEIGHT: 106.8 LBS | HEIGHT: 62 IN | SYSTOLIC BLOOD PRESSURE: 120 MMHG | OXYGEN SATURATION: 100 % | DIASTOLIC BLOOD PRESSURE: 70 MMHG | HEART RATE: 64 BPM | BODY MASS INDEX: 19.65 KG/M2 | TEMPERATURE: 97.4 F

## 2022-03-17 DIAGNOSIS — I10 ESSENTIAL HYPERTENSION: ICD-10-CM

## 2022-03-17 DIAGNOSIS — M89.9 DISORDER OF BONE: ICD-10-CM

## 2022-03-17 DIAGNOSIS — E78.2 MIXED HYPERLIPIDEMIA: ICD-10-CM

## 2022-03-17 DIAGNOSIS — Z23 ENCOUNTER FOR IMMUNIZATION: Primary | ICD-10-CM

## 2022-03-17 DIAGNOSIS — F43.21 GRIEF REACTION: ICD-10-CM

## 2022-03-17 DIAGNOSIS — J31.0 CHRONIC RHINITIS: ICD-10-CM

## 2022-03-17 DIAGNOSIS — E89.0 POSTABLATIVE HYPOTHYROIDISM: ICD-10-CM

## 2022-03-17 DIAGNOSIS — G43.009 MIGRAINE WITHOUT AURA AND WITHOUT STATUS MIGRAINOSUS, NOT INTRACTABLE: ICD-10-CM

## 2022-03-17 DIAGNOSIS — G44.209 TENSION HEADACHE: ICD-10-CM

## 2022-03-17 DIAGNOSIS — G43.709 CHRONIC MIGRAINE WITHOUT AURA WITHOUT STATUS MIGRAINOSUS, NOT INTRACTABLE: ICD-10-CM

## 2022-03-17 DIAGNOSIS — Z78.0 POSTMENOPAUSAL: ICD-10-CM

## 2022-03-17 DIAGNOSIS — R11.0 FUNCTIONAL NAUSEA: ICD-10-CM

## 2022-03-17 LAB
ALBUMIN SERPL BCP-MCNC: 3.8 G/DL (ref 3.5–5)
ALP SERPL-CCNC: 99 U/L (ref 46–116)
ALT SERPL W P-5'-P-CCNC: 23 U/L (ref 12–78)
ANION GAP SERPL CALCULATED.3IONS-SCNC: 3 MMOL/L (ref 4–13)
AST SERPL W P-5'-P-CCNC: 24 U/L (ref 5–45)
BASOPHILS # BLD AUTO: 0.12 THOUSANDS/ΜL (ref 0–0.1)
BASOPHILS NFR BLD AUTO: 1 % (ref 0–1)
BILIRUB SERPL-MCNC: 0.36 MG/DL (ref 0.2–1)
BILIRUB UR QL STRIP: NEGATIVE
BUN SERPL-MCNC: 16 MG/DL (ref 5–25)
CALCIUM SERPL-MCNC: 9.2 MG/DL (ref 8.3–10.1)
CHLORIDE SERPL-SCNC: 108 MMOL/L (ref 100–108)
CHOLEST SERPL-MCNC: 184 MG/DL
CLARITY UR: CLEAR
CO2 SERPL-SCNC: 29 MMOL/L (ref 21–32)
COLOR UR: NORMAL
CREAT SERPL-MCNC: 0.82 MG/DL (ref 0.6–1.3)
EOSINOPHIL # BLD AUTO: 0.15 THOUSAND/ΜL (ref 0–0.61)
EOSINOPHIL NFR BLD AUTO: 2 % (ref 0–6)
ERYTHROCYTE [DISTWIDTH] IN BLOOD BY AUTOMATED COUNT: 13 % (ref 11.6–15.1)
GFR SERPL CREATININE-BSD FRML MDRD: 71 ML/MIN/1.73SQ M
GLUCOSE P FAST SERPL-MCNC: 97 MG/DL (ref 65–99)
GLUCOSE UR STRIP-MCNC: NEGATIVE MG/DL
HCT VFR BLD AUTO: 47 % (ref 34.8–46.1)
HDLC SERPL-MCNC: 57 MG/DL
HGB BLD-MCNC: 15.6 G/DL (ref 11.5–15.4)
HGB UR QL STRIP.AUTO: NEGATIVE
IMM GRANULOCYTES # BLD AUTO: 0.04 THOUSAND/UL (ref 0–0.2)
IMM GRANULOCYTES NFR BLD AUTO: 1 % (ref 0–2)
KETONES UR STRIP-MCNC: NEGATIVE MG/DL
LDLC SERPL CALC-MCNC: 104 MG/DL (ref 0–100)
LEUKOCYTE ESTERASE UR QL STRIP: NEGATIVE
LYMPHOCYTES # BLD AUTO: 2.57 THOUSANDS/ΜL (ref 0.6–4.47)
LYMPHOCYTES NFR BLD AUTO: 29 % (ref 14–44)
MCH RBC QN AUTO: 31.5 PG (ref 26.8–34.3)
MCHC RBC AUTO-ENTMCNC: 33.2 G/DL (ref 31.4–37.4)
MCV RBC AUTO: 95 FL (ref 82–98)
MONOCYTES # BLD AUTO: 0.54 THOUSAND/ΜL (ref 0.17–1.22)
MONOCYTES NFR BLD AUTO: 6 % (ref 4–12)
NEUTROPHILS # BLD AUTO: 5.37 THOUSANDS/ΜL (ref 1.85–7.62)
NEUTS SEG NFR BLD AUTO: 61 % (ref 43–75)
NITRITE UR QL STRIP: NEGATIVE
NRBC BLD AUTO-RTO: 0 /100 WBCS
PH UR STRIP.AUTO: 6.5 [PH]
PLATELET # BLD AUTO: 294 THOUSANDS/UL (ref 149–390)
PMV BLD AUTO: 9.5 FL (ref 8.9–12.7)
POTASSIUM SERPL-SCNC: 4.3 MMOL/L (ref 3.5–5.3)
PROT SERPL-MCNC: 7.1 G/DL (ref 6.4–8.2)
PROT UR STRIP-MCNC: NEGATIVE MG/DL
RBC # BLD AUTO: 4.96 MILLION/UL (ref 3.81–5.12)
SODIUM SERPL-SCNC: 140 MMOL/L (ref 136–145)
SP GR UR STRIP.AUTO: 1.01 (ref 1–1.03)
TRIGL SERPL-MCNC: 116 MG/DL
TSH SERPL DL<=0.05 MIU/L-ACNC: 1.43 UIU/ML (ref 0.36–3.74)
UROBILINOGEN UR STRIP-ACNC: <2 MG/DL
WBC # BLD AUTO: 8.79 THOUSAND/UL (ref 4.31–10.16)

## 2022-03-17 PROCEDURE — 80061 LIPID PANEL: CPT

## 2022-03-17 PROCEDURE — 99214 OFFICE O/P EST MOD 30 MIN: CPT | Performed by: INTERNAL MEDICINE

## 2022-03-17 PROCEDURE — 80053 COMPREHEN METABOLIC PANEL: CPT

## 2022-03-17 PROCEDURE — 84443 ASSAY THYROID STIM HORMONE: CPT

## 2022-03-17 PROCEDURE — 81003 URINALYSIS AUTO W/O SCOPE: CPT | Performed by: INTERNAL MEDICINE

## 2022-03-17 PROCEDURE — 85025 COMPLETE CBC W/AUTO DIFF WBC: CPT

## 2022-03-17 PROCEDURE — G0439 PPPS, SUBSEQ VISIT: HCPCS | Performed by: INTERNAL MEDICINE

## 2022-03-17 PROCEDURE — 36415 COLL VENOUS BLD VENIPUNCTURE: CPT

## 2022-03-17 RX ORDER — LOSARTAN POTASSIUM 50 MG/1
TABLET ORAL
Qty: 135 TABLET | Refills: 3 | Status: SHIPPED | OUTPATIENT
Start: 2022-03-17

## 2022-03-17 RX ORDER — FAMOTIDINE 20 MG/1
20 TABLET, FILM COATED ORAL 2 TIMES DAILY
Qty: 180 TABLET | Refills: 3 | Status: SHIPPED | OUTPATIENT
Start: 2022-03-17

## 2022-03-17 RX ORDER — FLUTICASONE PROPIONATE 50 MCG
1 SPRAY, SUSPENSION (ML) NASAL DAILY
Qty: 1 G | Refills: 11 | Status: SHIPPED | OUTPATIENT
Start: 2022-03-17

## 2022-03-17 RX ORDER — PROCHLORPERAZINE MALEATE 5 MG/1
TABLET ORAL
Qty: 30 TABLET | Refills: 0 | Status: SHIPPED | OUTPATIENT
Start: 2022-03-17

## 2022-03-17 RX ORDER — KETOROLAC TROMETHAMINE 10 MG/1
10 TABLET, FILM COATED ORAL EVERY 6 HOURS PRN
Qty: 10 TABLET | Refills: 0 | Status: CANCELLED | OUTPATIENT
Start: 2022-03-17

## 2022-03-17 RX ORDER — ROSUVASTATIN CALCIUM 20 MG/1
20 TABLET, COATED ORAL DAILY
Qty: 90 TABLET | Refills: 3 | Status: SHIPPED | OUTPATIENT
Start: 2022-03-17

## 2022-03-17 RX ORDER — LEVOTHYROXINE SODIUM 88 UG/1
88 TABLET ORAL DAILY
Qty: 90 TABLET | Refills: 3 | Status: SHIPPED | OUTPATIENT
Start: 2022-03-17

## 2022-03-17 RX ORDER — FLUTICASONE PROPIONATE 50 MCG
1 SPRAY, SUSPENSION (ML) NASAL DAILY
COMMUNITY
End: 2022-03-17 | Stop reason: SDUPTHER

## 2022-03-17 NOTE — PROGRESS NOTES
Assessment/Plan:       Diagnoses and all orders for this visit:    Encounter for immunization    Tension headache  -     Diclofenac Sodium (VOLTAREN) 1 %; Apply 4 g topically 4 (four) times a day    Functional nausea  -     famotidine (PEPCID) 20 mg tablet; Take 1 tablet (20 mg total) by mouth 2 (two) times a day    Chronic migraine without aura without status migrainosus, not intractable  -     Galcanezumab-gnlm 120 MG/ML SOAJ; Inject 120 mg under the skin every 30 (thirty) days  -     Ubrogepant (UBRELVY) 100 MG tablet; Take 1 tab once prn for migraine  May repeat in 2 hours if needed  Max 2 doses per day  Max 8 days per month  90 day supply  Postablative hypothyroidism  -     levothyroxine 88 mcg tablet; Take 1 tablet (88 mcg total) by mouth daily    Essential hypertension  -     losartan (Cozaar) 50 mg tablet; 1-1/2 tablets by mouth daily  -     Lipid Panel with Direct LDL reflex; Future  -     CBC and differential; Future  -     Comprehensive metabolic panel; Future  -     TSH, 3rd generation with Free T4 reflex; Future  -     UA (URINE) with reflex to Scope    Migraine without aura and without status migrainosus, not intractable  -     prochlorperazine (COMPAZINE) 5 mg tablet; Take 1 tab by mouth every 6 hours if needed for nausea and vomiting  Mixed hyperlipidemia  -     rosuvastatin (CRESTOR) 20 MG tablet; Take 1 tablet (20 mg total) by mouth daily  -     Lipid Panel with Direct LDL reflex; Future  -     CBC and differential; Future  -     Comprehensive metabolic panel; Future  -     TSH, 3rd generation with Free T4 reflex; Future  -     UA (URINE) with reflex to Scope    Grief reaction  -     sertraline (ZOLOFT) 50 mg tablet; Take 1 tablet (50 mg total) by mouth daily    Chronic rhinitis  -     fluticasone (FLONASE) 50 mcg/act nasal spray; 1 spray into each nostril daily    Disorder of bone    Postmenopausal  -     DXA bone density spine hip and pelvis;  Future    Other orders  -     Discontinue: fluticasone (FLONASE) 50 mcg/act nasal spray; 1 spray into each nostril daily                Subjective:      Patient ID: Beba Hassan is a 67 y o  female  70-year-old female   Her    unexpectedly several years ago and she has been battling with the expected grief reaction  This is probably about word ought to be     Chronic migraine this her major symptom  She sees neurology has had numerous procedures  Right now she is getting Botox and taking Emgality  This has afforded a lot of relief   Bev Palm helps acute episodes    Hypertension treated with losartan  Associated diastolic dysfunction noted  No symptoms referable to this     Hyperlipidemia treated with atorvastatin     Post ablated hypothyroidism on levothyroxine needs a recheck     Colonoscopy in 2018   Mammograms done yearly     Overall, physically she feels reasonably well and is coping      The following portions of the patient's history were reviewed and updated as appropriate:   She has a past medical history of Arthritis, GERD (gastroesophageal reflux disease), Graves' disease, Hepatitis B virus infection, and Nonmelanoma skin cancer  ,  does not have any pertinent problems on file  ,   has a past surgical history that includes Appendectomy; ARTHROSCOPY KNEE (Left); Temporal artery biopsy / ligation;  section; Total knee arthroplasty (Right, 2016); Eye surgery (Bilateral); Tonsillectomy; Colonoscopy; and Cataract extraction, bilateral ,  family history includes Cancer in her maternal aunt and paternal grandmother; Cirrhosis in her paternal grandfather; Coronary artery disease in her father; Diabetes in her maternal grandmother; Heart attack in her maternal grandfather; Hyperlipidemia in her paternal grandfather; Lung cancer (age of onset: 76) in her maternal aunt; No Known Problems in her sister and sister; Pancreatic cancer (age of onset: 76) in her mother; Throat cancer in her father  ,   reports that she quit smoking about 22 years ago  She has never used smokeless tobacco  She reports that she does not drink alcohol and does not use drugs  ,  is allergic to fentanyl, methimazole, and codeine     Current Outpatient Medications   Medication Sig Dispense Refill    acetaminophen (TYLENOL) 500 mg tablet Take 500 mg by mouth every 6 (six) hours       aspirin 81 MG tablet Take 1 tablet by mouth daily      Diclofenac Sodium (VOLTAREN) 1 % Apply 4 g topically 4 (four) times a day 150 g 2    famotidine (PEPCID) 20 mg tablet Take 1 tablet (20 mg total) by mouth 2 (two) times a day 180 tablet 3    fluticasone (FLONASE) 50 mcg/act nasal spray 1 spray into each nostril daily 1 g 11    Galcanezumab-gnlm 120 MG/ML SOAJ Inject 120 mg under the skin every 30 (thirty) days 1 mL 11    ketorolac (TORADOL) 10 mg tablet Take 1 tablet (10 mg total) by mouth every 6 (six) hours as needed for moderate pain (Patient taking differently: Take 10 mg by mouth every 6 (six) hours as needed for moderate pain ) 10 tablet 0    levothyroxine 88 mcg tablet Take 1 tablet (88 mcg total) by mouth daily 90 tablet 3    losartan (Cozaar) 50 mg tablet 1-1/2 tablets by mouth daily 135 tablet 3    Probiotic Product (PROBIOTIC-10 PO) Take by mouth      prochlorperazine (COMPAZINE) 5 mg tablet Take 1 tab by mouth every 6 hours if needed for nausea and vomiting  30 tablet 0    rosuvastatin (CRESTOR) 20 MG tablet Take 1 tablet (20 mg total) by mouth daily 90 tablet 3    sertraline (ZOLOFT) 50 mg tablet Take 1 tablet (50 mg total) by mouth daily 90 tablet 3    Ubrogepant (UBRELVY) 100 MG tablet Take 1 tab once prn for migraine  May repeat in 2 hours if needed  Max 2 doses per day  Max 8 days per month  90 day supply  24 tablet 3     No current facility-administered medications for this visit  Review of Systems   Musculoskeletal: Positive for arthralgias  Neurological: Positive for headaches  Psychiatric/Behavioral: Positive for dysphoric mood     All other systems reviewed and are negative  Objective:  Vitals:    03/17/22 0832   BP: 120/70   Pulse: 64   Temp: (!) 97 4 °F (36 3 °C)   SpO2: 100%      Physical Exam  Constitutional:       Appearance: She is well-developed  HENT:      Head: Normocephalic and atraumatic  Eyes:      Pupils: Pupils are equal, round, and reactive to light  Neck:      Thyroid: No thyromegaly  Trachea: No tracheal deviation  Cardiovascular:      Rate and Rhythm: Normal rate and regular rhythm  Heart sounds: Normal heart sounds  No murmur heard  No gallop  Pulmonary:      Effort: No respiratory distress  Breath sounds: No wheezing or rales  Abdominal:      General: Bowel sounds are normal       Palpations: Abdomen is soft  Tenderness: There is no abdominal tenderness  Musculoskeletal:         General: No tenderness or deformity  Normal range of motion  Cervical back: Normal range of motion and neck supple  Skin:     General: Skin is warm  Neurological:      Mental Status: She is alert and oriented to person, place, and time  Coordination: Coordination normal    Psychiatric:         Judgment: Judgment normal            There are no Patient Instructions on file for this visit

## 2022-03-17 NOTE — TELEPHONE ENCOUNTER
Pt saw dr Hanley Spurling today, isn't going to get bone density test done, but would like an order for mammo    Please advise, call back upon completion

## 2022-03-17 NOTE — PROGRESS NOTES
Assessment and Plan:     Problem List Items Addressed This Visit        Digestive    Functional nausea    Relevant Medications    famotidine (PEPCID) 20 mg tablet       Endocrine    Postablative hypothyroidism    Relevant Medications    levothyroxine 88 mcg tablet       Cardiovascular and Mediastinum    Migraine without aura or status migrainosus    Relevant Medications    Galcanezumab-gnlm 120 MG/ML SOAJ    prochlorperazine (COMPAZINE) 5 mg tablet    sertraline (ZOLOFT) 50 mg tablet    Ubrogepant (UBRELVY) 100 MG tablet    Essential hypertension    Relevant Medications    losartan (Cozaar) 50 mg tablet    Other Relevant Orders    Lipid Panel with Direct LDL reflex    CBC and differential    Comprehensive metabolic panel    TSH, 3rd generation with Free T4 reflex    UA (URINE) with reflex to Scope    Chronic migraine without aura without status migrainosus, not intractable    Relevant Medications    Galcanezumab-gnlm 120 MG/ML SOAJ    sertraline (ZOLOFT) 50 mg tablet    Ubrogepant (UBRELVY) 100 MG tablet       Other    Tension headache    Relevant Medications    Diclofenac Sodium (VOLTAREN) 1 %    Galcanezumab-gnlm 120 MG/ML SOAJ    sertraline (ZOLOFT) 50 mg tablet    Ubrogepant (UBRELVY) 100 MG tablet    Mixed hyperlipidemia    Relevant Medications    rosuvastatin (CRESTOR) 20 MG tablet    Other Relevant Orders    Lipid Panel with Direct LDL reflex    CBC and differential    Comprehensive metabolic panel    TSH, 3rd generation with Free T4 reflex    UA (URINE) with reflex to Scope    Grief reaction    Relevant Medications    sertraline (ZOLOFT) 50 mg tablet      Other Visit Diagnoses     Encounter for immunization    -  Primary    Chronic rhinitis        Relevant Medications    fluticasone (FLONASE) 50 mcg/act nasal spray    Disorder of bone        Postmenopausal        Relevant Orders    DXA bone density spine hip and pelvis           Preventive health issues were discussed with patient, and age appropriate screening tests were ordered as noted in patient's After Visit Summary  Personalized health advice and appropriate referrals for health education or preventive services given if needed, as noted in patient's After Visit Summary       History of Present Illness:     Patient presents for Medicare Annual Wellness visit    Patient Care Team:  Maine Savage MD as PCP - General  DO Cammy Felipe MD Garwin Shingles, MD Rolley Skeen, DO (Gastroenterology)     Problem List:     Patient Active Problem List   Diagnosis    Arthritis    Migraine without aura or status migrainosus    GERD (gastroesophageal reflux disease)    Tension headache    Postablative hypothyroidism    Diastolic dysfunction    Essential hypertension    Chronic migraine without aura without status migrainosus, not intractable    Functional nausea    Bile duct abnormality    Mixed hyperlipidemia    Grief reaction    Adjustment insomnia      Past Medical and Surgical History:     Past Medical History:   Diagnosis Date    Arthritis     GERD (gastroesophageal reflux disease)     Graves' disease     TREATED WITH RADIOACTIVE IODINE ON 13 (10 7 MCI) RESOLVED:     Hepatitis B virus infection     Nonmelanoma skin cancer     LAST ASSESSED: [id number]     Past Surgical History:   Procedure Laterality Date    APPENDECTOMY      ARTHROSCOPY KNEE Left     CATARACT EXTRACTION, BILATERAL       SECTION      COLONOSCOPY      Oct/2018    EYE SURGERY Bilateral     SURGERY OF THE ORBITS - ORBITAL DECOMPRESSION    TEMPORAL ARTERY BIOPSY / LIGATION      TONSILLECTOMY      TOTAL KNEE ARTHROPLASTY Right 2016    TKR      Family History:     Family History   Problem Relation Age of Onset    Pancreatic cancer Mother 76    Coronary artery disease Father     Throat cancer Father     Diabetes Maternal Grandmother     Heart attack Maternal Grandfather         ACUTE MI    Cancer Paternal Grandmother         unknown where    Hyperlipidemia Paternal Grandfather     Cirrhosis Paternal Grandfather     No Known Problems Sister     No Known Problems Sister     Lung cancer Maternal Aunt 76    Cancer Maternal Aunt       Social History:     Social History     Socioeconomic History    Marital status:       Spouse name: None    Number of children: None    Years of education: 16    Highest education level: None   Occupational History     Comment: RETIRED   Tobacco Use    Smoking status: Former Smoker     Quit date:      Years since quittin 2    Smokeless tobacco: Never Used   Vaping Use    Vaping Use: Never used   Substance and Sexual Activity    Alcohol use: No     Comment: (HISTORY)    Drug use: No    Sexual activity: Not Currently     Partners: Male   Other Topics Concern    None   Social History Narrative    CAFFEINE USE    DAILY COFFEE CONSUMPTION (2 CUPS/DAY)    NO ADVANCE DIRECTIVE ON [facility]     Social Determinants of Health     Financial Resource Strain: Not on file   Food Insecurity: Not on file   Transportation Needs: Not on file   Physical Activity: Not on file   Stress: Not on file   Social Connections: Not on file   Intimate Partner Violence: Not on file   Housing Stability: Not on file      Medications and Allergies:     Current Outpatient Medications   Medication Sig Dispense Refill    acetaminophen (TYLENOL) 500 mg tablet Take 500 mg by mouth every 6 (six) hours       aspirin 81 MG tablet Take 1 tablet by mouth daily      Diclofenac Sodium (VOLTAREN) 1 % Apply 4 g topically 4 (four) times a day 150 g 2    famotidine (PEPCID) 20 mg tablet Take 1 tablet (20 mg total) by mouth 2 (two) times a day 180 tablet 3    fluticasone (FLONASE) 50 mcg/act nasal spray 1 spray into each nostril daily 1 g 11    Galcanezumab-gnlm 120 MG/ML SOAJ Inject 120 mg under the skin every 30 (thirty) days 1 mL 11    ketorolac (TORADOL) 10 mg tablet Take 1 tablet (10 mg total) by mouth every 6 (six) hours as needed for moderate pain (Patient taking differently: Take 10 mg by mouth every 6 (six) hours as needed for moderate pain ) 10 tablet 0    levothyroxine 88 mcg tablet Take 1 tablet (88 mcg total) by mouth daily 90 tablet 3    losartan (Cozaar) 50 mg tablet 1-1/2 tablets by mouth daily 135 tablet 3    Probiotic Product (PROBIOTIC-10 PO) Take by mouth      prochlorperazine (COMPAZINE) 5 mg tablet Take 1 tab by mouth every 6 hours if needed for nausea and vomiting  30 tablet 0    rosuvastatin (CRESTOR) 20 MG tablet Take 1 tablet (20 mg total) by mouth daily 90 tablet 3    sertraline (ZOLOFT) 50 mg tablet Take 1 tablet (50 mg total) by mouth daily 90 tablet 3    Ubrogepant (UBRELVY) 100 MG tablet Take 1 tab once prn for migraine  May repeat in 2 hours if needed  Max 2 doses per day  Max 8 days per month  90 day supply  24 tablet 3     No current facility-administered medications for this visit  Allergies   Allergen Reactions    Fentanyl Vomiting     Action Taken: vomiting; Category:  Adverse Reaction;   Protracted    Methimazole     Codeine GI Intolerance and Anxiety     Other reaction(s): Nausea and/or vomiting      Immunizations:     Immunization History   Administered Date(s) Administered    COVID-19 PFIZER VACCINE 0 3 ML IM 03/18/2021, 04/08/2021, 08/20/2021    INFLUENZA 11/20/2015, 10/25/2017, 10/03/2018, 10/03/2018, 09/08/2020    Influenza Split High Dose Preservative Free IM 11/20/2015, 10/25/2017    Influenza, seasonal, injectable 11/19/2013    Pneumococcal Conjugate 13-Valent 09/29/2016    Pneumococcal Polysaccharide PPV23 09/26/2014    Tdap 1950    Zoster 10/03/2014    Zoster Vaccine Recombinant 09/08/2020, 11/19/2020    influenza, trivalent, adjuvanted 10/03/2018, 10/03/2018      Health Maintenance:         Topic Date Due    DXA SCAN  Never done    Breast Cancer Screening: Mammogram  07/14/2022    Colorectal Cancer Screening  09/28/2023    Hepatitis C Screening  Completed Topic Date Due    DTaP,Tdap,and Td Vaccines (1 - Tdap) 03/14/1970    Influenza Vaccine (1) 09/01/2021      Medicare Health Risk Assessment:     /70   Pulse 64   Temp (!) 97 4 °F (36 3 °C)   Ht 5' 1 5" (1 562 m)   Wt 48 4 kg (106 lb 12 8 oz)   SpO2 100%   BMI 19 85 kg/m²      Ok May is here for her Subsequent Wellness visit  Last Medicare Wellness visit information reviewed, patient interviewed and updates made to the record today        PREVENTIVE SCREENINGS      Cardiovascular Screening:    General: Screening Not Indicated and History Lipid Disorder      Colorectal Cancer Screening:     General: Screening Current      Breast Cancer Screening:     General: Screening Current      Cervical Cancer Screening:    General: Screening Not Indicated      Lung Cancer Screening:     General: Screening Not Indicated      Hepatitis C Screening:    General: Screening Current      Kelley Ruiz MD

## 2022-03-17 NOTE — PATIENT INSTRUCTIONS
Multiple chronic diagnoses at a stable baseline  Laboratory testing yearly follow-up   medications renewed

## 2022-03-18 ENCOUNTER — PROCEDURE VISIT (OUTPATIENT)
Dept: NEUROLOGY | Facility: CLINIC | Age: 72
End: 2022-03-18
Payer: MEDICARE

## 2022-03-18 VITALS — TEMPERATURE: 97 F | DIASTOLIC BLOOD PRESSURE: 74 MMHG | HEART RATE: 78 BPM | SYSTOLIC BLOOD PRESSURE: 141 MMHG

## 2022-03-18 DIAGNOSIS — G43.709 CHRONIC MIGRAINE WITHOUT AURA WITHOUT STATUS MIGRAINOSUS, NOT INTRACTABLE: Primary | ICD-10-CM

## 2022-03-18 PROCEDURE — 64615 CHEMODENERV MUSC MIGRAINE: CPT | Performed by: PHYSICIAN ASSISTANT

## 2022-03-18 NOTE — PROGRESS NOTES
Universal Protocol   Consent: Verbal consent obtained  Written consent obtained  Risks and benefits: risks, benefits and alternatives were discussed  Consent given by: patient  Time out: Immediately prior to procedure a "time out" was called to verify the correct patient, procedure, equipment, support staff and site/side marked as required  Patient understanding: patient states understanding of the procedure being performed  Patient consent: the patient's understanding of the procedure matches consent given  Procedure consent: procedure consent matches procedure scheduled  Relevant documents: relevant documents present and verified  Patient identity confirmed: verbally with patient        Chemodenervation     Date/Time 3/18/2022 11:18 AM     Performed by  Jeannine Avalos PA-C     Authorized by Jeannine Avalos PA-C        Pre-procedure details      Prepped With: Alcohol     Anesthesia  (see MAR for exact dosages):      Anesthesia method:  None   Procedure details     Position:  Upright   Botox     Botox Type:  Type A    Brand:  Botox    mL's of Botulinum Toxin:  200    Final Concentration per CC:  50 units    Needle Gauge:  30 G 2 5 inch   Procedures     Botox Procedures: chronic headache      Indications: migraines     Injection Location      Head / Face:  L superior cervical paraspinal, R superior cervical paraspinal, L medial occipitalis, R medial occipitalis, R temporalis, L temporalis, R superior trapezius and L superior trapezius    L temporalis injection amount:  25 unit(s)    R temporalis injection amount:  25 unit(s)    L medial occipitalis injection amount:  20 unit(s)    R medial occipitalis injection amount:  20 unit(s)    L superior cervical paraspinal injection amount:  10 unit(s)    R superior cervical paraspinal injection amount:  10 unit(s)    L superior trapezius injection amount:  20 unit(s)    R superior trapezius injection amount:  20 unit(s)   Total Units     Total units used:  200    Total units discarded:  0   Post-procedure details      Chemodenervation:  Chronic migraine    Facial Nerve Location[de-identified]  Bilateral facial nerve    Patient tolerance of procedure:   Tolerated well, no immediate complications   Comments      5 units TMJ bilaterally  5 units masseter bilaterally  30 units apex  All medically necessary

## 2022-04-11 ENCOUNTER — OFFICE VISIT (OUTPATIENT)
Dept: DERMATOLOGY | Facility: CLINIC | Age: 72
End: 2022-04-11
Payer: MEDICARE

## 2022-04-11 VITALS — HEIGHT: 62 IN | TEMPERATURE: 98.1 F | BODY MASS INDEX: 19.51 KG/M2 | WEIGHT: 106 LBS

## 2022-04-11 DIAGNOSIS — Z85.828 HISTORY OF SKIN CANCER: ICD-10-CM

## 2022-04-11 DIAGNOSIS — L56.5 DSAP (DISSEMINATED SUPERFICIAL ACTINIC POROKERATOSIS): ICD-10-CM

## 2022-04-11 DIAGNOSIS — Z12.83 SCREENING FOR SKIN CANCER: ICD-10-CM

## 2022-04-11 DIAGNOSIS — L57.0 ACTINIC KERATOSIS: Primary | ICD-10-CM

## 2022-04-11 DIAGNOSIS — L82.1 SEBORRHEIC KERATOSIS: ICD-10-CM

## 2022-04-11 PROCEDURE — 17003 DESTRUCT PREMALG LES 2-14: CPT | Performed by: DERMATOLOGY

## 2022-04-11 PROCEDURE — 17000 DESTRUCT PREMALG LESION: CPT | Performed by: DERMATOLOGY

## 2022-04-11 PROCEDURE — 99213 OFFICE O/P EST LOW 20 MIN: CPT | Performed by: DERMATOLOGY

## 2022-04-11 NOTE — PATIENT INSTRUCTIONS
Actinic Keratosis:  Patient advised lesions are precancers  These should resolve with cryosurgery if not to let us know sun block recommended  Disseminated superficial actinic porokeratosis no change noted continue to monitor  Seborrheic keratosis patient reassured these are normal growths we acquire with age no treatment needed  History of skin cancer in no recurrence nothing else atypical sunblock recommended follow-up in 1 year  Screening for dermatologic disorders nothing else of concern noted on complete exam follow-up in 1 year  Treatment with Cryotherapy    The doctor has treated your skin with nitrogen, which is 320 degrees Fahrenheit below zero  He has given the treated area "frostbite "    Stinging should subside within a few hours  You can take Tylenol for pain, if needed  Over the next few days, it is normal if the area becomes reddened, a blood blister, or swollen with fluid  If the lesion treated was near the eye - you could get a swollen eye over the next few days  Do not panic! This is all temporary, and will resolve with time  There is no special treatment - just keep the area clean  Makeup and BandAids can be used, if preferred  When the area starts to dry up and peel off, using Vaseline can help healing  It usually takes up to a month for it to heal   Some lesions are recurrent and may require repeat treatments  If a lesion has not healed in one month, please don't hesitate to contact us  If you have any further questions that are not answered here, please call us  [phone]    Thank you for allowing us to care for you

## 2022-04-11 NOTE — PROGRESS NOTES
[facility] DERMATOLOGY  [address] [place] [id number]  [phone]  [phone]     MRN: [id number] : 1950  Encounter: [id number]  Patient Information: Deepak Lewis  Chief complaint: yearly check up    History of present illness:  76-year-old female with previous history nonmelanoma skin cancer actinic keratosis in known disseminated superficial actinic porokeratosis presents for follow-up    Notes some scaling areas on face and scalp bothersome to  Past Medical History:   Diagnosis Date    Arthritis     GERD (gastroesophageal reflux disease)     Graves' disease     TREATED WITH RADIOACTIVE IODINE ON 13 (10 7 MCI) RESOLVED:     Hepatitis B virus infection     Nonmelanoma skin cancer     LAST ASSESSED: [id number]     Past Surgical History:   Procedure Laterality Date    APPENDECTOMY      ARTHROSCOPY KNEE Left     CATARACT EXTRACTION, BILATERAL       SECTION      COLONOSCOPY      Oct/2018    EYE SURGERY Bilateral     SURGERY OF THE ORBITS - ORBITAL DECOMPRESSION    TEMPORAL ARTERY BIOPSY / LIGATION      TONSILLECTOMY      TOTAL KNEE ARTHROPLASTY Right 2016    TKR     Social History   Social History     Substance and Sexual Activity   Alcohol Use No    Comment: (HISTORY)     Social History     Substance and Sexual Activity   Drug Use No     Social History     Tobacco Use   Smoking Status Former Smoker    Quit date:     Years since quittin 2   Smokeless Tobacco Never Used     Family History   Problem Relation Age of Onset    Pancreatic cancer Mother 76    Coronary artery disease Father     Throat cancer Father     Diabetes Maternal Grandmother     Heart attack Maternal Grandfather         ACUTE MI    Cancer Paternal Grandmother         unknown where    Hyperlipidemia Paternal Grandfather     Cirrhosis Paternal Grandfather     No Known Problems Sister     No Known Problems Sister     Lung cancer Maternal Aunt 76    Cancer Maternal Aunt      Meds/Allergies   Allergies   Allergen Reactions    Fentanyl Vomiting     Action Taken: vomiting; Category: Adverse Reaction;   Protracted    Methimazole     Codeine GI Intolerance and Anxiety     Other reaction(s): Nausea and/or vomiting       Meds:  Prior to Admission medications    Medication Sig Start Date End Date Taking? Authorizing Provider   acetaminophen (TYLENOL) 500 mg tablet Take 500 mg by mouth every 6 (six) hours  3/22/16   Historical Provider, MD   aspirin 81 MG tablet Take 1 tablet by mouth daily    Historical Provider, MD   Diclofenac Sodium (VOLTAREN) 1 % Apply 4 g topically 4 (four) times a day 3/17/22   Lucy Lubin MD   famotidine (PEPCID) 20 mg tablet Take 1 tablet (20 mg total) by mouth 2 (two) times a day 3/17/22   Lucy Lubin MD   fluticasone [facility]) 50 mcg/act nasal spray 1 spray into each nostril daily 3/17/22   Lucy Lubin MD   Galcanezumab-gnlm 120 MG/ML SOAJ Inject 120 mg under the skin every 30 (thirty) days 3/17/22   Lucy Lubin MD   ketorolac (TORADOL) 10 mg tablet Take 1 tablet (10 mg total) by mouth every 6 (six) hours as needed for moderate pain  Patient taking differently: Take 10 mg by mouth every 6 (six) hours as needed for moderate pain  5/28/20   Zeeshan Mccarty PA-C   levothyroxine 88 mcg tablet Take 1 tablet (88 mcg total) by mouth daily 3/17/22   Lucy Lubin MD   losartan (Cozaar) 50 mg tablet 1-1/2 tablets by mouth daily 3/17/22   Lucy Lubin MD   Probiotic Product (PROBIOTIC-10 PO) Take by mouth    Historical Provider, MD   prochlorperazine (COMPAZINE) 5 mg tablet Take 1 tab by mouth every 6 hours if needed for nausea and vomiting   3/17/22   Lucy Lubin MD   rosuvastatin (CRESTOR) 20 MG tablet Take 1 tablet (20 mg total) by mouth daily 3/17/22   Lucy Lubin MD   sertraline (ZOLOFT) 50 mg tablet Take 1 tablet (50 mg total) by mouth daily 3/17/22   Lucy Lubin MD   Ubrogepant (UBRELVY) 100 MG tablet Take 1 tab once prn for migraine  May repeat in 2 hours if needed  Max 2 doses per day  Max 8 days per month  90 day supply   3/17/22   Ifeanyi Woods MD       Subjective:     Review of Systems:    General: negative for - chills, fatigue, fever,  weight gain or weight loss  Psychological: negative for - anxiety, behavioral disorder, concentration difficulties, decreased libido, depression, irritability, memory difficulties, mood swings, sleep disturbances or suicidal ideation  ENT: negative for - hearing difficulties , nasal congestion, nasal discharge, oral lesions, sinus pain, sneezing, sore throat  Allergy and Immunology: negative for - hives, insect bite sensitivity,  Hematological and Lymphatic: negative for - bleeding problems, blood clots,bruising, swollen lymph nodes  Endocrine: negative for - hair pattern changes, hot flashes, malaise/lethargy, mood swings, palpitations, polydipsia/polyuria, skin changes, temperature intolerance or unexpected weight change  Respiratory: negative for - cough, hemoptysis, orthopnea, shortness of breath, or wheezing  Cardiovascular: negative for - chest pain, dyspnea on exertion, edema,  Gastrointestinal: negative for - abdominal pain, nausea/vomiting  Genito-Urinary: negative for - dysuria, incontinence, irregular/heavy menses or urinary frequency/urgency  Musculoskeletal: negative for - gait disturbance, joint pain, joint stiffness, joint swelling, muscle pain, muscular weakness  Dermatological:  As in HPI  Neurological: negative for confusion, dizziness, headaches, impaired coordination/balance, memory loss, numbness/tingling, seizures, speech problems, tremors or weakness       Objective:   Temp 98 1 °F (36 7 °C) (Temporal)   Ht 5' 1 5" (1 562 m)   Wt 48 1 kg (106 lb)   BMI 19 70 kg/m²     Physical Exam:    General Appearance:    Alert, cooperative, no distress   Head:    Normocephalic, without obvious abnormality, atraumatic           Skin:   A full skin exam was performed including scalp, head scalp, eyes, ears, nose, lips, neck, chest, axilla, abdomen, back, buttocks, bilateral upper extremities, bilateral lower extremities, hands, feet, fingers, toes, fingernails, and toenails scaling erythematous areas noted below normal keratotic papules with greasy stuck on appearance numerous scaling erythematous patches noted on arms and mainly legs previous sites skin cancer well healed without recurrence  Physical Exam  HENT:      Head:       Cryotherapy Procedure Note    Pre-operative Diagnosis: actinic keratosis    Plan:  1  Instructed to keep the area dry and clean thereafter  Apply petrolatum if area gets crusty  2  Recommended that the patient use acetaminophen  as needed for pain  Locations:  Face scalp    Indications: Destruction of actinic keratoses x5    Patient informed of risks (permanent scarring, infection, light or dark discoloration, bleeding, infection, weakness, numbness and recurrence of the lesion) and benefits of the procedure and verbal informed consent obtained  The areas are treated with liquid nitrogen therapy, frozen until ice ball extended 2 mm beyond lesion, allowed to thaw, and treated again  The patient tolerated procedure well  The patient was instructed on post-op care, warned that there may be blister formation, redness and pain  Recommend OTC analgesia as needed for pain  Condition:  Stable    Complications:  none  Assessment:     1  Actinic keratosis     2  Seborrheic keratosis     3  DSAP (disseminated superficial actinic porokeratosis)     4  History of skin cancer     5  Screening for skin cancer           Plan:   Actinic Keratosis:  Patient advised lesions are precancers  These should resolve with cryosurgery if not to let us know sun block recommended    Disseminated superficial actinic porokeratosis no change noted continue to monitor  Seborrheic keratosis patient reassured these are normal growths we acquire with age no treatment needed  History of skin cancer in no recurrence nothing else atypical sunblock recommended follow-up in 1 year  Screening for dermatologic disorders nothing else of concern noted on complete exam follow-up in 1 year      Neisha Keenan MD  4/11/2022,11:15 AM    Portions of the record may have been created with voice recognition software   Occasional wrong word or "sound a like" substitutions may have occurred due to the inherent limitations of voice recognition software   Read the chart carefully and recognize, using context, where substitutions have occurred

## 2022-04-14 NOTE — PRE-PROCEDURE INSTRUCTIONS
Pre-Surgery Instructions:   Medication Instructions    acetaminophen (TYLENOL) 500 mg tablet -    aspirin 81 MG tablet -    Diclofenac Sodium (VOLTAREN) 1 % -    famotidine (PEPCID) 20 mg tablet -    fluticasone (FLONASE) 50 mcg/act nasal spray -    Galcanezumab-gnlm 120 MG/ML SOAJ -    ketorolac (TORADOL) 10 mg tablet -    levothyroxine 88 mcg tablet -    Lifitegrast (XIIDRA OP) -    Probiotic Product (PROBIOTIC-10 PO) -    prochlorperazine (COMPAZINE) 5 mg tablet -    rosuvastatin (CRESTOR) 20 MG tablet -    sertraline (ZOLOFT) 50 mg tablet -    Ubrogepant (UBRELVY) 100 MG tablet -    [facility] preop instructions reviewed with pt  Pt has surgical soap

## 2022-04-19 ENCOUNTER — HOSPITAL ENCOUNTER (OUTPATIENT)
Facility: AMBULARY SURGERY CENTER | Age: 72
Setting detail: OUTPATIENT SURGERY
Discharge: HOME | End: 2022-04-19
Attending: SURGERY | Admitting: SURGERY
Payer: MEDICARE

## 2022-04-19 VITALS
DIASTOLIC BLOOD PRESSURE: 84 MMHG | OXYGEN SATURATION: 98 % | TEMPERATURE: 97.3 F | WEIGHT: 107 LBS | BODY MASS INDEX: 19.69 KG/M2 | HEART RATE: 68 BPM | SYSTOLIC BLOOD PRESSURE: 170 MMHG | HEIGHT: 62 IN | RESPIRATION RATE: 16 BRPM

## 2022-04-19 DIAGNOSIS — M65.341 TRIGGER RING FINGER OF RIGHT HAND: Primary | ICD-10-CM

## 2022-04-19 DIAGNOSIS — Z47.89 AFTERCARE FOLLOWING SURGERY OF THE MUSCULOSKELETAL SYSTEM: ICD-10-CM

## 2022-04-19 PROCEDURE — 26055 INCISE FINGER TENDON SHEATH: CPT | Performed by: SURGERY

## 2022-04-19 PROCEDURE — NC001 PR NO CHARGE: Performed by: SURGERY

## 2022-04-19 RX ORDER — LIDOCAINE HYDROCHLORIDE AND EPINEPHRINE 10; 10 MG/ML; UG/ML
INJECTION, SOLUTION INFILTRATION; PERINEURAL AS NEEDED
Status: DISCONTINUED | OUTPATIENT
Start: 2022-04-19 | End: 2022-04-19 | Stop reason: HOSPADM

## 2022-04-19 RX ORDER — HYDROCODONE BITARTRATE AND ACETAMINOPHEN 5; 325 MG/1; MG/1
1 TABLET ORAL EVERY 6 HOURS PRN
Qty: 5 TABLET | Refills: 0 | Status: SHIPPED | OUTPATIENT
Start: 2022-04-19 | End: 2022-04-29

## 2022-04-19 NOTE — OP NOTE
OPERATIVE REPORT  PATIENT NAME: Krysta Muhammad  :  1950  MRN: [id number]  Pt Location: [facility]    SURGERY DATE: 22    Surgeon(s) and Role:     * Michael Berman MD - Primary    Pre-Op Diagnosis:  Trigger ring finger of right hand [M65 341]      Post-Op Diagnosis Codes:     * Trigger ring finger of right hand [M65 341]      Procedure(s):  RING TRIGGER FINGER RELEASE (Right)    Specimen(s):  * No orders in the log *    Estimated Blood Loss:   Minimal    Anesthesia Type:   Local    IMPLANTS:  * No implants in log *        Tourniquet Time: 2 min  at 250 mmHg          Operative Indications: The patient has a history of Trigger Finger  right  ring finger that was recalcitrant to conservative management  The decision was made to bring the patient to the operating room for Trigger Finger Release  right  ring finger  Risks of the procedure were explained which include, but are not limited to bleeding; infection; damage to nerves, arteries,veins, tendons; scar; pain; need for reoperation; failure to give desired result; and risks of anaesthesia  All questions were answered to satisfaction and they were willing to proceed  Operative Findings:  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    Complications:   None    Procedure and Technique:  After the patient, site, and procedure were identified, the patient was brought into the operating room in a supine position  Local anaesthesia and was provided  A well padded tourniquet was applied to the extremity, set at 250 mmHg  The  right upper extremity was then prepped and drapped in a normal, sterile, orthopedic fashion  A  longitudinal  incision is made in line with the right ring  finger overlying the A1 pulley    Dissection was  carried down under loupe magnification  Skin and subcutaneous tissues were sharply incised  The tissue was elevated off the A1 pulley  The A1 pulley was  identified and incised   There was retinacular cyst noted  Tenosynovectomy was not necessary  The FDS and FDP were noted to have independent glide after tenosynovectomy  The patient was able to fully flex and extend without any triggering  At the completion of the procedure, hemostasis was obtained with cautery and direct pressure  The wounds were copiously irrigated with sterile solution  The wounds were closed with Prolene  Sterile dressings were applied, including Xeroform, gauze, tweeners, webril, ACE  Please note, all sponge, needle, and instrument counts were correct prior to closure  Loupe magnification was utilized  The patient tolerated the procedure well       I was present for the entire procedure    Patient Disposition:  PACU     SIGNATURE: Tamika Barker MD  DATE: 04/19/22  TIME: 2:32 PM

## 2022-04-19 NOTE — H&P
ASSESSMENT/PLAN:       66year old female here for her right ring trigger finger  She has tried non operative treatments, cortisone injection, with partial relief  Patient is an avid skier and wishes to proceed with surgery post ski season  She would like to proceed with a 2nd cortisone injection today to help with her pain  She tolerated the injection well  The anatomy and physiology of trigger finger was discussed with the patient today in the office  Edema and increased contact pressure within the flexor tendons at the A1 pulley can cause pain, crepitation, and limitation of function  Treatment options include resting MP blocking splints to decrease edema, oral anti-inflammatory medications, home or formal therapy exercises, up to 2 steroid injections within the tendon sheath, or surgical release  While majority of patients do respond to conservative treatment, up to 20% may require surgical release  She will be wrapped up for 5 days, after 5 days surgical dressing comes off  She can wash with warm water and mild soap, but do not submerge in bath or dirty dish water  Suture will come out at 10-14 days  Patient wishes to proceed with the surgery under local          The patient verbalized understanding of exam findings and treatment plan  We engaged in the shared decision-making process and treatment options were discussed at length with the patient  Surgical and conservative management discussed today along with risks and benefits      Diagnoses and all orders for this visit:     Trigger ring finger of right hand           Follow Up:  Return for Follow up post- op      To Do Next Visit:  Re-evaluation of current issue        Operative Discussions:  Trigger Finger Release: The anatomy and physiology of trigger finger was discussed with the patient today in the office  Edema and increased contact pressure within the flexor tendons at the A1 pulley can cause pain, crepitation, and limitation of function  Treatment options include resting MP blocking splints to decrease edema, oral anti-inflammatory medications, home or formal therapy exercises, up to 2 steroid injections or surgical release  While majority of patients do respond to conservative treatment, up to 20% may require surgical release  The patient has elected release of the trigger finger  The patient has elected to undergo a release of the A1 pulley (trigger finger)  A small incision will be made over the palmar aspect of the hand, the tendon sheath holding the flexor tendons will be released  In the postoperative period, light activities are allowed immediately, driving is allowed when narcotic medication has stopped, and the incision may get wet after 2 days  Heavy activities (lifting more than approximately 10 pounds) will be allowed after the follow up appointment in 1-2 weeks  While the pain and discomfort within the wrist typically improves rapidly, some residual discomfort may be present for up to 6 weeks  The nodule that is typically palpable in the palmar aspect of the hand will not be removed, as this would necessitate removal of a portion of the flexor tendon, however the catching, clicking, and locking should resolve  Approximate success rate is 98%  The risks and benefits of the procedure were explained to the patient, which include, but are not limited to: Bleeding, infection, recurrence, pain, scar, damage to tendons, damage to nerves, and damage to blood vessels, need for future surgery and complications related to anesthesia  If bony work is done, risks also include malunion and nonunion  These risks, along with alternative conservative treatment options, and postoperative protocols were voiced back and understood by the patient  All questions were answered to the patient's satisfaction  The patient agrees to comply with a standard postoperative protocol, and is willing to proceed    Education was provided via written and auditory forms  There were no barriers to learning  Written handouts regarding wound care, incision and scar care, and general preoperative information, as well as risks and benefits were provided to the patient         ____________________________________________________________________________________________________________________________________________        CHIEF COMPLAINT:      Chief Complaint   Patient presents with    Right Ring Finger - Locking, Pain         SUBJECTIVE:  Vladimir Saucedo is a 66y o  year old RHD female who presents today for initial evaluation for her right ring trigger finger  Patient previously was treating with Dr Kristina Newton gave her 1 cortisone injection to the pulley, 2021  Patient states that she had a 75% relief  At this time patient is interested in surgical intervention         Pain/symptom timing:  Worse during the day when active  Pain/symptom context:  Worse with activites and work  Pain/symptom modifying factors:  Rest makes better, activities make worse  Pain/symptom associated signs/symptoms: none     Prior treatment   · NSAIDsNo   · Injections Yes   · Bracing/Orthotics No    Physical Therapy No      I have personally reviewed all the relevant PMH, PSH, SH, FH, Medications and allergies        PAST MEDICAL HISTORY:       Past Medical History:   Diagnosis Date    Arthritis      GERD (gastroesophageal reflux disease)      Graves' disease       TREATED WITH RADIOACTIVE IODINE ON 13 (10 7 MCI) RESOLVED:     Hepatitis B virus infection      Nonmelanoma skin cancer       LAST ASSESSED: [id number]         PAST SURGICAL HISTORY:        Past Surgical History:   Procedure Laterality Date    APPENDECTOMY        ARTHROSCOPY KNEE Left      CATARACT EXTRACTION, BILATERAL         SECTION        COLONOSCOPY         Oct/2018    EYE SURGERY Bilateral       SURGERY OF THE ORBITS - ORBITAL DECOMPRESSION    TEMPORAL ARTERY BIOPSY / LIGATION        TONSILLECTOMY        TOTAL KNEE ARTHROPLASTY Right 2016     TKR         FAMILY HISTORY:        Family History   Problem Relation Age of Onset    Pancreatic cancer Mother 76    Coronary artery disease Father      Throat cancer Father      Diabetes Maternal Grandmother      Heart attack Maternal Grandfather           ACUTE MI    Cancer Paternal Grandmother           unknown where    Hyperlipidemia Paternal Grandfather      Cirrhosis Paternal Grandfather      No Known Problems Sister      No Known Problems Sister      Lung cancer Maternal Aunt 76    Cancer Maternal Aunt           SOCIAL HISTORY:  Social History            Tobacco Use    Smoking status: Former Smoker       Quit date:        Years since quittin 0    Smokeless tobacco: Never Used   Vaping Use    Vaping Use: Never used   Substance Use Topics    Alcohol use: No       Comment: (HISTORY)    Drug use:  No         MEDICATIONS:     Current Outpatient Medications:     acetaminophen (TYLENOL) 500 mg tablet, Take 500 mg by mouth every 6 (six) hours , Disp: , Rfl:     aspirin 81 MG tablet, Take 1 tablet by mouth daily, Disp: , Rfl:     Diclofenac Sodium (VOLTAREN) 1 %, Apply 4 g topically 4 (four) times a day, Disp: 150 g, Rfl: 2    famotidine (PEPCID) 20 mg tablet, Take 1 tablet (20 mg total) by mouth 2 (two) times a day, Disp: 180 tablet, Rfl: 3    Galcanezumab-gnlm 120 MG/ML SOAJ, Inject 120 mg under the skin every 30 (thirty) days, Disp: 1 mL, Rfl: 11    ketorolac (TORADOL) 10 mg tablet, Take 1 tablet (10 mg total) by mouth every 6 (six) hours as needed for moderate pain (Patient taking differently: Take 10 mg by mouth every 6 (six) hours as needed for moderate pain ), Disp: 10 tablet, Rfl: 0    levothyroxine 88 mcg tablet, Take 1 tablet (88 mcg total) by mouth daily, Disp: 90 tablet, Rfl: 3    losartan (Cozaar) 50 mg tablet, 1-1/2 tablets by mouth daily, Disp: 135 tablet, Rfl: 3    Probiotic Product (PROBIOTIC-10 PO), Take by mouth, Disp: , Rfl:     prochlorperazine (COMPAZINE) 5 mg tablet, Take 1 tab by mouth every 6 hours if needed for nausea and vomiting  (Patient taking differently: Take 1 tab by mouth every 6 hours if needed for nausea and vomiting ), Disp: 30 tablet, Rfl: 0    rosuvastatin (CRESTOR) 20 MG tablet, Take 1 tablet (20 mg total) by mouth daily, Disp: 90 tablet, Rfl: 3    sertraline (ZOLOFT) 50 mg tablet, Take 1 tablet (50 mg total) by mouth daily, Disp: 90 tablet, Rfl: 3    Ubrogepant (UBRELVY) 100 MG tablet, Take 1 tab once prn for migraine  May repeat in 2 hours if needed  Max 2 doses per day  Max 8 days per month  90 day supply  , Disp: 24 tablet, Rfl: 3     ALLERGIES:        Allergies   Allergen Reactions    Fentanyl Vomiting       Action Taken: vomiting; Category: Adverse Reaction;   Protracted    Methimazole      Codeine GI Intolerance and Anxiety       Other reaction(s): Nausea and/or vomiting               REVIEW OF SYSTEMS:  Review of Systems   Constitutional: Negative for chills, fever and unexpected weight change  HENT: Negative for hearing loss, nosebleeds and sore throat  Eyes: Negative for pain, redness and visual disturbance  Respiratory: Negative for cough, shortness of breath and wheezing  Cardiovascular: Negative for chest pain, palpitations and leg swelling  Gastrointestinal: Negative for abdominal pain and nausea  Genitourinary: Negative for dyspareunia, dysuria and frequency  Skin: Negative for rash and wound  Neurological: Negative for dizziness, numbness and headaches  Psychiatric/Behavioral: Negative for decreased concentration and suicidal ideas   The patient is not nervous/anxious           VITALS:      Vitals:     01/26/22 1102   BP: 110/70   Pulse: 71   Resp: 17         LABS:  HgA1c: No results found for: HGBA1C  BMP:         Lab Results   Component Value Date     GLUCOSE 91 11/20/2015     CALCIUM 9 6 03/09/2021      11/20/2015     K 4 0 03/09/2021     CO2 27 03/09/2021      03/09/2021     BUN 18 03/09/2021     CREATININE 0 82 03/09/2021         _____________________________________________________  PHYSICAL EXAMINATION:  General: well developed and well nourished, alert, oriented times 3 and appears comfortable  Psychiatric: Normal  HEENT: Normocephalic, Atraumatic Trachea Midline, No torticollis  Pulmonary: No audible wheezing or respiratory distress   Abdomen/GI: Non tender, non distended   Cardiovascular: No pitting edema, 2+ radial pulse   Skin: No masses, erythema, lacerations, fluctation, ulcerations  Neurovascular: Sensation Intact to the Median, Ulnar, Radial Nerve, Motor Intact to the Median, Ulnar, Radial Nerve and Pulses Intact  Musculoskeletal: Normal, except as noted in detailed exam and in HPI         MUSCULOSKELETAL EXAMINATION:  right ring finger:  Positive palpable nodule over the A1 pulley  Positive tenderness to palpation over A1 pulley  Negative catching   Positive clicking

## 2022-04-19 NOTE — DISCHARGE INSTRUCTIONS
Post Operative Instructions    You have had surgery on your arm today, please read and follow the information below:  · Elevate your hand above your elbow during the next 24-48 hours to help with swelling  · Place your hand and arm over your head with motion at your shoulder three times a day  · Do not apply any cream/ointment/oil to your incisions including antibiotics  · Do not soak your hands in standing water (dishwater, tubs, Jacuzzi's, pools, etc ) until given permission (typically 2-3 weeks after injury)    Call the office if you notice any:  · Increased numbness or tingling of your hand or fingers that is not relieved with elevation  · Increasing pain that is not controlled with medication  · Difficulty chewing, breathing, swallowing  · Chest pains or shortness of breath  · Fever over 101 4 degrees  Bandage: Please keep bandages clean and dry  Remove bandage after 5 days  Once bandages are removed you may wash hands with soap and water  Short showers are okay as well, but please avoid soaking the hand as described above (ie no pools, baths, dirty dish water, hot tubs, ocean/lake water, etc)  Sutures will be removed in the office at your first follow up visit, please do not remove them yourself  Please do NOT put any topical agents on the surgical wound including neosporin, peroxide, tea tree oil, vitamin E, etc  as these can delay wound healing  Motion: Move fingers into a fist 5 times a day, DO NOT move any splinted fingers  Weight bearing status: Avoid heavy lifting (>5 pounds) with the extremity that was operated on until follow up appointment  Normal activities of daily living are OK  Ice: Ice for 10 minutes every hour as needed for swelling x 24 hours  Sling: No sling necessary      Pain medication:   Naproxen 220 mg two time a day (do not take this medication if you were told by your doctor that you cannot take anti-inflammatories or NSAIDS)  Tylenol Extended Release 650 mg every 8 hours  Norco/Hydrocodone one tab every 6 hours ONLY AS NEEDED for severe pain         Follow-up Appointment: 10-14 days with Dr NATALIE SAUCEDO OF [facility]        Please call the office if you have any questions or concerns regarding your post-operative care

## 2022-04-19 NOTE — INTERVAL H&P NOTE
H&P reviewed  After examining the patient I find no changes in the patients condition since the H&P had been written  ASA 2  There were no vitals filed for this visit

## 2022-05-04 ENCOUNTER — OFFICE VISIT (OUTPATIENT)
Dept: OBGYN CLINIC | Facility: CLINIC | Age: 72
End: 2022-05-04

## 2022-05-04 VITALS
WEIGHT: 107.2 LBS | BODY MASS INDEX: 19.73 KG/M2 | HEIGHT: 62 IN | RESPIRATION RATE: 16 BRPM | DIASTOLIC BLOOD PRESSURE: 79 MMHG | HEART RATE: 77 BPM | SYSTOLIC BLOOD PRESSURE: 124 MMHG

## 2022-05-04 DIAGNOSIS — M65.341 TRIGGER RING FINGER OF RIGHT HAND: Primary | ICD-10-CM

## 2022-05-04 PROCEDURE — 1124F ACP DISCUSS-NO DSCNMKR DOCD: CPT | Performed by: SURGERY

## 2022-05-04 PROCEDURE — 99024 POSTOP FOLLOW-UP VISIT: CPT | Performed by: SURGERY

## 2022-05-04 NOTE — PROGRESS NOTES
Assessment/Plan:  Patient ID: Flory Prado 67 y o  female   Surgery: Ring Trigger Finger Release - Right  Date of Surgery: 4/19/2022     Patient is 2 weeks status post right ring finger trigger release  Sutures removed today  Patient may continue to resume activities as tolerated, recommended to continue to avoid standing water for a few more weeks  Importance of scar tissue massage was also instructed to the patient  Follow Up:  PRN        CHIEF COMPLAINT:  Chief Complaint   Patient presents with    Right Ring Finger - Post-op         SUBJECTIVE:  Flory Prado is a 67y o  year old female who presents for follow up after Ring Trigger Finger Release - Right  Patient is 2 weeks s/p  With no pain  Good AROM  Patient states she was able to do yard work yesterday with no pain, or mechanical symptoms             PHYSICAL EXAMINATION:  General: well developed and well nourished, alert, oriented times 3 and appears comfortable  Psychiatric: Normal    MUSCULOSKELETAL EXAMINATION:  Incision: Clean, dry, intact  Surgery Site: normal, no evidence of infection   Range of Motion: As expected  Neurovascular status: Neuro intact, good cap refill  Activity Restrictions: No restrictions        STUDIES REVIEWED:  No Studies to review      PROCEDURES PERFORMED:  Procedures  No Procedures performed today    Scribe Attestation    I,:  Nivia Ayala am acting as a scribe while in the presence of the attending physician :       I,:  Alejandra Walls MD personally performed the services described in this documentation    as scribed in my presence :

## 2022-06-17 ENCOUNTER — PROCEDURE VISIT (OUTPATIENT)
Dept: NEUROLOGY | Facility: CLINIC | Age: 72
End: 2022-06-17
Payer: MEDICARE

## 2022-06-17 VITALS
HEIGHT: 62 IN | TEMPERATURE: 98.2 F | BODY MASS INDEX: 19.69 KG/M2 | WEIGHT: 107 LBS | DIASTOLIC BLOOD PRESSURE: 72 MMHG | SYSTOLIC BLOOD PRESSURE: 159 MMHG | HEART RATE: 68 BPM

## 2022-06-17 DIAGNOSIS — G43.709 CHRONIC MIGRAINE WITHOUT AURA WITHOUT STATUS MIGRAINOSUS, NOT INTRACTABLE: Primary | ICD-10-CM

## 2022-06-17 PROCEDURE — 64615 CHEMODENERV MUSC MIGRAINE: CPT | Performed by: PHYSICIAN ASSISTANT

## 2022-06-17 NOTE — PROGRESS NOTES
Universal Protocol   Consent: Verbal consent obtained  Written consent obtained  Risks and benefits: risks, benefits and alternatives were discussed  Consent given by: patient  Time out: Immediately prior to procedure a "time out" was called to verify the correct patient, procedure, equipment, support staff and site/side marked as required  Patient understanding: patient states understanding of the procedure being performed  Patient consent: the patient's understanding of the procedure matches consent given  Procedure consent: procedure consent matches procedure scheduled  Relevant documents: relevant documents present and verified  Patient identity confirmed: verbally with patient        Chemodenervation     Date/Time 6/17/2022 10:49 AM     Performed by  Charlene Cunningham PA-C     Authorized by Charlene Cunningham PA-C        Pre-procedure details      Prepped With: Alcohol     Anesthesia  (see MAR for exact dosages):      Anesthesia method:  None   Procedure details     Position:  Upright   Botox     Botox Type:  Type A    Brand:  Botox    mL's of Botulinum Toxin:  200    Final Concentration per CC:  50 units    Needle Gauge:  30 G 2 5 inch   Procedures     Botox Procedures: chronic headache      Indications: migraines     Injection Location      Head / Face:  L superior cervical paraspinal, R superior cervical paraspinal, L medial occipitalis, R medial occipitalis, R temporalis, L temporalis, R superior trapezius and L superior trapezius    L temporalis injection amount:  25 unit(s)    R temporalis injection amount:  25 unit(s)    L medial occipitalis injection amount:  20 unit(s)    R medial occipitalis injection amount:  20 unit(s)    L superior cervical paraspinal injection amount:  10 unit(s)    R superior cervical paraspinal injection amount:  10 unit(s)    L superior trapezius injection amount:  20 unit(s)    R superior trapezius injection amount:  20 unit(s)   Total Units     Total units used:  200    Total units discarded:  0   Post-procedure details      Chemodenervation:  Chronic migraine    Facial Nerve Location[de-identified]  Bilateral facial nerve    Patient tolerance of procedure:   Tolerated well, no immediate complications   Comments      5 units TMJ bilaterally  5 units masseter bilaterally  30 units apex  All medically necessary

## 2022-06-28 DIAGNOSIS — G44.209 TENSION HEADACHE: ICD-10-CM

## 2022-07-15 ENCOUNTER — HOSPITAL ENCOUNTER (OUTPATIENT)
Dept: MAMMOGRAPHY | Facility: CLINIC | Age: 72
Discharge: HOME | End: 2022-07-15
Payer: MEDICARE

## 2022-07-15 VITALS — BODY MASS INDEX: 19.69 KG/M2 | HEIGHT: 62 IN | WEIGHT: 107 LBS

## 2022-07-15 DIAGNOSIS — Z12.31 SCREENING MAMMOGRAM, ENCOUNTER FOR: ICD-10-CM

## 2022-07-15 PROCEDURE — 77063 BREAST TOMOSYNTHESIS BI: CPT

## 2022-07-15 PROCEDURE — 77067 SCR MAMMO BI INCL CAD: CPT

## 2022-08-28 ENCOUNTER — PATIENT MESSAGE (OUTPATIENT)
Dept: NEUROLOGY | Facility: CLINIC | Age: 72
End: 2022-08-28

## 2022-08-28 DIAGNOSIS — G44.209 TENSION HEADACHE: ICD-10-CM

## 2022-08-31 DIAGNOSIS — G44.209 TENSION HEADACHE: ICD-10-CM

## 2022-09-16 ENCOUNTER — PROCEDURE VISIT (OUTPATIENT)
Dept: NEUROLOGY | Facility: CLINIC | Age: 72
End: 2022-09-16
Payer: MEDICARE

## 2022-09-16 VITALS — DIASTOLIC BLOOD PRESSURE: 81 MMHG | SYSTOLIC BLOOD PRESSURE: 184 MMHG | HEART RATE: 67 BPM | TEMPERATURE: 98.2 F

## 2022-09-16 DIAGNOSIS — G44.209 TENSION HEADACHE: ICD-10-CM

## 2022-09-16 DIAGNOSIS — G43.709 CHRONIC MIGRAINE WITHOUT AURA WITHOUT STATUS MIGRAINOSUS, NOT INTRACTABLE: Primary | ICD-10-CM

## 2022-09-16 PROCEDURE — 64615 CHEMODENERV MUSC MIGRAINE: CPT | Performed by: PHYSICIAN ASSISTANT

## 2022-09-16 NOTE — PROGRESS NOTES
Universal Protocol   Consent: Verbal consent obtained  Written consent obtained  Risks and benefits: risks, benefits and alternatives were discussed  Consent given by: patient  Time out: Immediately prior to procedure a "time out" was called to verify the correct patient, procedure, equipment, support staff and site/side marked as required  Patient understanding: patient states understanding of the procedure being performed  Patient consent: the patient's understanding of the procedure matches consent given  Procedure consent: procedure consent matches procedure scheduled  Relevant documents: relevant documents present and verified  Patient identity confirmed: verbally with patient        Chemodenervation     Date/Time 9/16/2022 12:13 PM     Performed by  Rahel Calles PA-C     Authorized by Rahel Calles PA-C        Pre-procedure details      Prepped With: Alcohol     Anesthesia  (see MAR for exact dosages):      Anesthesia method:  None   Procedure details     Position:  Upright   Botox     Botox Type:  Type A    Brand:  Botox    mL's of Botulinum Toxin:  200    Final Concentration per CC:  50 units    Needle Gauge:  30 G 2 5 inch   Procedures     Botox Procedures: chronic headache      Indications: migraines     Injection Location      Head / Face:  L superior cervical paraspinal, R superior cervical paraspinal, L medial occipitalis, R medial occipitalis, R temporalis, L temporalis, R superior trapezius and L superior trapezius    L temporalis injection amount:  25 unit(s)    R temporalis injection amount:  25 unit(s)    L medial occipitalis injection amount:  20 unit(s)    R medial occipitalis injection amount:  20 unit(s)    L superior cervical paraspinal injection amount:  10 unit(s)    R superior cervical paraspinal injection amount:  10 unit(s)    L superior trapezius injection amount:  20 unit(s)    R superior trapezius injection amount:  20 unit(s)   Total Units     Total units used:  200    Total units discarded:  0   Post-procedure details      Chemodenervation:  Chronic migraine    Facial Nerve Location[de-identified]  Bilateral facial nerve    Patient tolerance of procedure:   Tolerated well, no immediate complications   Comments       5 units TMJ bilaterally  5 units masseter bilaterally  30 units apex  All medically necessary

## 2022-10-24 ENCOUNTER — RA CDI HCC (OUTPATIENT)
Dept: OTHER | Facility: HOSPITAL | Age: 72
End: 2022-10-24

## 2022-10-24 NOTE — PROGRESS NOTES
[address]  coding opportunities       Chart reviewed, no opportunity found: CHART REVIEWED, NO OPPORTUNITY FOUND        Patients Insurance     Medicare Insurance: Medicare

## 2022-11-01 ENCOUNTER — OFFICE VISIT (OUTPATIENT)
Dept: FAMILY MEDICINE CLINIC | Facility: CLINIC | Age: 72
End: 2022-11-01

## 2022-11-01 VITALS
OXYGEN SATURATION: 98 % | HEIGHT: 62 IN | HEART RATE: 88 BPM | SYSTOLIC BLOOD PRESSURE: 128 MMHG | TEMPERATURE: 97.4 F | RESPIRATION RATE: 16 BRPM | WEIGHT: 108.6 LBS | BODY MASS INDEX: 19.98 KG/M2 | DIASTOLIC BLOOD PRESSURE: 80 MMHG

## 2022-11-01 DIAGNOSIS — E89.0 POSTABLATIVE HYPOTHYROIDISM: ICD-10-CM

## 2022-11-01 DIAGNOSIS — F51.02 ADJUSTMENT INSOMNIA: ICD-10-CM

## 2022-11-01 DIAGNOSIS — E44.1 MILD PROTEIN-CALORIE MALNUTRITION (HCC): ICD-10-CM

## 2022-11-01 DIAGNOSIS — E78.2 MIXED HYPERLIPIDEMIA: Primary | ICD-10-CM

## 2022-11-01 DIAGNOSIS — Z76.89 ENCOUNTER TO ESTABLISH CARE: ICD-10-CM

## 2022-11-01 DIAGNOSIS — I10 ESSENTIAL HYPERTENSION: ICD-10-CM

## 2022-11-01 RX ORDER — LOSARTAN POTASSIUM 50 MG/1
100 TABLET ORAL DAILY
Qty: 180 TABLET | Refills: 3 | Status: SHIPPED | OUTPATIENT
Start: 2022-11-01

## 2022-11-01 RX ORDER — TRAZODONE HYDROCHLORIDE 50 MG/1
50 TABLET ORAL
Qty: 90 TABLET | Refills: 5 | Status: SHIPPED | OUTPATIENT
Start: 2022-11-01

## 2022-11-01 NOTE — PROGRESS NOTES
Name: Hu Rueda      :       MRN: [id number]  Encounter Provider: CANDIDA Leos  Encounter Date: 2022   Encounter department: [address]     1  Mixed hyperlipidemia  -     Lipid panel; Future    2  Essential hypertension  -     CBC and differential; Future  -     Comprehensive metabolic panel; Future  -     Microalbumin / creatinine urine ratio  -     losartan (COZAAR) 50 mg tablet; Take 2 tablets (100 mg total) by mouth daily    3  Postablative hypothyroidism  -     TSH, 3rd generation with Free T4 reflex; Future    4  Mild protein-calorie malnutrition ([address] )    5  Adjustment insomnia  -     traZODone (DESYREL) 50 mg tablet; Take 1 tablet (50 mg total) by mouth daily at bedtime    6  Encounter to establish care  Assessment & Plan:  Here to establish care  UTD with labs, mammogram, colon cancer screening  Discussed self care  Falls Plan of Care: balance, strength, and gait training instructions were provided  Subjective      Patient is here to establish care  Last primary care provider- Dr Arina Bob  Past medical history-  Past surgical history- appendectomy, c section, rtkr, cataracts, thyroid, eye,  Social history-  -   2 years ago  Kids- 1 son in [place]   Employment-retired nurse anesthesiology  Smoker- no  Alcohol-no  Other drugs-  Last diagnostic labs screening-current  Dental exam-current  Eyes-current  Mammogram-[facility]  Colonoscopy- current  Current concerns- has been taking 100mg of losaartan to regulate her blood pressure, needs refill      Review of Systems   Constitutional: Negative  HENT: Negative  Eyes: Positive for visual disturbance  Respiratory: Negative  Cardiovascular: Negative  Gastrointestinal: Negative  Endocrine: Negative  Genitourinary: Negative  Musculoskeletal: Negative  Skin: Negative  Allergic/Immunologic: Negative  Neurological: Negative  Psychiatric/Behavioral: Positive for dysphoric mood and sleep disturbance  Current Outpatient Medications on File Prior to Visit   Medication Sig   • acetaminophen (TYLENOL) 500 mg tablet Take 500 mg by mouth every 6 (six) hours    • aspirin 81 MG tablet Take 1 tablet by mouth daily   • Diclofenac Sodium (VOLTAREN) 1 % Apply 4 g topically 4 (four) times a day   • famotidine (PEPCID) 20 mg tablet Take 1 tablet (20 mg total) by mouth 2 (two) times a day   • fluticasone (FLONASE) 50 mcg/act nasal spray 1 spray into each nostril daily (Patient taking differently: 1 spray into each nostril as needed)   • Galcanezumab-gnlm 120 MG/ML SOAJ Inject 120 mg under the skin every 30 (thirty) days   • ketorolac (TORADOL) 10 mg tablet Take 1 tablet (10 mg total) by mouth every 6 (six) hours as needed for moderate pain (Patient taking differently: Take 10 mg by mouth every 6 (six) hours as needed for moderate pain)   • levothyroxine 88 mcg tablet Take 1 tablet (88 mcg total) by mouth daily   • Lifitegrast (XIIDRA OP) Apply to eye as needed   • Probiotic Product (PROBIOTIC-10 PO) Take by mouth   • prochlorperazine (COMPAZINE) 5 mg tablet Take 1 tab by mouth every 6 hours if needed for nausea and vomiting  • rosuvastatin (CRESTOR) 20 MG tablet Take 1 tablet (20 mg total) by mouth daily   • sertraline (ZOLOFT) 50 mg tablet Take 1 tablet (50 mg total) by mouth daily   • Ubrogepant (UBRELVY) 100 MG tablet Take 1 tab once prn for migraine  May repeat in 2 hours if needed  Max 2 doses per day  Max 8 days per month  90 day supply  Objective     /80   Pulse 88   Temp (!) 97 4 °F (36 3 °C) (Tympanic)   Resp 16   Ht 5' 2" (1 575 m)   Wt 49 3 kg (108 lb 9 6 oz)   SpO2 98%   BMI 19 86 kg/m²     Physical Exam  Vitals and nursing note reviewed  Constitutional:       Appearance: Normal appearance  She is well-developed  HENT:      Head: Normocephalic and atraumatic     Eyes:      Pupils: Pupils are equal, round, and reactive to light  Cardiovascular:      Rate and Rhythm: Normal rate and regular rhythm  Pulses: Normal pulses  Heart sounds: Normal heart sounds  Pulmonary:      Effort: Pulmonary effort is normal       Breath sounds: Normal breath sounds  Musculoskeletal:         General: Normal range of motion  Cervical back: Normal range of motion  Skin:     General: Skin is warm and dry  Neurological:      General: No focal deficit present  Mental Status: She is alert and oriented to person, place, and time  Psychiatric:         Mood and Affect: Mood normal          Behavior: Behavior normal          Thought Content:  Thought content normal          Judgment: Judgment normal        CANDIDA Alvarez

## 2022-11-01 NOTE — PATIENT INSTRUCTIONS
Obtain fasting labs, nothing to eat after midnight, may drink water  Prior to next office visit  Depression   WHAT YOU NEED TO KNOW:   Depression is a medical condition that causes feelings of sadness or hopelessness that do not go away  Depression may cause you to lose interest in things you used to enjoy  These feelings may interfere with your daily life  DISCHARGE INSTRUCTIONS:   Call your local emergency number (911 in the [address]) if:   You think about harming yourself or someone else  You have done something on purpose to hurt yourself  Call your therapist or doctor if:   Your symptoms do not improve  You cannot make it to your next appointment  You have new symptoms  You have questions or concerns about your condition or care  The following resources are available at any time to help you, if needed:   [facility]: [phone] (1-394-039-OIDT)     Suicide Hotline: 6-106.774.3337 (6-808-LXKUXZG)     For a list of international numbers: https://save org/find-help/international-resources/    Medicines:   Antidepressants  may be given to improve or balance your mood  You may need to take this medicine for several weeks before you begin to feel better  Take your medicine as directed  Contact your healthcare provider if you think your medicine is not helping or if you have side effects  Tell him of her if you are allergic to any medicine  Keep a list of the medicines, vitamins, and herbs you take  Include the amounts, and when and why you take them  Bring the list or the pill bottles to follow-up visits  Carry your medicine list with you in case of an emergency  Therapy  is often used together with medicine to relieve depression  Therapy is a way for you to talk about your feelings and anything that may be causing depression  Therapy can be done alone or in a group  It may also be done with family members or a significant other    Self-care:   Get regular physical activity  Try to be active for 30 minutes, 3 to 5 days a week  Physical activity can help relieve depression  Work with your healthcare provider to develop a plan that you enjoy  It may help to ask someone to be active with you  Create a regular sleep schedule  A routine can help you relax before bed  Listen to music, read, or do yoga  Try to go to bed and wake up at the same time every day  Sleep is important for emotional health  Eat a variety of healthy foods  Healthy foods include fruits, vegetables, whole-grain breads, low-fat dairy products, lean meats, fish, and cooked beans  A healthy meal plan is low in fat, salt, and added sugar  Do not drink alcohol or use drugs  Alcohol and drugs can make depression worse  Talk to your therapist or doctor if you need help quitting  Follow up with your healthcare provider as directed: Your healthcare provider will monitor your progress at follow-up visits  He or she will also monitor your medicine if you take antidepressants  Your healthcare provider will ask if the medicine is helping  Tell him or her about any side effects or problems you may have with your medicine  The type or amount of medicine may need to be changed  Write down your questions so you remember to ask them during your visits  © Copyright [employer] 2022 Information is for End User's use only and may not be sold, redistributed or otherwise used for commercial purposes  All illustrations and images included in CareNotes® are the copyrighted property of [employer]  or Jolanta Pate   The above information is an  only  It is not intended as medical advice for individual conditions or treatments  Talk to your doctor, nurse or pharmacist before following any medical regimen to see if it is safe and effective for you

## 2022-11-02 ENCOUNTER — TELEPHONE (OUTPATIENT)
Dept: NEUROLOGY | Facility: CLINIC | Age: 72
End: 2022-11-02

## 2022-11-02 PROBLEM — M70.60 GREATER TROCHANTERIC BURSITIS: Status: ACTIVE | Noted: 2022-11-02

## 2022-11-02 PROBLEM — Z76.89 ENCOUNTER TO ESTABLISH CARE: Status: ACTIVE | Noted: 2022-11-02

## 2022-11-02 NOTE — TELEPHONE ENCOUNTER
Pt has called back to cancel the appt on 11/14/2022 with [facility] at the [place] office due to location and did not want to reschedule that appointment  She stated she will keep her appointments with Deirdre Rosado at [facility]      Thank you,     Yahaira Burgess

## 2022-11-28 ENCOUNTER — TELEPHONE (OUTPATIENT)
Dept: NEUROLOGY | Facility: CLINIC | Age: 72
End: 2022-11-28

## 2022-11-28 NOTE — TELEPHONE ENCOUNTER
Cecy Matthews,     Pt has called back and chose the 11am that was on hold for the r/s pt's   Is that ok/    Thank you,     Aman Gao

## 2022-11-28 NOTE — TELEPHONE ENCOUNTER
Called patient and I left a message to call back to move up her 12/16/22 appointment to sooner in the day due to provider not being in the office

## 2022-12-13 ENCOUNTER — OFFICE VISIT (OUTPATIENT)
Dept: DERMATOLOGY | Facility: CLINIC | Age: 72
End: 2022-12-13

## 2022-12-13 VITALS — BODY MASS INDEX: 19.69 KG/M2 | WEIGHT: 107 LBS | HEIGHT: 62 IN

## 2022-12-13 DIAGNOSIS — L57.0 ACTINIC KERATOSIS: Primary | ICD-10-CM

## 2022-12-13 NOTE — PROGRESS NOTES
[facility] Note     Patient Name: Lluvia Max  Encounter Date: 12/13/2022     Have you been cared for by a [place] [address] Dermatologist in the last 3 years and, if so, which description applies to you? Yes  I have been here within the last 3 years, and my medical history has NOT changed since that time  I am FEMALE/of child-bearing potential     REVIEW OF SYSTEMS:  Have you recently had or currently have any of the following? · No changes in my recent health  PAST MEDICAL HISTORY:  Have you personally ever had or currently have any of the following? If "YES," then please provide more detail  · No changes in my medical history  FAMILY HISTORY:  Any "first degree relatives" (parent, brother, sister, or child) with the following? • No changes in my family's known health  PATIENT EXPERIENCE:    • Do you want the Dermatologist to perform a COMPLETE skin exam today including a clinical examination under the "bra and underwear" areas? NO  • If necessary, do we have your permission to call and leave a detailed message on your Preferred Phone number that includes your specific medical information? Yes      Allergies   Allergen Reactions   • Fentanyl Vomiting     Action Taken: vomiting; Category:  Adverse Reaction;   Protracted   • Codeine GI Intolerance and Anxiety     Other reaction(s): Nausea and/or vomiting      Current Outpatient Medications:   •  acetaminophen (TYLENOL) 500 mg tablet, Take 500 mg by mouth every 6 (six) hours , Disp: , Rfl:   •  aspirin 81 MG tablet, Take 1 tablet by mouth daily, Disp: , Rfl:   •  Diclofenac Sodium (VOLTAREN) 1 %, Apply 4 g topically 4 (four) times a day, Disp: 150 g, Rfl: 11  •  famotidine (PEPCID) 20 mg tablet, Take 1 tablet (20 mg total) by mouth 2 (two) times a day, Disp: 180 tablet, Rfl: 3  •  fluticasone (FLONASE) 50 mcg/act nasal spray, 1 spray into each nostril daily (Patient taking differently: 1 spray into each nostril as needed), Disp: 1 g, Rfl: 11  •  Galcanezumab-gnlm 120 MG/ML SOAJ, Inject 120 mg under the skin every 30 (thirty) days, Disp: 1 mL, Rfl: 11  •  ketorolac (TORADOL) 10 mg tablet, Take 1 tablet (10 mg total) by mouth every 6 (six) hours as needed for moderate pain (Patient taking differently: Take 10 mg by mouth every 6 (six) hours as needed for moderate pain), Disp: 10 tablet, Rfl: 0  •  levothyroxine 88 mcg tablet, Take 1 tablet (88 mcg total) by mouth daily, Disp: 90 tablet, Rfl: 3  •  Lifitegrast ([place] OP), Apply to eye as needed, Disp: , Rfl:   •  losartan (COZAAR) 50 mg tablet, Take 2 tablets (100 mg total) by mouth daily, Disp: 180 tablet, Rfl: 3  •  Probiotic Product (PROBIOTIC-10 PO), Take by mouth, Disp: , Rfl:   •  prochlorperazine (COMPAZINE) 5 mg tablet, Take 1 tab by mouth every 6 hours if needed for nausea and vomiting , Disp: 30 tablet, Rfl: 0  •  rosuvastatin (CRESTOR) 20 MG tablet, Take 1 tablet (20 mg total) by mouth daily, Disp: 90 tablet, Rfl: 3  •  sertraline (ZOLOFT) 50 mg tablet, Take 1 tablet (50 mg total) by mouth daily, Disp: 90 tablet, Rfl: 3  •  traZODone (DESYREL) 50 mg tablet, Take 1 tablet (50 mg total) by mouth daily at bedtime, Disp: 90 tablet, Rfl: 5  •  Ubrogepant (UBRELVY) 100 MG tablet, Take 1 tab once prn for migraine  May repeat in 2 hours if needed  Max 2 doses per day  Max 8 days per month  90 day supply  , Disp: 24 tablet, Rfl: 3          • Whom besides the patient is providing clinical information about today's encounter?   o NO ADDITIONAL HISTORIAN (patient alone provided history)    Physical Exam and Assessment/Plan by Diagnosis:    1   ACTINIC KERATOSIS    Physical Exam:  • Anatomic Location Affected:  Scalp and left eyebrow   • Morphological Description:  Scaly pink papules    Assessment and Plan:  Based on a thorough discussion of this condition and the management approach to it (including a comprehensive discussion of the known risks, side effects and potential benefits of treatment), the patient (family) agrees to implement the following specific plan:    • Liquid nitrogen was applied for 10-12 seconds to the skin lesion and the expected blistering or scabbing reaction explained  Do not pick at the area  Patient reminded to expect hypopigmented scars from the procedure  Return if lesion fails to fully resolve  Actinic keratoses are very common on sites repeatedly exposed to the sun, especially the backs of the hands and the face, most often affecting the ears, nose, cheeks, upper lip, vermilion of the lower lip, temples, forehead and balding scalp  In severely chronically sun-damaged individuals, they may also be found on the upper trunk, upper and lower limbs, and dorsum of feet  We discussed the theoretical premalignant (“pre-cancerous”) nature and etiology of these growths  We discussed the prevailing notion that actinic keratoses are a reflection of abnormal skin cell development due to DNA damage by short wavelength UVB  They are more likely to appear if the immune function is poor, due to aging, recent sun exposure, predisposing disease or certain drugs  We discussed that the main concern is that actinic keratoses may predispose to squamous cell carcinoma  It is rare for a solitary actinic keratosis to evolve to squamous cell carcinoma (SCC), but the risk of SCC occurring at some stage in a patient with more than 10 actinic keratoses is thought to be about 10 to 15%  A tender, thickened, ulcerated or enlarging actinic keratosis is suspicious of SCC  Actinic keratoses may be prevented by strict sun protection  If already present, keratoses may improve with a very high sun protection factor (50+) broad-spectrum sunscreen applied at least daily to affected areas, year-round    We recommend that UPF-rated clothing and hats and sunglasses be worn whenever possible and that a sunscreen-moisturizer combination product such as Neutrogena Daily Defense be applied at least three times a day     We performed a thorough discussion of treatment options and specific risk/benefits/alternatives including but not limited to medical “field” treatment with medications such as the following:    • Topical “field area” medications such as 5-fluorouracil or Aldara (specifically, the trouble with long-term compliance, blistering and local skin reaction versus the convenience of at-home therapy and that field therapy “gets what is not yet seen”)  • Cryotherapy (specifically, local pain, scarring, dyspigmentation, blistering, possible superinfection, and treats “only what we see” versus directed treatment today)  • Photodynamic therapy (specifically, local pain, scarring, dyspigmentation, blistering, possible superinfection, need to schedule for a later date, and time spent in the office versus field therapy that “gets what is not yet seen”)  PROCEDURE:  DESTRUCTION OF PRE-MALIGNANT LESIONS  After a thorough discussion of treatment options and risk/benefits/alternatives (including but not limited to local pain, scarring, dyspigmentation, blistering, and possible superinfection), verbal and written consent were obtained and the aforementioned lesions were treated on with cryotherapy using liquid nitrogen x 1 cycle for 5-10 seconds  • TOTAL NUMBER of 2 pre-malignant lesions were treated today on the ANATOMIC LOCATION: scalp and left eyebrow   The patient tolerated the procedure well, and after-care instructions were provided        Scribe Attestation    I,:  Chris Hernandez MA am acting as a scribe while in the presence of the attending physician :       I,:  Preeti Murrieta MD personally performed the services described in this documentation    as scribed in my presence :

## 2022-12-13 NOTE — PATIENT INSTRUCTIONS
1  ACTINIC KERATOSIS  Assessment and Plan:  Based on a thorough discussion of this condition and the management approach to it (including a comprehensive discussion of the known risks, side effects and potential benefits of treatment), the patient (family) agrees to implement the following specific plan:    Liquid nitrogen was applied for 10-12 seconds to the skin lesion and the expected blistering or scabbing reaction explained  Do not pick at the area  Patient reminded to expect hypopigmented scars from the procedure  Return if lesion fails to fully resolve  Actinic keratoses are very common on sites repeatedly exposed to the sun, especially the backs of the hands and the face, most often affecting the ears, nose, cheeks, upper lip, vermilion of the lower lip, temples, forehead and balding scalp  In severely chronically sun-damaged individuals, they may also be found on the upper trunk, upper and lower limbs, and dorsum of feet  We discussed the theoretical premalignant (“pre-cancerous”) nature and etiology of these growths  We discussed the prevailing notion that actinic keratoses are a reflection of abnormal skin cell development due to DNA damage by short wavelength UVB  They are more likely to appear if the immune function is poor, due to aging, recent sun exposure, predisposing disease or certain drugs  We discussed that the main concern is that actinic keratoses may predispose to squamous cell carcinoma  It is rare for a solitary actinic keratosis to evolve to squamous cell carcinoma (SCC), but the risk of SCC occurring at some stage in a patient with more than 10 actinic keratoses is thought to be about 10 to 15%  A tender, thickened, ulcerated or enlarging actinic keratosis is suspicious of SCC  Actinic keratoses may be prevented by strict sun protection   If already present, keratoses may improve with a very high sun protection factor (50+) broad-spectrum sunscreen applied at least daily to affected areas, year-round  We recommend that UPF-rated clothing and hats and sunglasses be worn whenever possible and that a sunscreen-moisturizer combination product such as Neutrogena Daily Defense be applied at least three times a day  We performed a thorough discussion of treatment options and specific risk/benefits/alternatives including but not limited to medical “field” treatment with medications such as the following:    Topical “field area” medications such as 5-fluorouracil or Aldara (specifically, the trouble with long-term compliance, blistering and local skin reaction versus the convenience of at-home therapy and that field therapy “gets what is not yet seen”)  Cryotherapy (specifically, local pain, scarring, dyspigmentation, blistering, possible superinfection, and treats “only what we see” versus directed treatment today)  Photodynamic therapy (specifically, local pain, scarring, dyspigmentation, blistering, possible superinfection, need to schedule for a later date, and time spent in the office versus field therapy that “gets what is not yet seen”)  PROCEDURE:  DESTRUCTION OF PRE-MALIGNANT LESIONS  After a thorough discussion of treatment options and risk/benefits/alternatives (including but not limited to local pain, scarring, dyspigmentation, blistering, and possible superinfection), verbal and written consent were obtained and the aforementioned lesions were treated on with cryotherapy using liquid nitrogen x 1 cycle for 5-10 seconds

## 2022-12-19 ENCOUNTER — PROCEDURE VISIT (OUTPATIENT)
Dept: NEUROLOGY | Facility: CLINIC | Age: 72
End: 2022-12-19

## 2022-12-19 VITALS — SYSTOLIC BLOOD PRESSURE: 160 MMHG | DIASTOLIC BLOOD PRESSURE: 77 MMHG | TEMPERATURE: 97.6 F | HEART RATE: 67 BPM

## 2022-12-19 DIAGNOSIS — G43.709 CHRONIC MIGRAINE WITHOUT AURA WITHOUT STATUS MIGRAINOSUS, NOT INTRACTABLE: Primary | ICD-10-CM

## 2022-12-19 NOTE — PROGRESS NOTES
Universal Protocol   Consent: Verbal consent obtained  Written consent obtained  Risks and benefits: risks, benefits and alternatives were discussed  Consent given by: patient  Time out: Immediately prior to procedure a "time out" was called to verify the correct patient, procedure, equipment, support staff and site/side marked as required  Patient understanding: patient states understanding of the procedure being performed  Patient consent: the patient's understanding of the procedure matches consent given  Procedure consent: procedure consent matches procedure scheduled  Relevant documents: relevant documents present and verified  Patient identity confirmed: verbally with patient        Chemodenervation     Date/Time 12/19/2022 12:47 PM     Performed by  Dariusz Robin PA-C     Authorized by Dariusz Robin PA-C        Pre-procedure details      Prepped With: Alcohol     Anesthesia  (see MAR for exact dosages):      Anesthesia method:  None   Procedure details     Position:  Upright   Botox     Botox Type:  Type A    Brand:  Botox    mL's of Botulinum Toxin:  200    Final Concentration per CC:  50 units    Needle Gauge:  30 G 2 5 inch   Procedures     Botox Procedures: chronic headache      Indications: migraines     Injection Location      Head / Face:  L superior cervical paraspinal, R superior cervical paraspinal, L medial occipitalis, R medial occipitalis, R temporalis, L temporalis, R superior trapezius and L superior trapezius    L temporalis injection amount:  25 unit(s)    R temporalis injection amount:  25 unit(s)    L medial occipitalis injection amount:  20 unit(s)    R medial occipitalis injection amount:  20 unit(s)    L superior cervical paraspinal injection amount:  10 unit(s)    R superior cervical paraspinal injection amount:  10 unit(s)    L superior trapezius injection amount:  20 unit(s)    R superior trapezius injection amount:  20 unit(s)   Total Units     Total units used:  200    Total units discarded:  0   Post-procedure details      Chemodenervation:  Chronic migraine    Facial Nerve Location[de-identified]  Bilateral facial nerve    Patient tolerance of procedure:   Tolerated well, no immediate complications   Comments       5 units TMJ bilaterally  5 units masseter bilaterally  30 units apex  All medically necessary

## 2023-01-12 DIAGNOSIS — G43.709 CHRONIC MIGRAINE WITHOUT AURA WITHOUT STATUS MIGRAINOSUS, NOT INTRACTABLE: ICD-10-CM

## 2023-02-20 NOTE — TELEPHONE ENCOUNTER
Pt called and states that when she saw you this month, you were going to send script for voltaren gel to [facility] by mail  I do not see that script ws sent  Please send as appropriate    [phone] to leave detailed message  [phone] to leave detailed message
Pt made aware
Sent it in    Not sure what happened
112

## 2023-03-14 ENCOUNTER — APPOINTMENT (OUTPATIENT)
Dept: LAB | Facility: CLINIC | Age: 73
End: 2023-03-14

## 2023-03-14 DIAGNOSIS — I10 ESSENTIAL HYPERTENSION: ICD-10-CM

## 2023-03-14 DIAGNOSIS — E78.2 MIXED HYPERLIPIDEMIA: ICD-10-CM

## 2023-03-14 DIAGNOSIS — E89.0 POSTABLATIVE HYPOTHYROIDISM: ICD-10-CM

## 2023-03-14 LAB
ALBUMIN SERPL BCP-MCNC: 3.8 G/DL (ref 3.5–5)
ALP SERPL-CCNC: 105 U/L (ref 46–116)
ALT SERPL W P-5'-P-CCNC: 28 U/L (ref 12–78)
ANION GAP SERPL CALCULATED.3IONS-SCNC: 3 MMOL/L (ref 4–13)
AST SERPL W P-5'-P-CCNC: 26 U/L (ref 5–45)
BASOPHILS # BLD AUTO: 0.1 THOUSANDS/ÂΜL (ref 0–0.1)
BASOPHILS NFR BLD AUTO: 1 % (ref 0–1)
BILIRUB SERPL-MCNC: 0.38 MG/DL (ref 0.2–1)
BUN SERPL-MCNC: 16 MG/DL (ref 5–25)
CALCIUM SERPL-MCNC: 9.7 MG/DL (ref 8.3–10.1)
CHLORIDE SERPL-SCNC: 109 MMOL/L (ref 96–108)
CHOLEST SERPL-MCNC: 213 MG/DL
CO2 SERPL-SCNC: 28 MMOL/L (ref 21–32)
CREAT SERPL-MCNC: 0.81 MG/DL (ref 0.6–1.3)
CREAT UR-MCNC: 182 MG/DL
EOSINOPHIL # BLD AUTO: 0.43 THOUSAND/ÂΜL (ref 0–0.61)
EOSINOPHIL NFR BLD AUTO: 4 % (ref 0–6)
ERYTHROCYTE [DISTWIDTH] IN BLOOD BY AUTOMATED COUNT: 12.8 % (ref 11.6–15.1)
GFR SERPL CREATININE-BSD FRML MDRD: 71 ML/MIN/1.73SQ M
GLUCOSE P FAST SERPL-MCNC: 113 MG/DL (ref 65–99)
HCT VFR BLD AUTO: 48.7 % (ref 34.8–46.1)
HDLC SERPL-MCNC: 58 MG/DL
HGB BLD-MCNC: 15.6 G/DL (ref 11.5–15.4)
IMM GRANULOCYTES # BLD AUTO: 0.03 THOUSAND/UL (ref 0–0.2)
IMM GRANULOCYTES NFR BLD AUTO: 0 % (ref 0–2)
LDLC SERPL CALC-MCNC: 121 MG/DL (ref 0–100)
LYMPHOCYTES # BLD AUTO: 3.34 THOUSANDS/ÂΜL (ref 0.6–4.47)
LYMPHOCYTES NFR BLD AUTO: 32 % (ref 14–44)
MCH RBC QN AUTO: 30.7 PG (ref 26.8–34.3)
MCHC RBC AUTO-ENTMCNC: 32 G/DL (ref 31.4–37.4)
MCV RBC AUTO: 96 FL (ref 82–98)
MICROALBUMIN UR-MCNC: 47.2 MG/L (ref 0–20)
MICROALBUMIN/CREAT 24H UR: 26 MG/G CREATININE (ref 0–30)
MONOCYTES # BLD AUTO: 0.72 THOUSAND/ÂΜL (ref 0.17–1.22)
MONOCYTES NFR BLD AUTO: 7 % (ref 4–12)
NEUTROPHILS # BLD AUTO: 5.7 THOUSANDS/ÂΜL (ref 1.85–7.62)
NEUTS SEG NFR BLD AUTO: 56 % (ref 43–75)
NONHDLC SERPL-MCNC: 155 MG/DL
NRBC BLD AUTO-RTO: 0 /100 WBCS
PLATELET # BLD AUTO: 318 THOUSANDS/UL (ref 149–390)
PMV BLD AUTO: 9.4 FL (ref 8.9–12.7)
POTASSIUM SERPL-SCNC: 4.6 MMOL/L (ref 3.5–5.3)
PROT SERPL-MCNC: 7.2 G/DL (ref 6.4–8.4)
RBC # BLD AUTO: 5.08 MILLION/UL (ref 3.81–5.12)
SODIUM SERPL-SCNC: 140 MMOL/L (ref 135–147)
TRIGL SERPL-MCNC: 169 MG/DL
TSH SERPL DL<=0.05 MIU/L-ACNC: 1.03 UIU/ML (ref 0.45–4.5)
WBC # BLD AUTO: 10.32 THOUSAND/UL (ref 4.31–10.16)

## 2023-03-21 ENCOUNTER — PROCEDURE VISIT (OUTPATIENT)
Dept: NEUROLOGY | Facility: CLINIC | Age: 73
End: 2023-03-21

## 2023-03-21 VITALS — TEMPERATURE: 98.1 F | SYSTOLIC BLOOD PRESSURE: 148 MMHG | HEART RATE: 104 BPM | DIASTOLIC BLOOD PRESSURE: 72 MMHG

## 2023-03-21 DIAGNOSIS — G43.709 CHRONIC MIGRAINE WITHOUT AURA WITHOUT STATUS MIGRAINOSUS, NOT INTRACTABLE: Primary | ICD-10-CM

## 2023-03-21 NOTE — PROGRESS NOTES
Universal Protocol   Consent: Verbal consent obtained  Written consent obtained  Risks and benefits: risks, benefits and alternatives were discussed  Consent given by: patient  Time out: Immediately prior to procedure a "time out" was called to verify the correct patient, procedure, equipment, support staff and site/side marked as required  Patient understanding: patient states understanding of the procedure being performed  Patient consent: the patient's understanding of the procedure matches consent given  Procedure consent: procedure consent matches procedure scheduled  Relevant documents: relevant documents present and verified  Patient identity confirmed: verbally with patient        Chemodenervation     Date/Time 3/21/2023 1:58 PM     Performed by  Barrera Rascon PA-C     Authorized by Barrera Rascon PA-C        Pre-procedure details      Prepped With: Alcohol     Anesthesia  (see MAR for exact dosages):      Anesthesia method:  None   Procedure details     Position:  Upright   Botox     Botox Type:  Type A    Brand:  Botox    mL's of Botulinum Toxin:  200    Final Concentration per CC:  50 units    Needle Gauge:  30 G 2 5 inch   Procedures     Botox Procedures: chronic headache      Indications: migraines     Injection Location      Head / Face:  L superior cervical paraspinal, R superior cervical paraspinal, L medial occipitalis, R medial occipitalis, R temporalis, L temporalis, R superior trapezius and L superior trapezius    L temporalis injection amount:  25 unit(s)    R temporalis injection amount:  25 unit(s)    L medial occipitalis injection amount:  20 unit(s)    R medial occipitalis injection amount:  20 unit(s)    L superior cervical paraspinal injection amount:  10 unit(s)    R superior cervical paraspinal injection amount:  10 unit(s)    L superior trapezius injection amount:  20 unit(s)    R superior trapezius injection amount:  20 unit(s)   Total Units     Total units used:  200    Total units discarded:  0   Post-procedure details      Chemodenervation:  Chronic migraine    Facial Nerve Location[de-identified]  Bilateral facial nerve    Patient tolerance of procedure:   Tolerated well, no immediate complications   Comments      5 units TMJ bilaterally  5 units masseter bilaterally  30 units apex  All medically necessary

## 2023-03-22 ENCOUNTER — OFFICE VISIT (OUTPATIENT)
Dept: FAMILY MEDICINE CLINIC | Facility: CLINIC | Age: 73
End: 2023-03-22

## 2023-03-22 VITALS
HEART RATE: 87 BPM | DIASTOLIC BLOOD PRESSURE: 70 MMHG | BODY MASS INDEX: 20.17 KG/M2 | OXYGEN SATURATION: 97 % | RESPIRATION RATE: 18 BRPM | WEIGHT: 109.6 LBS | TEMPERATURE: 97.5 F | SYSTOLIC BLOOD PRESSURE: 128 MMHG | HEIGHT: 62 IN

## 2023-03-22 DIAGNOSIS — E78.2 MIXED HYPERLIPIDEMIA: ICD-10-CM

## 2023-03-22 DIAGNOSIS — F43.21 GRIEF REACTION: ICD-10-CM

## 2023-03-22 DIAGNOSIS — R11.0 FUNCTIONAL NAUSEA: ICD-10-CM

## 2023-03-22 DIAGNOSIS — F33.9 DEPRESSION, RECURRENT (HCC): ICD-10-CM

## 2023-03-22 DIAGNOSIS — E89.0 POSTABLATIVE HYPOTHYROIDISM: ICD-10-CM

## 2023-03-22 DIAGNOSIS — Z13.820 ENCOUNTER FOR SCREENING FOR OSTEOPOROSIS: ICD-10-CM

## 2023-03-22 DIAGNOSIS — E44.1 MILD PROTEIN-CALORIE MALNUTRITION (HCC): ICD-10-CM

## 2023-03-22 DIAGNOSIS — Z79.899 MEDICATION MANAGEMENT: ICD-10-CM

## 2023-03-22 DIAGNOSIS — Z00.00 MEDICARE ANNUAL WELLNESS VISIT, SUBSEQUENT: Primary | ICD-10-CM

## 2023-03-22 DIAGNOSIS — I10 ESSENTIAL HYPERTENSION: ICD-10-CM

## 2023-03-22 RX ORDER — LEVOTHYROXINE SODIUM 88 UG/1
88 TABLET ORAL DAILY
Qty: 90 TABLET | Refills: 3 | Status: SHIPPED | OUTPATIENT
Start: 2023-03-22

## 2023-03-22 RX ORDER — FAMOTIDINE 20 MG/1
20 TABLET, FILM COATED ORAL 2 TIMES DAILY
Qty: 180 TABLET | Refills: 3 | Status: SHIPPED | OUTPATIENT
Start: 2023-03-22

## 2023-03-22 RX ORDER — ROSUVASTATIN CALCIUM 20 MG/1
20 TABLET, COATED ORAL DAILY
Qty: 90 TABLET | Refills: 3 | Status: SHIPPED | OUTPATIENT
Start: 2023-03-22

## 2023-03-22 RX ORDER — LOSARTAN POTASSIUM 50 MG/1
100 TABLET ORAL DAILY
Qty: 180 TABLET | Refills: 3 | Status: SHIPPED | OUTPATIENT
Start: 2023-03-22

## 2023-03-22 NOTE — PROGRESS NOTES
Assessment and Plan:     Problem List Items Addressed This Visit        Digestive    Functional nausea    Relevant Medications    famotidine (PEPCID) 20 mg tablet       Endocrine    Postablative hypothyroidism    Relevant Medications    levothyroxine 88 mcg tablet       Cardiovascular and Mediastinum    Essential hypertension     BP at goal today  Continue current medications and lifestyle modifications         Relevant Medications    losartan (COZAAR) 50 mg tablet       Other    Mixed hyperlipidemia     Triglycerides and LDL elevated  Discussed dietary changes  Reports increased appetite and some weight gain  Taking statin as prescribed  Discussed risk of hyperlipidemia  Continue current dose of statin  OK to use over the counter fish oil supplement for additional control  Follow-up in 6 months, labs prior         Relevant Medications    rosuvastatin (CRESTOR) 20 MG tablet    Other Relevant Orders    Lipid panel    Grief reaction    Relevant Medications    sertraline (ZOLOFT) 50 mg tablet    Mild protein-calorie malnutrition (HCC)    Depression, recurrent (HCC)     Mood has improve  Continues to have moments of withdrawal and grief  Reports Zoloft is working  Spending more time with family and friends  Reports daily exercise  Sleep habits have also improved  Overall feeling a bit better  Relevant Medications    sertraline (ZOLOFT) 50 mg tablet    Medicare annual wellness visit, subsequent - Primary     Medicare Wellness Visit Complete  Discussed diet and exercise  Annual screenings up to date  Discussed risk and benefits of screenings  Labs reviewed  No complaints   Follow-up in 6 months        Other Visit Diagnoses     Encounter for screening for osteoporosis        Medication management        Relevant Orders    CBC and differential    Microalbumin / creatinine urine ratio    Comprehensive metabolic panel           Preventive health issues were discussed with patient, and age appropriate screening tests were ordered as noted in patient's After Visit Summary  Personalized health advice and appropriate referrals for health education or preventive services given if needed, as noted in patient's After Visit Summary  History of Present Illness:     Patient presents for a Medicare Wellness Visit    Here today for Medicare Wellness visit  Reports mood has been improving  Continues to adjust to loss  Patient Care Team:  Jennifer Abrams as PCP - General (Family Medicine)  DO Jess Coppola MD Creasie Merritts, MD Maxie Galley, DO (Gastroenterology)     Review of Systems:     Review of Systems   Constitutional: Negative  HENT: Negative  Eyes: Negative  Respiratory: Negative  Cardiovascular: Negative  Gastrointestinal: Negative  Endocrine: Negative  Genitourinary: Negative  Musculoskeletal: Negative  Allergic/Immunologic: Negative  Neurological: Negative  Psychiatric/Behavioral: Negative           Problem List:     Patient Active Problem List   Diagnosis   • Arthritis   • Migraine without aura or status migrainosus   • GERD (gastroesophageal reflux disease)   • Tension headache   • Postablative hypothyroidism   • Diastolic dysfunction   • Essential hypertension   • Chronic migraine without aura without status migrainosus, not intractable   • Functional nausea   • Bile duct abnormality   • Mixed hyperlipidemia   • Grief reaction   • Adjustment insomnia   • Trigger ring finger of right hand   • Mild protein-calorie malnutrition (HCC)   • Graves disease   • Greater trochanteric bursitis   • Encounter to establish care   • [facility]   • Medicare annual wellness visit, subsequent      Past Medical and Surgical History:     Past Medical History:   Diagnosis Date   • Anxiety    • Arthritis    • Back pain    • Cancer ([facility] )     skin   • Depression    • Disease of thyroid gland     hypo   • Dry eye    • GERD (gastroesophageal reflux disease)    • Graves' disease     TREATED WITH RADIOACTIVE IODINE ON 13 (10 7 [facility]) RESOLVED:    • Hepatitis B virus infection    • Hyperlipidemia    • Hypertension    • Infectious viral hepatitis     B -    • Migraine    • Nonmelanoma skin cancer     LAST ASSESSED: [id number]     Past Surgical History:   Procedure Laterality Date   • APPENDECTOMY     • ARTHROSCOPY KNEE Left    • CATARACT EXTRACTION Bilateral    • CATARACT EXTRACTION, BILATERAL     •  SECTION     • COLONOSCOPY      Oct/2018   • EYE SURGERY Bilateral     SURGERY OF THE ORBITS - ORBITAL DECOMPRESSION   • [place] TENDON SHEATH INCISION Right 2022    Procedure: RING TRIGGER FINGER RELEASE;  Surgeon: Chintan Zaragoza MD;  Location: [facility] MAIN OR;  Service: Orthopedics   • TEMPORAL ARTERY BIOPSY / LIGATION     • TONSILLECTOMY     • TOTAL KNEE ARTHROPLASTY Right 2016    TKR      Family History:     Family History   Problem Relation Age of Onset   • Pancreatic cancer Mother 76   • Coronary artery disease Father    • Throat cancer Father    • Diabetes Maternal Grandmother    • Heart attack Maternal Grandfather         ACUTE MI   • Cancer Paternal Grandmother         unknown where   • Hyperlipidemia Paternal Grandfather    • Cirrhosis Paternal Grandfather    • No Known Problems Sister    • No Known Problems Sister    • Lung cancer Maternal Aunt 76   • Cancer Maternal Aunt       Social History:     Social History     Socioeconomic History   • Marital status:       Spouse name: None   • Number of children: None   • Years of education: 16   • Highest education level: None   Occupational History     Comment: RETIRED   Tobacco Use   • Smoking status: Former     Types: Cigarettes     Quit date:      Years since quittin 2   • Smokeless tobacco: Never   Vaping Use   • Vaping Use: Never used   Substance and Sexual Activity   • Alcohol use: No     Comment: (HISTORY)   • Drug use: No   • Sexual activity: None   Other Topics Concern   • None Social History Narrative    CAFFEINE USE    DAILY COFFEE CONSUMPTION (2 CUPS/DAY)    NO ADVANCE DIRECTIVE ON FILIE     Social Determinants of Health     Financial Resource Strain: Low Risk    • Difficulty of Paying Living Expenses: Not very hard   Food Insecurity: Not on file   Transportation Needs: No Transportation Needs   • Lack of Transportation (Medical): No   • Lack of Transportation (Non-Medical):  No   Physical Activity: Not on file   Stress: Not on file   Social Connections: Not on file   Intimate Partner Violence: Not on file   Housing Stability: Not on file      Medications and Allergies:     Current Outpatient Medications   Medication Sig Dispense Refill   • famotidine (PEPCID) 20 mg tablet Take 1 tablet (20 mg total) by mouth 2 (two) times a day 180 tablet 3   • levothyroxine 88 mcg tablet Take 1 tablet (88 mcg total) by mouth daily 90 tablet 3   • losartan (COZAAR) 50 mg tablet Take 2 tablets (100 mg total) by mouth daily 180 tablet 3   • rosuvastatin (CRESTOR) 20 MG tablet Take 1 tablet (20 mg total) by mouth daily 90 tablet 3   • sertraline (ZOLOFT) 50 mg tablet Take 1 tablet (50 mg total) by mouth daily 90 tablet 3   • acetaminophen (TYLENOL) 500 mg tablet Take 500 mg by mouth every 6 (six) hours      • aspirin 81 MG tablet Take 1 tablet by mouth daily     • Diclofenac Sodium (VOLTAREN) 1 % Apply 4 g topically 4 (four) times a day 150 g 11   • fluticasone (FLONASE) 50 mcg/act nasal spray 1 spray into each nostril daily (Patient taking differently: 1 spray into each nostril as needed) 1 g 11   • Galcanezumab-gnlm 120 MG/ML SOAJ Inject 120 mg under the skin every 30 (thirty) days 1 mL 11   • ketorolac (TORADOL) 10 mg tablet Take 1 tablet (10 mg total) by mouth every 6 (six) hours as needed for moderate pain (Patient taking differently: Take 10 mg by mouth every 6 (six) hours as needed for moderate pain) 10 tablet 0   • Lifitegrast (XIIDRA OP) Apply to eye as needed     • Probiotic Product (PROBIOTIC-10 PO) Take by mouth     • prochlorperazine (COMPAZINE) 5 mg tablet Take 1 tab by mouth every 6 hours if needed for nausea and vomiting  30 tablet 0   • Ubrogepant (UBRELVY) 100 MG tablet Take 1 tab once prn for migraine  May repeat in 2 hours if needed  Max 2 doses per day  90 day supply  48 tablet 3     No current facility-administered medications for this visit  Allergies   Allergen Reactions   • Fentanyl Vomiting     Action Taken: vomiting; Category: Adverse Reaction;   Protracted   • Codeine GI Intolerance and Anxiety     Other reaction(s): Nausea and/or vomiting      Immunizations:     Immunization History   Administered Date(s) Administered   • COVID-19 PFIZER VACCINE 0 3 ML IM 03/18/2021, 04/08/2021, 08/20/2021   • INFLUENZA 11/20/2015, 10/25/2017, 10/03/2018, 10/03/2018, 09/08/2020   • Influenza Split High Dose Preservative Free IM 11/20/2015, 10/25/2017   • Influenza, seasonal, injectable 11/19/2013   • Pneumococcal Conjugate 13-Valent 09/29/2016   • Pneumococcal Polysaccharide PPV23 09/26/2014   • Tdap 1950   • Zoster 10/03/2014   • Zoster Vaccine Recombinant 09/08/2020, 11/19/2020   • influenza, trivalent, adjuvanted 10/03/2018, 10/03/2018      Health Maintenance:         Topic Date Due   • DXA SCAN  09/22/2023 (Originally 1950)   • Breast Cancer Screening: Mammogram  07/15/2023   • Colorectal Cancer Screening  09/28/2023   • Hepatitis C Screening  Completed         Topic Date Due   • COVID-19 Vaccine (4 - Booster for Pfizer series) 10/15/2021   • Influenza Vaccine (1) 09/01/2022      Medicare Screening Tests and Risk Assessments:     Venancio Sandhu is here for her Subsequent Wellness visit  Last Medicare Wellness visit information reviewed, patient interviewed and updates made to the record today  Health Risk Assessment:   Patient rates overall health as good  Patient feels that their physical health rating is slightly worse  Patient is dissatisfied with their life   Eyesight was rated as same  Hearing was rated as same  Patient feels that their emotional and mental health rating is same  Patients states they are never, rarely angry  Patient states they are sometimes unusually tired/fatigued  Pain experienced in the last 7 days has been some  Patient's pain rating has been 2/10  Patient states that she has experienced weight loss or gain in last 6 months  gained    Depression Screening:   PHQ-9 Score: 10      Fall Risk Screening: In the past year, patient has experienced: history of falling in past year    Injured during fall?: No    Feels unsteady when standing or walking?: No    Worried about falling?: No      Urinary Incontinence Screening:   Patient has not leaked urine accidently in the last six months  Home Safety:  Patient does not have trouble with stairs inside or outside of their home  Patient has working smoke alarms and has working carbon monoxide detector  Home safety hazards include: none  Nutrition:   Current diet is Regular  Medications:   Patient is currently taking over-the-counter supplements  OTC medications include: melatonin multivit biotin  Patient is able to manage medications  Activities of Daily Living (ADLs)/Instrumental Activities of Daily Living (IADLs):   Walk and transfer into and out of bed and chair?: Yes  Dress and groom yourself?: Yes    Bathe or shower yourself?: Yes    Feed yourself? Yes  Do your laundry/housekeeping?: Yes  Manage your money, pay your bills and track your expenses?: Yes  Make your own meals?: Yes    Do your own shopping?: Yes    Previous Hospitalizations:   Any hospitalizations or ED visits within the last 12 months?: No      Advance Care Planning:   Living will: No    Durable POA for healthcare: Yes    Advanced directive: No    Five wishes given: Yes    End of Life Decisions reviewed with patient: Yes      Comments: Has copy at home in freezer   Will bring into office    Cognitive Screening:   Provider or family/friend/caregiver concerned regarding cognition?: No    PREVENTIVE SCREENINGS      Cardiovascular Screening:    General: Screening Not Indicated and History Lipid Disorder      Diabetes Screening:     General: Screening Current and Risks and Benefits Discussed      Colorectal Cancer Screening:     General: Screening Current      Breast Cancer Screening:     General: Screening Current and Risks and Benefits Discussed      Cervical Cancer Screening:    General: Screening Not Indicated      Osteoporosis Screening:    General: Risks and Benefits Discussed and Patient Declines      Abdominal Aortic Aneurysm (AAA) Screening:        General: Screening Not Indicated      Lung Cancer Screening:     General: Screening Not Indicated      Hepatitis C Screening:    General: Screening Current    Screening, Brief Intervention, and Referral to Treatment (SBIRT)    Screening  Typical number of drinks in a day: 0  Typical number of drinks in a week: 0  Interpretation: Low risk drinking behavior  AUDIT-C Screenin) How often did you have a drink containing alcohol in the past year? never  2) How many drinks did you have on a typical day when you were drinking in the past year? 0  3) How often did you have 6 or more drinks on one occasion in the past year? never    AUDIT-C Score: 0  Interpretation: Score 0-2 (female): Negative screen for alcohol misuse    Single Item Drug Screening:  How often have you used an illegal drug (including marijuana) or a prescription medication for non-medical reasons in the past year? never    Single Item Drug Screen Score: 0  Interpretation: Negative screen for possible drug use disorder    Other Counseling Topics:   Car/seat belt/driving safety, skin self-exam, sunscreen and calcium and vitamin D intake and regular weightbearing exercise  No results found       Physical Exam:     /70   Pulse 87   Temp 97 5 °F (36 4 °C) (Temporal)   Resp 18   Ht 5' 2" (1 575 m)   Wt 49 7 kg (109 lb 9 6 oz)   SpO2 97%   BMI 20 05 kg/m²     Physical Exam  Vitals reviewed  Constitutional:       General: She is not in acute distress  Appearance: Normal appearance  She is not ill-appearing  HENT:      Nose: Nose normal       Mouth/Throat:      Mouth: Mucous membranes are moist    Eyes:      Pupils: Pupils are equal, round, and reactive to light  Cardiovascular:      Rate and Rhythm: Normal rate and regular rhythm  Pulses: Normal pulses  Heart sounds: Normal heart sounds  No murmur heard  Pulmonary:      Effort: Pulmonary effort is normal  No respiratory distress  Breath sounds: Normal breath sounds  Chest:      Chest wall: No tenderness  Abdominal:      General: Abdomen is flat  Bowel sounds are normal  There is no distension  Palpations: There is no mass  Tenderness: There is no abdominal tenderness  Musculoskeletal:         General: Normal range of motion  Cervical back: Normal range of motion and neck supple  Skin:     General: Skin is warm and dry  Neurological:      General: No focal deficit present  Mental Status: She is alert and oriented to person, place, and time  Mental status is at baseline  Psychiatric:         Mood and Affect: Mood normal          Behavior: Behavior normal          Thought Content:  Thought content normal          Judgment: Judgment normal           VishalCANDIDA Guerin

## 2023-03-22 NOTE — ASSESSMENT & PLAN NOTE
Triglycerides and LDL elevated  Discussed dietary changes  Reports increased appetite and some weight gain  Taking statin as prescribed  Discussed risk of hyperlipidemia  Continue current dose of statin  OK to use over the counter fish oil supplement for additional control   Follow-up in 6 months, labs prior

## 2023-03-22 NOTE — PATIENT INSTRUCTIONS
Enjoy your scones  Obtain fasting labs, nothing to eat after midnight, may drink water  In 6 months  Hyperlipidemia   AMBULATORY CARE:   Hyperlipidemia  is a high level of lipids (fats) in your blood  These lipids include cholesterol or triglycerides  Lipids are made by your body  They also come from the foods you eat  Your body needs lipids to work properly, but high levels increase your risk for heart disease, heart attack, and stroke  Call your local emergency number (911 in the [address] [facility]) or have someone call if:   You have any of the following signs of a heart attack:      Squeezing, pressure, or pain in your chest    You may  also have any of the following:     Discomfort or pain in your back, neck, jaw, stomach, or arm    Shortness of breath    Nausea or vomiting    Lightheadedness or a sudden cold sweat    You have any of the following signs of a stroke:      Numbness or drooping on one side of your face     Weakness in an arm or leg    Confusion or difficulty speaking    Dizziness, a severe headache, or vision loss    Call your doctor if:   You have questions or concerns about your condition or care  Treatment  may first include lifestyle changes to help decrease your lipid levels  Your provider may recommend you work with a team to manage hyperlipidemia  The team may include medical experts such as a dietitian, an exercise or physical therapist, and a behavior therapist  Your family members may be included in helping you create lifestyle changes  You may also need to take medicine to lower your lipid levels  Some of the lifestyle changes you may need to make include the following:  Maintain a healthy weight  Ask your healthcare provider what a healthy weight is for you  Ask him or her to help you create a weight loss plan if you are overweight  Weight loss can decrease your cholesterol and triglyceride levels  Be physically active throughout the day    Physical activity, such as exercise, lowers your cholesterol levels and helps you maintain a healthy weight  Get 30 minutes or more of aerobic exercise 4 to 6 days each week  You can split your exercise into four 10-minute workouts instead of 30 minutes at one time  Examples of aerobic exercises include walking briskly, swimming, or riding a bike  Work with your healthcare provider to plan the best exercise program for you  Also include strength training at least 2 times each week  Your healthcare providers can help you create a physical activity plan  Do not smoke  Nicotine and other chemicals in cigarettes and cigars can increase your risk for a heart attack and stroke  Ask your healthcare provider for information if you currently smoke and need help to quit  E-cigarettes or smokeless tobacco still contain nicotine  Talk to your healthcare provider before you use these products  Eat heart-healthy foods  A dietitian or your provider can give you more information on low-sodium plans or the DASH (Dietary Approaches to Stop Hypertension) eating plan  The DASH plan is low in sodium, processed sugar, unhealthy fats, and total fat  It is high in potassium, calcium, and fiber  It is high in potassium, calcium, and fiber  These can be found in vegetables, fruit, and whole-grain foods  The following are ways to get more heart-healthy foods:         Decrease the total amount of fat you eat  Choose lean meats, fat-free or 1% fat milk, and low-fat dairy products, such as yogurt and cheese  Limit or do not eat red meat  Red meats are high in fat and cholesterol  Replace unhealthy fats with healthy fats  Unhealthy fats include saturated fat, trans fat, and cholesterol  Choose soft margarines that are low in saturated fat and have little or no trans fat  Monounsaturated fats are healthy fats  These are found in olive oil, canola oil, avocado, and nuts  Polyunsaturated fats are also healthy  These are found in fish, flaxseed, walnuts, and soybeans      Eat 5 or more servings of fruits and vegetables every day  They are low in calories and fat and a good source of essential vitamins  Include dark green, red, and orange vegetables  Examples include spinach, kale, broccoli, and carrots  Eat foods high in fiber  Fiber can help lower your cholesterol levels  Choose whole grain, high-fiber foods  Good choices include whole-wheat breads or cereals, beans, peas, fruits, and vegetables  Limit sodium (salt) as directed  Too much sodium can affect your fluid balance and blood pressure  Your healthcare provider will tell you how much sodium and potassium are safe for you to have in a day  He or she may recommend that you limit sodium to 2,300 mg a day  Your provider or a dietitian can help you find ways to limit sodium  For example, if you add salt while you cook, do not add more salt at the table  Check labels to find low-sodium or no-salt-added foods  Some low-sodium foods use potassium salts for flavor  Too much potassium can also cause health problems  Ask your healthcare provider if it is okay for you to drink alcohol  Alcohol can increase your cholesterol and triglyceride levels  Your provider can tell you how many drinks are okay to have within 24 hours and within 1 week  A drink of alcohol is 12 ounces of beer, 5 ounces of wine, or 1½ ounces of liquor  Follow up with your doctor as directed: You may need to return for more tests  Your healthcare provider may refer you to a dietitian  Write down your questions so you remember to ask them during your visits  © Copyright [employer] 2022 Information is for End User's use only and may not be sold, redistributed or otherwise used for commercial purposes  The above information is an  only  It is not intended as medical advice for individual conditions or treatments   Talk to your doctor, nurse or pharmacist before following any medical regimen to see if it is safe and effective for you       Medicare Preventive Visit Patient Instructions  Thank you for completing your Welcome to Medicare Visit or Medicare Annual Wellness Visit today  Your next wellness visit will be due in one year (3/22/2024)  The screening/preventive services that you may require over the next 5-10 years are detailed below  Some tests may not apply to you based off risk factors and/or age  Screening tests ordered at today's visit but not completed yet may show as past due  Also, please note that scanned in results may not display below  Preventive Screenings:  Service Recommendations Previous Testing/Comments   Colorectal Cancer Screening  * Colonoscopy    * Fecal Occult Blood Test (FOBT)/Fecal Immunochemical Test (FIT)  * Fecal DNA/Cologuard Test  * Flexible Sigmoidoscopy Age: 39-70 years old   Colonoscopy: every 10 years (may be performed more frequently if at higher risk)  OR  FOBT/FIT: every 1 year  OR  Cologuard: every 3 years  OR  Sigmoidoscopy: every 5 years  Screening may be recommended earlier than age 39 if at higher risk for colorectal cancer  Also, an individualized decision between you and your healthcare provider will decide whether screening between the ages of 74-80 would be appropriate  Colonoscopy: 09/28/2018  FOBT/FIT: Not on file  Cologuard: Not on file  Sigmoidoscopy: Not on file    Screening Current     Breast Cancer Screening Age: 36 years old  Frequency: every 1-2 years  Not required if history of left and right mastectomy Mammogram: 07/15/2022    Screening Current   Cervical Cancer Screening Between the ages of 21-29, pap smear recommended once every 3 years  Between the ages of 33-67, can perform pap smear with HPV co-testing every 5 years     Recommendations may differ for women with a history of total hysterectomy, cervical cancer, or abnormal pap smears in past  Pap Smear: 05/10/2021    Screening Not Indicated   Hepatitis C Screening Once for adults born between 1945 and 1965  More frequently in patients at high risk for Hepatitis C Hep C Antibody: 03/09/2021    Screening Current   Diabetes Screening 1-2 times per year if you're at risk for diabetes or have pre-diabetes Fasting glucose: 113 mg/dL (3/14/2023)  A1C: No results in last 5 years (No results in last 5 years)  Screening Current   Cholesterol Screening Once every 5 years if you don't have a lipid disorder  May order more often based on risk factors  Lipid panel: 03/14/2023    Screening Not Indicated  History Lipid Disorder     Other Preventive Screenings Covered by Medicare:  Abdominal Aortic Aneurysm (AAA) Screening: covered once if your at risk  You're considered to be at risk if you have a family history of AAA  Lung Cancer Screening: covers low dose CT scan once per year if you meet all of the following conditions: (1) Age 50-69; (2) No signs or symptoms of lung cancer; (3) Current smoker or have quit smoking within the last 15 years; (4) You have a tobacco smoking history of at least 20 pack years (packs per day multiplied by number of years you smoked); (5) You get a written order from a healthcare provider  Glaucoma Screening: covered annually if you're considered high risk: (1) You have diabetes OR (2) Family history of glaucoma OR (3)  aged 48 and older OR (3)  American aged 72 and older  Osteoporosis Screening: covered every 2 years if you meet one of the following conditions: (1) You're estrogen deficient and at risk for osteoporosis based off medical history and other findings; (2) Have a vertebral abnormality; (3) On glucocorticoid therapy for more than 3 months; (4) Have primary hyperparathyroidism; (5) On osteoporosis medications and need to assess response to drug therapy  Last bone density test (DXA Scan): Not on file  HIV Screening: covered annually if you're between the age of 12-76  Also covered annually if you are younger than 13 and older than 72 with risk factors for HIV infection   For pregnant patients, it is covered up to 3 times per pregnancy  Immunizations:  Immunization Recommendations   Influenza Vaccine Annual influenza vaccination during flu season is recommended for all persons aged >= 6 months who do not have contraindications   Pneumococcal Vaccine   * Pneumococcal conjugate vaccine = PCV13 (Prevnar 13), PCV15 (Vaxneuvance), PCV20 (Prevnar 20)  * Pneumococcal polysaccharide vaccine = PPSV23 (Pneumovax) Adults 25-60 years old: 1-3 doses may be recommended based on certain risk factors  Adults 72 years old: 1-2 doses may be recommended based off what pneumonia vaccine you previously received   Hepatitis B Vaccine 3 dose series if at intermediate or high risk (ex: diabetes, end stage renal disease, liver disease)   Tetanus (Td) Vaccine - COST NOT COVERED BY MEDICARE PART B Following completion of primary series, a booster dose should be given every 10 years to maintain immunity against tetanus  Td may also be given as tetanus wound prophylaxis  Tdap Vaccine - COST NOT COVERED BY MEDICARE PART B Recommended at least once for all adults  For pregnant patients, recommended with each pregnancy  Shingles Vaccine (Shingrix) - COST NOT COVERED BY MEDICARE PART B  2 shot series recommended in those aged 48 and above     Health Maintenance Due:      Topic Date Due    DXA SCAN  Never done    Breast Cancer Screening: Mammogram  07/15/2023    Colorectal Cancer Screening  09/28/2023    Hepatitis C Screening  Completed     Immunizations Due:      Topic Date Due    COVID-19 Vaccine (4 - Booster for Pfizer series) 10/15/2021    Influenza Vaccine (1) 09/01/2022     Advance Directives   What are advance directives? Advance directives are legal documents that state your wishes and plans for medical care  These plans are made ahead of time in case you lose your ability to make decisions for yourself   Advance directives can apply to any medical decision, such as the treatments you want, and if you want to donate organs  What are the types of advance directives? There are many types of advance directives, and each state has rules about how to use them  You may choose a combination of any of the following:  Living will: This is a written record of the treatment you want  You can also choose which treatments you do not want, which to limit, and which to stop at a certain time  This includes surgery, medicine, IV fluid, and tube feedings  Durable power of  for [facility]): This is a written record that states who you want to make healthcare choices for you when you are unable to make them for yourself  This person, called a proxy, is usually a family member or a friend  You may choose more than 1 proxy  Do not resuscitate (DNR) order:  A DNR order is used in case your heart stops beating or you stop breathing  It is a request not to have certain forms of treatment, such as CPR  A DNR order may be included in other types of advance directives  Medical directive: This covers the care that you want if you are in a coma, near death, or unable to make decisions for yourself  You can list the treatments you want for each condition  Treatment may include pain medicine, surgery, blood transfusions, dialysis, IV or tube feedings, and a ventilator (breathing machine)  Values history: This document has questions about your views, beliefs, and how you feel and think about life  This information can help others choose the care that you would choose  Why are advance directives important? An advance directive helps you control your care  Although spoken wishes may be used, it is better to have your wishes written down  Spoken wishes can be misunderstood, or not followed  Treatments may be given even if you do not want them  An advance directive may make it easier for your family to make difficult choices about your care     Fall Prevention    Fall prevention  includes ways to make your home and other areas safer  It also includes ways you can move more carefully to prevent a fall  Health conditions that cause changes in your blood pressure, vision, or muscle strength and coordination may increase your risk for falls  Medicines may also increase your risk for falls if they make you dizzy, weak, or sleepy  Fall prevention tips:   Stand or sit up slowly  Use assistive devices as directed  Wear shoes that fit well and have soles that   Wear a personal alarm  Stay active  Manage your medical conditions  Home Safety Tips:  Add items to prevent falls in the bathroom  Keep paths clear  Install bright lights in your home  Keep items you use often on shelves within reach  [place] or place reflective tape on the edges of your stairs  © Copyright [employer] 2018 Information is for End User's use only and may not be sold, redistributed or otherwise used for commercial purposes   All illustrations and images included in CareNotes® are the copyrighted property of [employer]  or [facility] [employer]

## 2023-03-22 NOTE — ASSESSMENT & PLAN NOTE
Medicare Wellness Visit Complete  Discussed diet and exercise  Annual screenings up to date  Discussed risk and benefits of screenings  Labs reviewed  No complaints   Follow-up in 6 months

## 2023-04-26 ENCOUNTER — TELEMEDICINE (OUTPATIENT)
Dept: NEUROLOGY | Facility: CLINIC | Age: 73
End: 2023-04-26

## 2023-04-26 DIAGNOSIS — G43.709 CHRONIC MIGRAINE WITHOUT AURA WITHOUT STATUS MIGRAINOSUS, NOT INTRACTABLE: Primary | ICD-10-CM

## 2023-04-26 DIAGNOSIS — F33.9 DEPRESSION, RECURRENT (HCC): ICD-10-CM

## 2023-04-26 NOTE — ASSESSMENT & PLAN NOTE
Continue Botox every 3 months  Continue Emgality 1 injection     Abortive: At onset of migraine, take Ubrelvy 100 mg    May repeat in 2 hours if needed  May also use ketorolac 10 mg for migraines limit of 10 a month  Prochlorperazine 10 mg as needed for migraines and nausea

## 2023-04-26 NOTE — PROGRESS NOTES
[facility]  PATIENT:  Ervin Carrington  MRN:  [id number]  :  1950  DATE OF SERVICE:  2023      Assessment/Plan:     No problem-specific Assessment & Plan notes found for this encounter  {Assess/PlanSmartLinks:73069}        History of Present Illness: We had the pleasure of evaluating Ervin Carrington in neurological follow up  today for headaches  As you know,  {He/she (caps):66474} is a 75 y o  {RIGHT/LEFT:} handed female  She is a retired anesthesia RN and is here today for evaluation of her headaches  Her  passed away with sudden cardiac arrest while skiing    What medications do you take or have you taken for your headaches?    Current preventative:  Aspirin  Probiotic  Losartan  Sertraline  Emgality  Botox    Current abortive:  Ubrelvy  Ketorolac    Prior preventive:  amitriptyline, venlafaxine,  Gabapentin,   Tizanidine, methocarbamol,   Atenolol, Diltiazem    Prior abortive:  dexamethasone,   Fioricet,   Compazine,   Tylenol    Non-Medical/Alternative Treatments used in the past for headaches: PT  Any warning prior to headache and how long do they last - sparkles in her peripheral vision intermittently    How often do the headaches occur - ***, prior to botox was daily  What time of the day do the headaches start - ***  How long do the headaches last - prior was days  Are you ever headache free - YES  Where are they located - Top and back of head, neck  What is the intensity of pain - it can get up 8-9/10, average 2-4/10     Describe your usual headache - Throbbing, Pounding    Associated symptoms:   -       Decrease of appetite, nausea  -       Photophobia, phonophobia  -       Problem with concentration  -       Dizziness  -       Tinnitus  -       prefer to be alone and in a dark room, unable to work    Headache trigger: smells, weather, lack of sleep    Number of days missed per month because of headaches:  Work (or school) days: ***  Social or Family activities: ***    What time of the year do headaches occur more frequently? {Headache time related:86333}  Have you seen someone else for headaches or pain? {YES /EC:02975}  Have you had trigger point injection performed and how often? {YES /UI:58282}  Have you had Botox injection performed and how often? {YES /G}   Have you had epidural injections or transforaminal injections performed? {YES /DH:31860}    Have you used CBD or THC for your headaches and how often? {YES [id number]    Are you current pregnant or planning on getting pregnant? {YES /LH:21122}    Have you ever had any Brain imaging? {Yes     FU:99647} {reviewed images:91858}    Reviewed old notes from physician seen in the past- see above HPI for summary of previous encounters       Past Medical History:   Diagnosis Date   • Anxiety    • Arthritis    • Back pain    • Cancer (HCC)     skin   • Depression    • Disease of thyroid gland     hypo   • Dry eye    • GERD (gastroesophageal reflux disease)    • Graves' disease     TREATED WITH RADIOACTIVE IODINE ON 13 (10 7 [facility]) RESOLVED: 2013   • Hepatitis B virus infection    • Hyperlipidemia    • Hypertension    • Infectious viral hepatitis     B - 1971   • Migraine    • Nonmelanoma skin cancer     LAST ASSESSED: [id number]       Patient Active Problem List   Diagnosis   • Arthritis   • Migraine without aura or status migrainosus   • GERD (gastroesophageal reflux disease)   • Tension headache   • Postablative hypothyroidism   • Diastolic dysfunction   • Essential hypertension   • Chronic migraine without aura without status migrainosus, not intractable   • Functional nausea   • Bile duct abnormality   • Mixed hyperlipidemia   • Grief reaction   • Adjustment insomnia   • Trigger ring finger of right hand   • Mild protein-calorie malnutrition (HCC)   • Graves disease   • Greater trochanteric bursitis   • Encounter to establish care   • Depression, recurrent ([facility] [address] )   • Medicare annual wellness visit, subsequent       Medications:      Current Outpatient Medications   Medication Sig Dispense Refill   • acetaminophen (TYLENOL) 500 mg tablet Take 500 mg by mouth every 6 (six) hours      • aspirin 81 MG tablet Take 1 tablet by mouth daily     • Diclofenac Sodium (VOLTAREN) 1 % Apply 4 g topically 4 (four) times a day 150 g 11   • famotidine (PEPCID) 20 mg tablet Take 1 tablet (20 mg total) by mouth 2 (two) times a day 180 tablet 3   • fluticasone (FLONASE) 50 mcg/act nasal spray 1 spray into each nostril daily (Patient taking differently: 1 spray into each nostril as needed) 1 g 11   • Galcanezumab-gnlm 120 MG/ML SOAJ Inject 120 mg under the skin every 30 (thirty) days 1 mL 11   • ketorolac (TORADOL) 10 mg tablet Take 1 tablet (10 mg total) by mouth every 6 (six) hours as needed for moderate pain (Patient taking differently: Take 10 mg by mouth every 6 (six) hours as needed for moderate pain) 10 tablet 0   • levothyroxine 88 mcg tablet Take 1 tablet (88 mcg total) by mouth daily 90 tablet 3   • Lifitegrast (XIIDRA OP) Apply to eye as needed     • losartan (COZAAR) 50 mg tablet Take 2 tablets (100 mg total) by mouth daily 180 tablet 3   • Probiotic Product (PROBIOTIC-10 PO) Take by mouth     • prochlorperazine (COMPAZINE) 5 mg tablet Take 1 tab by mouth every 6 hours if needed for nausea and vomiting  30 tablet 0   • rosuvastatin (CRESTOR) 20 MG tablet Take 1 tablet (20 mg total) by mouth daily 90 tablet 3   • sertraline (ZOLOFT) 50 mg tablet Take 1 tablet (50 mg total) by mouth daily 90 tablet 3   • Ubrogepant (UBRELVY) 100 MG tablet Take 1 tab once prn for migraine  May repeat in 2 hours if needed  Max 2 doses per day  90 day supply  48 tablet 3     No current facility-administered medications for this visit  Allergies: Allergies   Allergen Reactions   • Fentanyl Vomiting     Action Taken: vomiting; Category:  Adverse Reaction;   Protracted   • Codeine GI Intolerance and Anxiety     Other reaction(s): Nausea and/or vomiting       Family History:     Family History   Problem Relation Age of Onset   • Pancreatic cancer Mother 76   • Coronary artery disease Father    • Throat cancer Father    • Diabetes Maternal Grandmother    • Heart attack Maternal Grandfather         ACUTE MI   • Cancer Paternal Grandmother         unknown where   • Hyperlipidemia Paternal Grandfather    • Cirrhosis Paternal Grandfather    • No Known Problems Sister    • No Known Problems Sister    • Lung cancer Maternal Aunt 76   • Cancer Maternal Aunt        Social History:     Social History     Socioeconomic History   • Marital status:      Spouse name: Not on file   • Number of children: Not on file   • Years of education: 16   • Highest education level: Not on file   Occupational History     Comment: RETIRED   Tobacco Use   • Smoking status: Former     Types: Cigarettes     Quit date:      Years since quittin 3   • Smokeless tobacco: Never   Vaping Use   • Vaping Use: Never used   Substance and Sexual Activity   • Alcohol use: No     Comment: (HISTORY)   • Drug use: No   • Sexual activity: Not on file   Other Topics Concern   • Not on file   Social History Narrative    CAFFEINE USE    DAILY COFFEE CONSUMPTION (2 CUPS/DAY)    [facility]     Social Determinants of Health     Financial Resource Strain: Low Risk    • Difficulty of Paying Living Expenses: Not very hard   Food Insecurity: Not on file   Transportation Needs: No Transportation Needs   • Lack of Transportation (Medical): No   • Lack of Transportation (Non-Medical): No   Physical Activity: Not on file   Stress: Not on file   Social Connections: Not on file   Intimate Partner Violence: Not on file   Housing Stability: Not on file         Objective:   Physical Exam:                                                                   Vitals: There were no vitals taken for this visit    BP Readings from Last 3 Encounters:   23 128/70   03/21/23 148/72   12/19/22 160/77     Pulse Readings from Last 3 Encounters:   03/22/23 87   03/21/23 104   12/19/22 67                 Review of Systems:   Review of Systems    I personally reviewed the ROS entered by the MA      I have spent a total time of *** minutes on 04/26/23 in caring for this patient including {AMB Counseling Topics:[id number]}        Author:  Chaya Coats PA-C 4/26/2023 7:44 AM

## 2023-04-26 NOTE — PROGRESS NOTES
Virtual Regular Visit    Verification of patient location:    Patient is located at Home in the following state in which I hold an active license [place]      Assessment/Plan:    Problem List Items Addressed This Visit        Cardiovascular and Mediastinum    Chronic migraine without aura without status migrainosus, not intractable - Primary     Continue Botox every 3 months  Continue Emgality 1 injection     Abortive: At onset of migraine, take Ubrelvy 100 mg  May repeat in 2 hours if needed  May also use ketorolac 10 mg for migraines limit of 10 a month  Prochlorperazine 10 mg as needed for migraines and nausea            Other    Depression, recurrent ([facility] )            Reason for visit is   Chief Complaint   Patient presents with   • Migraine   • Virtual Regular Visit        Encounter provider Noemi Muller PA-C    Provider located at [address] [place] [address] [address] [address]  [phone]      Recent Visits  No visits were found meeting these conditions  Showing recent visits within past 7 days and meeting all other requirements  Today's Visits  Date Type Provider Dept   04/26/23 Telemedicine Noemi Muller PA-C  Neuro [facility]   Showing today's visits and meeting all other requirements  Future Appointments  No visits were found meeting these conditions  Showing future appointments within next 150 days and meeting all other requirements       The patient was identified by name and date of birth  Zana Del Cid was informed that this is a telemedicine visit and that the visit is being conducted through the [facility] [employer]  She agrees to proceed     My office door was closed  The patient was notified the following individuals were present in the room Dr Telma Shaw  She acknowledged consent and understanding of privacy and security of the video platform   The patient has agreed to participate and understands they can discontinue the visit at any time  Patient is aware this is a billable service  Subjective  Wilfred Townsend is a 75 y o  female She is a retired anesthesia RN and is here today for evaluation of her headaches  Her  passed away with sudden cardiac arrest while skiing    What medications do you take or have you taken for your headaches? Current preventative:  Aspirin  Probiotic  Losartan  Sertraline  Emgality  Botox    Current abortive:  Ubrelvy  Ketorolac    Prior preventive:  amitriptyline, venlafaxine,  Gabapentin,   Tizanidine, methocarbamol,   Atenolol, Diltiazem    Prior abortive:  dexamethasone,   Fioricet,   Compazine,   Tylenol    Non-Medical/Alternative Treatments used in the past for headaches: PT  Any warning prior to headache and how long do they last - sparkles in her peripheral vision intermittently    Current Pain 0/10    How often do the headaches occur - 1-2 every 3 months, prior to botox was daily  What time of the day do the headaches start - varies  How long do the headaches last - with ubrlevy within a few hours; prior was days  Are you ever headache free - YES  Where are they located - Top and back of head, neck  What is the intensity of pain - 6/10    Describe your usual headache - Throbbing, Pounding    Associated symptoms:   -       Decrease of appetite, nausea  -       Photophobia, phonophobia  -       Problem with concentration  -       Dizziness  -       Tinnitus  - Stiff and sore neck  -       prefer to be alone and in a dark room, unable to work    Headache trigger: smells, weather, lack of sleep, sunlight/bright lights    Number of days missed per month because of headaches:  Work (or school) days: NA  Social or Family activities: sometimes with bad migraines but less than before    What time of the year do headaches occur more frequently? do not seem to be related to any time of day or year  Have you seen someone else for headaches or pain?  Yes  Have you had trigger point injection performed and how often? No  Have you had Botox injection performed and how often? Yes, works well   Have you had epidural injections or transforaminal injections performed? Yes, c section    Have you used CBD or THC for your headaches and how often? No    Are you current pregnant or planning on getting pregnant? No    Have you ever had any Brain imaging? no I personally reviewed these images  Recent laboratory data was reviewed  Medications and allergies were reviewed  Reviewed old notes from physician seen in the past- see above HPI for summary of previous encounters                Past Medical History:   Diagnosis Date   • Anxiety    • Arthritis    • Back pain    • Cancer (HCC)     skin   • Depression    • Disease of thyroid gland     hypo   • Dry eye    • GERD (gastroesophageal reflux disease)    • Graves' disease     TREATED WITH RADIOACTIVE IODINE ON 13 (10 7 [facility]) RESOLVED:    • Hepatitis B virus infection    • Hyperlipidemia    • Hypertension    • Infectious viral hepatitis     B -    • Migraine    • Nonmelanoma skin cancer     LAST ASSESSED: [id number]       Past Surgical History:   Procedure Laterality Date   • APPENDECTOMY     • ARTHROSCOPY KNEE Left    • CATARACT EXTRACTION Bilateral    • CATARACT EXTRACTION, BILATERAL     •  SECTION     • COLONOSCOPY      Oct/2018   • EYE SURGERY Bilateral     SURGERY OF THE ORBITS - ORBITAL DECOMPRESSION   • [place] TENDON SHEATH INCISION Right 2022    Procedure: RING TRIGGER FINGER RELEASE;  Surgeon: Pam Hussein MD;  [facility]IN [facility];  Service: Orthopedics   • TEMPORAL ARTERY BIOPSY / LIGATION     • TONSILLECTOMY     • TOTAL KNEE ARTHROPLASTY Right 2016    TKR       Current Outpatient Medications   Medication Sig Dispense Refill   • acetaminophen (TYLENOL) 500 mg tablet Take 500 mg by mouth every 6 (six) hours as needed     • aspirin 81 MG tablet Take 1 tablet by mouth daily     • Diclofenac Sodium (VOLTAREN) 1 % Apply 4 g topically 4 (four) times a day (Patient taking differently: Apply 4 g topically 4 (four) times a day as needed) 150 g 11   • famotidine (PEPCID) 20 mg tablet Take 1 tablet (20 mg total) by mouth 2 (two) times a day 180 tablet 3   • fluticasone (FLONASE) 50 mcg/act nasal spray 1 spray into each nostril daily (Patient taking differently: 1 spray into each nostril as needed) 1 g 11   • Galcanezumab-gnlm 120 MG/ML SOAJ Inject 120 mg under the skin every 30 (thirty) days 1 mL 11   • ketorolac (TORADOL) 10 mg tablet Take 1 tablet (10 mg total) by mouth every 6 (six) hours as needed for moderate pain (Patient taking differently: Take 10 mg by mouth every 6 (six) hours as needed for moderate pain) 10 tablet 0   • levothyroxine 88 mcg tablet Take 1 tablet (88 mcg total) by mouth daily 90 tablet 3   • Lifitegrast (XIIDRA OP) Administer 1 drop to both eyes 2 (two) times a day     • losartan (COZAAR) 50 mg tablet Take 2 tablets (100 mg total) by mouth daily 180 tablet 3   • Probiotic Product (PROBIOTIC-10 PO) Take 1 tablet by mouth in the morning     • prochlorperazine (COMPAZINE) 5 mg tablet Take 1 tab by mouth every 6 hours if needed for nausea and vomiting  30 tablet 0   • rosuvastatin (CRESTOR) 20 MG tablet Take 1 tablet (20 mg total) by mouth daily 90 tablet 3   • sertraline (ZOLOFT) 50 mg tablet Take 1 tablet (50 mg total) by mouth daily 90 tablet 3   • Ubrogepant (UBRELVY) 100 MG tablet Take 1 tab once prn for migraine  May repeat in 2 hours if needed  Max 2 doses per day  90 day supply  48 tablet 3     No current facility-administered medications for this visit  Allergies   Allergen Reactions   • Fentanyl Vomiting     Action Taken: vomiting; Category:  Adverse Reaction;   Protracted   • Codeine GI Intolerance and Anxiety     Other reaction(s): Nausea and/or vomiting    I have reviewed the patient's medical, social and surgical history as well as medications in detail and updated the computerized patient record  Review of Systems   Constitutional: Negative  HENT: Negative  Eyes: Negative  Respiratory: Negative  Cardiovascular: Negative  Gastrointestinal: Negative  Endocrine: Negative  Genitourinary: Negative  Musculoskeletal: Negative  Skin: Negative  Allergic/Immunologic: Negative  Neurological: Positive for headaches  Hematological: Negative  Psychiatric/Behavioral: Negative  I personally reviewed and updated the ROS that was entered by the medical assistant    Video Exam    There were no vitals filed for this visit  Physical Exam   CONSTITUTIONAL: Well developed, well nourished, well groomed  No dysmorphic features  Eyes:  EOM normal      Neck:  Normal ROM, neck supple  HEENT:  Normocephalic atraumatic  Chest:  Respirations regular and unlabored  Psychiatric:  Normal behavior and appropriate affect      MENTAL STATUS  Orientation: Alert and oriented x 3  Fund of knowledge: Intact  Visit Time  I have spent a total time of 30 minutes on 04/26/23 in caring for this patient including Risks and benefits of tx options, Instructions for management, Patient and family education, Impressions, Counseling / Coordination of care, Documenting in the medical record, Reviewing / ordering tests, medicine, procedures   and Obtaining or reviewing history

## 2023-05-09 ENCOUNTER — OFFICE VISIT (OUTPATIENT)
Dept: FAMILY MEDICINE CLINIC | Facility: CLINIC | Age: 73
End: 2023-05-09

## 2023-05-09 VITALS
SYSTOLIC BLOOD PRESSURE: 120 MMHG | HEIGHT: 62 IN | HEART RATE: 75 BPM | TEMPERATURE: 97.3 F | RESPIRATION RATE: 16 BRPM | DIASTOLIC BLOOD PRESSURE: 80 MMHG | WEIGHT: 108 LBS | BODY MASS INDEX: 19.88 KG/M2 | OXYGEN SATURATION: 99 %

## 2023-05-09 DIAGNOSIS — J31.0 CHRONIC RHINITIS: ICD-10-CM

## 2023-05-09 DIAGNOSIS — H61.22 IMPACTED CERUMEN OF LEFT EAR: Primary | ICD-10-CM

## 2023-05-09 RX ORDER — FLUTICASONE PROPIONATE 50 MCG
1 SPRAY, SUSPENSION (ML) NASAL DAILY
Qty: 1 G | Refills: 3 | Status: SHIPPED | OUTPATIENT
Start: 2023-05-09

## 2023-05-09 NOTE — PATIENT INSTRUCTIONS
Earwax Blockage   AMBULATORY CARE:   Earwax  can build up in your ear canal and cause a blockage  Earwax blockage happens when your ear makes earwax faster than your body can remove it  Common symptoms include the following:   Trouble hearing    Earache    Ear fullness or a feeling that something is plugging up your ear    Itching or ringing in your ear    Dizziness    Seed immediate care if:   You feel dizzy  You have discharge or blood coming out of your ear  Your ear pain does not go away or gets worse  Call your doctor if:   You have a fever  You have trouble hearing or hear ringing noises  You have questions or concerns about your condition or care  Treatment for earwax blockage:   Medicines  placed in the ear canal can soften the earwax so it will come out  Flushing your ear canal  with warm water may flush out the earwax  Small medical tools  may be used to remove the earwax  How to prevent earwax blockage:  Do not stick anything into your ears to clean them  Use cotton swabs on the outside of your ear only  Ask your healthcare provider for more information on ways to prevent blockage  Follow up with your doctor as directed:  Write down your questions so you remember to ask them during your visits  © Copyright [employer] 2022 Information is for End User's use only and may not be sold, redistributed or otherwise used for commercial purposes  The above information is an  only  It is not intended as medical advice for individual conditions or treatments  Talk to your doctor, nurse or pharmacist before following any medical regimen to see if it is safe and effective for you

## 2023-05-09 NOTE — PROGRESS NOTES
Ear cerumen removal    Date/Time: 2023 7:14 AM  Performed by: Mireya Oneal  Authorized by: CANDIDA Zee   Universal Protocol:  Consent: Verbal consent obtained  Consent given by: patient  Patient understanding: patient states understanding of the procedure being performed  Patient consent: the patient's understanding of the procedure matches consent given  Patient identity confirmed: verbally with patient      Patient location:  Clinic  Procedure details:     Location:  L ear    Procedure type: irrigation with instrumentation      Instrumentation: curette      Approach:  External  Post-procedure details:     Complication:  None    Hearing quality:  Improved    Patient tolerance of procedure: Tolerated well, no immediate complications  Comments:      Cerumen disimpaction, positive results  Patient feels much improved      Name: Opal Cronin      :       MRN: [id number]  Encounter Provider: CANDIDA Zee  Encounter Date: 2023   Encounter department: [address]     1  Impacted cerumen of left ear  -     Ear cerumen removal    2  Chronic rhinitis  -     fluticasone (FLONASE) 50 mcg/act nasal spray; 1 spray into each nostril daily         Subjective      Patient is here with complaints of having ear fullness ongoing for about 2 weeks in the left ear  Has been trying to flush it with no success  Denies any fevers or chills  Does have seasonal allergies  Review of Systems   Constitutional: Negative for fever  HENT: Positive for ear pain and sinus pressure  Respiratory: Negative for cough          Current Outpatient Medications on File Prior to Visit   Medication Sig   • acetaminophen (TYLENOL) 500 mg tablet Take 500 mg by mouth every 6 (six) hours as needed   • aspirin 81 MG tablet Take 1 tablet by mouth daily   • Diclofenac Sodium (VOLTAREN) 1 % Apply 4 g topically 4 (four) times a day (Patient taking differently: Apply 4 g "topically 4 (four) times a day as needed)   • famotidine (PEPCID) 20 mg tablet Take 1 tablet (20 mg total) by mouth 2 (two) times a day   • Galcanezumab-gnlm 120 MG/ML SOAJ Inject 120 mg under the skin every 30 (thirty) days   • ketorolac (TORADOL) 10 mg tablet Take 1 tablet (10 mg total) by mouth every 6 (six) hours as needed for moderate pain (Patient taking differently: Take 10 mg by mouth every 6 (six) hours as needed for moderate pain)   • levothyroxine 88 mcg tablet Take 1 tablet (88 mcg total) by mouth daily   • Lifitegrast (XIIDRA OP) Administer 1 drop to both eyes 2 (two) times a day   • losartan (COZAAR) 50 mg tablet Take 2 tablets (100 mg total) by mouth daily   • Probiotic Product (PROBIOTIC-10 PO) Take 1 tablet by mouth in the morning   • prochlorperazine (COMPAZINE) 5 mg tablet Take 1 tab by mouth every 6 hours if needed for nausea and vomiting  • rosuvastatin (CRESTOR) 20 MG tablet Take 1 tablet (20 mg total) by mouth daily   • sertraline (ZOLOFT) 50 mg tablet Take 1 tablet (50 mg total) by mouth daily   • Ubrogepant (UBRELVY) 100 MG tablet Take 1 tab once prn for migraine  May repeat in 2 hours if needed  Max 2 doses per day  90 day supply  • [DISCONTINUED] fluticasone (FLONASE) 50 mcg/act nasal spray 1 spray into each nostril daily (Patient taking differently: 1 spray into each nostril as needed)       Objective     /80   Pulse 75   Temp (!) 97 3 °F (36 3 °C) (Temporal)   Resp 16   Ht 5' 2\" (1 575 m)   Wt 49 kg (108 lb)   SpO2 99%   BMI 19 75 kg/m²     Physical Exam  Constitutional:       Appearance: Normal appearance  HENT:      Right Ear: Tympanic membrane normal       Left Ear: There is impacted cerumen  Nose: Rhinorrhea present  No congestion  Cardiovascular:      Rate and Rhythm: Normal rate and regular rhythm  Pulses: Normal pulses  Heart sounds: Normal heart sounds     Pulmonary:      Effort: Pulmonary effort is normal       Breath sounds: Normal breath " sounds  Abdominal:      General: Abdomen is flat  Musculoskeletal:      Cervical back: Normal range of motion  Neurological:      General: No focal deficit present  Mental Status: She is alert and oriented to person, place, and time  Psychiatric:         Mood and Affect: Mood normal          Behavior: Behavior normal          Thought Content:  Thought content normal          Judgment: Judgment normal        CANDIDA Singh

## 2023-05-21 PROBLEM — Z00.00 MEDICARE ANNUAL WELLNESS VISIT, SUBSEQUENT: Status: RESOLVED | Noted: 2023-03-22 | Resolved: 2023-05-21

## 2023-06-01 ENCOUNTER — OFFICE VISIT (OUTPATIENT)
Age: 73
End: 2023-06-01

## 2023-06-01 VITALS — WEIGHT: 110 LBS | HEIGHT: 62 IN | BODY MASS INDEX: 20.24 KG/M2

## 2023-06-01 DIAGNOSIS — Z85.828 HISTORY OF SKIN CANCER: ICD-10-CM

## 2023-06-01 DIAGNOSIS — L82.1 SEBORRHEIC KERATOSIS: ICD-10-CM

## 2023-06-01 DIAGNOSIS — Z12.83 SCREENING FOR SKIN CANCER: ICD-10-CM

## 2023-06-01 DIAGNOSIS — L56.5 DSAP (DISSEMINATED SUPERFICIAL ACTINIC POROKERATOSIS): ICD-10-CM

## 2023-06-01 DIAGNOSIS — L57.0 ACTINIC KERATOSIS: Primary | ICD-10-CM

## 2023-06-01 NOTE — PROGRESS NOTES
[address] [facility] [id number]  [phone]  [phone]     MRN: [id number] : 1950  Encounter: [id number]  Patient Information: Kell Chaidez  Chief complaint: yearly check up    History of present illness: 65-year-old female presents for overall skin check concerned regarding potential skin cancer with previous history of nonmelanoma skin cancer actinic keratosis and known disseminated superficial actinic porokeratosis patient notes that area was bothersome to her on the scalp was seen by Dr Geronimo East in December had this lesion treated she notes that just the last month it has come back again no other concerns noted except a new spot that developed on the left  Past Medical History:   Diagnosis Date   • Anxiety    • Arthritis    • Back pain    • Cancer ([address] )     skin   • Depression    • Disease of thyroid gland     hypo   • Dry eye    • GERD (gastroesophageal reflux disease)    • Graves' disease     TREATED WITH RADIOACTIVE IODINE ON 13 (10 7 [facility]) RESOLVED:    • Hepatitis B virus infection    • Hyperlipidemia    • Hypertension    • Infectious viral hepatitis     B -    • Migraine    • Nonmelanoma skin cancer     LAST ASSESSED: [id number]     Past Surgical History:   Procedure Laterality Date   • APPENDECTOMY     • ARTHROSCOPY KNEE Left    • CATARACT EXTRACTION Bilateral    • CATARACT EXTRACTION, BILATERAL     •  SECTION     • COLONOSCOPY      Oct/2018   • EYE SURGERY Bilateral     SURGERY OF THE ORBITS - ORBITAL DECOMPRESSION   • CT TENDON SHEATH INCISION Right 2022    Procedure: RING TRIGGER FINGER RELEASE;  Surgeon: Wily Durand MD;  Location: [facility] MAIN OR;  Service: Orthopedics   • TEMPORAL ARTERY BIOPSY / LIGATION     • TONSILLECTOMY     • TOTAL KNEE ARTHROPLASTY Right 2016    TKR     Social History   Social History     Substance and Sexual Activity   Alcohol Use No    Comment: (HISTORY)     Social History Substance and Sexual Activity   Drug Use No     Social History     Tobacco Use   Smoking Status Former   • Packs/day: 1 00   • Years: 50 00   • Total pack years: 50 00   • Types: Cigarettes   • Quit date: 10/1/2000   • Years since quittin 6   Smokeless Tobacco Never     Family History   Problem Relation Age of Onset   • Pancreatic cancer Mother 76   • Coronary artery disease Father    • Throat cancer Father    • Diabetes Maternal Grandmother    • Heart attack Maternal Grandfather         ACUTE MI   • Cancer Paternal Grandmother         unknown where   • Hyperlipidemia Paternal Grandfather    • Cirrhosis Paternal Grandfather    • No Known Problems Sister    • No Known Problems Sister    • Lung cancer Maternal Aunt 76   • Cancer Maternal Aunt      Meds/Allergies   Allergies   Allergen Reactions   • Fentanyl Vomiting     Action Taken: vomiting; Category: Adverse Reaction;   Protracted   • Codeine GI Intolerance and Anxiety     Other reaction(s): Nausea and/or vomiting       Meds:  Prior to Admission medications    Medication Sig Start Date End Date Taking?  Authorizing Provider   acetaminophen (TYLENOL) 500 mg tablet Take 500 mg by mouth every 6 (six) hours as needed 3/22/16  Yes Historical Provider, MD   aspirin 81 MG tablet Take 1 tablet by mouth daily   Yes Historical Provider, MD   Diclofenac Sodium (VOLTAREN) 1 % Apply 4 g topically 4 (four) times a day  Patient taking differently: Apply 4 g topically 4 (four) times a day as needed 22  Yes Isabella Whitaker PA-C   famotidine (PEPCID) 20 mg tablet Take 1 tablet (20 mg total) by mouth 2 (two) times a day 3/22/23  Yes CANDIDA Royal   fluticasone (FLONASE) 50 mcg/act nasal spray 1 spray into each nostril daily 23  Yes CANDIDA Royal   Galcanezumab-gnlm 120 MG/ML SOAJ Inject 120 mg under the skin every 30 (thirty) days 22  Yes Isabella Whitaker PA-C   ketorolac (TORADOL) 10 mg tablet Take 1 tablet (10 mg total) by mouth every 6 (six) hours as needed for moderate pain  Patient taking differently: Take 10 mg by mouth every 6 (six) hours as needed for moderate pain 5/28/20  Yes Kennedi Rice PA-C   levothyroxine 88 mcg tablet Take 1 tablet (88 mcg total) by mouth daily 3/22/23  Yes CANDIDA Royal   Lifitegrast (XIIDRA OP) Administer 1 drop to both eyes 2 (two) times a day   Yes Historical Provider, MD   losartan (COZAAR) 50 mg tablet Take 2 tablets (100 mg total) by mouth daily 3/22/23  Yes CANDIDA Royal   Probiotic Product (PROBIOTIC-10 PO) Take 1 tablet by mouth in the morning   Yes Historical Provider, MD   prochlorperazine (COMPAZINE) 5 mg tablet Take 1 tab by mouth every 6 hours if needed for nausea and vomiting  3/17/22  Yes Bar Jordan MD   rosuvastatin (CRESTOR) 20 MG tablet Take 1 tablet (20 mg total) by mouth daily 3/22/23  Yes CANDIDA Arango   sertraline (ZOLOFT) 50 mg tablet Take 1 tablet (50 mg total) by mouth daily 3/22/23  Yes CANDIDA Royal   Ubrogepant (UBRELVY) 100 MG tablet Take 1 tab once prn for migraine  May repeat in 2 hours if needed  Max 2 doses per day  90 day supply   3/21/23  Yes Kennedi Rice PA-C       Subjective:     Review of Systems:    General: negative for - chills, fatigue, fever,  weight gain or weight loss  Psychological: negative for - anxiety, behavioral disorder, concentration difficulties, decreased libido, depression, irritability, memory difficulties, mood swings, sleep disturbances or suicidal ideation  ENT: negative for - hearing difficulties , nasal congestion, nasal discharge, oral lesions, sinus pain, sneezing, sore throat  Allergy and Immunology: negative for - hives, insect bite sensitivity,  Hematological and Lymphatic: negative for - bleeding problems, blood clots,bruising, swollen lymph nodes  Endocrine: negative for - hair pattern changes, hot flashes, malaise/lethargy, mood swings, palpitations, polydipsia/polyuria, skin changes, temperature intolerance or unexpected weight change  Respiratory: "negative for - cough, hemoptysis, orthopnea, shortness of breath, or wheezing  Cardiovascular: negative for - chest pain, dyspnea on exertion, edema,  Gastrointestinal: negative for - abdominal pain, nausea/vomiting  Genito-Urinary: negative for - dysuria, incontinence, irregular/heavy menses or urinary frequency/urgency  Musculoskeletal: negative for - gait disturbance, joint pain, joint stiffness, joint swelling, muscle pain, muscular weakness  Dermatological:  As in HPI  Neurological: negative for confusion, dizziness, headaches, impaired coordination/balance, memory loss, numbness/tingling, seizures, speech problems, tremors or weakness       Objective:   Ht 5' 2\" (1 575 m)   Wt 49 9 kg (110 lb)   BMI 20 12 kg/m²     Physical Exam:    General Appearance:    Alert, cooperative, no distress   Head:    Normocephalic, without obvious abnormality, atraumatic           Skin:   A full skin exam was performed including scalp, head scalp, eyes, ears, nose, lips, neck, chest, axilla, abdomen, back, buttocks, bilateral upper extremities, bilateral lower extremities, hands, feet, fingers, toes, fingernails, and toenails scaling erythematous areas noted below normal keratotic papules with greasy stuck on appearance previous site of skin cancers well-healed without recurrence scaling erythematous patches noted on the lower legs with nails remarkable noted on exam  Physical Exam  HENT:      Head:          Cryotherapy Procedure Note    Pre-operative Diagnosis: actinic keratosis    Plan:  1  Instructed to keep the area dry and clean thereafter  Apply petrolatum if area gets crusty  2  Recommended that the patient use acetaminophen  as needed for pain       Locations: left temple and scalp    Indications: Destruction of actinic keratosis x2    Patient informed of risks (permanent scarring, infection, light or dark discoloration, bleeding, infection, weakness, numbness and recurrence of the lesion) and benefits of the " "procedure and verbal informed consent obtained  The areas are treated with liquid nitrogen therapy, frozen until ice ball extended 2 mm beyond lesion, allowed to thaw, and treated again  The patient tolerated procedure well  The patient was instructed on post-op care, warned that there may be blister formation, redness and pain  Recommend OTC analgesia as needed for pain  Condition:  Stable    Complications:  none  Assessment:     1  Actinic keratosis        2  Seborrheic keratosis        3  DSAP (disseminated superficial actinic porokeratosis)        4  History of skin cancer        5  Screening for skin cancer              Plan:   Actinic Keratosis:  Patient advised lesions are precancers  These should resolve with cryosurgery if not to let us know sun block recommended  Disseminated superficial actinic porokeratosis appears stable no treatment needed  Seborrheic keratosis patient reassured these are normal growths we acquire with age no treatment needed  History of skin cancer in no recurrence nothing else atypical sunblock recommended follow-up in 1 year  Screening for dermatologic disorders nothing else of concern noted on complete exam follow-up in 1 year      Brian Taylor MD  6/1/2023,11:33 AM    Portions of the record may have been created with voice recognition software   Occasional wrong word or \"sound a like\" substitutions may have occurred due to the inherent limitations of voice recognition software   Read the chart carefully and recognize, using context, where substitutions have occurred    "

## 2023-06-01 NOTE — PATIENT INSTRUCTIONS
"Actinic Keratosis:  Patient advised lesions are precancers  These should resolve with cryosurgery if not to let us know sun block recommended  Disseminated superficial actinic porokeratosis appears stable no treatment needed  Seborrheic keratosis patient reassured these are normal growths we acquire with age no treatment needed  History of skin cancer in no recurrence nothing else atypical sunblock recommended follow-up in 1 year  Screening for dermatologic disorders nothing else of concern noted on complete exam follow-up in 1 year  Treatment with Cryotherapy    The doctor has treated your skin with nitrogen, which is 320 degrees Fahrenheit below zero  He has given the treated area \"frostbite  \"    Stinging should subside within a few hours  You can take Tylenol for pain, if needed  Over the next few days, it is normal if the area becomes reddened, a blood blister, or swollen with fluid  If the lesion treated was near the eye - you could get a swollen eye over the next few days  Do not panic! This is all temporary, and will resolve with time  There is no special treatment - just keep the area clean  Makeup and BandAids can be used, if preferred  When the area starts to dry up and peel off, using Vaseline can help healing  It usually takes up to a month for it to heal   Some lesions are recurrent and may require repeat treatments  If a lesion has not healed in one month, please don't hesitate to contact us  If you have any further questions that are not answered here, please call us  [phone]    Thank you for allowing us to care for you      "

## 2023-06-01 NOTE — PROGRESS NOTES
"[facility] Note     Patient Name: William Muñoz  Encounter Date: June 1, 2023     Have you been cared for by a [facility] in the last 3 years and, if so, which description applies to you? Yes  I have been here within the last 3 years, and my medical history has NOT changed since that time  I am FEMALE/of child-bearing potential     REVIEW OF SYSTEMS:  Have you recently had or currently have any of the following? · No changes in my recent health  PAST MEDICAL HISTORY:  Have you personally ever had or currently have any of the following? If \"YES,\" then please provide more detail  · No changes in my medical history  FAMILY HISTORY:  Any \"first degree relatives\" (parent, brother, sister, or child) with the following? • No changes in my family's known health  PATIENT EXPERIENCE:    • Do you want the Dermatologist to perform a COMPLETE skin exam today including a clinical examination under the \"bra and underwear\" areas? Yes  • If necessary, do we have your permission to call and leave a detailed message on your Preferred Phone number that includes your specific medical information? Yes      Allergies   Allergen Reactions   • Fentanyl Vomiting     Action Taken: vomiting; Category:  Adverse Reaction;   Protracted   • Codeine GI Intolerance and Anxiety     Other reaction(s): Nausea and/or vomiting      Current Outpatient Medications:   •  acetaminophen (TYLENOL) 500 mg tablet, Take 500 mg by mouth every 6 (six) hours as needed, Disp: , Rfl:   •  aspirin 81 MG tablet, Take 1 tablet by mouth daily, Disp: , Rfl:   •  Diclofenac Sodium (VOLTAREN) 1 %, Apply 4 g topically 4 (four) times a day (Patient taking differently: Apply 4 g topically 4 (four) times a day as needed), Disp: 150 g, Rfl: 11  •  famotidine (PEPCID) 20 mg tablet, Take 1 tablet (20 mg total) by mouth 2 (two) times a day, Disp: 180 tablet, Rfl: 3  •  fluticasone (FLONASE) 50 mcg/act nasal spray, 1 spray into each " nostril daily, Disp: 1 g, Rfl: 3  •  Galcanezumab-gnlm 120 MG/ML SOAJ, Inject 120 mg under the skin every 30 (thirty) days, Disp: 1 mL, Rfl: 11  •  ketorolac (TORADOL) 10 mg tablet, Take 1 tablet (10 mg total) by mouth every 6 (six) hours as needed for moderate pain (Patient taking differently: Take 10 mg by mouth every 6 (six) hours as needed for moderate pain), Disp: 10 tablet, Rfl: 0  •  levothyroxine 88 mcg tablet, Take 1 tablet (88 mcg total) by mouth daily, Disp: 90 tablet, Rfl: 3  •  Lifitegrast (XIIDRA OP), Administer 1 drop to both eyes 2 (two) times a day, Disp: , Rfl:   •  losartan (COZAAR) 50 mg tablet, Take 2 tablets (100 mg total) by mouth daily, Disp: 180 tablet, Rfl: 3  •  Probiotic Product (PROBIOTIC-10 PO), Take 1 tablet by mouth in the morning, Disp: , Rfl:   •  prochlorperazine (COMPAZINE) 5 mg tablet, Take 1 tab by mouth every 6 hours if needed for nausea and vomiting , Disp: 30 tablet, Rfl: 0  •  rosuvastatin (CRESTOR) 20 MG tablet, Take 1 tablet (20 mg total) by mouth daily, Disp: 90 tablet, Rfl: 3  •  sertraline (ZOLOFT) 50 mg tablet, Take 1 tablet (50 mg total) by mouth daily, Disp: 90 tablet, Rfl: 3  •  Ubrogepant (UBRELVY) 100 MG tablet, Take 1 tab once prn for migraine  May repeat in 2 hours if needed  Max 2 doses per day  90 day supply  , Disp: 48 tablet, Rfl: 3          • Whom besides the patient is providing clinical information about today's encounter?   o NO ADDITIONAL HISTORIAN (patient alone provided history)    Physical Exam and Assessment/Plan by Diagnosis:

## 2023-06-20 ENCOUNTER — PROCEDURE VISIT (OUTPATIENT)
Dept: NEUROLOGY | Facility: CLINIC | Age: 73
End: 2023-06-20
Payer: MEDICARE

## 2023-06-20 VITALS — DIASTOLIC BLOOD PRESSURE: 65 MMHG | SYSTOLIC BLOOD PRESSURE: 138 MMHG | HEART RATE: 65 BPM | TEMPERATURE: 98.3 F

## 2023-06-20 DIAGNOSIS — G43.709 CHRONIC MIGRAINE WITHOUT AURA WITHOUT STATUS MIGRAINOSUS, NOT INTRACTABLE: Primary | ICD-10-CM

## 2023-06-20 PROCEDURE — 64615 CHEMODENERV MUSC MIGRAINE: CPT | Performed by: PHYSICIAN ASSISTANT

## 2023-06-20 NOTE — PROGRESS NOTES
"  Universal Protocol   Consent: Verbal consent obtained  Written consent obtained  Risks and benefits: risks, benefits and alternatives were discussed  Consent given by: patient  Time out: Immediately prior to procedure a \"time out\" was called to verify the correct patient, procedure, equipment, support staff and site/side marked as required  Patient understanding: patient states understanding of the procedure being performed  Patient consent: the patient's understanding of the procedure matches consent given  Procedure consent: procedure consent matches procedure scheduled  Relevant documents: relevant documents present and verified  Patient identity confirmed: verbally with patient        Chemodenervation     Date/Time 6/20/2023 2:30 PM     Performed by  Virgina Boeck, PA-C   Authorized by Virgina Boeck, PA-C       Pre-procedure details      Prepped With: Alcohol     Anesthesia  (see MAR for exact dosages):      Anesthesia method:  None   Procedure details     Position:  Upright   Botox     Botox Type:  Type A    Brand:  Botox    mL's of Botulinum Toxin:  200    Final Concentration per CC:  50 units    Needle Gauge:  30 G 2 5 inch   Procedures     Botox Procedures: chronic headache      Indications: migraines     Injection Location      Head / Face:  L superior cervical paraspinal, R superior cervical paraspinal, L medial occipitalis, R medial occipitalis, R temporalis, L temporalis, R superior trapezius and L superior trapezius    L temporalis injection amount:  25 unit(s)    R temporalis injection amount:  25 unit(s)    L medial occipitalis injection amount:  20 unit(s)    R medial occipitalis injection amount:  20 unit(s)    L superior cervical paraspinal injection amount:  10 unit(s)    R superior cervical paraspinal injection amount:  10 unit(s)    L superior trapezius injection amount:  20 unit(s)    R superior trapezius injection amount:  20 unit(s)   Total Units     Total units used:  200    Total units " discarded:  0   Post-procedure details      Chemodenervation:  Chronic migraine    Facial Nerve Location[de-identified]  Bilateral facial nerve    Patient tolerance of procedure:   Tolerated well, no immediate complications   Comments      5 units TMJ bilaterally  5 units masseter bilaterally  30 units apex  All medically necessary

## 2023-09-14 ENCOUNTER — APPOINTMENT (OUTPATIENT)
Dept: LAB | Facility: CLINIC | Age: 73
End: 2023-09-14
Payer: MEDICARE

## 2023-09-14 DIAGNOSIS — E78.2 MIXED HYPERLIPIDEMIA: ICD-10-CM

## 2023-09-14 DIAGNOSIS — Z79.899 MEDICATION MANAGEMENT: ICD-10-CM

## 2023-09-14 LAB
ALBUMIN SERPL BCP-MCNC: 4.4 G/DL (ref 3.5–5)
ALP SERPL-CCNC: 97 U/L (ref 34–104)
ALT SERPL W P-5'-P-CCNC: 14 U/L (ref 7–52)
ANION GAP SERPL CALCULATED.3IONS-SCNC: 6 MMOL/L
AST SERPL W P-5'-P-CCNC: 18 U/L (ref 13–39)
BASOPHILS # BLD AUTO: 0.11 THOUSANDS/ÂΜL (ref 0–0.1)
BASOPHILS NFR BLD AUTO: 1 % (ref 0–1)
BILIRUB SERPL-MCNC: 0.53 MG/DL (ref 0.2–1)
BUN SERPL-MCNC: 14 MG/DL (ref 5–25)
CALCIUM SERPL-MCNC: 9.5 MG/DL (ref 8.4–10.2)
CHLORIDE SERPL-SCNC: 105 MMOL/L (ref 96–108)
CHOLEST SERPL-MCNC: 194 MG/DL
CO2 SERPL-SCNC: 29 MMOL/L (ref 21–32)
CREAT SERPL-MCNC: 0.77 MG/DL (ref 0.6–1.3)
CREAT UR-MCNC: 25.5 MG/DL
EOSINOPHIL # BLD AUTO: 0.22 THOUSAND/ÂΜL (ref 0–0.61)
EOSINOPHIL NFR BLD AUTO: 2 % (ref 0–6)
ERYTHROCYTE [DISTWIDTH] IN BLOOD BY AUTOMATED COUNT: 12.7 % (ref 11.6–15.1)
GFR SERPL CREATININE-BSD FRML MDRD: 76 ML/MIN/1.73SQ M
GLUCOSE P FAST SERPL-MCNC: 82 MG/DL (ref 65–99)
HCT VFR BLD AUTO: 47.7 % (ref 34.8–46.1)
HDLC SERPL-MCNC: 60 MG/DL
HGB BLD-MCNC: 16 G/DL (ref 11.5–15.4)
IMM GRANULOCYTES # BLD AUTO: 0.04 THOUSAND/UL (ref 0–0.2)
IMM GRANULOCYTES NFR BLD AUTO: 0 % (ref 0–2)
LDLC SERPL CALC-MCNC: 107 MG/DL (ref 0–100)
LYMPHOCYTES # BLD AUTO: 2.3 THOUSANDS/ÂΜL (ref 0.6–4.47)
LYMPHOCYTES NFR BLD AUTO: 24 % (ref 14–44)
MCH RBC QN AUTO: 31.3 PG (ref 26.8–34.3)
MCHC RBC AUTO-ENTMCNC: 33.5 G/DL (ref 31.4–37.4)
MCV RBC AUTO: 93 FL (ref 82–98)
MICROALBUMIN UR-MCNC: 7.6 MG/L
MICROALBUMIN/CREAT 24H UR: 30 MG/G CREATININE (ref 0–30)
MONOCYTES # BLD AUTO: 0.64 THOUSAND/ÂΜL (ref 0.17–1.22)
MONOCYTES NFR BLD AUTO: 7 % (ref 4–12)
NEUTROPHILS # BLD AUTO: 6.45 THOUSANDS/ÂΜL (ref 1.85–7.62)
NEUTS SEG NFR BLD AUTO: 66 % (ref 43–75)
NONHDLC SERPL-MCNC: 134 MG/DL
NRBC BLD AUTO-RTO: 0 /100 WBCS
PLATELET # BLD AUTO: 344 THOUSANDS/UL (ref 149–390)
PMV BLD AUTO: 9.6 FL (ref 8.9–12.7)
POTASSIUM SERPL-SCNC: 4.4 MMOL/L (ref 3.5–5.3)
PROT SERPL-MCNC: 7.2 G/DL (ref 6.4–8.4)
RBC # BLD AUTO: 5.12 MILLION/UL (ref 3.81–5.12)
SODIUM SERPL-SCNC: 140 MMOL/L (ref 135–147)
TRIGL SERPL-MCNC: 134 MG/DL
WBC # BLD AUTO: 9.76 THOUSAND/UL (ref 4.31–10.16)

## 2023-09-14 PROCEDURE — 80061 LIPID PANEL: CPT

## 2023-09-14 PROCEDURE — 36415 COLL VENOUS BLD VENIPUNCTURE: CPT

## 2023-09-14 PROCEDURE — 85025 COMPLETE CBC W/AUTO DIFF WBC: CPT

## 2023-09-14 PROCEDURE — 80053 COMPREHEN METABOLIC PANEL: CPT

## 2023-09-19 ENCOUNTER — PROCEDURE VISIT (OUTPATIENT)
Dept: NEUROLOGY | Facility: CLINIC | Age: 73
End: 2023-09-19

## 2023-09-19 VITALS — HEART RATE: 64 BPM | SYSTOLIC BLOOD PRESSURE: 135 MMHG | DIASTOLIC BLOOD PRESSURE: 60 MMHG | TEMPERATURE: 98.2 F

## 2023-09-19 DIAGNOSIS — G43.709 CHRONIC MIGRAINE WITHOUT AURA WITHOUT STATUS MIGRAINOSUS, NOT INTRACTABLE: Primary | ICD-10-CM

## 2023-09-19 NOTE — PROGRESS NOTES
Universal Protocol   Consent: Verbal consent obtained. Written consent obtained. Risks and benefits: risks, benefits and alternatives were discussed  Consent given by: patient  Time out: Immediately prior to procedure a "time out" was called to verify the correct patient, procedure, equipment, support staff and site/side marked as required. Patient understanding: patient states understanding of the procedure being performed  Patient consent: the patient's understanding of the procedure matches consent given  Procedure consent: procedure consent matches procedure scheduled  Relevant documents: relevant documents present and verified  Patient identity confirmed: verbally with patient        Chemodenervation     Date/Time 9/19/2023 9:45 AM     Performed by  Lui Lr PA-C   Authorized by Lui Lr PA-C       Pre-procedure details      Prepped With: Alcohol     Anesthesia  (see MAR for exact dosages):      Anesthesia method:  None   Procedure details     Position:  Upright   Botox     Botox Type:  Type A    Brand:  Botox    mL's of Botulinum Toxin:  200    Final Concentration per CC:  50 units    Needle Gauge:  30 G 2.5 inch   Procedures     Botox Procedures: chronic headache      Indications: migraines     Injection Location      Head / Face:  L superior cervical paraspinal, R superior cervical paraspinal, L medial occipitalis, R medial occipitalis, R temporalis, L temporalis, R superior trapezius and L superior trapezius    L temporalis injection amount:  25 unit(s)    R temporalis injection amount:  25 unit(s)    L medial occipitalis injection amount:  20 unit(s)    R medial occipitalis injection amount:  20 unit(s)    L superior cervical paraspinal injection amount:  10 unit(s)    R superior cervical paraspinal injection amount:  10 unit(s)    L superior trapezius injection amount:  20 unit(s)    R superior trapezius injection amount:  20 unit(s)   Total Units     Total units used:  200    Total units discarded:  0   Post-procedure details      Chemodenervation:  Chronic migraine    Facial Nerve Location[de-identified]  Bilateral facial nerve    Patient tolerance of procedure:   Tolerated well, no immediate complications   Comments       5 units TMJ bilaterally  5 units masseter bilaterally  30 units apex  All medically necessary

## 2023-09-28 ENCOUNTER — OFFICE VISIT (OUTPATIENT)
Dept: FAMILY MEDICINE CLINIC | Facility: CLINIC | Age: 73
End: 2023-09-28
Payer: MEDICARE

## 2023-09-28 VITALS
HEART RATE: 86 BPM | SYSTOLIC BLOOD PRESSURE: 110 MMHG | DIASTOLIC BLOOD PRESSURE: 72 MMHG | TEMPERATURE: 97.2 F | OXYGEN SATURATION: 97 % | WEIGHT: 105 LBS | BODY MASS INDEX: 19.32 KG/M2 | HEIGHT: 62 IN | RESPIRATION RATE: 14 BRPM

## 2023-09-28 DIAGNOSIS — E78.2 MIXED HYPERLIPIDEMIA: ICD-10-CM

## 2023-09-28 DIAGNOSIS — F43.21 GRIEF REACTION: ICD-10-CM

## 2023-09-28 DIAGNOSIS — Z12.31 ENCOUNTER FOR SCREENING MAMMOGRAM FOR BREAST CANCER: ICD-10-CM

## 2023-09-28 DIAGNOSIS — Z23 ENCOUNTER FOR IMMUNIZATION: ICD-10-CM

## 2023-09-28 DIAGNOSIS — I10 ESSENTIAL HYPERTENSION: ICD-10-CM

## 2023-09-28 DIAGNOSIS — G43.009 MIGRAINE WITHOUT AURA AND WITHOUT STATUS MIGRAINOSUS, NOT INTRACTABLE: ICD-10-CM

## 2023-09-28 DIAGNOSIS — J31.0 CHRONIC RHINITIS: ICD-10-CM

## 2023-09-28 DIAGNOSIS — E05.00 GRAVES DISEASE: ICD-10-CM

## 2023-09-28 DIAGNOSIS — E89.0 POSTABLATIVE HYPOTHYROIDISM: ICD-10-CM

## 2023-09-28 DIAGNOSIS — H04.123 DRY EYES: ICD-10-CM

## 2023-09-28 DIAGNOSIS — E05.00 GRAVES DISEASE: Primary | ICD-10-CM

## 2023-09-28 DIAGNOSIS — Z13.820 ENCOUNTER FOR SCREENING FOR OSTEOPOROSIS: ICD-10-CM

## 2023-09-28 DIAGNOSIS — F33.9 DEPRESSION, RECURRENT (HCC): ICD-10-CM

## 2023-09-28 DIAGNOSIS — I10 ESSENTIAL HYPERTENSION: Primary | ICD-10-CM

## 2023-09-28 PROCEDURE — 90662 IIV NO PRSV INCREASED AG IM: CPT

## 2023-09-28 PROCEDURE — 99214 OFFICE O/P EST MOD 30 MIN: CPT

## 2023-09-28 PROCEDURE — G0008 ADMIN INFLUENZA VIRUS VAC: HCPCS

## 2023-09-28 PROCEDURE — 90677 PCV20 VACCINE IM: CPT

## 2023-09-28 PROCEDURE — G0009 ADMIN PNEUMOCOCCAL VACCINE: HCPCS

## 2023-09-28 NOTE — PATIENT INSTRUCTIONS
Vitamin d 2000 iu  Continue meds  Continue self care  Osteoporosis   WHAT YOU NEED TO KNOW:   Osteoporosis is a long-term medical condition that causes your bones to become weak, brittle, and more likely to fracture. Osteoporosis occurs when your body absorbs more bone than it makes. It is also caused by a lack of calcium and estrogen (female hormone). DISCHARGE INSTRUCTIONS:   Return to the emergency department if:   You have trouble moving an area of your body after a fall. You have severe or increasing pain after a fall. Call your doctor if:   You have pain when you do your daily activities. You have questions or concerns about your condition or care. Medicines:   Medicines  may be given to prevent bone loss, build new bone, and increase estrogen. These medicines may be given as a pill or injection. Ask your healthcare provider for more information. Take your medicine as directed. Contact your healthcare provider if you think your medicine is not helping or if you have side effects. Tell your provider if you are allergic to any medicine. Keep a list of the medicines, vitamins, and herbs you take. Include the amounts, and when and why you take them. Bring the list or the pill bottles to follow-up visits. Carry your medicine list with you in case of an emergency. Prevent bone loss:       Eat healthy foods that are high in calcium. This helps keep your bones strong. Good sources of calcium are milk, cheese, broccoli, tofu, almonds, and canned salmon and sardines. Increase your vitamin D intake. Vitamin D is in fish oils, some vegetables, and fortified milk, cereal, and bread. Vitamin D is also formed in the skin when it is exposed to the sun. Ask your healthcare provider how much sunlight is safe for you. Drink liquids as directed. Ask your healthcare provider how much liquid to drink each day and which liquids are best for you. Do not have alcohol or caffeine.  They decrease bone mineral density, which can weaken your bones. Exercise regularly. Ask your healthcare provider about the best exercise plan for you. Weight bearing exercise for 30 minutes, 3 times a week can help build and strengthen bone. Do not smoke. Nicotine and other chemicals in cigarettes and cigars can decrease bone mineral density. Ask your healthcare provider for information if you currently smoke and need help to quit. E-cigarettes or smokeless tobacco still contain nicotine. Talk to your healthcare provider before you use these products. Go to physical therapy as directed. A physical therapist teaches you exercises to help improve movement and muscle strength. Follow up with your doctor as directed:  Write down your questions so you remember to ask them during your visits. © Copyright [facility] [employer] 2023 Information is for End User's use only and may not be sold, redistributed or otherwise used for commercial purposes. The above information is an  only. It is not intended as medical advice for individual conditions or treatments. Talk to your doctor, nurse or pharmacist before following any medical regimen to see if it is safe and effective for you.

## 2023-09-29 NOTE — PROGRESS NOTES
Name: Torsten Pacheco      : [birth year]      MRN: [id number]  Encounter Provider: CANDIDA Scott  Encounter Date: 2023   Encounter department: [facility] [address]     1. Essential hypertension  Assessment & Plan:  Pressure is at goal.  Blood work reviewed. Continue heart healthy diet. Orders:  -     CBC and differential; Future  -     Comprehensive metabolic panel; Future    2. Encounter for screening mammogram for breast cancer  -     Mammo screening bilateral w 3d & cad; Future; Expected date: 2023    3. Encounter for screening for osteoporosis    4. Encounter for immunization  -     influenza vaccine, high-dose, PF 0.7 mL (FLUZONE HIGH-DOSE)  -     Pneumococcal Conjugate Vaccine 20-valent (Pcv20)    5. Mixed hyperlipidemia  -     Lipid panel; Future    6. Graves disease  -     TSH, 3rd generation with Free T4 reflex; Future    7. Depression, recurrent ([address])  Assessment & Plan:  Denies depression. Does have sadness over the loss of her . May continue Zoloft. Discussed nonpharmacological intervention. Subjective      Is here to follow-up on chronic health conditions. Generally doing well. Review of Systems   Constitutional: Negative. HENT: Negative. Eyes: Negative. Respiratory: Negative. Cardiovascular: Negative. Gastrointestinal: Negative. Endocrine: Negative. Genitourinary: Negative. Musculoskeletal: Negative. Skin: Negative. Allergic/Immunologic: Negative. Neurological: Negative. Psychiatric/Behavioral: Positive for dysphoric mood (Denies depression but continues to have sadness over the loss of her ).        Current Outpatient Medications on File Prior to Visit   Medication Sig   • acetaminophen (TYLENOL) 500 mg tablet Take 500 mg by mouth every 6 (six) hours as needed   • aspirin 81 MG tablet Take 1 tablet by mouth daily   • Diclofenac Sodium (VOLTAREN) 1 % Apply 4 g topically 4 (four) times a day (Patient taking differently: Apply 4 g topically 4 (four) times a day as needed)   • famotidine (PEPCID) 20 mg tablet Take 1 tablet (20 mg total) by mouth 2 (two) times a day   • fluticasone (FLONASE) 50 mcg/act nasal spray 1 spray into each nostril daily   • Galcanezumab-gnlm 120 MG/ML SOAJ Inject 120 mg under the skin every 30 (thirty) days   • ketorolac (TORADOL) 10 mg tablet Take 1 tablet (10 mg total) by mouth every 6 (six) hours as needed for moderate pain (Patient taking differently: Take 10 mg by mouth every 6 (six) hours as needed for moderate pain)   • levothyroxine 88 mcg tablet Take 1 tablet (88 mcg total) by mouth daily   • Lifitegrast (XIIDRA OP) Administer 1 drop to both eyes 2 (two) times a day   • losartan (COZAAR) 50 mg tablet Take 2 tablets (100 mg total) by mouth daily   • Probiotic Product (PROBIOTIC-10 PO) Take 1 tablet by mouth in the morning   • prochlorperazine (COMPAZINE) 5 mg tablet Take 1 tab by mouth every 6 hours if needed for nausea and vomiting. • rosuvastatin (CRESTOR) 20 MG tablet Take 1 tablet (20 mg total) by mouth daily   • sertraline (ZOLOFT) 50 mg tablet Take 1 tablet (50 mg total) by mouth daily   • Ubrogepant (UBRELVY) 100 MG tablet Take 1 tab once prn for migraine. May repeat in 2 hours if needed. Max 2 doses per day. 90 day supply. Objective     /72   Pulse 86   Temp (!) 97.2 °F (36.2 °C) (Temporal)   Resp 14   Ht 5' 2" (1.575 m)   Wt 47.6 kg (105 lb)   SpO2 97%   BMI 19.20 kg/m²     Physical Exam  Vitals and nursing note reviewed. Constitutional:       Appearance: Normal appearance. She is well-developed. HENT:      Head: Normocephalic and atraumatic. Eyes:      Pupils: Pupils are equal, round, and reactive to light. Cardiovascular:      Rate and Rhythm: Normal rate and regular rhythm. Pulses: Normal pulses. Heart sounds: Normal heart sounds.    Pulmonary:      Effort: Pulmonary effort is normal.      Breath sounds: Normal breath sounds. Musculoskeletal:         General: Normal range of motion. Cervical back: Normal range of motion. Skin:     General: Skin is warm and dry. Neurological:      Mental Status: She is alert and oriented to person, place, and time. Psychiatric:         Mood and Affect: Mood normal.         Behavior: Behavior normal.         Thought Content:  Thought content normal.         Judgment: Judgment normal.       CANDIDA Caal

## 2023-09-29 NOTE — ASSESSMENT & PLAN NOTE
Denies depression. Does have sadness over the loss of her . May continue Zoloft. Discussed nonpharmacological intervention.

## 2023-10-02 RX ORDER — ROSUVASTATIN CALCIUM 20 MG/1
20 TABLET, COATED ORAL DAILY
Qty: 90 TABLET | Refills: 3 | Status: SHIPPED | OUTPATIENT
Start: 2023-10-02

## 2023-10-02 RX ORDER — LIFITEGRAST 50 MG/ML
1 SOLUTION/ DROPS OPHTHALMIC 2 TIMES DAILY
Qty: 60 EACH | Refills: 3 | Status: SHIPPED | OUTPATIENT
Start: 2023-10-02 | End: 2023-11-01

## 2023-10-02 RX ORDER — LEVOTHYROXINE SODIUM 88 UG/1
88 TABLET ORAL DAILY
Qty: 90 TABLET | Refills: 3 | Status: SHIPPED | OUTPATIENT
Start: 2023-10-02

## 2023-10-02 RX ORDER — FLUTICASONE PROPIONATE 50 MCG
1 SPRAY, SUSPENSION (ML) NASAL DAILY
Qty: 1 G | Refills: 3 | Status: SHIPPED | OUTPATIENT
Start: 2023-10-02

## 2023-10-02 RX ORDER — LOSARTAN POTASSIUM 50 MG/1
100 TABLET ORAL DAILY
Qty: 180 TABLET | Refills: 3 | Status: SHIPPED | OUTPATIENT
Start: 2023-10-02

## 2023-10-18 ENCOUNTER — HOSPITAL ENCOUNTER (OUTPATIENT)
Dept: MAMMOGRAPHY | Facility: CLINIC | Age: 73
Discharge: HOME | End: 2023-10-18
Payer: MEDICARE

## 2023-10-18 VITALS — BODY MASS INDEX: 19.32 KG/M2 | HEIGHT: 62 IN | WEIGHT: 105 LBS

## 2023-10-18 DIAGNOSIS — Z12.31 ENCOUNTER FOR SCREENING MAMMOGRAM FOR BREAST CANCER: ICD-10-CM

## 2023-10-18 PROCEDURE — 77067 SCR MAMMO BI INCL CAD: CPT

## 2023-10-18 PROCEDURE — 77063 BREAST TOMOSYNTHESIS BI: CPT

## 2023-11-30 DIAGNOSIS — E05.00 GRAVES DISEASE: ICD-10-CM

## 2023-11-30 DIAGNOSIS — G44.209 TENSION HEADACHE: ICD-10-CM

## 2023-11-30 DIAGNOSIS — H04.123 DRY EYES: ICD-10-CM

## 2023-11-30 RX ORDER — LIFITEGRAST 50 MG/ML
1 SOLUTION/ DROPS OPHTHALMIC 2 TIMES DAILY
Qty: 180 EACH | Refills: 3 | Status: SHIPPED | OUTPATIENT
Start: 2023-11-30 | End: 2023-12-30

## 2023-12-19 ENCOUNTER — PROCEDURE VISIT (OUTPATIENT)
Dept: NEUROLOGY | Facility: CLINIC | Age: 73
End: 2023-12-19
Payer: MEDICARE

## 2023-12-19 VITALS — DIASTOLIC BLOOD PRESSURE: 82 MMHG | SYSTOLIC BLOOD PRESSURE: 148 MMHG | HEART RATE: 78 BPM | TEMPERATURE: 98.4 F

## 2023-12-19 DIAGNOSIS — G43.709 CHRONIC MIGRAINE WITHOUT AURA WITHOUT STATUS MIGRAINOSUS, NOT INTRACTABLE: Primary | ICD-10-CM

## 2023-12-19 PROCEDURE — 64615 CHEMODENERV MUSC MIGRAINE: CPT | Performed by: PHYSICIAN ASSISTANT

## 2023-12-19 NOTE — PROGRESS NOTES
"Universal Protocol   Consent: Verbal consent obtained. Written consent obtained.  Risks and benefits: risks, benefits and alternatives were discussed  Consent given by: patient  Time out: Immediately prior to procedure a \"time out\" was called to verify the correct patient, procedure, equipment, support staff and site/side marked as required.  Patient understanding: patient states understanding of the procedure being performed  Patient consent: the patient's understanding of the procedure matches consent given  Procedure consent: procedure consent matches procedure scheduled  Relevant documents: relevant documents present and verified  Patient identity confirmed: verbally with patient      Chemodenervation     Date/Time  12/19/2023 12:30 PM     Performed by  Ambika José PA-C   Authorized by  Ambika José PA-C     Pre-procedure details      Prepped With: Alcohol     Anesthesia  (see MAR for exact dosages):     Anesthesia method:  None   Procedure details      Position:  Upright   Botox      Botox Type:  Type A    Brand:  Botox    mL's of Botulinum Toxin:  200    Final Concentration per CC:  50 units    Needle Gauge:  30 G 2.5 inch   Procedures      Botox Procedures: chronic headache      Indications: migraines     Injection Location      Head / Face:  L superior cervical paraspinal, R superior cervical paraspinal, L medial occipitalis, R medial occipitalis, R temporalis, L temporalis, R superior trapezius and L superior trapezius    L temporalis injection amount:  25 unit(s)    R temporalis injection amount:  25 unit(s)    L medial occipitalis injection amount:  20 unit(s)    R medial occipitalis injection amount:  20 unit(s)    L superior cervical paraspinal injection amount:  10 unit(s)    R superior cervical paraspinal injection amount:  10 unit(s)    L superior trapezius injection amount:  20 unit(s)    R superior trapezius injection amount:  20 unit(s)   Total Units      Total units used:  200    Total units " discarded:  0   Post-procedure details      Chemodenervation:  Chronic migraine    Facial Nerve Location::  Bilateral facial nerve    Patient tolerance of procedure:  Tolerated well, no immediate complications   Comments       30 units apex  10 units *temporalis bilaterally  All medically necessary

## 2023-12-30 ENCOUNTER — PATIENT MESSAGE (OUTPATIENT)
Dept: NEUROLOGY | Facility: CLINIC | Age: 73
End: 2023-12-30

## 2023-12-30 DIAGNOSIS — G43.709 CHRONIC MIGRAINE WITHOUT AURA WITHOUT STATUS MIGRAINOSUS, NOT INTRACTABLE: ICD-10-CM

## 2024-01-03 NOTE — PATIENT COMMUNICATION
Last script was sent on 12/19/22 with 11 refills so pt would be due for refill.  Script entered, please sign off

## 2024-03-19 ENCOUNTER — PROCEDURE VISIT (OUTPATIENT)
Dept: NEUROLOGY | Facility: CLINIC | Age: 74
End: 2024-03-19
Payer: MEDICARE

## 2024-03-19 VITALS — DIASTOLIC BLOOD PRESSURE: 86 MMHG | TEMPERATURE: 96.9 F | HEART RATE: 81 BPM | SYSTOLIC BLOOD PRESSURE: 144 MMHG

## 2024-03-19 DIAGNOSIS — G43.709 CHRONIC MIGRAINE WITHOUT AURA WITHOUT STATUS MIGRAINOSUS, NOT INTRACTABLE: Primary | ICD-10-CM

## 2024-03-19 PROCEDURE — 64615 CHEMODENERV MUSC MIGRAINE: CPT | Performed by: PHYSICIAN ASSISTANT

## 2024-03-19 NOTE — PROGRESS NOTES
"Universal Protocol   Consent: Verbal consent obtained. Written consent obtained.  Risks and benefits: risks, benefits and alternatives were discussed  Consent given by: patient  Time out: Immediately prior to procedure a \"time out\" was called to verify the correct patient, procedure, equipment, support staff and site/side marked as required.  Patient understanding: patient states understanding of the procedure being performed  Patient consent: the patient's understanding of the procedure matches consent given  Procedure consent: procedure consent matches procedure scheduled  Relevant documents: relevant documents present and verified  Patient identity confirmed: verbally with patient      Chemodenervation     Date/Time  3/19/2024 10:00 AM     Performed by  Ambika José PA-C   Authorized by  Ambika Jsoé PA-C     Pre-procedure details      Prepped With: Alcohol     Anesthesia  (see MAR for exact dosages):     Anesthesia method:  None   Procedure details      Position:  Upright   Botox      Botox Type:  Type A    Brand:  Botox    mL's of Botulinum Toxin:  200    Final Concentration per CC:  50 units    Needle Gauge:  30 G 2.5 inch   Procedures      Botox Procedures: chronic headache      Indications: migraines     Injection Location      Head / Face:  L superior cervical paraspinal, R superior cervical paraspinal, L medial occipitalis, R medial occipitalis, R temporalis, L temporalis, R superior trapezius and L superior trapezius    L temporalis injection amount:  25 unit(s)    R temporalis injection amount:  25 unit(s)    L medial occipitalis injection amount:  20 unit(s)    R medial occipitalis injection amount:  20 unit(s)    L superior cervical paraspinal injection amount:  10 unit(s)    R superior cervical paraspinal injection amount:  10 unit(s)    L superior trapezius injection amount:  20 unit(s)    R superior trapezius injection amount:  20 unit(s)   Total Units      Total units used:  200    Total units " discarded:  0   Post-procedure details      Chemodenervation:  Chronic migraine    Facial Nerve Location::  Bilateral facial nerve    Patient tolerance of procedure:  Tolerated well, no immediate complications   Comments       30 units apex  10 units *temporalis bilaterally  All medically necessary

## 2024-03-21 ENCOUNTER — APPOINTMENT (OUTPATIENT)
Dept: LAB | Facility: CLINIC | Age: 74
End: 2024-03-21
Payer: MEDICARE

## 2024-03-21 DIAGNOSIS — I10 ESSENTIAL HYPERTENSION: ICD-10-CM

## 2024-03-21 DIAGNOSIS — E05.00 GRAVES DISEASE: ICD-10-CM

## 2024-03-21 DIAGNOSIS — E78.2 MIXED HYPERLIPIDEMIA: ICD-10-CM

## 2024-03-21 LAB
ALBUMIN SERPL BCP-MCNC: 4.4 G/DL (ref 3.5–5)
ALP SERPL-CCNC: 95 U/L (ref 34–104)
ALT SERPL W P-5'-P-CCNC: 17 U/L (ref 7–52)
ANION GAP SERPL CALCULATED.3IONS-SCNC: 9 MMOL/L (ref 4–13)
AST SERPL W P-5'-P-CCNC: 22 U/L (ref 13–39)
BASOPHILS # BLD AUTO: 0.11 THOUSANDS/ÂΜL (ref 0–0.1)
BASOPHILS NFR BLD AUTO: 1 % (ref 0–1)
BILIRUB SERPL-MCNC: 0.51 MG/DL (ref 0.2–1)
BUN SERPL-MCNC: 17 MG/DL (ref 5–25)
CALCIUM SERPL-MCNC: 9.2 MG/DL (ref 8.4–10.2)
CHLORIDE SERPL-SCNC: 102 MMOL/L (ref 96–108)
CHOLEST SERPL-MCNC: 187 MG/DL
CO2 SERPL-SCNC: 28 MMOL/L (ref 21–32)
CREAT SERPL-MCNC: 0.69 MG/DL (ref 0.6–1.3)
EOSINOPHIL # BLD AUTO: 0.3 THOUSAND/ÂΜL (ref 0–0.61)
EOSINOPHIL NFR BLD AUTO: 4 % (ref 0–6)
ERYTHROCYTE [DISTWIDTH] IN BLOOD BY AUTOMATED COUNT: 12.8 % (ref 11.6–15.1)
GFR SERPL CREATININE-BSD FRML MDRD: 85 ML/MIN/1.73SQ M
GLUCOSE P FAST SERPL-MCNC: 88 MG/DL (ref 65–99)
HCT VFR BLD AUTO: 49.2 % (ref 34.8–46.1)
HDLC SERPL-MCNC: 53 MG/DL
HGB BLD-MCNC: 15.5 G/DL (ref 11.5–15.4)
IMM GRANULOCYTES # BLD AUTO: 0.01 THOUSAND/UL (ref 0–0.2)
IMM GRANULOCYTES NFR BLD AUTO: 0 % (ref 0–2)
LDLC SERPL CALC-MCNC: 95 MG/DL (ref 0–100)
LYMPHOCYTES # BLD AUTO: 2.76 THOUSANDS/ÂΜL (ref 0.6–4.47)
LYMPHOCYTES NFR BLD AUTO: 32 % (ref 14–44)
MCH RBC QN AUTO: 30.6 PG (ref 26.8–34.3)
MCHC RBC AUTO-ENTMCNC: 31.5 G/DL (ref 31.4–37.4)
MCV RBC AUTO: 97 FL (ref 82–98)
MONOCYTES # BLD AUTO: 0.56 THOUSAND/ÂΜL (ref 0.17–1.22)
MONOCYTES NFR BLD AUTO: 7 % (ref 4–12)
NEUTROPHILS # BLD AUTO: 4.88 THOUSANDS/ÂΜL (ref 1.85–7.62)
NEUTS SEG NFR BLD AUTO: 56 % (ref 43–75)
NONHDLC SERPL-MCNC: 134 MG/DL
NRBC BLD AUTO-RTO: 0 /100 WBCS
PLATELET # BLD AUTO: 323 THOUSANDS/UL (ref 149–390)
PMV BLD AUTO: 9.4 FL (ref 8.9–12.7)
POTASSIUM SERPL-SCNC: 4 MMOL/L (ref 3.5–5.3)
PROT SERPL-MCNC: 6.8 G/DL (ref 6.4–8.4)
RBC # BLD AUTO: 5.07 MILLION/UL (ref 3.81–5.12)
SODIUM SERPL-SCNC: 139 MMOL/L (ref 135–147)
TRIGL SERPL-MCNC: 195 MG/DL
TSH SERPL DL<=0.05 MIU/L-ACNC: 0.9 UIU/ML (ref 0.45–4.5)
WBC # BLD AUTO: 8.62 THOUSAND/UL (ref 4.31–10.16)

## 2024-03-21 PROCEDURE — 80053 COMPREHEN METABOLIC PANEL: CPT

## 2024-03-21 PROCEDURE — 80061 LIPID PANEL: CPT

## 2024-03-21 PROCEDURE — 36415 COLL VENOUS BLD VENIPUNCTURE: CPT

## 2024-03-21 PROCEDURE — 85025 COMPLETE CBC W/AUTO DIFF WBC: CPT

## 2024-03-21 PROCEDURE — 84443 ASSAY THYROID STIM HORMONE: CPT

## 2024-03-25 ENCOUNTER — RA CDI HCC (OUTPATIENT)
Dept: OTHER | Facility: HOSPITAL | Age: 74
End: 2024-03-25

## 2024-03-28 ENCOUNTER — OFFICE VISIT (OUTPATIENT)
Dept: FAMILY MEDICINE CLINIC | Facility: CLINIC | Age: 74
End: 2024-03-28
Payer: MEDICARE

## 2024-03-28 VITALS
SYSTOLIC BLOOD PRESSURE: 140 MMHG | WEIGHT: 108 LBS | DIASTOLIC BLOOD PRESSURE: 94 MMHG | BODY MASS INDEX: 19.88 KG/M2 | HEIGHT: 62 IN | OXYGEN SATURATION: 100 % | TEMPERATURE: 97.1 F | HEART RATE: 74 BPM | RESPIRATION RATE: 12 BRPM

## 2024-03-28 DIAGNOSIS — R11.0 FUNCTIONAL NAUSEA: ICD-10-CM

## 2024-03-28 DIAGNOSIS — H04.123 DRY EYES: ICD-10-CM

## 2024-03-28 DIAGNOSIS — G44.209 TENSION HEADACHE: ICD-10-CM

## 2024-03-28 DIAGNOSIS — E78.2 MIXED HYPERLIPIDEMIA: ICD-10-CM

## 2024-03-28 DIAGNOSIS — F43.21 GRIEF REACTION: ICD-10-CM

## 2024-03-28 DIAGNOSIS — E05.00 GRAVES DISEASE: ICD-10-CM

## 2024-03-28 DIAGNOSIS — Z00.00 MEDICARE ANNUAL WELLNESS VISIT, SUBSEQUENT: Primary | ICD-10-CM

## 2024-03-28 DIAGNOSIS — M81.0 POST-MENOPAUSAL OSTEOPOROSIS: ICD-10-CM

## 2024-03-28 DIAGNOSIS — E44.1 MILD PROTEIN-CALORIE MALNUTRITION (HCC): ICD-10-CM

## 2024-03-28 DIAGNOSIS — I10 ESSENTIAL HYPERTENSION: ICD-10-CM

## 2024-03-28 DIAGNOSIS — J01.10 ACUTE NON-RECURRENT FRONTAL SINUSITIS: ICD-10-CM

## 2024-03-28 DIAGNOSIS — E89.0 POSTABLATIVE HYPOTHYROIDISM: ICD-10-CM

## 2024-03-28 DIAGNOSIS — J31.0 CHRONIC RHINITIS: ICD-10-CM

## 2024-03-28 DIAGNOSIS — F33.9 DEPRESSION, RECURRENT (HCC): ICD-10-CM

## 2024-03-28 PROCEDURE — 99214 OFFICE O/P EST MOD 30 MIN: CPT | Performed by: NURSE PRACTITIONER

## 2024-03-28 PROCEDURE — G0439 PPPS, SUBSEQ VISIT: HCPCS | Performed by: NURSE PRACTITIONER

## 2024-03-28 RX ORDER — LIFITEGRAST 50 MG/ML
1 SOLUTION/ DROPS OPHTHALMIC 2 TIMES DAILY
Qty: 180 EACH | Refills: 3 | Status: SHIPPED | OUTPATIENT
Start: 2024-03-28 | End: 2024-04-27

## 2024-03-28 RX ORDER — LEVOTHYROXINE SODIUM 88 UG/1
88 TABLET ORAL DAILY
Qty: 90 TABLET | Refills: 3 | Status: SHIPPED | OUTPATIENT
Start: 2024-03-28

## 2024-03-28 RX ORDER — LEVOTHYROXINE SODIUM 88 UG/1
88 TABLET ORAL DAILY
Qty: 90 TABLET | Refills: 3 | Status: SHIPPED | OUTPATIENT
Start: 2024-03-28 | End: 2024-03-28 | Stop reason: SDUPTHER

## 2024-03-28 RX ORDER — ROSUVASTATIN CALCIUM 20 MG/1
20 TABLET, COATED ORAL DAILY
Qty: 90 TABLET | Refills: 3 | Status: SHIPPED | OUTPATIENT
Start: 2024-03-28 | End: 2024-03-28 | Stop reason: SDUPTHER

## 2024-03-28 RX ORDER — LOSARTAN POTASSIUM 50 MG/1
100 TABLET ORAL DAILY
Qty: 180 TABLET | Refills: 3 | Status: SHIPPED | OUTPATIENT
Start: 2024-03-28

## 2024-03-28 RX ORDER — METHYLPREDNISOLONE 4 MG/1
TABLET ORAL
Qty: 21 EACH | Refills: 0 | Status: SHIPPED | OUTPATIENT
Start: 2024-03-28

## 2024-03-28 RX ORDER — ROSUVASTATIN CALCIUM 20 MG/1
20 TABLET, COATED ORAL DAILY
Qty: 90 TABLET | Refills: 3 | Status: SHIPPED | OUTPATIENT
Start: 2024-03-28

## 2024-03-28 RX ORDER — FLUTICASONE PROPIONATE 50 MCG
1 SPRAY, SUSPENSION (ML) NASAL DAILY
Qty: 1 G | Refills: 3 | Status: SHIPPED | OUTPATIENT
Start: 2024-03-28

## 2024-03-28 RX ORDER — FAMOTIDINE 20 MG/1
20 TABLET, FILM COATED ORAL 2 TIMES DAILY
Qty: 180 TABLET | Refills: 3 | Status: SHIPPED | OUTPATIENT
Start: 2024-03-28

## 2024-03-28 RX ORDER — METHYLPREDNISOLONE 4 MG/1
TABLET ORAL
Qty: 21 EACH | Refills: 0 | OUTPATIENT
Start: 2024-03-28

## 2024-03-28 NOTE — ASSESSMENT & PLAN NOTE
Patient's triglycerides continue to be elevated.  Discussed heart healthy diet.  Continue medication

## 2024-03-28 NOTE — ASSESSMENT & PLAN NOTE
Medicare wellness completed.  Patient declined DEXA scan at this time.  Does not have a living will, states her son is aware of her wishes.  Discussed maintaining safety.  Continue with self-care.  Continue good fluid hydration, nutrition.

## 2024-03-28 NOTE — ASSESSMENT & PLAN NOTE
Reports sinus pain and pressure.  Denies any fevers chills productive nasal discharge.  Discussed using steam to help decongest.  Prednisone therapy

## 2024-03-28 NOTE — PROGRESS NOTES
Assessment and Plan:     Problem List Items Addressed This Visit        Respiratory    Acute non-recurrent frontal sinusitis     Reports sinus pain and pressure.  Denies any fevers chills productive nasal discharge.  Discussed using steam to help decongest.  Prednisone therapy         Relevant Medications    methylPREDNISolone 4 MG tablet therapy pack       Digestive    Functional nausea    Relevant Medications    famotidine (PEPCID) 20 mg tablet       Endocrine    Postablative hypothyroidism    Relevant Medications    methylPREDNISolone 4 MG tablet therapy pack    levothyroxine 88 mcg tablet       Behavioral Health    Grief reaction    Relevant Medications    sertraline (ZOLOFT) 50 mg tablet    Depression, recurrent (HCC)    Relevant Medications    sertraline (ZOLOFT) 50 mg tablet       Other    Mixed hyperlipidemia     Patient's triglycerides continue to be elevated.  Discussed heart healthy diet.  Continue medication         Relevant Medications    rosuvastatin (CRESTOR) 20 MG tablet    Mild protein-calorie malnutrition (HCC)    Medicare annual wellness visit, subsequent - Primary     Medicare wellness completed.  Patient declined DEXA scan at this time.  Does not have a living will, states her son is aware of her wishes.  Discussed maintaining safety.  Continue with self-care.  Continue good fluid hydration, nutrition.        Other Visit Diagnoses     Post-menopausal osteoporosis              Depression Screening and Follow-up Plan: Patient's depression screening was positive with a PHQ-9 score of 6. Patient assessed for underlying major depression. Brief counseling provided and recommend additional follow-up/re-evaluation next office visit. Patient declines further evaluation by mental health professional and/or medications. Brief counseling provided. Will re-evaluate at next office visit.       Preventive health issues were discussed with patient, and age appropriate screening tests were ordered as noted in  patient's After Visit Summary.  Personalized health advice and appropriate referrals for health education or preventive services given if needed, as noted in patient's After Visit Summary.     History of Present Illness:     Patient presents for a Medicare Wellness Visit    HPI   Patient Care Team:  CANDIDA Royal as PCP - General (Family Medicine)  DO Natasha Zuluaga MD Alan Westheim, MD Charles F Cohan, DO (Gastroenterology)     Review of Systems:     Review of Systems     Problem List:     Patient Active Problem List   Diagnosis   • Arthritis   • Migraine without aura or status migrainosus   • GERD (gastroesophageal reflux disease)   • Tension headache   • Postablative hypothyroidism   • Diastolic dysfunction   • Essential hypertension   • Chronic migraine without aura without status migrainosus, not intractable   • Functional nausea   • Bile duct abnormality   • Mixed hyperlipidemia   • Grief reaction   • Adjustment insomnia   • Trigger ring finger of right hand   • Mild protein-calorie malnutrition (HCC)   • Graves disease   • Greater trochanteric bursitis   • Encounter to establish care   • Depression, recurrent (HCC)   • Medicare annual wellness visit, subsequent   • Acute non-recurrent frontal sinusitis      Past Medical and Surgical History:     Past Medical History:   Diagnosis Date   • Allergic codeine  fentanyl   • Anxiety    • Arthritis    • Back pain    • Cancer (HCC)     skin   • Depression    • Disease of thyroid gland     hypo   • Diverticulitis of colon    • Dry eye    • GERD (gastroesophageal reflux disease)    • Graves' disease     TREATED WITH RADIOACTIVE IODINE ON 9/13/13 (10.7 MCI) RESOLVED: 2013   • Headache(784.0)    • Hepatitis B virus infection    • Hyperlipidemia    • Hypertension    • Infectious viral hepatitis     B - 1971   • Migraine    • Nonmelanoma skin cancer     LAST ASSESSED: [id number]   • Visual impairment dryeye thyroidorbitopathy     Past Surgical History:    Procedure Laterality Date   • APPENDECTOMY     • ARTHROSCOPY KNEE Left    • CATARACT EXTRACTION Bilateral    • CATARACT EXTRACTION, BILATERAL     •  SECTION     • COLONOSCOPY      Oct/2018   • EYE SURGERY Bilateral     SURGERY OF THE ORBITS - ORBITAL DECOMPRESSION   • JOINT REPLACEMENT  2016    r knee   • OR TENDON SHEATH INCISION Right 2022    Procedure: RING TRIGGER FINGER RELEASE;  Surgeon: Leo Marshall MD;  Location: [facility] MAIN OR;  Service: Orthopedics   • TEMPORAL ARTERY BIOPSY / LIGATION     • TONSILLECTOMY     • TOTAL KNEE ARTHROPLASTY Right 2016    TKR      Family History:     Family History   Problem Relation Age of Onset   • Pancreatic cancer Mother 68   • Arthritis Mother    • Cancer Mother    • Coronary artery disease Father    • Throat cancer Father    • Hyperlipidemia Father    • Heart disease Father    • Diabetes Maternal Grandmother    • Heart attack Maternal Grandfather         ACUTE MI   • Cancer Paternal Grandmother         unknown where   • Hyperlipidemia Paternal Grandfather    • Cirrhosis Paternal Grandfather    • No Known Problems Sister    • No Known Problems Sister    • Lung cancer Maternal Aunt 68   • Cancer Maternal Aunt       Social History:     Social History     Socioeconomic History   • Marital status:      Spouse name: None   • Number of children: None   • Years of education: 17   • Highest education level: None   Occupational History     Comment: RETIRED   Tobacco Use   • Smoking status: Former     Current packs/day: 0.00     Average packs/day: 1 pack/day for 50.0 years (50.0 ttl pk-yrs)     Types: Cigarettes     Start date: 10/1/1950     Quit date: 10/1/2000     Years since quittin.5   • Smokeless tobacco: Never   Vaping Use   • Vaping status: Never Used   Substance and Sexual Activity   • Alcohol use: No     Comment: (HISTORY)   • Drug use: No   • Sexual activity: Not Currently     Partners: Male     Birth control/protection:  Post-menopausal   Other Topics Concern   • None   Social History Narrative    CAFFEINE USE    DAILY COFFEE CONSUMPTION (2 CUPS/DAY)    NO ADVANCE DIRECTIVE ON FILIE     Social Determinants of Health     Financial Resource Strain: Low Risk  (3/15/2023)    Overall Financial Resource Strain (CARDIA)    • Difficulty of Paying Living Expenses: Not very hard   Food Insecurity: No Food Insecurity (3/28/2024)    Hunger Vital Sign    • Worried About Running Out of Food in the Last Year: Never true    • Ran Out of Food in the Last Year: Never true   Transportation Needs: No Transportation Needs (3/28/2024)    PRAPARE - Transportation    • Lack of Transportation (Medical): No    • Lack of Transportation (Non-Medical): No   Physical Activity: Inactive (9/5/2020)    Exercise Vital Sign    • Days of Exercise per Week: 0 days    • Minutes of Exercise per Session: 0 min   Stress: Stress Concern Present (9/5/2020)    [employer] [place] [employer] - Occupational Stress Questionnaire    • Feeling of Stress : Very much   Social Connections: Not on file   Intimate Partner Violence: Not on file   Housing Stability: Low Risk  (3/28/2024)    Housing Stability Vital Sign    • Unable to Pay for Housing in the Last Year: No    • Number of Places Lived in the Last Year: 1    • Unstable Housing in the Last Year: No      Medications and Allergies:     Current Outpatient Medications   Medication Sig Dispense Refill   • acetaminophen (TYLENOL) 500 mg tablet Take 500 mg by mouth every 6 (six) hours as needed     • aspirin 81 MG tablet Take 1 tablet by mouth daily     • Diclofenac Sodium (VOLTAREN) 1 % Apply 4 g topically 4 (four) times a day 150 g 11   • famotidine (PEPCID) 20 mg tablet Take 1 tablet (20 mg total) by mouth 2 (two) times a day 180 tablet 3   • fluticasone (FLONASE) 50 mcg/act nasal spray 1 spray into each nostril daily 1 g 3   • Galcanezumab-gnlm 120 MG/ML SOAJ Inject 120 mg under the skin every 30 (thirty) days 1 mL 11    • ketorolac (TORADOL) 10 mg tablet Take 1 tablet (10 mg total) by mouth every 6 (six) hours as needed for moderate pain (Patient taking differently: Take 10 mg by mouth every 6 (six) hours as needed for moderate pain) 10 tablet 0   • levothyroxine 88 mcg tablet Take 1 tablet (88 mcg total) by mouth daily 90 tablet 3   • losartan (COZAAR) 50 mg tablet Take 2 tablets (100 mg total) by mouth daily 180 tablet 3   • methylPREDNISolone 4 MG tablet therapy pack Use as directed on package 21 each 0   • Probiotic Product (PROBIOTIC-10 PO) Take 1 tablet by mouth in the morning     • prochlorperazine (COMPAZINE) 5 mg tablet Take 1 tab by mouth every 6 hours if needed for nausea and vomiting. 30 tablet 0   • rosuvastatin (CRESTOR) 20 MG tablet Take 1 tablet (20 mg total) by mouth daily 90 tablet 3   • sertraline (ZOLOFT) 50 mg tablet Take 1 tablet (50 mg total) by mouth daily 90 tablet 3   • Ubrogepant (UBRELVY) 100 MG tablet Take 1 tab once prn for migraine. May repeat in 2 hours if needed. Max 2 doses per day. 90 day supply. 48 tablet 3   • lifitegrast (Xiidra) 5 % op solution Administer 1 drop to both eyes 2 (two) times a day 180 each 3     No current facility-administered medications for this visit.     Allergies   Allergen Reactions   • Fentanyl Vomiting     Action Taken: vomiting; Category: Adverse Reaction;   Protracted   • Methimazole Hives   • Codeine GI Intolerance and Anxiety     Other reaction(s): Nausea and/or vomiting      Immunizations:     Immunization History   Administered Date(s) Administered   • COVID-19 PFIZER VACCINE 0.3 ML IM 03/18/2021, 04/08/2021, 08/20/2021   • COVID-19 Pfizer Vac BIVALENT Nabil-sucrose 12 Yr+ IM 09/19/2022, 06/06/2023   • COVID-19 Pfizer mRNA vacc PF nabil-sucrose 12 yr and older (Comirnaty) 10/04/2023   • COVID-19 Pfizer vac (Nabil-sucrose, gray cap) 12 yr+ IM 04/12/2022   • INFLUENZA 11/20/2015, 10/25/2017, 10/03/2018, 10/03/2018, 09/08/2020   • Influenza Split High Dose Preservative  Free IM 11/20/2015, 10/25/2017   • Influenza, high dose seasonal 0.7 mL 09/28/2023   • Influenza, seasonal, injectable 11/19/2013   • Pneumococcal Conjugate 13-Valent 09/29/2016   • Pneumococcal Conjugate Vaccine 20-valent (Pcv20), Polysace 09/28/2023   • Pneumococcal Polysaccharide PPV23 09/26/2014   • Tdap 1950   • Zoster 10/03/2014   • Zoster Vaccine Recombinant 09/08/2020, 11/19/2020   • influenza, trivalent, adjuvanted 10/03/2018, 10/03/2018      Health Maintenance:         Topic Date Due   • DXA SCAN  03/29/2024 (Originally 1950)   • Colorectal Cancer Screening  09/29/2024 (Originally 11/25/2016)   • Breast Cancer Screening: Mammogram  10/18/2024   • Hepatitis C Screening  Completed         Topic Date Due   • COVID-19 Vaccine (8 - 2023-24 season) 11/29/2023      Medicare Screening Tests and Risk Assessments:     Roxanne is here for her Subsequent Wellness visit. Last Medicare Wellness visit information reviewed, patient interviewed and updates made to the record today.      Health Risk Assessment:   Patient rates overall health as good. Patient feels that their physical health rating is same. Patient is satisfied with their life. Eyesight was rated as same. Hearing was rated as same. Patient feels that their emotional and mental health rating is same. Patients states they are never, rarely angry. Patient states they are sometimes unusually tired/fatigued. Pain experienced in the last 7 days has been some. Patient's pain rating has been 1/10. Patient states that she has experienced no weight loss or gain in last 6 months.     Depression Screening:   PHQ-9 Score: 6      Fall Risk Screening:   In the past year, patient has experienced: no history of falling in past year      Urinary Incontinence Screening:   Patient has not leaked urine accidently in the last six months.     Home Safety:  Patient does not have trouble with stairs inside or outside of their home. Patient has working smoke alarms and  has working carbon monoxide detector. Home safety hazards include: none.     Nutrition:   Current diet is Regular.     Medications:   Patient is currently taking over-the-counter supplements. OTC medications include: see medication list. Patient is able to manage medications.     Activities of Daily Living (ADLs)/Instrumental Activities of Daily Living (IADLs):   Walk and transfer into and out of bed and chair?: Yes  Dress and groom yourself?: Yes    Bathe or shower yourself?: Yes    Feed yourself? Yes  Do your laundry/housekeeping?: Yes  Manage your money, pay your bills and track your expenses?: Yes  Make your own meals?: Yes    Do your own shopping?: Yes    Previous Hospitalizations:   Any hospitalizations or ED visits within the last 12 months?: No      Advance Care Planning:   Living will: No    Durable POA for healthcare: Yes    Advanced directive: Yes      Comments: Reports son is aware of her wishes    Cognitive Screening:   Provider or family/friend/caregiver concerned regarding cognition?: No    PREVENTIVE SCREENINGS      Cardiovascular Screening:    General: Screening Not Indicated and History Lipid Disorder      Diabetes Screening:     General: Screening Current      Colorectal Cancer Screening:     General: Screening Not Indicated      Breast Cancer Screening:     General: Screening Current      Cervical Cancer Screening:    General: Screening Not Indicated      Osteoporosis Screening:    General: Screening Current      Abdominal Aortic Aneurysm (AAA) Screening:      Due for: Screening AAA Ultrasound      Lung Cancer Screening:     General: Screening Not Indicated      Hepatitis C Screening:    General: Screening Current    Screening, Brief Intervention, and Referral to Treatment (SBIRT)    Screening  Typical number of drinks in a day: 0  Typical number of drinks in a week: 0  Interpretation: Low risk drinking behavior.    AUDIT-C Screenin) How often did you have a drink containing alcohol in the  "past year? never  2) How many drinks did you have on a typical day when you were drinking in the past year? 0  3) How often did you have 6 or more drinks on one occasion in the past year? never    AUDIT-C Score: 0  Interpretation: Score 0-2 (female): Negative screen for alcohol misuse    Single Item Drug Screening:  How often have you used an illegal drug (including marijuana) or a prescription medication for non-medical reasons in the past year? never    Single Item Drug Screen Score: 0  Interpretation: Negative screen for possible drug use disorder    SDOH Risk Assessment  Social determinants of health (SDOH) risk assesment tool was completed. The tool at a minimum covered housing stability, food insecurity, transportation needs, and utility difficulty. Patient had at risk responses for the following SDOH domains: housing stability.     Other Counseling Topics:   Car/seat belt/driving safety, skin self-exam, sunscreen and regular weightbearing exercise and calcium and vitamin D intake.     No results found.     Physical Exam:     /94   Pulse 74   Temp (!) 97.1 °F (36.2 °C) (Temporal)   Resp 12   Ht 5' 2\" (1.575 m)   Wt 49 kg (108 lb)   SpO2 100%   BMI 19.75 kg/m²     Physical Exam  Constitutional:       General: She is not in acute distress.     Appearance: Normal appearance. She is obese. She is not ill-appearing.   HENT:      Head: Normocephalic and atraumatic.      Nose: Congestion present.      Right Turbinates: Enlarged.      Left Turbinates: Swollen.      Right Sinus: Frontal sinus tenderness present.      Mouth/Throat:      Mouth: Mucous membranes are moist.   Eyes:      Pupils: Pupils are equal, round, and reactive to light.   Cardiovascular:      Rate and Rhythm: Normal rate and regular rhythm.      Pulses: Normal pulses.   Pulmonary:      Effort: Pulmonary effort is normal. No respiratory distress.      Breath sounds: Normal breath sounds.   Chest:      Chest wall: No tenderness. "   Abdominal:      General: Abdomen is flat. Bowel sounds are normal. There is no distension.      Palpations: There is no mass.      Tenderness: There is no abdominal tenderness.   Musculoskeletal:         General: Normal range of motion.      Cervical back: Normal range of motion and neck supple.   Skin:     General: Skin is warm and dry.   Neurological:      General: No focal deficit present.      Mental Status: She is alert and oriented to person, place, and time.   Psychiatric:         Mood and Affect: Mood normal.         Behavior: Behavior normal.         Thought Content: Thought content normal.         Judgment: Judgment normal.          CANDIDA Royal

## 2024-03-28 NOTE — PATIENT INSTRUCTIONS
Medicare Preventive Visit Patient Instructions  Thank you for completing your Welcome to Medicare Visit or Medicare Annual Wellness Visit today. Your next wellness visit will be due in one year (3/29/2025).  The screening/preventive services that you may require over the next 5-10 years are detailed below. Some tests may not apply to you based off risk factors and/or age. Screening tests ordered at today's visit but not completed yet may show as past due. Also, please note that scanned in results may not display below.  Preventive Screenings:  Service Recommendations Previous Testing/Comments   Colorectal Cancer Screening  * Colonoscopy    * Fecal Occult Blood Test (FOBT)/Fecal Immunochemical Test (FIT)  * Fecal DNA/Cologuard Test  * Flexible Sigmoidoscopy Age: 45-75 years old   Colonoscopy: every 10 years (may be performed more frequently if at higher risk)  OR  FOBT/FIT: every 1 year  OR  Cologuard: every 3 years  OR  Sigmoidoscopy: every 5 years  Screening may be recommended earlier than age 45 if at higher risk for colorectal cancer. Also, an individualized decision between you and your healthcare provider will decide whether screening between the ages of 76-85 would be appropriate. Colonoscopy: 09/28/2018  FOBT/FIT: Not on file  Cologuard: Not on file  Sigmoidoscopy: Not on file          Breast Cancer Screening Age: 40+ years old  Frequency: every 1-2 years  Not required if history of left and right mastectomy Mammogram: 10/18/2023    Screening Current   Cervical Cancer Screening Between the ages of 21-29, pap smear recommended once every 3 years.   Between the ages of 30-65, can perform pap smear with HPV co-testing every 5 years.   Recommendations may differ for women with a history of total hysterectomy, cervical cancer, or abnormal pap smears in past. Pap Smear: 05/10/2021    Screening Not Indicated   Hepatitis C Screening Once for adults born between 1945 and 1965  More frequently in patients at high risk  for Hepatitis C Hep C Antibody: 03/09/2021    Screening Current   Diabetes Screening 1-2 times per year if you're at risk for diabetes or have pre-diabetes Fasting glucose: 88 mg/dL (3/21/2024)  A1C: No results in last 5 years (No results in last 5 years)  Screening Current   Cholesterol Screening Once every 5 years if you don't have a lipid disorder. May order more often based on risk factors. Lipid panel: 03/21/2024    Screening Not Indicated  History Lipid Disorder     Other Preventive Screenings Covered by Medicare:  Abdominal Aortic Aneurysm (AAA) Screening: covered once if your at risk. You're considered to be at risk if you have a family history of AAA.  Lung Cancer Screening: covers low dose CT scan once per year if you meet all of the following conditions: (1) Age 55-77; (2) No signs or symptoms of lung cancer; (3) Current smoker or have quit smoking within the last 15 years; (4) You have a tobacco smoking history of at least 20 pack years (packs per day multiplied by number of years you smoked); (5) You get a written order from a healthcare provider.  Glaucoma Screening: covered annually if you're considered high risk: (1) You have diabetes OR (2) Family history of glaucoma OR (3)  aged 50 and older OR (4)  American aged 65 and older  Osteoporosis Screening: covered every 2 years if you meet one of the following conditions: (1) You're estrogen deficient and at risk for osteoporosis based off medical history and other findings; (2) Have a vertebral abnormality; (3) On glucocorticoid therapy for more than 3 months; (4) Have primary hyperparathyroidism; (5) On osteoporosis medications and need to assess response to drug therapy.   Last bone density test (DXA Scan): Not on file.  HIV Screening: covered annually if you're between the age of 15-65. Also covered annually if you are younger than 15 and older than 65 with risk factors for HIV infection. For pregnant patients, it is covered  up to 3 times per pregnancy.    Immunizations:  Immunization Recommendations   Influenza Vaccine Annual influenza vaccination during flu season is recommended for all persons aged >= 6 months who do not have contraindications   Pneumococcal Vaccine   * Pneumococcal conjugate vaccine = PCV13 (Prevnar 13), PCV15 (Vaxneuvance), PCV20 (Prevnar 20)  * Pneumococcal polysaccharide vaccine = PPSV23 (Pneumovax) Adults 19-63 yo with certain risk factors or if 65+ yo  If never received any pneumonia vaccine: recommend Prevnar 20 (PCV20)  Give PCV20 if previously received 1 dose of PCV13 or PPSV23   Hepatitis B Vaccine 3 dose series if at intermediate or high risk (ex: diabetes, end stage renal disease, liver disease)   Respiratory syncytial virus (RSV) Vaccine - COVERED BY MEDICARE PART D  * RSVPreF3 (Arexvy) CDC recommends that adults 60 years of age and older may receive a single dose of RSV vaccine using shared clinical decision-making (SCDM)   Tetanus (Td) Vaccine - COST NOT COVERED BY MEDICARE PART B Following completion of primary series, a booster dose should be given every 10 years to maintain immunity against tetanus. Td may also be given as tetanus wound prophylaxis.   Tdap Vaccine - COST NOT COVERED BY MEDICARE PART B Recommended at least once for all adults. For pregnant patients, recommended with each pregnancy.   Shingles Vaccine (Shingrix) - COST NOT COVERED BY MEDICARE PART B  2 shot series recommended in those 19 years and older who have or will have weakened immune systems or those 50 years and older     Health Maintenance Due:      Topic Date Due   • DXA SCAN  03/29/2024 (Originally 1950)   • Colorectal Cancer Screening  09/29/2024 (Originally 11/25/2016)   • Breast Cancer Screening: Mammogram  10/18/2024   • Hepatitis C Screening  Completed     Immunizations Due:      Topic Date Due   • COVID-19 Vaccine (8 - 2023-24 season) 11/29/2023     Advance Directives   What are advance directives?  Advance  directives are legal documents that state your wishes and plans for medical care. These plans are made ahead of time in case you lose your ability to make decisions for yourself. Advance directives can apply to any medical decision, such as the treatments you want, and if you want to donate organs.   What are the types of advance directives?  There are many types of advance directives, and each state has rules about how to use them. You may choose a combination of any of the following:  Living will:  This is a written record of the treatment you want. You can also choose which treatments you do not want, which to limit, and which to stop at a certain time. This includes surgery, medicine, IV fluid, and tube feedings.   Durable power of  for healthcare (DPAHC):  This is a written record that states who you want to make healthcare choices for you when you are unable to make them for yourself. This person, called a proxy, is usually a family member or a friend. You may choose more than 1 proxy.  Do not resuscitate (DNR) order:  A DNR order is used in case your heart stops beating or you stop breathing. It is a request not to have certain forms of treatment, such as CPR. A DNR order may be included in other types of advance directives.  Medical directive:  This covers the care that you want if you are in a coma, near death, or unable to make decisions for yourself. You can list the treatments you want for each condition. Treatment may include pain medicine, surgery, blood transfusions, dialysis, IV or tube feedings, and a ventilator (breathing machine).  Values history:  This document has questions about your views, beliefs, and how you feel and think about life. This information can help others choose the care that you would choose.  Why are advance directives important?  An advance directive helps you control your care. Although spoken wishes may be used, it is better to have your wishes written down. Spoken  wishes can be misunderstood, or not followed. Treatments may be given even if you do not want them. An advance directive may make it easier for your family to make difficult choices about your care.       © Copyright [employer] 2018 Information is for End User's use only and may not be sold, redistributed or otherwise used for commercial purposes. All illustrations and images included in CareNotes® are the copyrighted property of [employer] or [employer]

## 2024-04-18 DIAGNOSIS — R11.0 FUNCTIONAL NAUSEA: ICD-10-CM

## 2024-04-23 RX ORDER — FAMOTIDINE 20 MG/1
20 TABLET, FILM COATED ORAL 2 TIMES DAILY
Qty: 180 TABLET | Refills: 1 | Status: SHIPPED | OUTPATIENT
Start: 2024-04-23

## 2024-05-01 PROBLEM — J01.10 ACUTE NON-RECURRENT FRONTAL SINUSITIS: Status: RESOLVED | Noted: 2024-03-28 | Resolved: 2024-05-01

## 2024-05-01 PROBLEM — Z00.00 MEDICARE ANNUAL WELLNESS VISIT, SUBSEQUENT: Status: RESOLVED | Noted: 2023-03-22 | Resolved: 2024-05-01

## 2024-05-16 ENCOUNTER — TELEMEDICINE (OUTPATIENT)
Dept: NEUROLOGY | Facility: CLINIC | Age: 74
End: 2024-05-16
Payer: MEDICARE

## 2024-05-16 DIAGNOSIS — G43.009 MIGRAINE WITHOUT AURA AND WITHOUT STATUS MIGRAINOSUS, NOT INTRACTABLE: ICD-10-CM

## 2024-05-16 DIAGNOSIS — G43.709 CHRONIC MIGRAINE WITHOUT AURA WITHOUT STATUS MIGRAINOSUS, NOT INTRACTABLE: Primary | ICD-10-CM

## 2024-05-16 PROCEDURE — 99213 OFFICE O/P EST LOW 20 MIN: CPT | Performed by: PHYSICIAN ASSISTANT

## 2024-05-16 RX ORDER — PROCHLORPERAZINE MALEATE 10 MG
10 TABLET ORAL EVERY 8 HOURS PRN
Qty: 30 TABLET | Refills: 2 | Status: SHIPPED | OUTPATIENT
Start: 2024-05-16 | End: 2024-05-21

## 2024-05-16 NOTE — PATIENT INSTRUCTIONS
Continue Botox every 3 months  Continue Emgality 1 injection     Abortive:  At onset of migraine, take Ubrelvy 100 mg.  May repeat in 2 hours if needed  Prochlorperazine 10 mg as needed for migraines and dizziness

## 2024-05-16 NOTE — PROGRESS NOTES
Virtual Regular Visit    Verification of patient location:    Patient is located at Home in the following state in which I hold an active license [place]      Assessment/Plan:    Problem List Items Addressed This Visit          Cardiovascular and Mediastinum    Migraine without aura or status migrainosus    Relevant Medications    prochlorperazine (COMPAZINE) 10 mg tablet    Ubrogepant (UBRELVY) 100 MG tablet    Chronic migraine without aura without status migrainosus, not intractable - Primary     Continue Botox every 3 months  Continue Emgality 1 injection     Abortive:  At onset of migraine, take Ubrelvy 100 mg.  May repeat in 2 hours if needed  Prochlorperazine 10 mg as needed for migraines and dizziness         Relevant Medications    Ubrogepant (UBRELVY) 100 MG tablet            Reason for visit is   Chief Complaint   Patient presents with    Migraine    Virtual Regular Visit          Encounter provider Ambika José PA-C      Recent Visits  No visits were found meeting these conditions.  Showing recent visits within past 7 days and meeting all other requirements  Today's Visits  Date Type Provider Dept   05/16/24 Telemedicine Ambika José PA-C [facility]   Showing today's visits and meeting all other requirements  Future Appointments  No visits were found meeting these conditions.  Showing future appointments within next 150 days and meeting all other requirements       The patient was identified by name and date of birth. Roxanne Hopper was informed that this is a telemedicine visit and that the visit is being conducted through the Epic Embedded platform. She agrees to proceed..  My office door was closed. No one else was in the room.  She acknowledged consent and understanding of privacy and security of the video platform. The patient has agreed to participate and understands they can discontinue the visit at any time.    Patient is aware this is a billable service.     Subjective  Roxanne FALCON  Ward is a 74 y.o. 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     Her  passed away with sudden cardiac arrest while skiing     What medications do you take or have you taken for your headaches?   Current preventative:  Aspirin  Probiotic  Losartan  Sertraline  Emgality  Botox     Current abortive:  Ubrelvy  Ketorolac     Prior preventive:  amitriptyline, venlafaxine,  Gabapentin,   Tizanidine, methocarbamol,   Atenolol, Diltiazem     Prior abortive:  dexamethasone,   Fioricet,   Compazine,   Tylenol     Non-Medical/Alternative Treatments used in the past for headaches: PT  Any warning prior to headache and how long do they last - sparkles in her peripheral vision intermittently     Current Pain 0/10     How often do the headaches occur - 1-2 every 3 months, prior to botox was daily  What time of the day do the headaches start - varies  How long do the headaches last - with ubrlevy within a few hours; prior was days  Are you ever headache free - YES  Where are they located - Top and back of head, neck  What is the intensity of pain - 7-8/10     Describe your usual headache - Throbbing, Pounding     Associated symptoms:   -       Decrease of appetite, nausea  -       Photophobia, phonophobia  -       Problem with concentration  -       Dizziness  -       Tinnitus  -  Stiff and sore neck  -       prefer to be alone and in a dark room, unable to work     Headache trigger: smells, weather, lack of sleep, sunlight/bright lights     Number of days missed per month because of headaches:  Work (or school) days: NA  Social or Family activities: sometimes with bad migraines but less than before     What time of the year do headaches occur more frequently? do not seem to be related to any time of day or year  Have you seen someone else for headaches or pain? Yes  Have you had trigger point injection performed and how often? No  Have you had Botox injection performed and how  often? Yes, works well   Have you had epidural injections or transforaminal injections performed? Yes, c section     Have you used CBD or THC for your headaches and how often? No     Are you current pregnant or planning on getting pregnant? No     Have you ever had any Brain imaging? no I personally reviewed these images.  Recent laboratory data was reviewed.  Medications and allergies were reviewed.     Reviewed old notes from physician seen in the past- see above HPI for summary of previous encounters.        Past Medical History:   Diagnosis Date    Allergic codeine  fentanyl    Anxiety     Arthritis     Back pain     Cancer (HCC)     skin    Depression     Disease of thyroid gland     hypo    Diverticulitis of colon     Dry eye     GERD (gastroesophageal reflux disease)     Graves' disease     TREATED WITH RADIOACTIVE IODINE ON 13 (10.7 [facility]) RESOLVED:     Headache(784.0)     Hepatitis B virus infection     Hyperlipidemia     Hypertension     Infectious viral hepatitis     B -     Migraine     Nonmelanoma skin cancer     LAST ASSESSED: 2017    Visual impairment dryeye thyroidorbitopathy       Past Surgical History:   Procedure Laterality Date    APPENDECTOMY      ARTHROSCOPY KNEE Left     CATARACT EXTRACTION Bilateral     CATARACT EXTRACTION, BILATERAL       SECTION      COLONOSCOPY      Oct/2018    EYE SURGERY Bilateral     SURGERY OF THE ORBITS - ORBITAL DECOMPRESSION    JOINT REPLACEMENT      r knee    [place] TENDON SHEATH INCISION Right 2022    Procedure: RING TRIGGER FINGER RELEASE;  Surgeon: Leo Marshall MD;  [facility]: [facility];  Service: Orthopedics    TEMPORAL ARTERY BIOPSY / LIGATION      TONSILLECTOMY      TOTAL KNEE ARTHROPLASTY Right 2016    TKR       Current Outpatient Medications   Medication Sig Dispense Refill    acetaminophen (TYLENOL) 500 mg tablet Take 500 mg by mouth every 6 (six) hours as needed      aspirin 81 MG tablet Take 1 tablet by  mouth daily      Diclofenac Sodium (VOLTAREN) 1 % Apply 4 g topically 4 (four) times a day (Patient taking differently: Apply 4 g topically 4 (four) times a day as needed) 150 g 11    famotidine (PEPCID) 20 mg tablet Take 1 tablet (20 mg total) by mouth 2 (two) times a day 180 tablet 1    fluticasone (FLONASE) 50 mcg/act nasal spray 1 spray into each nostril daily 1 g 3    Galcanezumab-gnlm 120 MG/ML SOAJ Inject 120 mg under the skin every 30 (thirty) days 1 mL 11    ketorolac (TORADOL) 10 mg tablet Take 1 tablet (10 mg total) by mouth every 6 (six) hours as needed for moderate pain (Patient taking differently: Take 10 mg by mouth every 6 (six) hours as needed for moderate pain) 10 tablet 0    levothyroxine 88 mcg tablet Take 1 tablet (88 mcg total) by mouth daily 90 tablet 3    lifitegrast (Xiidra) 5 % op solution Administer 1 drop to both eyes 2 (two) times a day 180 each 3    losartan (COZAAR) 50 mg tablet Take 2 tablets (100 mg total) by mouth daily 180 tablet 3    Probiotic Product (PROBIOTIC-10 PO) Take 1 tablet by mouth in the morning      prochlorperazine (COMPAZINE) 10 mg tablet Take 1 tablet (10 mg total) by mouth every 8 (eight) hours as needed for nausea or vomiting Take 1 tab by mouth every 6 hours if needed for nausea and vomiting. 30 tablet 2    rosuvastatin (CRESTOR) 20 MG tablet Take 1 tablet (20 mg total) by mouth daily 90 tablet 3    sertraline (ZOLOFT) 50 mg tablet Take 1 tablet (50 mg total) by mouth daily 90 tablet 3    Ubrogepant (UBRELVY) 100 MG tablet Take 1 tab once prn for migraine. May repeat in 2 hours if needed. Max 2 doses per day. 90 day supply. 48 tablet 3    methylPREDNISolone 4 MG tablet therapy pack Use as directed on package (Patient not taking: Reported on 5/16/2024) 21 each 0     No current facility-administered medications for this visit.        Allergies   Allergen Reactions    Fentanyl Vomiting     Action Taken: vomiting; Category: Adverse Reaction;   Protracted     Methimazole Hives    Codeine GI Intolerance and Anxiety     Other reaction(s): Nausea and/or vomiting    I have reviewed the patient's medical, social and surgical history as well as medications in detail and updated the computerized patient record.      Review of Systems   Constitutional: Negative.    HENT: Negative.     Eyes: Negative.    Respiratory: Negative.     Cardiovascular: Negative.    Gastrointestinal: Negative.    Endocrine: Negative.    Genitourinary: Negative.    Musculoskeletal: Negative.    Skin: Negative.    Allergic/Immunologic: Negative.    Neurological:  Positive for headaches.   Hematological: Negative.    Psychiatric/Behavioral: Negative.     I personally reviewed and updated the ROS that was entered by the medical assistant      Video Exam    There were no vitals filed for this visit.    Physical Exam   CONSTITUTIONAL: Well developed, well nourished, well groomed. No dysmorphic features.     HEENT:  Normocephalic atraumatic.    Chest:  Respirations regular and unlabored.    Psychiatric:  Normal behavior and appropriate affect      MENTAL STATUS  Orientation: Alert and oriented x 3  Fund of knowledge: Intact.    Visit Time  I have spent a total time of 22 minutes on 05/16/24 in caring for this patient including Prognosis, Risks and benefits of tx options, Instructions for management, Importance of tx compliance, Risk factor reductions, Impressions, Counseling / Coordination of care, Documenting in the medical record, Reviewing / ordering tests, medicine, procedures  , and Obtaining or reviewing history  .

## 2024-05-16 NOTE — PROGRESS NOTES
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     Her  passed away with sudden cardiac arrest while skiing     What medications do you take or have you taken for your headaches?   Current preventative:  Aspirin  Probiotic  Losartan  Sertraline  Emgality  Botox     Current abortive:  Ubrelvy  Ketorolac     Prior preventive:  amitriptyline, venlafaxine,  Gabapentin,   Tizanidine, methocarbamol,   Atenolol, Diltiazem     Prior abortive:  dexamethasone,   Fioricet,   Compazine,   Tylenol     Non-Medical/Alternative Treatments used in the past for headaches: PT  Any warning prior to headache and how long do they last - sparkles in her peripheral vision intermittently     Current Pain 0/10     How often do the headaches occur - 1-2 every 3 months, prior to botox was daily  What time of the day do the headaches start - varies  How long do the headaches last - with ubrlevy within a few hours; prior was days  Are you ever headache free - YES  Where are they located - Top and back of head, neck  What is the intensity of pain - 6/10     Describe your usual headache - Throbbing, Pounding     Associated symptoms:   -       Decrease of appetite, nausea  -       Photophobia, phonophobia  -       Problem with concentration  -       Dizziness  -       Tinnitus  -Stiff and sore neck  -       prefer to be alone and in a dark room, unable to work     Headache trigger: smells, weather, lack of sleep, sunlight/bright lights     Number of days missed per month because of headaches:  Work (or school) days: NA  Social or Family activities: sometimes with bad migraines but less than before     What time of the year do headaches occur more frequently? do not seem to be related to any time of day or year  Have you seen someone else for headaches or pain? Yes  Have you had trigger point injection performed and how often? No  Have you had Botox injection performed and how often? Yes, works well   Have  you had epidural injections or transforaminal injections performed? Yes, c section     Have you used CBD or THC for your headaches and how often? No     Are you current pregnant or planning on getting pregnant? No     Have you ever had any Brain imaging? no I personally reviewed these images.  Recent laboratory data was reviewed.  Medications and allergies were reviewed.     Reviewed old notes from physician seen in the past- see above HPI for summary of previous encounters.    .

## 2024-05-21 DIAGNOSIS — G43.009 MIGRAINE WITHOUT AURA AND WITHOUT STATUS MIGRAINOSUS, NOT INTRACTABLE: ICD-10-CM

## 2024-05-21 RX ORDER — PROCHLORPERAZINE MALEATE 10 MG
TABLET ORAL
Qty: 30 TABLET | Refills: 3 | Status: SHIPPED | OUTPATIENT
Start: 2024-05-21

## 2024-06-11 ENCOUNTER — OFFICE VISIT (OUTPATIENT)
Age: 74
End: 2024-06-11
Payer: MEDICARE

## 2024-06-11 VITALS — WEIGHT: 106 LBS | HEIGHT: 62 IN | BODY MASS INDEX: 19.51 KG/M2 | TEMPERATURE: 97.6 F

## 2024-06-11 DIAGNOSIS — D22.9 MULTIPLE MELANOCYTIC NEVI: Primary | ICD-10-CM

## 2024-06-11 DIAGNOSIS — L82.1 SEBORRHEIC KERATOSIS: ICD-10-CM

## 2024-06-11 DIAGNOSIS — D18.01 CHERRY ANGIOMA: ICD-10-CM

## 2024-06-11 DIAGNOSIS — D48.9 NEOPLASM OF UNCERTAIN BEHAVIOR: ICD-10-CM

## 2024-06-11 PROCEDURE — 88305 TISSUE EXAM BY PATHOLOGIST: CPT | Performed by: STUDENT IN AN ORGANIZED HEALTH CARE EDUCATION/TRAINING PROGRAM

## 2024-06-11 PROCEDURE — 99214 OFFICE O/P EST MOD 30 MIN: CPT | Performed by: DERMATOLOGY

## 2024-06-11 PROCEDURE — 11102 TANGNTL BX SKIN SINGLE LES: CPT | Performed by: DERMATOLOGY

## 2024-06-11 PROCEDURE — 88342 IMHCHEM/IMCYTCHM 1ST ANTB: CPT | Performed by: STUDENT IN AN ORGANIZED HEALTH CARE EDUCATION/TRAINING PROGRAM

## 2024-06-11 PROCEDURE — 11103 TANGNTL BX SKIN EA SEP/ADDL: CPT | Performed by: DERMATOLOGY

## 2024-06-11 NOTE — PROGRESS NOTES
"[facility] Note     Patient Name: Roxanne Hopper  Encounter Date: 6/11/2024    Have you been cared for by a [facility] Dermatologist in the last 3 years and, if so, which description applies to you?    Yes.  I have been here within the last 3 years, and my medical history has NOT changed since that time.  I am FEMALE/of child-bearing potential.    REVIEW OF SYSTEMS:  Have you recently had or currently have any of the following? No changes in my recent health.   PAST MEDICAL HISTORY:  Have you personally ever had or currently have any of the following?  If \"YES,\" then please provide more detail. No changes in my medical history.   HISTORY OF IMMUNOSUPPRESSION: Do you have a history of any of the following:  Systemic Immunosuppression such as Diabetes, Biologic or Immunotherapy, Chemotherapy, Organ Transplantation, Bone Marrow Transplantation?  No     Answering \"YES\" requires the addition of the dotphrase \"IMMUNOSUPPRESSED\" as the first diagnosis of the patient's visit.   FAMILY HISTORY:  Any \"first degree relatives\" (parent, brother, sister, or child) with the following?    No changes in my family's known health.   PATIENT EXPERIENCE:    Do you want the Dermatologist to perform a COMPLETE skin exam today including a clinical examination under the \"bra and underwear\" areas?  Yes  If necessary, do we have your permission to call and leave a detailed message on your Preferred Phone number that includes your specific medical information?  Yes      Allergies   Allergen Reactions    Fentanyl Vomiting     Action Taken: vomiting; Category: Adverse Reaction;   Protracted    Methimazole Hives    Codeine GI Intolerance and Anxiety     Other reaction(s): Nausea and/or vomiting      Current Outpatient Medications:     acetaminophen (TYLENOL) 500 mg tablet, Take 500 mg by mouth every 6 (six) hours as needed, Disp: , Rfl:     aspirin 81 MG tablet, Take 1 tablet by mouth daily, Disp: , Rfl:     Diclofenac Sodium " (VOLTAREN) 1 %, Apply 4 g topically 4 (four) times a day, Disp: 150 g, Rfl: 11    famotidine (PEPCID) 20 mg tablet, Take 1 tablet (20 mg total) by mouth 2 (two) times a day, Disp: 180 tablet, Rfl: 1    fluticasone (FLONASE) 50 mcg/act nasal spray, 1 spray into each nostril daily, Disp: 1 g, Rfl: 3    Galcanezumab-gnlm 120 MG/ML SOAJ, Inject 120 mg under the skin every 30 (thirty) days, Disp: 1 mL, Rfl: 11    ketorolac (TORADOL) 10 mg tablet, Take 1 tablet (10 mg total) by mouth every 6 (six) hours as needed for moderate pain (Patient taking differently: Take 10 mg by mouth every 6 (six) hours as needed for moderate pain), Disp: 10 tablet, Rfl: 0    levothyroxine 88 mcg tablet, Take 1 tablet (88 mcg total) by mouth daily, Disp: 90 tablet, Rfl: 3    losartan (COZAAR) 50 mg tablet, Take 2 tablets (100 mg total) by mouth daily, Disp: 180 tablet, Rfl: 3    Probiotic Product (PROBIOTIC-10 PO), Take 1 tablet by mouth in the morning, Disp: , Rfl:     prochlorperazine (COMPAZINE) 10 mg tablet, Take 1 tablet (10 mg total) by mouth every 8 (eight) hours as needed for nausea or vomiting., Disp: 30 tablet, Rfl: 3    rosuvastatin (CRESTOR) 20 MG tablet, Take 1 tablet (20 mg total) by mouth daily, Disp: 90 tablet, Rfl: 3    sertraline (ZOLOFT) 50 mg tablet, Take 1 tablet (50 mg total) by mouth daily, Disp: 90 tablet, Rfl: 3    Ubrogepant (UBRELVY) 100 MG tablet, Take 1 tab once prn for migraine. May repeat in 2 hours if needed. Max 2 doses per day. 90 day supply., Disp: 48 tablet, Rfl: 3    lifitegrast (Xiidra) 5 % op solution, Administer 1 drop to both eyes 2 (two) times a day, Disp: 180 each, Rfl: 3    methylPREDNISolone 4 MG tablet therapy pack, Use as directed on package (Patient not taking: Reported on 5/16/2024), Disp: 21 each, Rfl: 0          Whom besides the patient is providing clinical information about today's encounter?   NO ADDITIONAL HISTORIAN (patient alone provided history)    Physical Exam and Assessment/Plan by  "Diagnosis:  CHIEF COMPLAINT    74 year old female patient presents today for Yearly Follow Up.  Patient has a No history of skin cancer    MELANOCYTIC NEVI (\"Moles\")    Physical Exam:  Anatomic Location Affected: Mostly on sun-exposed areas of the trunk and extremities   Morphological Description:  Scattered, 1-4mm round to ovoid, symmetrical-appearing, even bordered, skin colored to dark brown macules/papules, mostly in sun-exposed areas  Pertinent Positives:  Pertinent Negatives:    Additional History of Present Condition:  present on exam     Assessment and Plan:  Based on a thorough discussion of this condition and the management approach to it (including a comprehensive discussion of the known risks, side effects and potential benefits of treatment), the patient (family) agrees to implement the following specific plan:  Provided handout with information regarding the ABCDE's of moles   Recommend routine skin exams every year   Sun avoidance, protective clothing (known as UPF clothing), and the use of at least SPF 30 sunscreens is advised. Sunscreen should be reapplied every two hours when outside.       SEBORRHEIC KERATOSIS; NON-INFLAMED    Physical Exam:  Anatomic Location Affected:  scattered across trunk, extremities,  face  Morphological Description:  Flat and raised, waxy, smooth to warty textured, yellow to brownish-grey to dark brown to blackish, discrete, \"stuck-on\" appearing papules.  Pertinent Positives:  Pertinent Negatives:    Additional History of Present Condition:  Patient reports new bumps on the skin.  Denies itch, burn, pain, bleeding or ulceration.  Present constantly; nothing seems to make it worse or better.  No prior treatment.      Assessment and Plan:  Based on a thorough discussion of this condition and the management approach to it (including a comprehensive discussion of the known risks, side effects and potential benefits of treatment), the patient (family) agrees to implement the " "following specific plan:  Reassured benign        ANGIOMA (\"CHERRY ANGIOMA\")    Physical Exam:  Anatomic Location: scattered across sun exposed areas of the trunk and extremities   Morphologic Description: Firm red to reddish-blue discrete papules  Pertinent Positives:  Pertinent Negatives:    Additional History of Present Condition:  Present on exam.    Assessment and Plan:  Reassured benign       NEOPLASM OF UNCERTAIN BEHAVIOR OF SKIN    Physical Exam:  (Anatomic Location); (Size and Morphological Description); (Differential Diagnosis):  A; left cheek; 7 mm pink macule; diffdx rule out BCC   B; Left thigh; 14 mm pearly macule; diffdx; rule out BCC   C left posterior thigh 13 mm scaling erythematous macule;diffdx; rule out SCC               Pertinent Positives:  Pertinent Negatives:    Additional History of Present Condition:  Present on exam     Assessment and Plan:  I have discussed with the patient that a sample of skin via a \"skin biopsy” would be potentially helpful to further make a specific diagnosis under the microscope.  Based on a thorough discussion of this condition and the management approach to it (including a comprehensive discussion of the known risks, side effects and potential benefits of treatment), the patient (family) agrees to implement the following specific plan:    Procedure:  Skin Biopsy.  After a thorough discussion of treatment options and risk/benefits/alternatives (including but not limited to local pain, scarring, dyspigmentation, blistering, possible superinfection, and inability to confirm a diagnosis via histopathology), verbal and written consent were obtained and portion of the rash was biopsied for tissue sample.  See below for consent that was obtained from patient and subsequent Procedure Note.   PROCEDURE TANGENTIAL (SHAVE) BIOPSY NOTE: A     Performing Physician:   Anatomic Location; Clinical Description with size (cm); Pre-Op Diagnosis:   A; left cheek; 7 mm pink " macule; diffdx rule out BCC   Post-op diagnosis: Same     Local anesthesia: 3:1 1% xylocaine with epi and 1-100,000 buffered     Topical anesthesia: None    Hemostasis: Aluminum chloride     PROCEDURE TANGENTIAL (SHAVE) BIOPSY NOTE: B     Performing Physician:   Anatomic Location; Clinical Description with size (cm); Pre-Op Diagnosis:   B; Left thigh; 14 mm pearly macule; diffdx; rule out BCC  Post-op diagnosis: Same     Local anesthesia: 3:1 1% xylocaine with epi and 1-100,000 buffered     Topical anesthesia: None    Hemostasis: Aluminum chloride     PROCEDURE TANGENTIAL (SHAVE) BIOPSY NOTE: C     Performing Physician:   Anatomic Location; Clinical Description with size (cm); Pre-Op Diagnosis:   C left posterior thigh 13 mm scaling erythematous macule;diffdx; rule out SCC   Post-op diagnosis: Same     Local anesthesia: 3:1 1% xylocaine with epi and 1-100,000 buffered     Topical anesthesia: None    Hemostasis: Aluminum chloride   After obtaining informed consent  at which time there was a discussion about the purpose of biopsy  and low risks of infection and bleeding.  The area was prepped and draped in the usual fashion. Anesthesia was obtained with 1% lidocaine with epinephrine. A shave biopsy to an appropriate sampling depth was obtained by Shave (Dermablade or 15 blade) The resulting wound was covered with surgical ointment and bandaged appropriately.     The patient tolerated the procedure well without complications and was without signs of functional compromise.      Specimen has been sent for review by Dermatopathology.    Standard post-procedure care has been explained and has been included in written form within the patient's copy of Informed Consent.    INFORMED CONSENT DISCUSSION AND POST-OPERATIVE INSTRUCTIONS FOR PATIENT    I.  RATIONALE FOR PROCEDURE  I understand that a skin biopsy allows the Dermatologist to test a lesion or rash under the microscope to obtain a diagnosis.  It  "usually involves numbing the area with numbing medication and removing a small piece of skin; sometimes the area will be closed with sutures. In this specific procedure, sutures are not usually needed.  If any sutures are placed, then they are usually need to be removed in 2 weeks or less.    I understand that my Dermatologist recommends that a skin \"shave\" biopsy be performed today.  A local anesthetic, similar to the kind that a dentist uses when filling a cavity, will be injected with a very small needle into the skin area to be sampled.  The injected skin and tissue underneath \"will go to sleep” and become numb so no pain should be felt afterwards.  An instrument shaped like a tiny \"razor blade\" (shave biopsy instrument) will be used to cut a small piece of tissue and skin from the area so that a sample of tissue can be taken and examined more closely under the microscope.  A slight amount of bleeding will occur, but it will be stopped with direct pressure and a pressure bandage and any other appropriate methods.  I understands that a scar will form where the wound was created.  Surgical ointment will be applied to help protect the wound.  Sutures are not usually needed.    II.  RISKS AND POTENTIAL COMPLICATIONS   I understand the risks and potential complications of a skin biopsy include but are not limited to the following:  Bleeding  Infection  Pain  Scar/keloid  Skin discoloration  Incomplete Removal  Recurrence  Nerve Damage/Numbness/Loss of Function  Allergic Reaction to Anesthesia  Biopsies are diagnostic procedures and based on findings additional treatment or evaluation may be required  Loss or destruction of specimen resulting in no additional findings    My Dermatologist has explained to me the nature of the condition, the nature of the procedure, and the benefits to be reasonably expected compared with alternative approaches.  My Dermatologist has discussed the likelihood of major risks or " "complications of this procedure including the specific risks listed above, such as bleeding, infection, and scarring/keloid.  I understand that a scar is expected after this procedure.  I understand that my physician cannot predict if the scar will form a \"keloid,\" which extends beyond the borders of the wound that is created.  A keloid is a thick, painful, and bumpy scar.  A keloid can be difficult to treat, as it does not always respond well to therapy, which includes injecting cortisone directly into the keloid every few weeks.  While this usually reduces the pain and size of the scar, it does not eliminate it.      I understand that photographs may be taken before and after the procedure.  These will be maintained as part of the medical providers confidential records and may not be made available to me.  I further authorize the medical provider to use the photographs for teaching purposes or to illustrate scientific papers, books, or lectures if in his/her judgment, medical research, education, or science may benefit from its use.    I have had an opportunity to fully inquire about the risks and benefits of this procedure and its alternatives.   I have been given ample time and opportunity to ask questions and to seek a second opinion if I wished to do so.  I acknowledge that there have specifically been no guarantees as to the cosmetic results from the procedure.  I am aware that with any procedure there is always the possibility of an unexpected complication.    III. POST-PROCEDURAL CARE (WHAT YOU WILL NEED TO DO \"AFTER THE BIOPSY\" TO OPTIMIZE HEALING)    Keep the area clean and dry.  Try NOT to remove the bandage or get it wet for the first 24 hours.    Gently clean the area and apply surgical ointment (such as Vaseline petrolatum ointment, which is available \"over the counter\" and not a prescription) to the biopsy site for up to 2 weeks straight.  This acts to protect the wound from the outside world.  "     Sutures are not usually placed in this procedure.  If any sutures were placed, return for suture removal as instructed (generally 1 week for the face, 2 weeks for the body).      Take Acetaminophen (Tylenol) for discomfort, if no contraindications.  Ibuprofen or aspirin could make bleeding worse.    Call our office immediately for signs of infection: fever, chills, increased redness, warmth, tenderness, discomfort/pain, or pus or foul smell coming from the wound.    WHAT TO DO IF THERE IS ANY BLEEDING?  If a small amount of bleeding is noticed, place a clean cloth over the area and apply firm pressure for ten minutes.  Check the wound after 10 minutes of direct pressure.  If bleeding persists, try one more time for an additional 10 minutes of direct pressure on the area.  If the bleeding becomes heavier or does not stop after the second attempt, or if you have any other questions about this procedure, then please call your [facility]'s Dermatologist by calling [phone] (SKIN).     I hereby acknowledge that I have reviewed and verified the site with my Dermatologist and have requested and authorized my Dermatologist to proceed with the procedure.    Scribe Attestation      I,:  Zayra Allan MA am acting as a scribe while in the presence of the attending physician.:       I,:  Prasanna Castrejon MD personally performed the services described in this documentation    as scribed in my presence.:

## 2024-06-11 NOTE — PATIENT INSTRUCTIONS
"MELANOCYTIC NEVI (\"Moles\")    Melanocytic nevi (\"moles\") are tan or brown, raised or flat areas of the skin which have an increased number of melanocytes. Melanocytes are the cells in our body which make pigment and account for skin color.    Some moles are present at birth (I.e., \"congenital nevi\"), while others come up later in life (i.e., \"acquired nevi\").  The sun can stimulate the body to make more moles.  Sunburns are not the only thing that triggers more moles.  Chronic sun exposure can do it too.     Clinically distinguishing a healthy mole from melanoma may be difficult, even for experienced dermatologists. The \"ABCDE's\" of moles have been suggested as a means of helping to alert a person to a suspicious mole and the possible increased risk of melanoma.  The suggestions for raising alert are as follows:    Asymmetry: Healthy moles tend to be symmetric, while melanomas are often asymmetric.  Asymmetry means if you draw a line through the mole, the two halves do not match in color, size, shape, or surface texture. Asymmetry can be a result of rapid enlargement of a mole, the development of a raised area on a previously flat lesion, scaling, ulceration, bleeding or scabbing within the mole.  Any mole that starts to demonstrate \"asymmetry\" should be examined promptly by a board certified dermatologist.     Border: Healthy moles tend to have discrete, even borders.  The border of a melanoma often blends into the normal skin and does not sharply delineate the mole from normal skin.  Any mole that starts to demonstrate \"uneven borders\" should be examined promptly by a board certified dermatologist.     Color: Healthy moles tend to be one color throughout.  Melanomas tend to be made up of different colors ranging from dark black, blue, white, or red.  Any mole that demonstrates a color change should be examined promptly by a board certified dermatologist.     Diameter: Healthy moles tend to be smaller than 0.6 cm " "in size; an exception are \"congenital nevi\" that can be larger.  Melanomas tend to grow and can often be greater than 0.6 cm (1/4 of an inch, or the size of a pencil eraser). This is only a guideline, and many normal moles may be larger than 0.6 cm without being unhealthy.  Any mole that starts to change in size (small to bigger or bigger to smaller) should be examined promptly by a board certified dermatologist.     Evolving: Healthy moles tend to \"stay the same.\"  Melanomas may often show signs of change or evolution such as a change in size, shape, color, or elevation.  Any mole that starts to itch, bleed, crust, burn, hurt, or ulcerate or demonstrate a change or evolution should be examined promptly by a board certified dermatologist.      Dysplastic Nevi  Dysplastic moles are moles that fit the ABCDE rules of melanoma but are not identified as melanomas when examined under the microscope.  They may indicate an increased risk of melanoma in that person. If there is a family history of melanoma, most experts agree that the person may be at an increased risk for developing a melanoma.  Experts still do not agree on what dysplastic moles mean in patients without a personal or family history of melanoma.  Dysplastic moles are usually larger than common moles and have different colors within it with irregular borders. The appearance can be very similar to a melanoma. Biopsies of dysplastic moles may show abnormalities which are different from a regular mole.      Melanoma  Malignant melanoma is a type of skin cancer that can be deadly if it spreads throughout the body. The incidence of melanoma in the [place] is growing faster than any other cancer. Melanoma usually grows near the surface of the skin for a period of time, and then begins to grow deeper into the skin. Once it grows deeper into the skin, the risk of spread to other organs greatly increases. Therefore, early detection and removal of a malignant " "melanoma may result in a better chance at a complete cure; removal after the tumor has spread may not be as effective, leading to worse clinical outcomes such as death.    The true rate of nevus transformation into a melanoma is unknown. It has been estimated that the lifetime risk for any acquired melanocytic nevus on any 20-year-old individual transforming into melanoma by age 80 is 0.03% (1 in 3,164) for men and 0.009% (1 in 10,800) for women.     The appearance of a \"new mole\" remains one of the most reliable methods for identifying a malignant melanoma.  Occasionally, melanomas appear as rapidly growing, blue-black, dome-shaped bumps within a previous mole or previous area of normal skin.  Other times, melanomas are suspected when a mole suddenly appears or changes. Itching, burning, or pain in a pigmented lesion should increase suspicion, but most patients with early melanoma have no skin discomfort whatsoever.  Melanoma can occur anywhere on the skin, including areas that are difficult for self-examination. Many melanomas are first noticed by other family members.  Suspicious-looking moles may be removed for microscopic examination.       You may be able to prevent death from melanoma by doing two simple things:    Try to avoid unnecessary sun exposure and protect your skin when it is exposed to the sun.  People who live near the equator, people who have intermittent exposures to large amounts of sun, and people who have had sunburns in childhood or adolescence have an increased risk for melanoma. Sun sense and vigilant sun protection may be keys to helping to prevent melanoma.  We recommend wearing UPF-rated sun protective clothing and sunglasses whenever possible and applying a moisturizer-sunscreen combination product (SPF 50+) such as Neutrogena Daily Defense to sun exposed areas of skin at least three times a day.    Have your moles regularly examined by a board certified dermatologist AND by yourself or " "a family member/friend at home.  We recommend that you have your moles examined at least once a year by a board certified dermatologist.  Use your birthday as an annual reminder to have your \"Birthday Suit\" (I.e., your skin) examined; it is a nice birthday gift to yourself to know that your skin is healthy appearing!  Additionally, at-home self examinations may be helpful for detecting a possible melanoma.  Use the ABCDEs we discussed and check your moles once a month at home.        SEBORRHEIC KERATOSIS  A seborrheic keratosis is a harmless warty spot that appears during adult life as a common sign of skin aging.  Seborrheic keratoses can arise on any area of skin, covered or uncovered, with the usual exception of the palms and soles. They do not arise from mucous membranes. Seborrheic keratoses can have highly variable appearance.      Seborrheic keratoses are extremely common. It has been estimated that over 90% of adults over the age of 60 years have one or more of them. They occur in males and females of all races, typically beginning to erupt in the 30s or 40s. They are uncommon under the age of 20 years.  The precise cause of seborrhoeic keratoses is not known.  Seborrhoeic keratoses are considered degenerative in nature. As time goes by, seborrheic keratoses tend to become more numerous. Some people inherit a tendency to develop a very large number of them; some people may have hundreds of them.    The name \"seborrheic keratosis\" is misleading, because these lesions are not limited to a seborrhoeic distribution (scalp, mid-face, chest, upper back), nor are they formed from sebaceous glands, nor are they associated with sebum -- which is greasy.  Seborrheic keratosis may also be called \"SK,\" \"Seb K,\" \"basal cell papilloma,\" \"senile wart,\" or \"barnacle.\"      There is no easy way to remove multiple lesions on a single occasion.  Unless a specific lesion is \"inflamed\" and is causing pain or stinging/burning or " "is bleeding, most insurance companies do not authorize treatment.      ANGIOMA (\"CHERRY ANGIOMA\")  Meneses angiomas markedly increase in number from about the age of 40, so it has been estimated that 75% of people over 75 years of age have them. Although they also called \"senile angiomas,\" they can occur in young people too - 5% of adolescents have been found to have them.     Cherry angiomas are very common in males and females of any age or race, with no difference in sexes or races affected. They are however more noticeable in white skin than in skin of colour.  There may be a family history of similar lesions. Eruptive (very large number appearing in a short period of time) cherry angiomas have been rarely reported to be associated with internal malignancy and pregnancy.     INFORMED CONSENT DISCUSSION AND POST-OPERATIVE INSTRUCTIONS FOR PATIENT    I.  RATIONALE FOR PROCEDURE  I understand that a skin biopsy allows the Dermatologist to test a lesion or rash under the microscope to obtain a diagnosis.  It usually involves numbing the area with numbing medication and removing a small piece of skin; sometimes the area will be closed with sutures. In this specific procedure, sutures are not usually needed.  If any sutures are placed, then they are usually need to be removed in 2 weeks or less.    I understand that my Dermatologist recommends that a skin \"shave\" biopsy be performed today.  A local anesthetic, similar to the kind that a dentist uses when filling a cavity, will be injected with a very small needle into the skin area to be sampled.  The injected skin and tissue underneath \"will go to sleep” and become numb so no pain should be felt afterwards.  An instrument shaped like a tiny \"razor blade\" (shave biopsy instrument) will be used to cut a small piece of tissue and skin from the area so that a sample of tissue can be taken and examined more closely under the microscope.  A slight amount of bleeding will " "occur, but it will be stopped with direct pressure and a pressure bandage and any other appropriate methods.  I understands that a scar will form where the wound was created.  Surgical ointment will be applied to help protect the wound.  Sutures are not usually needed.    II.  RISKS AND POTENTIAL COMPLICATIONS   I understand the risks and potential complications of a skin biopsy include but are not limited to the following:  Bleeding  Infection  Pain  Scar/keloid  Skin discoloration  Incomplete Removal  Recurrence  Nerve Damage/Numbness/Loss of Function  Allergic Reaction to Anesthesia  Biopsies are diagnostic procedures and based on findings additional treatment or evaluation may be required  Loss or destruction of specimen resulting in no additional findings    My Dermatologist has explained to me the nature of the condition, the nature of the procedure, and the benefits to be reasonably expected compared with alternative approaches.  My Dermatologist has discussed the likelihood of major risks or complications of this procedure including the specific risks listed above, such as bleeding, infection, and scarring/keloid.  I understand that a scar is expected after this procedure.  I understand that my physician cannot predict if the scar will form a \"keloid,\" which extends beyond the borders of the wound that is created.  A keloid is a thick, painful, and bumpy scar.  A keloid can be difficult to treat, as it does not always respond well to therapy, which includes injecting cortisone directly into the keloid every few weeks.  While this usually reduces the pain and size of the scar, it does not eliminate it.      I understand that photographs may be taken before and after the procedure.  These will be maintained as part of the medical providers confidential records and may not be made available to me.  I further authorize the medical provider to use the photographs for teaching purposes or to illustrate scientific " "papers, books, or lectures if in his/her judgment, medical research, education, or science may benefit from its use.    I have had an opportunity to fully inquire about the risks and benefits of this procedure and its alternatives.   I have been given ample time and opportunity to ask questions and to seek a second opinion if I wished to do so.  I acknowledge that there have specifically been no guarantees as to the cosmetic results from the procedure.  I am aware that with any procedure there is always the possibility of an unexpected complication.    III. POST-PROCEDURAL CARE (WHAT YOU WILL NEED TO DO \"AFTER THE BIOPSY\" TO OPTIMIZE HEALING)    Keep the area clean and dry.  Try NOT to remove the bandage or get it wet for the first 24 hours.    Gently clean the area and apply surgical ointment (such as Vaseline petrolatum ointment, which is available \"over the counter\" and not a prescription) to the biopsy site for up to 2 weeks straight.  This acts to protect the wound from the outside world.      Sutures are not usually placed in this procedure.  If any sutures were placed, return for suture removal as instructed (generally 1 week for the face, 2 weeks for the body).      Take Acetaminophen (Tylenol) for discomfort, if no contraindications.  Ibuprofen or aspirin could make bleeding worse.    Call our office immediately for signs of infection: fever, chills, increased redness, warmth, tenderness, discomfort/pain, or pus or foul smell coming from the wound.    WHAT TO DO IF THERE IS ANY BLEEDING?  If a small amount of bleeding is noticed, place a clean cloth over the area and apply firm pressure for ten minutes.  Check the wound after 10 minutes of direct pressure.  If bleeding persists, try one more time for an additional 10 minutes of direct pressure on the area.  If the bleeding becomes heavier or does not stop after the second attempt, or if you have any other questions about this procedure, then please call " your  [facility] by calling [phone] (SKIN).

## 2024-06-14 PROCEDURE — 88342 IMHCHEM/IMCYTCHM 1ST ANTB: CPT | Performed by: STUDENT IN AN ORGANIZED HEALTH CARE EDUCATION/TRAINING PROGRAM

## 2024-06-14 PROCEDURE — 88305 TISSUE EXAM BY PATHOLOGIST: CPT | Performed by: STUDENT IN AN ORGANIZED HEALTH CARE EDUCATION/TRAINING PROGRAM

## 2024-06-18 ENCOUNTER — PROCEDURE VISIT (OUTPATIENT)
Dept: NEUROLOGY | Facility: CLINIC | Age: 74
End: 2024-06-18
Payer: MEDICARE

## 2024-06-18 VITALS — HEART RATE: 62 BPM | TEMPERATURE: 98.2 F | DIASTOLIC BLOOD PRESSURE: 82 MMHG | SYSTOLIC BLOOD PRESSURE: 183 MMHG

## 2024-06-18 DIAGNOSIS — G43.709 CHRONIC MIGRAINE WITHOUT AURA WITHOUT STATUS MIGRAINOSUS, NOT INTRACTABLE: Primary | ICD-10-CM

## 2024-06-18 PROCEDURE — 64615 CHEMODENERV MUSC MIGRAINE: CPT | Performed by: PHYSICIAN ASSISTANT

## 2024-06-18 NOTE — PROGRESS NOTES
"Universal Protocol   Consent: Verbal consent obtained. Written consent obtained.  Risks and benefits: risks, benefits and alternatives were discussed  Consent given by: patient  Time out: Immediately prior to procedure a \"time out\" was called to verify the correct patient, procedure, equipment, support staff and site/side marked as required.  Patient understanding: patient states understanding of the procedure being performed  Patient consent: the patient's understanding of the procedure matches consent given  Procedure consent: procedure consent matches procedure scheduled  Relevant documents: relevant documents present and verified  Patient identity confirmed: verbally with patient      Chemodenervation     Date/Time  6/18/2024 10:00 AM     Performed by  Ambika José PA-C   Authorized by  Ambika José PA-C     Pre-procedure details      Preparation: Patient was prepped and draped in usual sterile fashion     Anesthesia  (see MAR for exact dosages):     Anesthesia method:  None   Procedure details      Position:  Upright   Botox      Botox Type:  Type A    Brand:  Botox    mL's of Botulinum Toxin:  200    mL's of preservative free sterile saline:  4    Final Concentration per CC:  50 units    Needle Gauge:  30 G 2.5 inch   Procedures      Botox Procedures: chronic headache     Injection Location      Head / Face:  L superior cervical paraspinal, R superior cervical paraspinal, R medial occipitalis, L medial occipitalis, R temporalis, L superior trapezius, R superior trapezius and L temporalis    L temporalis injection amount:  25 unit(s)    R temporalis injection amount:  25 unit(s)    L medial occipitalis injection amount:  20 unit(s)    R medial occipitalis injection amount:  20 unit(s)    L superior cervical paraspinal injection amount:  10 unit(s)    R superior cervical paraspinal injection amount:  10 unit(s)    L superior trapezius injection amount:  20 unit(s)    R superior trapezius injection amount:  20 " unit(s)   Total Units      Total units used:  200   Post-procedure details      Chemodenervation:  Chronic migraine    Facial Nerve Location::  Bilateral facial nerve    Patient tolerance of procedure:  Tolerated well, no immediate complications   Comments       30 units apex  10 units *temporalis bilaterally  All medically necessary

## 2024-08-07 ENCOUNTER — PROCEDURE VISIT (OUTPATIENT)
Age: 74
End: 2024-08-07
Payer: MEDICARE

## 2024-08-07 VITALS
OXYGEN SATURATION: 96 % | BODY MASS INDEX: 19.14 KG/M2 | WEIGHT: 104 LBS | TEMPERATURE: 97.3 F | HEIGHT: 62 IN | DIASTOLIC BLOOD PRESSURE: 70 MMHG | HEART RATE: 68 BPM | SYSTOLIC BLOOD PRESSURE: 104 MMHG

## 2024-08-07 DIAGNOSIS — D09.9 SQUAMOUS CELL CARCINOMA IN SITU: Primary | ICD-10-CM

## 2024-08-07 PROCEDURE — 11603 EXC TR-EXT MAL+MARG 2.1-3 CM: CPT | Performed by: DERMATOLOGY

## 2024-08-07 PROCEDURE — 88342 IMHCHEM/IMCYTCHM 1ST ANTB: CPT | Performed by: STUDENT IN AN ORGANIZED HEALTH CARE EDUCATION/TRAINING PROGRAM

## 2024-08-07 PROCEDURE — 12032 INTMD RPR S/A/T/EXT 2.6-7.5: CPT | Performed by: DERMATOLOGY

## 2024-08-07 PROCEDURE — 88305 TISSUE EXAM BY PATHOLOGIST: CPT | Performed by: STUDENT IN AN ORGANIZED HEALTH CARE EDUCATION/TRAINING PROGRAM

## 2024-08-07 NOTE — PROGRESS NOTES
"[facility] Note     Patient Name: Roxanne Hopper  Encounter Date: 08/07/2024     Have you been cared for by a [facility] in the last 3 years and, if so, which description applies to you?    Yes.  I have been here within the last 3 years, and my medical history has NOT changed since that time.  I am FEMALE/of child-bearing potential.    REVIEW OF SYSTEMS:  Have you recently had or currently have any of the following? No changes in my recent health.   PAST MEDICAL HISTORY:  Have you personally ever had or currently have any of the following?  If \"YES,\" then please provide more detail. No changes in my medical history.   HISTORY OF IMMUNOSUPPRESSION: Do you have a history of any of the following:  Systemic Immunosuppression such as Diabetes, Biologic or Immunotherapy, Chemotherapy, Organ Transplantation, Bone Marrow Transplantation?  No     Answering \"YES\" requires the addition of the dotphrase \"IMMUNOSUPPRESSED\" as the first diagnosis of the patient's visit.   FAMILY HISTORY:  Any \"first degree relatives\" (parent, brother, sister, or child) with the following?    No changes in my family's known health.   PATIENT EXPERIENCE:    Do you want the Dermatologist to perform a COMPLETE skin exam today including a clinical examination under the \"bra and underwear\" areas?  NO  If necessary, do we have your permission to call and leave a detailed message on your Preferred Phone number that includes your specific medical information?  Yes      Allergies   Allergen Reactions    Fentanyl Vomiting     Action Taken: vomiting; Category: Adverse Reaction;   Protracted    Methimazole Hives    Codeine GI Intolerance and Anxiety     Other reaction(s): Nausea and/or vomiting      Current Outpatient Medications:     acetaminophen (TYLENOL) 500 mg tablet, Take 500 mg by mouth every 6 (six) hours as needed, Disp: , Rfl:     aspirin 81 MG tablet, Take 1 tablet by mouth daily, Disp: , Rfl:     Diclofenac Sodium " (VOLTAREN) 1 %, Apply 4 g topically 4 (four) times a day, Disp: 150 g, Rfl: 11    famotidine (PEPCID) 20 mg tablet, Take 1 tablet (20 mg total) by mouth 2 (two) times a day, Disp: 180 tablet, Rfl: 1    fluticasone (FLONASE) 50 mcg/act nasal spray, 1 spray into each nostril daily, Disp: 1 g, Rfl: 3    Galcanezumab-gnlm 120 MG/ML SOAJ, Inject 120 mg under the skin every 30 (thirty) days, Disp: 1 mL, Rfl: 11    ketorolac (TORADOL) 10 mg tablet, Take 1 tablet (10 mg total) by mouth every 6 (six) hours as needed for moderate pain (Patient taking differently: Take 10 mg by mouth every 6 (six) hours as needed for moderate pain), Disp: 10 tablet, Rfl: 0    levothyroxine 88 mcg tablet, Take 1 tablet (88 mcg total) by mouth daily, Disp: 90 tablet, Rfl: 3    lifitegrast (Xiidra) 5 % op solution, Administer 1 drop to both eyes 2 (two) times a day, Disp: 180 each, Rfl: 3    losartan (COZAAR) 50 mg tablet, Take 2 tablets (100 mg total) by mouth daily, Disp: 180 tablet, Rfl: 3    methylPREDNISolone 4 MG tablet therapy pack, Use as directed on package (Patient not taking: Reported on 5/16/2024), Disp: 21 each, Rfl: 0    Probiotic Product (PROBIOTIC-10 PO), Take 1 tablet by mouth in the morning, Disp: , Rfl:     prochlorperazine (COMPAZINE) 10 mg tablet, Take 1 tablet (10 mg total) by mouth every 8 (eight) hours as needed for nausea or vomiting., Disp: 30 tablet, Rfl: 3    rosuvastatin (CRESTOR) 20 MG tablet, Take 1 tablet (20 mg total) by mouth daily, Disp: 90 tablet, Rfl: 3    sertraline (ZOLOFT) 50 mg tablet, Take 1 tablet (50 mg total) by mouth daily, Disp: 90 tablet, Rfl: 3    Ubrogepant (UBRELVY) 100 MG tablet, Take 1 tab once prn for migraine. May repeat in 2 hours if needed. Max 2 doses per day. 90 day supply., Disp: 48 tablet, Rfl: 3          Whom besides the patient is providing clinical information about today's encounter?   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 We discussed that \"excision\" is a method of removing lesions, both benign and malignant lesions.  A portion of normal skin is often taken to ensure completeness of removal.  I reviewed that this procedure will include numbing the area, cutting around and under the skin lesion, undermining (\"freeing up\") surrounding tissue, and closing the wound with sutures (stitches) both inside and out.  Risks include and are not limited to the following:  Bleeding, pain, infection, scarring, recurrence, more required treatment, no additional information, numbness and/or loss of function (if nerves are damaged).  These risks were considered against the benefits that we discussed, and the patient opted to continue with the procedure. It was discussed with patient that every effort is made to minimize scarring, but scarring is influenced also by extrinsic factor such as location, age and genetics.     Procedural Time Out:      Correct patient? yes  Correct site per Clinic Report? yes  Correct site per previous Path Report? yes  Correct site per Patient's recollection? yes    Anesthesia:      Local anesthesia: 3:1 1% xylocaine with epi and 1-100,000 buffered    Excision Description:      Post-Op Diagnosis: Same as Pre-Op Diagnosis (above)  Pre-op size:13 mm  Margins: 4 mm  Final size of surgical defect:  2.1 cm      The patient was seated in the procedure/exam room, anesthetized locally, prepped and draped in the usual fashion. Using a #15 blade with a scalpel, the lesion was excised in " "elliptical fashion.     REQUIRED Kirk MELANOMA DATA      This procedure was not performed to treat primary cutaneous melanoma through wide local excision      Closure Description:      The specific type of closure that was utilized:     INTERMEDIATE Closure   4 sutures  4.0 vicryl deep sutures                                             8 sutures 4-0 Ethilon  interrupted    Final repaired wound length:  5.5 cm      Postoperative Care:      The wound was cleaned with sterile saline, dried off, surgical vaseline ointment was applied, and the wound was covered. A pressure dressing was applied for stabilization and light pressure.    Estimated blood loss:  Less than 3ml.  Complications: none  Post-op medications: none  Additional notes: none    Discharge Plans:      Discharge plans: Plan for return to us for suture removal, as scheduled in 14 days.   Patient condition at discharge: STABLE    The patient was given detailed oral and written instructions on postoperative care as detailed in consent. We urged the patient to call us if any problems or question should arise.         Please complete this section and then \"cut and paste\" it into the Patient Instructions section.  These notes should be printed and shared directly with the patient for review PRIOR TO leaving our office.         YOUR \"AFTER SURGERY\" REVIEW & INSTRUCTIONS    What to Know About Your Procedure  An \"excision\" was performed today to allow the dermatologist to remove a skin lesion. The procedure involves a local numbing medication and removing the entire lesion (or as much as possible). Typically, the lesion is being removed because it does not look \"normal,\" because it is becoming irritated and traumatized, or for significant appearance reasons.  The skin was cut deeply and then repaired - usually with sutures (stitches).  The removed tissue has been sent to the pathologist who will confirm the diagnosis and verify if the lesion has been completely " "removed.  Surgical “Vaseline-type” ointment has been applied after the procedure to help create a barrier between your wound and the outside world.     The advantage of using sutures (stitches) to repair a skin excision is that it allows the lesion to heal as quickly as possible with the least amount of scarring and risk of infection, Still, there are some risks and potential complications that you should watch out for that include but are not limited to the following:    Some bleeding is normal at the time of procedure and some bleeding on the gauze bandage after the procedure is normal for the first few days after surgery.  Profuse bleeding or bleeding with swelling and pain is NOT normal and should be reported as detailed below.  Infection is uncommon after skin surgery.  Infection should be reported and is indicated by pain, redness, and discharge of purulent material.  Some pain may occur initially the day after surgery.  Persistent pain or increasing pain days after surgery is not expected and should be reported.  Every effort is made to minimize scar, but location, size, and genetics do play a role in scar appearance.  A surgical wound does not achieve its optimal appearance until 6 months.  There are several treatments available if scarring would be problematic including scar creams, silicone pad, laser and scar revision.  Skin discoloration can occur especially in people of color.  Its important to avoid sun on wound in first 6 months after surgery.  Treatment is available if pigment is problematic.  Incomplete removal of the lesion or recurrence of lesion can occur and - depending on the lesion - this would then require further treatment and more surgery.  Nerve damage/numbness and/or loss of function is very rare, but is most likely to occur if the lesion being removed is large or if it is in a \"high risk\" location.  Allergic reaction to lidocaine is rare.  More commonly, epinephrine is used with the " "lidocaine, and, occasionally, epinephrine (a.k.a., adrenalin) may cause a brief feeling of anxiety or jitteriness.  The person at the microscope (pathologist) may provide additional information that was unexpected. This unexpected finding could prompt the need for additional treatment or evaluation.    At-Home Wound Care  Try NOT to remove the pressure bandage for 48 hours. Keep the area clean and dry while this bandage is on.   After removing the bandage for the first time, gently clean the area with soap and water. If the bandage is difficult to remove, getting the bandage wet in the shower will sometimes help soften the adhesive and allow it to be removed more easily.   You will now need to cleanse this area daily in the shower with gentle soap. There is no need to scrub the area. You will need to apply plain Vaseline ointment (this is over the counter and not a prescription) to the site for up to 2 weeks followed by a clean appropriately sized bandage to area.  Non stick dressing and paper tape (or Hypafix) are recommended for sensitive skin but a bandaid is fine if it covers the area well.  In general, sutures (stitches) are removed in about 5-7 days for face wounds and in about 12-14 days for the body wounds.  Your dermatologist wants you to return for suture removal in 14 DAYS.     General Restrictions  For TWO (2) DAYS:  You will need to take it very easy as this time is highest risk for bleeding. Being a \"couch potato\" during these two days is generally recommended.   For surgeries on the face/neck/scalp: Avoid leaning down to pick things up off the floor as this brings blood up to your head. Instead, squat down to pick things up.     For TWO WEEKS (14 DAYS):   No heavy lifting (anything greater than 10 pounds)   You can start to do slow, gentle activities such as slow walking but nothing to increase your heart rate and blood pressure too much (such as cardiovascular exercise).  It is important to take it " "easy as there is still a risk for bleeding and a high risk popping of stitches open during this time.     Site Specific Restrictions  If we did surgery near your eyes (including the nose, forehead, front part of your scalp, cheeks): It is VERY common to get a large amount of swelling around your eyes (puffy eyes). Although less frequent, this can be enough to swell your eyes shut and can also come along with bruising. This should not hurt and is very expected and normal. It is typically worst at ~ 3 days out from your surgery and dramatically better 1 week post-operatively.   If we did surgery around your nose: No blowing your nose as this puts you at higher risk of popping stitches durign this time. Instead dab under your nose with a tissue or use a Q-tip inside your nose.  If we did surgery on the skin above or below your lip or your lip itself: No sipping from straws as this uses a lot of the muscles around your mouth and increases the risk of popping stitches during this time.    Managing Your Pain After Surgery  You can expect to have some pain after surgery. This is normal. The pain is typically worse the first two days after surgery, and quickly begins to get better.   You can use heating pads or ice packs on your incisions to help reduce your pain.   The best strategy for controlling your pain after surgery is \"around the clock\" pain control. You can take \"over-the-counter\" (non-prescription) Acetaminophen (Tylenol) for discomfort, unless you have been told not to by your physician.  If you are taking Tylenol at the maximum dose, you can alternate Tylenol with Advil/Motrin (ibuprofen) as long as there are no contraindications.  Alternating these medications with each other (I.e., Tylenol followed by Motrin/Advil) allows you to maximize your pain control.  To alternate these medications properly, you will take a dose of pain medication every three hours, alternating Tylenol (acetaminophen) and Advil/Motrin " "(ibuprofen).  Start by taking 650 mg of Tylenol (2 pills of 325 mg)  3 hours later take 600 mg of Motrin (3 pills of 200 mg)  3 hours after taking the Motrin take 650 mg of Tylenol  3 hours after that take 600 mg of Motrin.    As an example, if your first dose of Tylenol (acetaminophen) is at 12:00 PM, then you would alternate with Motrin as directed below, continuing usually for no more than a total of 48 hours straight:     12:00 PM  Tylenol 650 mg (2 pills of 325 mg)    3:00 PM  Motrin 600 mg (3 pills of 200 mg)    6:00 PM  Tylenol 650 mg (2 pills of 325 mg)    9:00 PM  Motrin 600 mg (3 pills of 200 mg)      WARNING:  Do NOT take more than 4000 mg of Tylenol or 3200 mg of Motrin in a \"24-hour\" period.       What if you still have pain?    If you have pain that is not controlled with the over-the-counter pain medications (Tylenol and Motrin/Advil), do not hesitate to call our staff using the number provided. We will help make sure you are managing your pain in the best way possible, and if necessary, we can provide a prescription for additional pain medication.     Call our office IMMEDIATELY with any signs of wound infection.  This includes fever, chills, increasing redness, warmth, tenderness, severe discomfort/pain, or pus or foul smell coming from the wound. [facility] can be directly at [phone] (SKIN) and ask for the on-call Dermatologist covering surgery/Mohs.    If Bleeding is Noticed  If bleeding is soaking through the bandage, remove the bandage and see where the bleeding is coming from.  Place a clean cloth over the area and apply firm pressure directly to the area that is bleeding for thirty minutes.    Check the wound ONLY after 30 minutes of direct pressure; do not cheat and sneak a peak, as that does not count (i.e., resets the clock back to zero).  If bleeding persists after 30 minutes of legitimate direct pressure, then try one more round of direct pressure to the area.    Should " "bleeding become heavier or not stop after the second application of direct pressure for 30 minutes, then call [facility] directly at [phone] (SKIN) and ask to speak with the on-call Dermatologist covering surgery/Mohs.  If after hours, go to your nearest Emergency Room or Urgent Care and have the team call [facility] directly at [phone] (SKIN); you will be connected to our after hours team.            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 We discussed that \"excision\" is a method of removing lesions, both benign and malignant lesions.  A portion of normal skin is often taken to ensure completeness of removal.  I reviewed that this procedure will include numbing the area, cutting around and under the skin lesion, undermining (\"freeing up\") surrounding tissue, and closing the wound with sutures (stitches) both inside and out.  Risks include and are not limited to the following:  Bleeding, pain, infection, scarring, recurrence, more required treatment, no additional information, numbness and/or loss of function (if nerves are damaged).  These risks were considered against the benefits that we discussed, and the patient opted to continue with the procedure. It was discussed with patient that every effort is made to minimize scarring, but scarring is influenced also by extrinsic factor such as location, age and genetics.     Procedural Time Out:      Correct patient? yes  Correct site per Clinic Report? yes  Correct site per previous Path Report? yes  Correct site per Patient's recollection? yes    " "Anesthesia:      Local anesthesia: 3:1 1% xylocaine with epi and 1-100,000 buffered    Excision Description:      Post-Op Diagnosis: Same as Pre-Op Diagnosis (above)  Pre-op size: 7 mm  Margins:  0.4 cm   Final size of surgical defect: 21 mm    The patient was seated in the procedure/exam room, anesthetized locally, prepped and draped in the usual fashion. Using a #15 blade with a scalpel, the lesion was excised in elliptical fashion.     REQUIRED Kirk MELANOMA DATA      This procedure was not performed to treat primary cutaneous melanoma through wide local excision      Closure Description:      The specific type of closure that was utilized:     INTERMEDIATE  deep Closure 5-0 vicryl 3 sutures                                 Superficial sutures  6-0 Ethilon 7 sutures             Final repaired wound length:  35 mm      Postoperative Care:      The wound was cleaned with sterile saline, dried off, surgical vaseline ointment was applied, and the wound was covered. A pressure dressing was applied for stabilization and light pressure.    Estimated blood loss:  Less than 3ml.  Complications: none  Post-op medications: none  Additional notes: none    Discharge Plans:      Discharge plans: Plan for return to us for suture removal, as scheduled in 7 days.   Patient condition at discharge: STABLE    The patient was given detailed oral and written instructions on postoperative care as detailed in consent. We urged the patient to call us if any problems or question should arise.         Please complete this section and then \"cut and paste\" it into the Patient Instructions section.  These notes should be printed and shared directly with the patient for review PRIOR TO leaving our office.         YOUR \"AFTER SURGERY\" REVIEW & INSTRUCTIONS    What to Know About Your Procedure  An \"excision\" was performed today to allow the dermatologist to remove a skin lesion. The procedure involves a local numbing medication and removing the " "entire lesion (or as much as possible). Typically, the lesion is being removed because it does not look \"normal,\" because it is becoming irritated and traumatized, or for significant appearance reasons.  The skin was cut deeply and then repaired - usually with sutures (stitches).  The removed tissue has been sent to the pathologist who will confirm the diagnosis and verify if the lesion has been completely removed.  Surgical “Vaseline-type” ointment has been applied after the procedure to help create a barrier between your wound and the outside world.     The advantage of using sutures (stitches) to repair a skin excision is that it allows the lesion to heal as quickly as possible with the least amount of scarring and risk of infection, Still, there are some risks and potential complications that you should watch out for that include but are not limited to the following:    Some bleeding is normal at the time of procedure and some bleeding on the gauze bandage after the procedure is normal for the first few days after surgery.  Profuse bleeding or bleeding with swelling and pain is NOT normal and should be reported as detailed below.  Infection is uncommon after skin surgery.  Infection should be reported and is indicated by pain, redness, and discharge of purulent material.  Some pain may occur initially the day after surgery.  Persistent pain or increasing pain days after surgery is not expected and should be reported.  Every effort is made to minimize scar, but location, size, and genetics do play a role in scar appearance.  A surgical wound does not achieve its optimal appearance until 6 months.  There are several treatments available if scarring would be problematic including scar creams, silicone pad, laser and scar revision.  Skin discoloration can occur especially in people of color.  Its important to avoid sun on wound in first 6 months after surgery.  Treatment is available if pigment is " "problematic.  Incomplete removal of the lesion or recurrence of lesion can occur and - depending on the lesion - this would then require further treatment and more surgery.  Nerve damage/numbness and/or loss of function is very rare, but is most likely to occur if the lesion being removed is large or if it is in a \"high risk\" location.  Allergic reaction to lidocaine is rare.  More commonly, epinephrine is used with the lidocaine, and, occasionally, epinephrine (a.k.a., adrenalin) may cause a brief feeling of anxiety or jitteriness.  The person at the microscope (pathologist) may provide additional information that was unexpected. This unexpected finding could prompt the need for additional treatment or evaluation.    At-Home Wound Care  Try NOT to remove the pressure bandage for 48 hours. Keep the area clean and dry while this bandage is on.   After removing the bandage for the first time, gently clean the area with soap and water. If the bandage is difficult to remove, getting the bandage wet in the shower will sometimes help soften the adhesive and allow it to be removed more easily.   You will now need to cleanse this area daily in the shower with gentle soap. There is no need to scrub the area. You will need to apply plain Vaseline ointment (this is over the counter and not a prescription) to the site for up to 2 weeks followed by a clean appropriately sized bandage to area.  Non stick dressing and paper tape (or Hypafix) are recommended for sensitive skin but a bandaid is fine if it covers the area well.  In general, sutures (stitches) are removed in about 5-7 days for face wounds and in about 12-14 days for the body wounds.  Your dermatologist wants you to return for suture removal in 7 DAYS.     General Restrictions  For TWO (2) DAYS:  You will need to take it very easy as this time is highest risk for bleeding. Being a \"couch potato\" during these two days is generally recommended.   For surgeries on the " "face/neck/scalp: Avoid leaning down to pick things up off the floor as this brings blood up to your head. Instead, squat down to pick things up.     For TWO WEEKS (14 DAYS):   No heavy lifting (anything greater than 10 pounds)   You can start to do slow, gentle activities such as slow walking but nothing to increase your heart rate and blood pressure too much (such as cardiovascular exercise).  It is important to take it easy as there is still a risk for bleeding and a high risk popping of stitches open during this time.     Site Specific Restrictions  If we did surgery near your eyes (including the nose, forehead, front part of your scalp, cheeks): It is VERY common to get a large amount of swelling around your eyes (puffy eyes). Although less frequent, this can be enough to swell your eyes shut and can also come along with bruising. This should not hurt and is very expected and normal. It is typically worst at ~ 3 days out from your surgery and dramatically better 1 week post-operatively.   If we did surgery around your nose: No blowing your nose as this puts you at higher risk of popping stitches durign this time. Instead dab under your nose with a tissue or use a Q-tip inside your nose.  If we did surgery on the skin above or below your lip or your lip itself: No sipping from straws as this uses a lot of the muscles around your mouth and increases the risk of popping stitches during this time.    Managing Your Pain After Surgery  You can expect to have some pain after surgery. This is normal. The pain is typically worse the first two days after surgery, and quickly begins to get better.   You can use heating pads or ice packs on your incisions to help reduce your pain.   The best strategy for controlling your pain after surgery is \"around the clock\" pain control. You can take \"over-the-counter\" (non-prescription) Acetaminophen (Tylenol) for discomfort, unless you have been told not to by your physician.  If you " "are taking Tylenol at the maximum dose, you can alternate Tylenol with Advil/Motrin (ibuprofen) as long as there are no contraindications.  Alternating these medications with each other (I.e., Tylenol followed by Motrin/Advil) allows you to maximize your pain control.  To alternate these medications properly, you will take a dose of pain medication every three hours, alternating Tylenol (acetaminophen) and Advil/Motrin (ibuprofen).  Start by taking 650 mg of Tylenol (2 pills of 325 mg)  3 hours later take 600 mg of Motrin (3 pills of 200 mg)  3 hours after taking the Motrin take 650 mg of Tylenol  3 hours after that take 600 mg of Motrin.    As an example, if your first dose of Tylenol (acetaminophen) is at 12:00 PM, then you would alternate with Motrin as directed below, continuing usually for no more than a total of 48 hours straight:     12:00 PM  Tylenol 650 mg (2 pills of 325 mg)    3:00 PM  Motrin 600 mg (3 pills of 200 mg)    6:00 PM  Tylenol 650 mg (2 pills of 325 mg)    9:00 PM  Motrin 600 mg (3 pills of 200 mg)      WARNING:  Do NOT take more than 4000 mg of Tylenol or 3200 mg of Motrin in a \"24-hour\" period.       What if you still have pain?    If you have pain that is not controlled with the over-the-counter pain medications (Tylenol and Motrin/Advil), do not hesitate to call our staff using the number provided. We will help make sure you are managing your pain in the best way possible, and if necessary, we can provide a prescription for additional pain medication.     Call our office IMMEDIATELY with any signs of wound infection.  This includes fever, chills, increasing redness, warmth, tenderness, severe discomfort/pain, or pus or foul smell coming from the wound. [facility] can be directly at [phone] (SKIN) and ask for the on-call Dermatologist covering surgery/Mohs.    If Bleeding is Noticed  If bleeding is soaking through the bandage, remove the bandage and see where the bleeding " is coming from.  Place a clean cloth over the area and apply firm pressure directly to the area that is bleeding for thirty minutes.    Check the wound ONLY after 30 minutes of direct pressure; do not cheat and sneak a peak, as that does not count (i.e., resets the clock back to zero).  If bleeding persists after 30 minutes of legitimate direct pressure, then try one more round of direct pressure to the area.    Should bleeding become heavier or not stop after the second application of direct pressure for 30 minutes, then call [facility] directly at [phone] (SKIN) and ask to speak with the on-call Dermatologist covering surgery/Mohs.  If after hours, go to your nearest Emergency Room or Urgent Care and have the team call [facility] directly at [phone] (SKIN); you will be connected to our after hours team.              ACTINIC KERATOSIS    Physical Exam:  Anatomic Location Affected:  left thigh  Morphological Description:  scaly pink papule    Additional History of Present Condition:  biopsy done on 6/11/2024    Assessment and Plan:  Based on a thorough discussion of this condition and the management approach to it (including a comprehensive discussion of the known risks, side effects and potential benefits of treatment), the patient (family) agrees to implement the following specific plan:    Cryotherapy done today    Actinic keratoses are very common on sites repeatedly exposed to the sun, especially the backs of the hands and the face.  They are considered precancers and have a low risk of turning into squamous cell carcinoma. It is rare for a solitary actinic keratosis to evolve into a squamous cell carcinoma (SCC), but the risk is 10-15% when more than 10 actinic keratoses are present. A tender, thickened, ulcerated or enlarging actinic keratosis is suspicious of SCC.    Actinic keratoses may be prevented by strict sun protection. If already present, keratoses may improve  with a very high sun protection factor (50+) broad-spectrum sunscreen applied at least daily to affected areas, year-round.  We recommend that sun protective clothing and hats and sunglasses be worn whenever possible.  Note that you can make you own UPF 30 rate clothing using just your own washing machine with a product called sun guard    There are several different options for treating actinic keratoses    Topical “medications such as 5-fluorouracil or Aldara  - good for field treatment ie treats what's seen and not seen    Cryotherapy - good for single spots but treats “only what we see” versus a field treatment    Photodynamic therapy - involves application of a light sensitizing medicine and then exposure to a special light, also a good field treatment        PROCEDURE:  DESTRUCTION OF PRE-MALIGNANT LESIONS  After a thorough discussion of treatment options and risk/benefits/alternatives (including but not limited to local pain, scarring, dyspigmentation, blistering, and possible superinfection), verbal and written consent were obtained and the aforementioned lesions were treated on with cryotherapy using liquid nitrogen x 1 cycle for 5-10 seconds.    TOTAL NUMBER of 1 pre-malignant lesions were treated today on the ANATOMIC LOCATION: left thigh.     The patient tolerated the procedure well, and after-care instructions were provided.     Scribe Attestation      I,:  Ema Cleaning MA am acting as a scribe while in the presence of the attending physician.:       I,:  Prasanna Castrejon MD personally performed the services described in this documentation    as scribed in my presence.:

## 2024-08-07 NOTE — PATIENT INSTRUCTIONS
"Dr. Castrejon - Surgery with Sutures After Care Instructions    WOUND CARE AFTER SURGERY:    Do NOT to remove the pressure bandage for 48 hours. Keep the area clean and dry while this bandage is on.    After removing the bandage for the first time, gently clean the area with soap and water. If the bandage is difficult to remove, getting the bandage wet in the shower will sometimes help soften the adhesive and allow it to be removed more easily.     You will now need to cleanse this area daily in the shower with gentle soap. There is no need to scrub the area. Apply plain Vaseline ointment (this is over the counter and not a prescription) to the site followed by a clean appropriately sized bandage to area.  Non stick dressing and paper tape (or Hypafix) are recommended for sensitive skin but a bandaid is fine if it covers the area well.  DO NOT USE NEOSPORIN, BACITRACIN OR ANY TOPICAL ANTIBIOTIC UNLESS INSTRUCTED BY THE DOCTOR.      You will need to continue the above daily wound care until you return for suture removal in 14 days for left posterior leg and 7 days for left cheek  (generally 7 days for the face, 10-14 days off the face)      RESTRICTIONS:     For two DAYS:   - You will need to take it very easy as this time is highest risk for bleeding. Being a \"couch potato\" during these two days is generally recommended.   - For surgeries on the face/neck/scalp: Avoid leaning down to pick things up off the floor as this brings blood up to your head. Instead, squat down to pick things up.     For two WEEKS:   - No heavy lifting (anything greater than 10 pounds)   - You can start to do slow, gentle activities such as slow walking but nothing to increase your heart rate and blood pressure too much (such as cardiovascular exercise). It is important to take it easy as there is still a risk for bleeding and a high risk popping of stitches open during this time.     If we did surgery near the eyes (including the nose, " forehead, front part of your scalp, cheeks): It is VERY common to get a large amount of swelling around your eyes (puffy eyes). Although less frequent, this can be enough to swell your eyes shut and can also come along with bruising. This should not hurt and is very expected and normal. It is typically worst at ~ 3 days out from your surgery and dramatically better 1 week post-operatively.     If we did surgery around your nose: No blowing your nose as this puts you at higher risk of popping stitches durign this time. Instead dab under your nose with a tissue or use a Q-tip inside your nose.    If we did surgery on the skin above or bellow your lip or your lip itself: No sipping from straws as this uses a lot of the muscles around your mouth and increases the risk of popping stitches during this time.    MANAGING YOUR PAIN AFTER SURGERY     You can expect to have some pain after surgery. This is normal. The pain is typically worse the first two days after surgery, and quickly begins to get better.     The best strategy for controlling your pain after surgery is around the clock pain control. You can take over the counter Acetaminophen (Tylenol) for discomfort, if no contraindications.     If you are taking this at the maximum dose, you can alternate this with Motrin (ibuprofen or Advil) as well. Alternating these medications with each other allows you to maximize your pain control. In addition to Tylenol and Motrin, you can use heating pads or ice packs on your incisions to help reduce your pain.     How will I alternate your regular strength over-the-counter pain medication?  You will take a dose of pain medication every three hours.   Start by taking 650 mg of Tylenol (2 pills of 325 mg)   3 hours later take 600 mg of Motrin (3 pills of 200 mg)   3 hours after taking the Motrin take 650 mg of Tylenol   3 hours after that take 600 mg of Motrin.    See example - if your first dose of Tylenol is at 12:00 PM     12:00  PM  Tylenol 650 mg (2 pills of 325 mg)    3:00 PM  Motrin 600 mg (3 pills of 200 mg)    6:00 PM  Tylenol 650 mg (2 pills of 325 mg)    9:00 PM  Motrin 600 mg (3 pills of 200 mg)    Continue alternating every 3 hours      Important:   Do not take more than 4000mg of Tylenol or 3200mg of Motrin in a 24-hour period.       CALL OUR OFFICE IMMEDIATELY FOR ANY SIGNS OF INFECTION:    This includes fever, chills, increased redness, warmth, tenderness, severe discomfort/pain, or pus or foul smell coming from the wound.  Call [facility] directly at   [phone] [phone]    IF BLEEDING IS NOTICED:    Place a clean cloth over the area and apply firm pressure for thirty minutes.  Check the wound ONLY after 30 minutes of direct pressure; do not cheat and sneak a peak, as that does not count.  If bleeding persists after 30 minutes of legitimate direct pressure, then try one more round of direct pressure to the area.  Should the bleeding become heavier or not stop after the second attempt, call [facility] directly at [phone]).       ACTINIC KERATOSIS    Assessment and Plan:  Based on a thorough discussion of this condition and the management approach to it (including a comprehensive discussion of the known risks, side effects and potential benefits of treatment), the patient (family) agrees to implement the following specific plan:    Cryotherapy done today    Actinic keratoses are very common on sites repeatedly exposed to the sun, especially the backs of the hands and the face.  They are considered precancers and have a low risk of turning into squamous cell carcinoma. It is rare for a solitary actinic keratosis to evolve into a squamous cell carcinoma (SCC), but the risk is 10-15% when more than 10 actinic keratoses are present. A tender, thickened, ulcerated or enlarging actinic keratosis is suspicious of SCC.    Actinic keratoses may be prevented by strict sun protection. If already  "present, keratoses may improve with a very high sun protection factor (50+) broad-spectrum sunscreen applied at least daily to affected areas, year-round.  We recommend that sun protective clothing and hats and sunglasses be worn whenever possible.  Note that you can make you own UPF 30 rate clothing using just your own washing machine with a product called sun guard    There are several different options for treating actinic keratoses    Topical “medications such as 5-fluorouracil or Aldara  - good for field treatment ie treats what's seen and not seen    Cryotherapy - good for single spots but treats “only what we see” versus a field treatment    Photodynamic therapy - involves application of a light sensitizing medicine and then exposure to a special light, also a good field treatment    Treatment with Cryotherapy    The doctor has treated your skin with nitrogen, which is 320 degrees Fahrenheit below zero.  He has given the treated area \"frostbite.\"    Stinging should subside within a few hours.  You can take Tylenol for pain, if needed.    Over the next few days, it is normal if the area becomes reddened, a blood blister, or swollen with fluid.  If the lesion treated was near the eye - you could get a swollen eye over the next few days.  Do not panic!  This is all temporary, and will resolve with time.    There is no special treatment - just keep the area clean.  Makeup and BandAids can be used, if preferred.    When the area starts to dry up and peel off, using Vaseline can help healing.    It usually takes up to a month for it to heal.  Some lesions are recurrent and may require repeat treatments.  If a lesion has not healed in one month, please don't hesitate to contact us.      If you have any further questions that are not answered here, please call us.  [phone].    Thank you for allowing us to care for you.   "

## 2024-08-12 PROCEDURE — 88305 TISSUE EXAM BY PATHOLOGIST: CPT | Performed by: STUDENT IN AN ORGANIZED HEALTH CARE EDUCATION/TRAINING PROGRAM

## 2024-08-12 PROCEDURE — 88342 IMHCHEM/IMCYTCHM 1ST ANTB: CPT | Performed by: STUDENT IN AN ORGANIZED HEALTH CARE EDUCATION/TRAINING PROGRAM

## 2024-08-13 ENCOUNTER — TELEPHONE (OUTPATIENT)
Age: 74
End: 2024-08-13

## 2024-08-13 NOTE — TELEPHONE ENCOUNTER
----- Message from Prasanna Castrejon MD sent at 8/13/2024  7:55 AM EDT -----  Call patient if not coming in shortly for suture removal to let him know that the margins were clear

## 2024-08-13 NOTE — TELEPHONE ENCOUNTER
Spoke with patient and notified her of test results.     Ema Cleaning/[place]  08/13/24  8:25 AM

## 2024-08-14 ENCOUNTER — CLINICAL SUPPORT (OUTPATIENT)
Age: 74
End: 2024-08-14

## 2024-08-14 DIAGNOSIS — Z48.02 ENCOUNTER FOR REMOVAL OF SUTURES: Primary | ICD-10-CM

## 2024-08-14 PROCEDURE — 99024 POSTOP FOLLOW-UP VISIT: CPT | Performed by: DERMATOLOGY

## 2024-08-14 NOTE — PROGRESS NOTES
"[facility] Note     Patient Name: Roxanne Hopper  Encounter Date: 8/14/24     Have you been cared for by a [facility] in the last 3 years and, if so, which description applies to you?    Yes.  I have been here within the last 3 years, and my medical history has NOT changed since that time.  I am FEMALE/of child-bearing potential.    REVIEW OF SYSTEMS:  Have you recently had or currently have any of the following? No changes in my recent health.   PAST MEDICAL HISTORY:  Have you personally ever had or currently have any of the following?  If \"YES,\" then please provide more detail. No changes in my medical history.   HISTORY OF IMMUNOSUPPRESSION: Do you have a history of any of the following:  Systemic Immunosuppression such as Diabetes, Biologic or Immunotherapy, Chemotherapy, Organ Transplantation, Bone Marrow Transplantation?  No     Answering \"YES\" requires the addition of the dotphrase \"IMMUNOSUPPRESSED\" as the first diagnosis of the patient's visit.   FAMILY HISTORY:  Any \"first degree relatives\" (parent, brother, sister, or child) with the following?    No changes in my family's known health.   PATIENT EXPERIENCE:    Do you want the Dermatologist to perform a COMPLETE skin exam today including a clinical examination under the \"bra and underwear\" areas?  NO  If necessary, do we have your permission to call and leave a detailed message on your Preferred Phone number that includes your specific medical information?  Yes      Allergies   Allergen Reactions    Fentanyl Vomiting     Action Taken: vomiting; Category: Adverse Reaction;   Protracted    Methimazole Hives    Codeine GI Intolerance and Anxiety     Other reaction(s): Nausea and/or vomiting      Current Outpatient Medications:     acetaminophen (TYLENOL) 500 mg tablet, Take 500 mg by mouth every 6 (six) hours as needed, Disp: , Rfl:     aspirin 81 MG tablet, Take 1 tablet by mouth daily, Disp: , Rfl:     Diclofenac Sodium " (VOLTAREN) 1 %, Apply 4 g topically 4 (four) times a day, Disp: 150 g, Rfl: 11    famotidine (PEPCID) 20 mg tablet, Take 1 tablet (20 mg total) by mouth 2 (two) times a day, Disp: 180 tablet, Rfl: 1    fluticasone (FLONASE) 50 mcg/act nasal spray, 1 spray into each nostril daily, Disp: 1 g, Rfl: 3    Galcanezumab-gnlm 120 MG/ML SOAJ, Inject 120 mg under the skin every 30 (thirty) days, Disp: 1 mL, Rfl: 11    ketorolac (TORADOL) 10 mg tablet, Take 1 tablet (10 mg total) by mouth every 6 (six) hours as needed for moderate pain (Patient taking differently: Take 10 mg by mouth every 6 (six) hours as needed for moderate pain), Disp: 10 tablet, Rfl: 0    levothyroxine 88 mcg tablet, Take 1 tablet (88 mcg total) by mouth daily, Disp: 90 tablet, Rfl: 3    lifitegrast (Xiidra) 5 % op solution, Administer 1 drop to both eyes 2 (two) times a day, Disp: 180 each, Rfl: 3    losartan (COZAAR) 50 mg tablet, Take 2 tablets (100 mg total) by mouth daily, Disp: 180 tablet, Rfl: 3    methylPREDNISolone 4 MG tablet therapy pack, Use as directed on package (Patient not taking: Reported on 5/16/2024), Disp: 21 each, Rfl: 0    Probiotic Product (PROBIOTIC-10 PO), Take 1 tablet by mouth in the morning, Disp: , Rfl:     prochlorperazine (COMPAZINE) 10 mg tablet, Take 1 tablet (10 mg total) by mouth every 8 (eight) hours as needed for nausea or vomiting., Disp: 30 tablet, Rfl: 3    rosuvastatin (CRESTOR) 20 MG tablet, Take 1 tablet (20 mg total) by mouth daily, Disp: 90 tablet, Rfl: 3    sertraline (ZOLOFT) 50 mg tablet, Take 1 tablet (50 mg total) by mouth daily, Disp: 90 tablet, Rfl: 3    Ubrogepant (UBRELVY) 100 MG tablet, Take 1 tab once prn for migraine. May repeat in 2 hours if needed. Max 2 doses per day. 90 day supply., Disp: 48 tablet, Rfl: 3          Whom besides the patient is providing clinical information about today's encounter?   NO ADDITIONAL HISTORIAN (patient alone provided history)    Physical Exam and Assessment/Plan by  Diagnosis:    Suture removal    Date/Time: 8/14/2024 9:30 AM    Performed by: Ines Hudson MA  Authorized by: Prasanna Castrejon MD  Universal Protocol:  Consent: Verbal consent obtained.  Consent given by: patient  Patient understanding: patient states understanding of the procedure being performed  Patient consent: the patient's understanding of the procedure matches consent given  Procedure consent: procedure consent matches procedure scheduled  Relevant documents: relevant documents present and verified  Test results: test results available and properly labeled  Site marked: the operative site was marked  Patient identity confirmed: verbally with patient      Location:     Laterality:  Left    Location:  Head/neck    Head/neck location:  Cheek    Cheek location:  L cheek  Procedure details:     Tools used:  Suture removal kit    Wound appearance:  No sign(s) of infection, good wound healing, clean and pink  Post-procedure details:     Post-removal:  Steri-Strips applied    Patient tolerance of procedure:  Tolerated well, no immediate complications     Ines Hudson

## 2024-08-21 ENCOUNTER — CLINICAL SUPPORT (OUTPATIENT)
Age: 74
End: 2024-08-21

## 2024-08-21 DIAGNOSIS — Z48.02 ENCOUNTER FOR REMOVAL OF SUTURES: Primary | ICD-10-CM

## 2024-08-21 PROCEDURE — RECHECK: Performed by: DERMATOLOGY

## 2024-08-21 NOTE — PROGRESS NOTES
"[facility] Note     Patient Name: Roxanne Hopper  Encounter Date: 8/21/24     Have you been cared for by a [facility] Dermatologist in the last 3 years and, if so, which description applies to you?    Yes.  I have been here within the last 3 years, and my medical history has NOT changed since that time.  I am FEMALE/of child-bearing potential.    REVIEW OF SYSTEMS:  Have you recently had or currently have any of the following? No changes in my recent health.   PAST MEDICAL HISTORY:  Have you personally ever had or currently have any of the following?  If \"YES,\" then please provide more detail. No changes in my medical history.   HISTORY OF IMMUNOSUPPRESSION: Do you have a history of any of the following:  Systemic Immunosuppression such as Diabetes, Biologic or Immunotherapy, Chemotherapy, Organ Transplantation, Bone Marrow Transplantation?  No     Answering \"YES\" requires the addition of the dotphrase \"IMMUNOSUPPRESSED\" as the first diagnosis of the patient's visit.   FAMILY HISTORY:  Any \"first degree relatives\" (parent, brother, sister, or child) with the following?    No changes in my family's known health.   PATIENT EXPERIENCE:    Do you want the Dermatologist to perform a COMPLETE skin exam today including a clinical examination under the \"bra and underwear\" areas?  NO  If necessary, do we have your permission to call and leave a detailed message on your Preferred Phone number that includes your specific medical information?  Yes      Allergies   Allergen Reactions    Fentanyl Vomiting     Action Taken: vomiting; Category: Adverse Reaction;   Protracted    Methimazole Hives    Codeine GI Intolerance and Anxiety     Other reaction(s): Nausea and/or vomiting      Current Outpatient Medications:     acetaminophen (TYLENOL) 500 mg tablet, Take 500 mg by mouth every 6 (six) hours as needed, Disp: , Rfl:     aspirin 81 MG tablet, Take 1 tablet by mouth daily, Disp: , Rfl:     Diclofenac Sodium " (VOLTAREN) 1 %, Apply 4 g topically 4 (four) times a day, Disp: 150 g, Rfl: 11    famotidine (PEPCID) 20 mg tablet, Take 1 tablet (20 mg total) by mouth 2 (two) times a day, Disp: 180 tablet, Rfl: 1    fluticasone (FLONASE) 50 mcg/act nasal spray, 1 spray into each nostril daily, Disp: 1 g, Rfl: 3    Galcanezumab-gnlm 120 MG/ML SOAJ, Inject 120 mg under the skin every 30 (thirty) days, Disp: 1 mL, Rfl: 11    ketorolac (TORADOL) 10 mg tablet, Take 1 tablet (10 mg total) by mouth every 6 (six) hours as needed for moderate pain (Patient taking differently: Take 10 mg by mouth every 6 (six) hours as needed for moderate pain), Disp: 10 tablet, Rfl: 0    levothyroxine 88 mcg tablet, Take 1 tablet (88 mcg total) by mouth daily, Disp: 90 tablet, Rfl: 3    lifitegrast (Xiidra) 5 % op solution, Administer 1 drop to both eyes 2 (two) times a day, Disp: 180 each, Rfl: 3    losartan (COZAAR) 50 mg tablet, Take 2 tablets (100 mg total) by mouth daily, Disp: 180 tablet, Rfl: 3    methylPREDNISolone 4 MG tablet therapy pack, Use as directed on package (Patient not taking: Reported on 5/16/2024), Disp: 21 each, Rfl: 0    Probiotic Product (PROBIOTIC-10 PO), Take 1 tablet by mouth in the morning, Disp: , Rfl:     prochlorperazine (COMPAZINE) 10 mg tablet, Take 1 tablet (10 mg total) by mouth every 8 (eight) hours as needed for nausea or vomiting., Disp: 30 tablet, Rfl: 3    rosuvastatin (CRESTOR) 20 MG tablet, Take 1 tablet (20 mg total) by mouth daily, Disp: 90 tablet, Rfl: 3    sertraline (ZOLOFT) 50 mg tablet, Take 1 tablet (50 mg total) by mouth daily, Disp: 90 tablet, Rfl: 3    Ubrogepant (UBRELVY) 100 MG tablet, Take 1 tab once prn for migraine. May repeat in 2 hours if needed. Max 2 doses per day. 90 day supply., Disp: 48 tablet, Rfl: 3          Whom besides the patient is providing clinical information about today's encounter?   NO ADDITIONAL HISTORIAN (patient alone provided history)    Physical Exam and Assessment/Plan by  Diagnosis:    Suture removal    Date/Time: 8/21/2024 9:30 AM    Performed by: Ines Hudson MA  Authorized by: Prasanna Castrejon MD  Universal Protocol:  Consent: Verbal consent obtained.  Consent given by: patient  Patient understanding: patient states understanding of the procedure being performed  Patient consent: the patient's understanding of the procedure matches consent given  Procedure consent: procedure consent matches procedure scheduled  Relevant documents: relevant documents present and verified  Test results: test results available and properly labeled  Site marked: the operative site was marked  Patient identity confirmed: verbally with patient      Location:     Laterality:  Left    Location:  Lower extremity    Lower extremity location:  Leg    Leg location:  L upper leg  Procedure details:     Tools used:  Suture removal kit    Wound appearance:  No sign(s) of infection, red and clean  Post-procedure details:     Post-removal:  Steri-Strips applied    Patient tolerance of procedure:  Tolerated well, no immediate complications  Comments:      Left posterior upper leg        Ines Hudson

## 2024-09-05 DIAGNOSIS — Z00.00 HEALTHCARE MAINTENANCE: ICD-10-CM

## 2024-09-05 DIAGNOSIS — I10 ESSENTIAL HYPERTENSION: Primary | ICD-10-CM

## 2024-09-05 DIAGNOSIS — E78.2 MIXED HYPERLIPIDEMIA: ICD-10-CM

## 2024-09-05 DIAGNOSIS — E89.0 POSTABLATIVE HYPOTHYROIDISM: ICD-10-CM

## 2024-09-05 DIAGNOSIS — E05.00 GRAVES DISEASE: ICD-10-CM

## 2024-09-17 ENCOUNTER — PROCEDURE VISIT (OUTPATIENT)
Dept: NEUROLOGY | Facility: CLINIC | Age: 74
End: 2024-09-17
Payer: MEDICARE

## 2024-09-17 VITALS — HEART RATE: 75 BPM | TEMPERATURE: 98.3 F | DIASTOLIC BLOOD PRESSURE: 66 MMHG | SYSTOLIC BLOOD PRESSURE: 142 MMHG

## 2024-09-17 DIAGNOSIS — G43.709 CHRONIC MIGRAINE WITHOUT AURA WITHOUT STATUS MIGRAINOSUS, NOT INTRACTABLE: Primary | ICD-10-CM

## 2024-09-17 PROCEDURE — 64615 CHEMODENERV MUSC MIGRAINE: CPT | Performed by: PHYSICIAN ASSISTANT

## 2024-09-17 NOTE — PROGRESS NOTES
"Universal Protocol   procedure performed by consultantConsent: Verbal consent obtained. Written consent obtained.  Risks and benefits: risks, benefits and alternatives were discussed  Consent given by: patient  Time out: Immediately prior to procedure a \"time out\" was called to verify the correct patient, procedure, equipment, support staff and site/side marked as required.  Patient understanding: patient states understanding of the procedure being performed  Patient consent: the patient's understanding of the procedure matches consent given  Procedure consent: procedure consent matches procedure scheduled  Relevant documents: relevant documents present and verified  Patient identity confirmed: verbally with patient      Chemodenervation     Date/Time  9/17/2024 10:00 AM     Performed by  Ambika José PA-C   Authorized by  Ambika José PA-C     Pre-procedure details      Preparation: Patient was prepped and draped in usual sterile fashion     Anesthesia  (see MAR for exact dosages):     Anesthesia method:  None   Procedure details      Position:  Upright   Botox      Botox Type:  Type A    Brand:  Botox    mL's of Botulinum Toxin:  200    mL's of preservative free sterile saline:  4    Final Concentration per CC:  50 units    Needle Gauge:  30 G 2.5 inch   Procedures      Botox Procedures: chronic headache     Injection Location      Head / Face:  L superior cervical paraspinal, R superior cervical paraspinal, R medial occipitalis, L medial occipitalis, R temporalis, L superior trapezius, R superior trapezius and L temporalis    L temporalis injection amount:  25 unit(s)    R temporalis injection amount:  25 unit(s)    L medial occipitalis injection amount:  20 unit(s)    R medial occipitalis injection amount:  20 unit(s)    L superior cervical paraspinal injection amount:  10 unit(s)    R superior cervical paraspinal injection amount:  10 unit(s)    L superior trapezius injection amount:  20 unit(s)    R superior " trapezius injection amount:  20 unit(s)   Total Units      Total units used:  200   Post-procedure details      Chemodenervation:  Chronic migraine    Facial Nerve Location::  Bilateral facial nerve    Patient tolerance of procedure:  Tolerated well, no immediate complications   Comments       30 units apex  10 units *temporalis bilaterally  All medically necessary

## 2024-09-23 ENCOUNTER — APPOINTMENT (OUTPATIENT)
Dept: LAB | Facility: CLINIC | Age: 74
End: 2024-09-23
Payer: MEDICARE

## 2024-09-23 DIAGNOSIS — Z00.00 HEALTHCARE MAINTENANCE: ICD-10-CM

## 2024-09-23 DIAGNOSIS — E89.0 POSTABLATIVE HYPOTHYROIDISM: ICD-10-CM

## 2024-09-23 DIAGNOSIS — I10 ESSENTIAL HYPERTENSION: ICD-10-CM

## 2024-09-23 DIAGNOSIS — E78.2 MIXED HYPERLIPIDEMIA: ICD-10-CM

## 2024-09-23 DIAGNOSIS — E05.00 GRAVES DISEASE: ICD-10-CM

## 2024-09-23 LAB
ALBUMIN SERPL BCG-MCNC: 4.2 G/DL (ref 3.5–5)
ALP SERPL-CCNC: 105 U/L (ref 34–104)
ALT SERPL W P-5'-P-CCNC: 19 U/L (ref 7–52)
ANION GAP SERPL CALCULATED.3IONS-SCNC: 10 MMOL/L (ref 4–13)
AST SERPL W P-5'-P-CCNC: 24 U/L (ref 13–39)
BASOPHILS # BLD AUTO: 0.1 THOUSANDS/ΜL (ref 0–0.1)
BASOPHILS NFR BLD AUTO: 1 % (ref 0–1)
BILIRUB SERPL-MCNC: 0.36 MG/DL (ref 0.2–1)
BUN SERPL-MCNC: 21 MG/DL (ref 5–25)
CALCIUM SERPL-MCNC: 9.3 MG/DL (ref 8.4–10.2)
CHLORIDE SERPL-SCNC: 103 MMOL/L (ref 96–108)
CHOLEST SERPL-MCNC: 213 MG/DL
CO2 SERPL-SCNC: 28 MMOL/L (ref 21–32)
CREAT SERPL-MCNC: 0.68 MG/DL (ref 0.6–1.3)
EOSINOPHIL # BLD AUTO: 0.4 THOUSAND/ΜL (ref 0–0.61)
EOSINOPHIL NFR BLD AUTO: 5 % (ref 0–6)
ERYTHROCYTE [DISTWIDTH] IN BLOOD BY AUTOMATED COUNT: 13.2 % (ref 11.6–15.1)
GFR SERPL CREATININE-BSD FRML MDRD: 85 ML/MIN/1.73SQ M
GLUCOSE P FAST SERPL-MCNC: 84 MG/DL (ref 65–99)
HCT VFR BLD AUTO: 49.7 % (ref 34.8–46.1)
HDLC SERPL-MCNC: 61 MG/DL
HGB BLD-MCNC: 15.9 G/DL (ref 11.5–15.4)
IMM GRANULOCYTES # BLD AUTO: 0.03 THOUSAND/UL (ref 0–0.2)
IMM GRANULOCYTES NFR BLD AUTO: 0 % (ref 0–2)
LDLC SERPL CALC-MCNC: 130 MG/DL (ref 0–100)
LYMPHOCYTES # BLD AUTO: 2.35 THOUSANDS/ΜL (ref 0.6–4.47)
LYMPHOCYTES NFR BLD AUTO: 29 % (ref 14–44)
MCH RBC QN AUTO: 30.8 PG (ref 26.8–34.3)
MCHC RBC AUTO-ENTMCNC: 32 G/DL (ref 31.4–37.4)
MCV RBC AUTO: 96 FL (ref 82–98)
MONOCYTES # BLD AUTO: 0.51 THOUSAND/ΜL (ref 0.17–1.22)
MONOCYTES NFR BLD AUTO: 6 % (ref 4–12)
NEUTROPHILS # BLD AUTO: 4.59 THOUSANDS/ΜL (ref 1.85–7.62)
NEUTS SEG NFR BLD AUTO: 59 % (ref 43–75)
NONHDLC SERPL-MCNC: 152 MG/DL
NRBC BLD AUTO-RTO: 0 /100 WBCS
PLATELET # BLD AUTO: 343 THOUSANDS/UL (ref 149–390)
PMV BLD AUTO: 9.4 FL (ref 8.9–12.7)
POTASSIUM SERPL-SCNC: 3.8 MMOL/L (ref 3.5–5.3)
PROT SERPL-MCNC: 7.1 G/DL (ref 6.4–8.4)
RBC # BLD AUTO: 5.16 MILLION/UL (ref 3.81–5.12)
SODIUM SERPL-SCNC: 141 MMOL/L (ref 135–147)
TRIGL SERPL-MCNC: 111 MG/DL
TSH SERPL DL<=0.05 MIU/L-ACNC: 2.93 UIU/ML (ref 0.45–4.5)
WBC # BLD AUTO: 7.98 THOUSAND/UL (ref 4.31–10.16)

## 2024-09-23 PROCEDURE — 80053 COMPREHEN METABOLIC PANEL: CPT

## 2024-09-23 PROCEDURE — 84443 ASSAY THYROID STIM HORMONE: CPT

## 2024-09-23 PROCEDURE — 80061 LIPID PANEL: CPT

## 2024-09-23 PROCEDURE — 85025 COMPLETE CBC W/AUTO DIFF WBC: CPT

## 2024-09-23 PROCEDURE — 36415 COLL VENOUS BLD VENIPUNCTURE: CPT

## 2024-09-25 ENCOUNTER — OFFICE VISIT (OUTPATIENT)
Dept: FAMILY MEDICINE CLINIC | Facility: CLINIC | Age: 74
End: 2024-09-25
Payer: MEDICARE

## 2024-09-25 VITALS
WEIGHT: 106.6 LBS | TEMPERATURE: 97.9 F | BODY MASS INDEX: 19.62 KG/M2 | RESPIRATION RATE: 16 BRPM | OXYGEN SATURATION: 96 % | SYSTOLIC BLOOD PRESSURE: 120 MMHG | HEIGHT: 62 IN | DIASTOLIC BLOOD PRESSURE: 74 MMHG | HEART RATE: 94 BPM

## 2024-09-25 DIAGNOSIS — G43.709 CHRONIC MIGRAINE WITHOUT AURA WITHOUT STATUS MIGRAINOSUS, NOT INTRACTABLE: ICD-10-CM

## 2024-09-25 DIAGNOSIS — I10 ESSENTIAL HYPERTENSION: ICD-10-CM

## 2024-09-25 DIAGNOSIS — Z23 ENCOUNTER FOR IMMUNIZATION: ICD-10-CM

## 2024-09-25 DIAGNOSIS — F43.21 GRIEF REACTION: ICD-10-CM

## 2024-09-25 DIAGNOSIS — E89.0 POSTABLATIVE HYPOTHYROIDISM: ICD-10-CM

## 2024-09-25 DIAGNOSIS — E78.2 MIXED HYPERLIPIDEMIA: ICD-10-CM

## 2024-09-25 DIAGNOSIS — Z23 NEED FOR COVID-19 VACCINE: Primary | ICD-10-CM

## 2024-09-25 PROCEDURE — G0008 ADMIN INFLUENZA VIRUS VAC: HCPCS

## 2024-09-25 PROCEDURE — 99214 OFFICE O/P EST MOD 30 MIN: CPT | Performed by: NURSE PRACTITIONER

## 2024-09-25 PROCEDURE — 90662 IIV NO PRSV INCREASED AG IM: CPT

## 2024-09-25 PROCEDURE — 90480 ADMN SARSCOV2 VAC 1/ONLY CMP: CPT

## 2024-09-25 PROCEDURE — 91320 SARSCV2 VAC 30MCG TRS-SUC IM: CPT

## 2024-09-25 RX ORDER — LOSARTAN POTASSIUM 50 MG/1
100 TABLET ORAL DAILY
Qty: 180 TABLET | Refills: 3 | Status: SHIPPED | OUTPATIENT
Start: 2024-09-25

## 2024-09-25 RX ORDER — ROSUVASTATIN CALCIUM 20 MG/1
20 TABLET, COATED ORAL DAILY
Qty: 90 TABLET | Refills: 3 | Status: SHIPPED | OUTPATIENT
Start: 2024-09-25

## 2024-09-25 RX ORDER — LEVOTHYROXINE SODIUM 88 UG/1
88 TABLET ORAL DAILY
Qty: 90 TABLET | Refills: 3 | Status: SHIPPED | OUTPATIENT
Start: 2024-09-25

## 2024-09-25 NOTE — ASSESSMENT & PLAN NOTE
Orders:    rosuvastatin (CRESTOR) 20 MG tablet; Take 1 tablet (20 mg total) by mouth daily    Lipid panel; Future

## 2024-09-25 NOTE — ASSESSMENT & PLAN NOTE
Blood pressure is at goal.  Continue medication.  Reviewed blood work.  Continue low-salt heart healthy diet  Orders:    losartan (COZAAR) 50 mg tablet; Take 2 tablets (100 mg total) by mouth daily    CBC and differential; Future    Albumin / creatinine urine ratio; Future    Comprehensive metabolic panel; Future

## 2024-09-25 NOTE — ASSESSMENT & PLAN NOTE
Reviewed blood work maintain same dosage of levothyroxine.  Blood work ordered  Orders:    levothyroxine 88 mcg tablet; Take 1 tablet (88 mcg total) by mouth daily    TSH, 3rd generation with Free T4 reflex; Future

## 2024-09-25 NOTE — ASSESSMENT & PLAN NOTE
Continue with Zoloft continue with self-care  Orders:    sertraline (ZOLOFT) 50 mg tablet; Take 1 tablet (50 mg total) by mouth daily

## 2024-09-25 NOTE — PROGRESS NOTES
Ambulatory Visit  Name: Roxanne Hopper      : 1950      MRN: [id number]  Encounter Provider: CANDIDA Royal  Encounter Date: 2024   Encounter department: [facility]    Assessment & Plan  Essential hypertension  Blood pressure is at goal.  Continue medication.  Reviewed blood work.  Continue low-salt heart healthy diet  Orders:    losartan (COZAAR) 50 mg tablet; Take 2 tablets (100 mg total) by mouth daily    CBC and differential; Future    Albumin / creatinine urine ratio; Future    Comprehensive metabolic panel; Future    Need for COVID-19 vaccine    Orders:    COVID-19 Pfizer mRNA vaccine 12 yr and older (Comirnaty pre-filled syringe)    Encounter for immunization    Orders:    Fluzone High-Dose 0.5 mL IM    Mixed hyperlipidemia    Orders:    rosuvastatin (CRESTOR) 20 MG tablet; Take 1 tablet (20 mg total) by mouth daily    Lipid panel; Future    Grief reaction  Continue with Zoloft continue with self-care  Orders:    sertraline (ZOLOFT) 50 mg tablet; Take 1 tablet (50 mg total) by mouth daily    Postablative hypothyroidism  Reviewed blood work maintain same dosage of levothyroxine.  Blood work ordered  Orders:    levothyroxine 88 mcg tablet; Take 1 tablet (88 mcg total) by mouth daily    TSH, 3rd generation with Free T4 reflex; Future    Chronic migraine without aura without status migrainosus, not intractable  Follows with neurology.  Uses Ubrelvy, Botox injections.           Depression Screening and Follow-up Plan: Patient was screened for depression during today's encounter. They screened negative with a PHQ-9 score of 0.      History of Present Illness     Patient is here to follow-up on chronic health conditions.        History obtained from : patient  Review of Systems   Constitutional: Negative.    HENT: Negative.     Eyes: Negative.    Respiratory: Negative.     Cardiovascular: Negative.    Gastrointestinal: Negative.    Endocrine: Negative.    Genitourinary:  "Negative.    Musculoskeletal: Negative.    Skin: Negative.    Allergic/Immunologic: Negative.    Neurological: Negative.    Psychiatric/Behavioral: Negative.             Objective     /74   Pulse 94   Temp 97.9 °F (36.6 °C) (Temporal)   Resp 16   Ht 5' 2\" (1.575 m)   Wt 48.4 kg (106 lb 9.6 oz)   SpO2 96%   BMI 19.50 kg/m²     Physical Exam  Constitutional:       General: She is not in acute distress.     Appearance: Normal appearance. She is not ill-appearing.   HENT:      Head: Normocephalic and atraumatic.      Nose: Nose normal.      Mouth/Throat:      Mouth: Mucous membranes are moist.   Eyes:      Pupils: Pupils are equal, round, and reactive to light.   Cardiovascular:      Rate and Rhythm: Regular rhythm. Tachycardia present.      Pulses: Normal pulses.      Heart sounds: Normal heart sounds.   Pulmonary:      Effort: Pulmonary effort is normal. No respiratory distress.      Breath sounds: Normal breath sounds.   Chest:      Chest wall: No tenderness.   Abdominal:      General: Abdomen is flat. Bowel sounds are normal. There is no distension.      Palpations: There is no mass.      Tenderness: There is no abdominal tenderness.   Musculoskeletal:         General: Normal range of motion.      Cervical back: Normal range of motion and neck supple.   Skin:     General: Skin is warm and dry.   Neurological:      General: No focal deficit present.      Mental Status: She is alert and oriented to person, place, and time.   Psychiatric:         Mood and Affect: Mood normal.         Behavior: Behavior normal.         Thought Content: Thought content normal.         Judgment: Judgment normal.         "

## 2024-10-09 DIAGNOSIS — K21.9 GASTROESOPHAGEAL REFLUX DISEASE, UNSPECIFIED WHETHER ESOPHAGITIS PRESENT: Primary | ICD-10-CM

## 2024-10-21 ENCOUNTER — HOSPITAL ENCOUNTER (OUTPATIENT)
Dept: MAMMOGRAPHY | Facility: CLINIC | Age: 74
Discharge: HOME | End: 2024-10-21
Payer: MEDICARE

## 2024-10-21 DIAGNOSIS — Z12.31 VISIT FOR SCREENING MAMMOGRAM: ICD-10-CM

## 2024-10-21 PROCEDURE — 77063 BREAST TOMOSYNTHESIS BI: CPT

## 2024-10-21 PROCEDURE — 77067 SCR MAMMO BI INCL CAD: CPT

## 2024-11-19 DIAGNOSIS — J31.0 CHRONIC RHINITIS: ICD-10-CM

## 2024-11-20 RX ORDER — FLUTICASONE PROPIONATE 50 MCG
1 SPRAY, SUSPENSION (ML) NASAL DAILY
Qty: 1 G | Refills: 0 | Status: SHIPPED | OUTPATIENT
Start: 2024-11-20

## 2024-11-29 DIAGNOSIS — G43.709 CHRONIC MIGRAINE WITHOUT AURA WITHOUT STATUS MIGRAINOSUS, NOT INTRACTABLE: ICD-10-CM

## 2024-12-17 ENCOUNTER — PROCEDURE VISIT (OUTPATIENT)
Dept: NEUROLOGY | Facility: CLINIC | Age: 74
End: 2024-12-17
Payer: MEDICARE

## 2024-12-17 VITALS — DIASTOLIC BLOOD PRESSURE: 74 MMHG | HEART RATE: 70 BPM | SYSTOLIC BLOOD PRESSURE: 136 MMHG | TEMPERATURE: 98.1 F

## 2024-12-17 DIAGNOSIS — G43.709 CHRONIC MIGRAINE WITHOUT AURA WITHOUT STATUS MIGRAINOSUS, NOT INTRACTABLE: Primary | ICD-10-CM

## 2024-12-17 PROCEDURE — 64615 CHEMODENERV MUSC MIGRAINE: CPT | Performed by: PHYSICIAN ASSISTANT

## 2024-12-17 NOTE — PROGRESS NOTES
"Universal Protocol   procedure performed by consultantConsent: Verbal consent obtained. Written consent obtained.  Risks and benefits: risks, benefits and alternatives were discussed  Consent given by: patient  Time out: Immediately prior to procedure a \"time out\" was called to verify the correct patient, procedure, equipment, support staff and site/side marked as required.  Patient understanding: patient states understanding of the procedure being performed  Patient consent: the patient's understanding of the procedure matches consent given  Procedure consent: procedure consent matches procedure scheduled  Relevant documents: relevant documents present and verified  Patient identity confirmed: verbally with patient      Chemodenervation     Date/Time  12/17/2024 10:00 AM     Performed by  Ambika José PA-C   Authorized by  Ambika José PA-C     Pre-procedure details      Preparation: Patient was prepped and draped in usual sterile fashion     Anesthesia  (see MAR for exact dosages):     Anesthesia method:  None   Procedure details      Position:  Upright   Botox      Botox Type:  Type A    Brand:  Botox    mL's of Botulinum Toxin:  200    mL's of preservative free sterile saline:  4    Final Concentration per CC:  50 units    Needle Gauge:  30 G 2.5 inch   Procedures      Botox Procedures: chronic headache     Injection Location      Head / Face:  L superior cervical paraspinal, R superior cervical paraspinal, R medial occipitalis, L medial occipitalis, R temporalis, L superior trapezius, R superior trapezius and L temporalis    L temporalis injection amount:  25 unit(s)    R temporalis injection amount:  25 unit(s)    L medial occipitalis injection amount:  20 unit(s)    R medial occipitalis injection amount:  20 unit(s)    L superior cervical paraspinal injection amount:  10 unit(s)    R superior cervical paraspinal injection amount:  10 unit(s)    L superior trapezius injection amount:  20 unit(s)    R " superior trapezius injection amount:  20 unit(s)   Total Units      Total units used:  200   Post-procedure details      Chemodenervation:  Chronic migraine    Facial Nerve Location::  Bilateral facial nerve    Patient tolerance of procedure:  Tolerated well, no immediate complications   Comments       30 units apex  10 units *temporalis bilaterally  All medically necessary

## 2024-12-30 DIAGNOSIS — J31.0 CHRONIC RHINITIS: ICD-10-CM

## 2024-12-30 NOTE — TELEPHONE ENCOUNTER
Medication: Flonase 50 mcg.act nasal spray    Dose/Frequency: 1 spray each nostril daily    Quantity: 1 g    Ph[facility]acy: [facility]    Office:   [x] PCP/Provider -   [] Speciality/Provider -     Does the patient have enough for 3 days?   [x] Yes   [] No - Send as HP to POD

## 2024-12-31 RX ORDER — FLUTICASONE PROPIONATE 50 MCG
1 SPRAY, SUSPENSION (ML) NASAL DAILY
Qty: 1 G | Refills: 0 | Status: SHIPPED | OUTPATIENT
Start: 2024-12-31

## 2025-02-12 ENCOUNTER — OFFICE VISIT (OUTPATIENT)
Age: 75
End: 2025-02-12
Payer: MEDICARE

## 2025-02-12 VITALS
WEIGHT: 108 LBS | SYSTOLIC BLOOD PRESSURE: 112 MMHG | BODY MASS INDEX: 19.88 KG/M2 | DIASTOLIC BLOOD PRESSURE: 72 MMHG | TEMPERATURE: 98 F | OXYGEN SATURATION: 100 % | HEIGHT: 62 IN | HEART RATE: 68 BPM

## 2025-02-12 DIAGNOSIS — L56.5 DSAP (DISSEMINATED SUPERFICIAL ACTINIC POROKERATOSIS): ICD-10-CM

## 2025-02-12 DIAGNOSIS — D48.9 NEOPLASM OF UNCERTAIN BEHAVIOR: ICD-10-CM

## 2025-02-12 DIAGNOSIS — L82.1 SEBORRHEIC KERATOSIS: ICD-10-CM

## 2025-02-12 DIAGNOSIS — Z13.89 SCREENING FOR SKIN CONDITION: ICD-10-CM

## 2025-02-12 DIAGNOSIS — D18.01 CHERRY ANGIOMA: ICD-10-CM

## 2025-02-12 DIAGNOSIS — D22.9 MULTIPLE MELANOCYTIC NEVI: ICD-10-CM

## 2025-02-12 DIAGNOSIS — L57.0 KERATOSIS, ACTINIC: ICD-10-CM

## 2025-02-12 DIAGNOSIS — Z85.828 HISTORY OF SKIN CANCER: Primary | ICD-10-CM

## 2025-02-12 PROCEDURE — 88305 TISSUE EXAM BY PATHOLOGIST: CPT | Performed by: PATHOLOGY

## 2025-02-12 PROCEDURE — 11102 TANGNTL BX SKIN SINGLE LES: CPT

## 2025-02-12 PROCEDURE — 17004 DESTROY PREMAL LESIONS 15/>: CPT

## 2025-02-12 PROCEDURE — 99214 OFFICE O/P EST MOD 30 MIN: CPT

## 2025-02-12 RX ORDER — FLUOROURACIL 50 MG/G
CREAM TOPICAL 2 TIMES DAILY
Qty: 40 G | Refills: 0 | Status: SHIPPED | OUTPATIENT
Start: 2025-02-12

## 2025-02-12 NOTE — PATIENT INSTRUCTIONS
"INFORMED CONSENT DISCUSSION AND POST-OPERATIVE INSTRUCTIONS FOR PATIENT    I.  RATIONALE FOR PROCEDURE  I understand that a skin biopsy allows the Dermatologist to test a lesion or rash under the microscope to obtain a diagnosis.  It usually involves numbing the area with numbing medication and removing a small piece of skin; sometimes the area will be closed with sutures. In this specific procedure, sutures are not usually needed.  If any sutures are placed, then they are usually need to be removed in 2 weeks or less.    I understand that my Dermatologist recommends that a skin \"shave\" biopsy be performed today.  A local anesthetic, similar to the kind that a dentist uses when filling a cavity, will be injected with a very small needle into the skin area to be sampled.  The injected skin and tissue underneath \"will go to sleep” and become numb so no pain should be felt afterwards.  An instrument shaped like a tiny \"razor blade\" (shave biopsy instrument) will be used to cut a small piece of tissue and skin from the area so that a sample of tissue can be taken and examined more closely under the microscope.  A slight amount of bleeding will occur, but it will be stopped with direct pressure and a pressure bandage and any other appropriate methods.  I understands that a scar will form where the wound was created.  Surgical ointment will be applied to help protect the wound.  Sutures are not usually needed.    II.  RISKS AND POTENTIAL COMPLICATIONS   I understand the risks and potential complications of a skin biopsy include but are not limited to the following:  Bleeding  Infection  Pain  Scar/keloid  Skin discoloration  Incomplete Removal  Recurrence  Nerve Damage/Numbness/Loss of Function  Allergic Reaction to Anesthesia  Biopsies are diagnostic procedures and based on findings additional treatment or evaluation may be required  Loss or destruction of specimen resulting in no additional findings    My Dermatologist " "has explained to me the nature of the condition, the nature of the procedure, and the benefits to be reasonably expected compared with alternative approaches.  My Dermatologist has discussed the likelihood of major risks or complications of this procedure including the specific risks listed above, such as bleeding, infection, and scarring/keloid.  I understand that a scar is expected after this procedure.  I understand that my physician cannot predict if the scar will form a \"keloid,\" which extends beyond the borders of the wound that is created.  A keloid is a thick, painful, and bumpy scar.  A keloid can be difficult to treat, as it does not always respond well to therapy, which includes injecting cortisone directly into the keloid every few weeks.  While this usually reduces the pain and size of the scar, it does not eliminate it.      I understand that photographs may be taken before and after the procedure.  These will be maintained as part of the medical providers confidential records and may not be made available to me.  I further authorize the medical provider to use the photographs for teaching purposes or to illustrate scientific papers, books, or lectures if in his/her judgment, medical research, education, or science may benefit from its use.    I have had an opportunity to fully inquire about the risks and benefits of this procedure and its alternatives.   I have been given ample time and opportunity to ask questions and to seek a second opinion if I wished to do so.  I acknowledge that there have specifically been no guarantees as to the cosmetic results from the procedure.  I am aware that with any procedure there is always the possibility of an unexpected complication.    III. POST-PROCEDURAL CARE (WHAT YOU WILL NEED TO DO \"AFTER THE BIOPSY\" TO OPTIMIZE HEALING)    Keep the area clean and dry.  Try NOT to remove the bandage or get it wet for the first 24 hours.    Gently clean the area and apply " "surgical ointment (such as Vaseline petrolatum ointment, which is available \"over the counter\" and not a prescription) to the biopsy site for up to 2 weeks straight.  This acts to protect the wound from the outside world.      Sutures are not usually placed in this procedure.  If any sutures were placed, return for suture removal as instructed (generally 1 week for the face, 2 weeks for the body).      Take Acetaminophen (Tylenol) for discomfort, if no contraindications.  Ibuprofen or aspirin could make bleeding worse.    Call our office immediately for signs of infection: fever, chills, increased redness, warmth, tenderness, discomfort/pain, or pus or foul smell coming from the wound.    WHAT TO DO IF THERE IS ANY BLEEDING?  If a small amount of bleeding is noticed, place a clean cloth over the area and apply firm pressure for ten minutes.  Check the wound after 10 minutes of direct pressure.  If bleeding persists, try one more time for an additional 10 minutes of direct pressure on the area.  If the bleeding becomes heavier or does not stop after the second attempt, or if you have any other questions about this procedure, then please call your [facility]'s Dermatologist by calling [phone] (SKIN).     I hereby acknowledge that I have reviewed and verified the site with my Dermatologist and have requested and authorized my Dermatologist to proceed with the procedure.    ACTINIC KERATOSIS    Physical Exam:  Anatomic Location: Face, Forearm and Chest    Plan:  Cryotherapy performed in the office (See Procedure Note). We discussed that a hypopigmentation or scar may form in the region following cryotherapy.  We discussed the pre-cancerous nature of the condition. Actinic keratosis is found on sun-damaged skin and there is small risk that the condition could turn into a skin cancer called squamous cell carcinoma. There is no risk of actinic keratosis turning into melanoma.  We discussed sun protection measures, " "including using sunscreen with an SPF 50+ year round, avoiding the sun, and wearing protective clothing such as long sleeves and pants when out in the sun.  Continue to monitor clinically for signs of recurrence. Discussed with patient the importance of keeping up to date with full body skin exams.  Efudex Cream apply to chest twice a day x10 to 14 days after biopsy site heals  Treatment with Cryotherapy    The doctor has treated your skin with nitrogen, which is 320 degrees Fahrenheit below zero.  He has given the treated area \"frostbite.\"    Stinging should subside within a few hours.  You can take Tylenol for pain, if needed.    Over the next few days, it is normal if the area becomes reddened, a blood blister, or swollen with fluid.  If the lesion treated was near the eye - you could get a swollen eye over the next few days.  Do not panic!  This is all temporary, and will resolve with time.    There is no special treatment - just keep the area clean.  Makeup and BandAids can be used, if preferred.    When the area starts to dry up and peel off, using Vaseline can help healing.    It usually takes up to a month for it to heal.  Some lesions are recurrent and may require repeat treatments.  If a lesion has not healed in one month, please don't hesitate to contact us.      If you have any further questions that are not answered here, please call us.  [phone].    Thank you for allowing us to care for you.    What is disseminated superficial actinic porokeratosis?  Disseminated superficial actinic porokeratosis, or DSAP, is an inherited keratinisation disorder that causes discrete dry patches on the arms and legs.  DSAP is a special type of inherited 'sunspot\". The name porokeratosis means scaly pore, and is a misnomer as porokeratosis is not related to pores.    Who gets disseminated superficial actinic porokeratosis?  DSAP most often affects people of  descent, although it has also been reported to " affect individuals of other races. It is more common in women than in men.  The average age which patients first notice DSAP is about 35-40 years and its frequency in affected families increases steadily with age. It is rare in childhood.  DSAP may arise in immune suppressed patients, including after organ transplantation. Its onset can also be triggered by sun exposure, phototherapy, injury, infection or systemic disease.    What causes disseminated superficial actinic porokeratosis?  DSAP is due to a genetic mutation. The tendency to DSAP is inherited as an autosomal dominant characteristic, which means on average half of the children of an affected parent will also have the tendency. A locus on chromosome 12 was found to be responsible for DSAP in a [place] family (J Invest Dermatol 2000 [place];114(6):1071-4). The causative genes in affected families have included spliceosome-associated factor 3 (SART3) and mevalonate kinase (MVK) genes.     What are the clinical features of disseminated superficial actinic porokeratosis?  DSAP mainly affects the lower arms and legs bilaterally and arises more frequently on the lower legs. There may be few or innumerable lesions. The forehead and cheeks are affected in less than 10% of individuals and DSAP almost never occurs on the scalp, palms or soles. It tends to be more prominent in the summer and may appear less prominent in winter. New lesions have been provoked by ultraviolet light in sun lamps.   The lesions are composed of multiple irregular roundish, annular or polycyclic plaques, each of which has an elevated horny rim. The visibility of this rim is markedly accentuated by the application of artificial tanning solution (dihydroxyacetone).     The smallest DSAP lesion is a 1-3 mm conical papule, skin coloured, brownish red or brown in colour. It is based around a hair follicle containing a keratotic (scaly) plug. Larger plaques have a sharp, slightly raised, keratotic  ring, a fraction of a millimetre thick, with a diameter of 10 mm or more. The skin within the ring is thinned and mildly reddened or slightly brown, and a pale ring may be seen just within the ridge. The ridge itself is often a darker brown than the rest of the lesion. The central area is most often pale and smooth, but it may be red, scaly, dry, or have scaly follicular plugs.    Sweating is absent within the lesions. Although most often asymptomatic, sun exposure or heat may cause them to itch or sting.     What are the complications of DSAP?  Development of squamous cell carcinoma (SCC) within a DSAP lesion is the main concern. This is uncommon (< 10% of individuals with DSAP develop SCC). However, many patients with DSAP have had significant exposure to the sun and may also have actinic keratoses and other forms of skin cancer (particularly basal cell carcinoma). SCC presents as a solitary tender enlarging scaly or ulcerated plaque or nodule.     How is DSAP diagnosed?  The diagnosis of porokeratosis is usually clinical, with the help of dermoscopy. DSAP is sometimes diagnosed by finding characteristic features on pathology, in which the scaly rim of DSAP is described as a parakeratotic cornoid lamella. The diagnosis can also be missed on a biopsy if the specimen does not include the rim, it is poorly orientated, or the pathologist's attention is not drawn to the horny ridge seen clinically.    What is the differential diagnosis of DSAP?  There are several kinds of porokeratosis, and these can occur in family members or in the patient that has DSAP.   DSAP is sometimes confused with multiple actinic keratoses, but actinic keratoses are more likely to arise on the face and hands and have a central scale rather than peripheral scale.    What is the treatment for DSAP?  Unfortunately, in our present state of knowledge, there is no very satisfactory treatment for DSAP. Over the years the agents that have been tried  include:  Cryotherapy   5-Fluorouracil cream   Imiquimod cream   Tretinoin cream   Ingenol mebutate gel   Alpha hydroxy acid cream   Calcipotriol ointment   Diclofenac gel   Oral acitretin   Photodynamic therapy   Grenz ray therapy   Laser treatments.    To date, no treatment has proved effective long term. Most people settle for just having the larger lesions frozen lightly and returning as necessary for further treatments, using a moisturiser to reduce the dry feeling.  If the DSAP has been induced by drug-induced immune suppression, withdrawal of the drug has been reported to result in remission of DSAP.    Sun protection  Restriction of sun exposure by wearing long sleeves, skirts or slacks and using sunscreens on the legs and arms is believed to reduce or delay the development of new lesions.

## 2025-02-12 NOTE — PROGRESS NOTES
"[facility] Note     Patient Name: Roxanne Hopper  Encounter Date: February 12,2025     Have you been cared for by a [facility] Dermatologist in the last 3 years and, if so, which description applies to you?    Yes.  I have been here within the last 3 years, and my medical history has NOT changed since that time.  I am FEMALE/of child-bearing potential.    REVIEW OF SYSTEMS:  Have you recently had or currently have any of the following? No changes in my recent health.   PAST MEDICAL HISTORY:  Have you personally ever had or currently have any of the following?  If \"YES,\" then please provide more detail. No changes in my medical history.   HISTORY OF IMMUNOSUPPRESSION: Do you have a history of any of the following:  Systemic Immunosuppression such as Diabetes, Biologic or Immunotherapy, Chemotherapy, Organ Transplantation, Bone Marrow Transplantation or Prednisone?  No     Answering \"YES\" requires the addition of the dotphrase \"IMMUNOSUPPRESSED\" as the first diagnosis of the patient's visit.   FAMILY HISTORY:  Any \"first degree relatives\" (parent, brother, sister, or child) with the following?    No changes in my family's known health.   PATIENT EXPERIENCE:    Do you want the Dermatologist to perform a COMPLETE skin exam today including a clinical examination under the \"bra and underwear\" areas?  Yes  If necessary, do we have your permission to call and leave a detailed message on your Preferred Phone number that includes your specific medical information?  Yes      Allergies   Allergen Reactions   • Fentanyl Vomiting     Action Taken: vomiting; Category: Adverse Reaction;   Protracted   • Methimazole Hives   • Codeine GI Intolerance and Anxiety     Other reaction(s): Nausea and/or vomiting      Current Outpatient Medications:   •  acetaminophen (TYLENOL) 500 mg tablet, Take 500 mg by mouth every 6 (six) hours as needed, Disp: , Rfl:   •  aspirin 81 MG tablet, Take 1 tablet by mouth daily, Disp: , " Rfl:   •  Diclofenac Sodium (VOLTAREN) 1 %, Apply 4 g topically 4 (four) times a day, Disp: 150 g, Rfl: 11  •  famotidine (PEPCID) 20 mg tablet, Take 1 tablet (20 mg total) by mouth 2 (two) times a day, Disp: 180 tablet, Rfl: 1  •  fluorouracil (EFUDEX) 5 % cream, Apply topically 2 (two) times a day Apply to chest twice a day for a maximum of 14 days after biopsy site has healed. Wash hands after application. Keep medication away from children and pets., Disp: 40 g, Rfl: 0  •  fluticasone (FLONASE) 50 mcg/act nasal spray, 1 spray into each nostril daily, Disp: 1 g, Rfl: 0  •  Galcanezumab-gnlm 120 MG/ML SOAJ, Inject 120 mg under the skin every 30 (thirty) days, Disp: 1 mL, Rfl: 11  •  levothyroxine 88 mcg tablet, Take 1 tablet (88 mcg total) by mouth daily, Disp: 90 tablet, Rfl: 3  •  omeprazole (PriLOSEC) 20 mg delayed release capsule, Take 1 capsule (20 mg total) by mouth 2 (two) times a day, Disp: 180 capsule, Rfl: 1  •  Probiotic Product (PROBIOTIC-10 PO), Take 1 tablet by mouth in the morning, Disp: , Rfl:   •  prochlorperazine (COMPAZINE) 10 mg tablet, Take 1 tablet (10 mg total) by mouth every 8 (eight) hours as needed for nausea or vomiting., Disp: 30 tablet, Rfl: 3  •  rosuvastatin (CRESTOR) 20 MG tablet, Take 1 tablet (20 mg total) by mouth daily, Disp: 90 tablet, Rfl: 3  •  sertraline (ZOLOFT) 50 mg tablet, Take 1 tablet (50 mg total) by mouth daily, Disp: 90 tablet, Rfl: 3  •  Ubrogepant (UBRELVY) 100 MG tablet, Take 1 tab once prn for migraine. May repeat in 2 hours if needed. Max 2 doses per day. 90 day supply., Disp: 48 tablet, Rfl: 3  •  ketorolac (TORADOL) 10 mg tablet, Take 1 tablet (10 mg total) by mouth every 6 (six) hours as needed for moderate pain (Patient not taking: Reported on 2/12/2025), Disp: 10 tablet, Rfl: 0  •  lifitegrast (Xiidra) 5 % op solution, Administer 1 drop to both eyes 2 (two) times a day, Disp: 180 each, Rfl: 3  •  losartan (COZAAR) 50 mg tablet, Take 2 tablets (100 mg  total) by mouth daily, Disp: 180 tablet, Rfl: 3  •  methylPREDNISolone 4 MG tablet therapy pack, Use as directed on package (Patient not taking: Reported on 2/12/2025), Disp: 21 each, Rfl: 0            Physical Exam and Assessment/Plan by Diagnosis:     Chief Complaint: Patient is 75 y/o female here for yearly skin exam with hx NMSC and spots of concern on chest and nose      HISTORY OF SQUAMOUS CELL CARCINOMA IN SITU    Physical Exam:  Anatomic Location Affected:  left posterior thigh and left cheek - 2024  Morphological Description of Scar:  well healed scars   Suspected Recurrence: NO     Additional History of Present Condition:  excision done by Dr. Castrejon 8/7/24 with no sign of recurrence    Assessment and Plan:  Based on a thorough discussion of this condition and the management approach to it (including a comprehensive discussion of the known risks, side effects and potential benefits of treatment), the patient (family) agrees to implement the following specific plan:    No signs of recurrence  Continue routine skin checks every 6 months  Wear SPF 30+ when outdoors and reapply every 2 hrs.    ACTINIC KERATOSIS    Physical Exam:  Anatomic Location: Face, Forearm, nose, and Chest  Morphologic Description: Scaly pink papules  Pertinent Positives:  Pertinent Negatives:    Additional History of Present Condition:  present on exam  History of bleeding from this lesion? NO  History of pain, tenderness, and/or itching within this lesion? YES,    Personal history of skin cancer? YES,    Family history of skin cancer? NO  Previous treatment to the same site? NO    Plan:  Cryotherapy performed in the office (See Procedure Note). We discussed that a hypopigmentation or scar may form in the region following cryotherapy.  We discussed the pre-cancerous nature of the condition. Actinic keratosis is found on sun-damaged skin and there is small risk that the condition could turn into a skin cancer called squamous cell  "carcinoma. There is no risk of actinic keratosis turning into melanoma.  We discussed sun protection measures, including using sunscreen with an SPF 50+ year round, avoiding the sun, and wearing protective clothing such as long sleeves and pants when out in the sun.  Continue to monitor clinically for signs of recurrence. Discussed with patient the importance of keeping up to date with full body skin exams.  Efudex 5% Cream is to be applied to chest twice a day for a max of 14 days after biopsy site and cryotherapy areas heal.      PROCEDURES PERFORMED TODAY ASSOCIATED WITH THIS CONDITION:          Cryotherapy: PROCEDURE:  DESTRUCTION OF PRE-MALIGNANT LESIONS  After a thorough discussion of treatment options and risk/benefits/alternatives (including but not limited to local pain, scarring, dyspigmentation, blistering, and possible superinfection), verbal and written consent were obtained and the aforementioned lesions were treated on with cryotherapy using liquid nitrogen x 1 cycle for 5-10 seconds.    TOTAL NUMBER of 8 pre-malignant lesions were treated today on the ANATOMIC LOCATION: nose x1, chest x5, forehead x1, Left Forearm x1     The patient tolerated the procedure well, and after-care instructions were provided.        MELANOCYTIC NEVI (\"Moles\")    Physical Exam:  Anatomic Location Affected: Mostly on sun-exposed areas of the trunk and extremities  Morphological Description:  Scattered, 1-4mm round to ovoid, symmetrical-appearing, even bordered, skin colored to dark brown macules/papules, mostly in sun-exposed areas  Pertinent Positives:  Pertinent Negatives:    Additional History of Present Condition:  present at exam    Assessment and Plan:  Based on a thorough discussion of this condition and the management approach to it (including a comprehensive discussion of the known risks, side effects and potential benefits of treatment), the patient (family) agrees to implement the following specific " "plan:  Provided handout with information regarding the ABCDE's of moles   Recommend routine skin exams every year   Sun avoidance, protective clothing (known as UPF clothing), and the use of at least SPF 30 sunscreens is advised. Sunscreen should be reapplied every two hours when outside.     SEBORRHEIC KERATOSIS; NON-INFLAMED    Physical Exam:  Anatomic Location Affected:  scattered across trunk, extremities, chest  Morphological Description:  Flat and raised, waxy, smooth to warty textured, yellow to brownish-grey to dark brown to blackish, discrete, \"stuck-on\" appearing papules.  Pertinent Positives:  Pertinent Negatives:    Additional History of Present Condition:  Patient reports new bumps on the skin.  Denies itch, burn, pain, bleeding or ulceration.  Present constantly; nothing seems to make it worse or better.  No prior treatment.      Assessment and Plan:  Based on a thorough discussion of this condition and the management approach to it (including a comprehensive discussion of the known risks, side effects and potential benefits of treatment), the patient (family) agrees to implement the following specific plan:  Reassured benign    ANGIOMA (\"CHERRY ANGIOMA\")    Physical Exam:  Anatomic Location: scattered across sun trunk and extremities   Morphologic Description: Firm red to reddish-blue discrete papules  Pertinent Positives:  Pertinent Negatives:    Additional History of Present Condition:  Present on exam.     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 SUPERFICIAL ACTINIC POROKERATOSIS (DSAP)    Physical Exam:  Anatomic Location Affected:  bilateral shins and anterior thighs  Morphological Description:  erythematous annular patches with fine scale scattered over anterior legs  Pertinent Positives:  Pertinent Negatives:    Additional History of Present Condition:  present on exam    Assessment and Plan:  Based on a thorough discussion of this condition and the management approach " "to it (including a comprehensive discussion of the known risks, side effects and potential benefits of treatment), the patient (family) agrees to implement the following specific plan:  Cryotherapy in office today (see procedure below).   Follow up 1 month to see if lesions resolve with cryotherapy. If tolerated well and good response will continue to treat with cryotherapy  Could alternatively consider topical 2% lovastatin if not responding to cryotherapy    PROCEDURE:  DESTRUCTION OF PRE-MALIGNANT LESIONS  After a thorough discussion of treatment options and risk/benefits/alternatives (including but not limited to local pain, scarring, dyspigmentation, blistering, and possible superinfection), verbal and written consent were obtained and the aforementioned lesions were treated on with cryotherapy using liquid nitrogen x 1 cycle for 5-10 seconds.    TOTAL NUMBER of 11 pre-malignant lesions were treated today on the ANATOMIC LOCATION: bilateral anterior legs-shins/thighs.     The patient tolerated the procedure well, and after-care instructions were provided.    What is disseminated superficial actinic porokeratosis?  Disseminated superficial actinic porokeratosis, or DSAP, is an inherited keratinisation disorder that causes discrete dry patches on the arms and legs.  DSAP is a special type of inherited 'sunspot\". The name porokeratosis means scaly pore, and is a misnomer as porokeratosis is not related to pores.    Who gets disseminated superficial actinic porokeratosis?  DSAP most often affects people of  descent, although it has also been reported to affect individuals of other races. It is more common in women than in men.  The average age which patients first notice DSAP is about 35-40 years and its frequency in affected families increases steadily with age. It is rare in childhood.  DSAP may arise in immune suppressed patients, including after organ transplantation. Its onset can also be triggered by " sun exposure, phototherapy, injury, infection or systemic disease.    What causes disseminated superficial actinic porokeratosis?  DSAP is due to a genetic mutation. The tendency to DSAP is inherited as an autosomal dominant characteristic, which means on average half of the children of an affected parent will also have the tendency. A locus on chromosome 12 was found to be responsible for DSAP in a [place] family (J Invest Dermatol 2000 [place];114(6):1071-4). The causative genes in affected families have included spliceosome-associated factor 3 (SART3) and mevalonate kinase (MVK) genes.     What are the clinical features of disseminated superficial actinic porokeratosis?  DSAP mainly affects the lower arms and legs bilaterally and arises more frequently on the lower legs. There may be few or innumerable lesions. The forehead and cheeks are affected in less than 10% of individuals and DSAP almost never occurs on the scalp, palms or soles. It tends to be more prominent in the summer and may appear less prominent in winter. New lesions have been provoked by ultraviolet light in sun lamps.   The lesions are composed of multiple irregular roundish, annular or polycyclic plaques, each of which has an elevated horny rim. The visibility of this rim is markedly accentuated by the application of artificial tanning solution (dihydroxyacetone).     The smallest DSAP lesion is a 1-3 mm conical papule, skin coloured, brownish red or brown in colour. It is based around a hair follicle containing a keratotic (scaly) plug. Larger plaques have a sharp, slightly raised, keratotic ring, a fraction of a millimetre thick, with a diameter of 10 mm or more. The skin within the ring is thinned and mildly reddened or slightly brown, and a pale ring may be seen just within the ridge. The ridge itself is often a darker brown than the rest of the lesion. The central area is most often pale and smooth, but it may be red, scaly, dry, or have scaly  follicular plugs.    Sweating is absent within the lesions. Although most often asymptomatic, sun exposure or heat may cause them to itch or sting.     What are the complications of DSAP?  Development of squamous cell carcinoma (SCC) within a DSAP lesion is the main concern. This is uncommon (< 10% of individuals with DSAP develop SCC). However, many patients with DSAP have had significant exposure to the sun and may also have actinic keratoses and other forms of skin cancer (particularly basal cell carcinoma). SCC presents as a solitary tender enlarging scaly or ulcerated plaque or nodule.     How is DSAP diagnosed?  The diagnosis of porokeratosis is usually clinical, with the help of dermoscopy. DSAP is sometimes diagnosed by finding characteristic features on pathology, in which the scaly rim of DSAP is described as a parakeratotic cornoid lamella. The diagnosis can also be missed on a biopsy if the specimen does not include the rim, it is poorly orientated, or the pathologist's attention is not drawn to the horny ridge seen clinically.    What is the differential diagnosis of DSAP?  There are several kinds of porokeratosis, and these can occur in family members or in the patient that has DSAP.   DSAP is sometimes confused with multiple actinic keratoses, but actinic keratoses are more likely to arise on the face and hands and have a central scale rather than peripheral scale.    What is the treatment for DSAP?  Unfortunately, in our present state of knowledge, there is no very satisfactory treatment for DSAP. Over the years the agents that have been tried include:  Cryotherapy   5-Fluorouracil cream   Imiquimod cream   Tretinoin cream   Ingenol mebutate gel   Alpha hydroxy acid cream   Calcipotriol ointment   Diclofenac gel   Oral acitretin   Photodynamic therapy   Grenz ray therapy   Laser treatments.    To date, no treatment has proved effective long term. Most people settle for just having the larger lesions  "frozen lightly and returning as necessary for further treatments, using a moisturiser to reduce the dry feeling.  If the DSAP has been induced by drug-induced immune suppression, withdrawal of the drug has been reported to result in remission of DSAP.    Sun protection  Restriction of sun exposure by wearing long sleeves, skirts or slacks and using sunscreens on the legs and arms is believed to reduce or delay the development of new lesions.    NEOPLASM OF UNCERTAIN BEHAVIOR OF SKIN    Physical Exam:  (Anatomic Location); (Size and Morphological Description); (Differential Diagnosis):  Specimen A; Right clavicle; 0.4x0.3cm pink brown papule;DDX: rule out Basal Cell Carcinoma      Additional History of Present Condition:  present on exam. Patient reports site had bled and is new    Assessment and Plan:  I have discussed with the patient that a sample of skin via a \"skin biopsy” would be potentially helpful to further make a specific diagnosis under the microscope.  Based on a thorough discussion of this condition and the management approach to it (including a comprehensive discussion of the known risks, side effects and potential benefits of treatment), the patient (family) agrees to implement the following specific plan:    Procedure:  Skin Biopsy.  After a thorough discussion of treatment options and risk/benefits/alternatives (including but not limited to local pain, scarring, dyspigmentation, blistering, possible superinfection, and inability to confirm a diagnosis via histopathology), verbal and written consent were obtained and portion of the rash was biopsied for tissue sample.  See below for consent that was obtained from patient and subsequent Procedure Note.      PROCEDURE TANGENTIAL (SHAVE) BIOPSY NOTE:    Performing Physician:  Ivon Echols PA-C  Anatomic Location; Clinical Description with size (cm); Pre-Op Diagnosis:   Specimen A; Right clavicle; 0.4x0.3cm pink brown papule;DDX: rule out Basal Cell " Carcinoma  Post-op diagnosis: Same     Local anesthesia: 1% Lidocaine HCL     Topical anesthesia: None    Hemostasis: Aluminum chloride       After obtaining informed consent  at which time there was a discussion about the purpose of biopsy  and low risks of infection and bleeding.  The area was prepped and draped in the usual fashion. Anesthesia was obtained with 1% lidocaine with epinephrine. A shave biopsy to an appropriate sampling depth was obtained by Shave (Dermablade or 15 blade) The resulting wound was covered with surgical ointment and bandaged appropriately.     The patient tolerated the procedure well without complications and was without signs of functional compromise.      Specimen has been sent for review by Dermatopathology.    Standard post-procedure care has been explained and has been included in written form within the patient's copy of Informed Consent.    Scribe Attestation    I,:  Loreto Segovia MA am acting as a scribe while in the presence of the attending physician.:       I,:  Ivon Echols PA-C personally performed the services described in this documentation    as scribed in my presence.:

## 2025-02-17 ENCOUNTER — RESULTS FOLLOW-UP (OUTPATIENT)
Dept: DERMATOLOGY | Facility: CLINIC | Age: 75
End: 2025-02-17

## 2025-02-17 PROCEDURE — 88305 TISSUE EXAM BY PATHOLOGIST: CPT | Performed by: PATHOLOGY

## 2025-02-17 NOTE — RESULT ENCOUNTER NOTE
DERMATOPATHOLOGY RESULT NOTE    Results reviewed by ordering provider  Called patient to personally discuss results. Discussed results with patient.       Instructions for Clinical Derm Team:   (remember to route Result Note to appropriate staff):    Call patient and schedule for excision. Please let me know when scheduled thank you!    Result & Plan by Specimen:    Specimen A: malignant  Plan: excision      Tissue Exam: [id number]  Order: [id number]   Status: Final result      Dx: Neoplasm of uncertain behavior    Test Result Released: Yes (seen)    View Follow-Up Encounter     Component  Ref Range & Units (hover)   Case Report  Surgical Pathology Report                         Case: [id number]                                  Authorizing Provider:  Ivon Echols PA-C          Collected:           02/12/2025 1650              Ordering Location:     [facility]      Received:            02/12/2025 1650                                     [place]                                                                      Pathologist:           Anthony Payne MD                                                      Specimen:    Skin, Other, Specimen A - Right Clavicle                                                Final Diagnosis  A. Skin, right Clavicle:  BASAL CELL CARCINOMA, NODULAR, PIGMENTED    Electronically signed by Anthony Payne MD on 2/17/2025 at 1457 EST  Note   Interpretation performed at [facility] [address] [place] [id number]    Additional Information   All reported additional testing was performed with appropriately reactive controls.  These tests were developed and their performance characteristics determined by [facility] or appropriate performing facility, though some tests may be performed on tissues which have not been validated for performance characteristics (such as staining performed on alcohol exposed cell blocks and decalcified tissues).   "Results should be interpreted with caution and in the context of the patients' clinical condition. These tests may not be cleared or approved by the U.S. Food and Drug Administration, though the FDA has determined that such clearance or approval is not necessary. These tests are used for clinical purposes and they should not be regarded as investigational or for research. This laboratory has been approved by CLIA 88, designated as a high-complexity laboratory and is qualified to perform these tests.  .  Gross Description   A. The specimen is received in formalin, labeled with the patient's name and hospital number, and is designated \"right clavicle\".  The specimen consists of a shave biopsy of pale-white skin measuring 0.6 x 0.5 cm.  The epithelial surface exhibits a brown crusted papule measuring 0.3 x 0.3 x 0.1 cm.  The skin surface is inked red and the margin of resection is inked green.  The specimen is bisected.  Entirely submitted. One cassette.  Between sponges    Note: The estimated total formalin fixation time based upon information provided by the submitting clinician and the standard processing schedule is under 72 hours.      Joaquín  Clinical Information   Specimen A; Right clavicle; Skin; Shave Biopsy; 75 y/o female with a 0.4x0.3cm pink brown papule; DDX: rule out Basal Cell Carcinoma    ATTN: DERM PATH  Resulting Agency [id number]          Specimen Collected: 02/12/25  4:50 PM Last Resulted: 02/17/25  2:57 PM     Order Details       View Encounter       Lab and Collection Details       Routing       Result History    Scans on Order [id number]    Lab Result Document - Document on 2/17/2025  2:57 PM        "

## 2025-02-19 NOTE — TELEPHONE ENCOUNTER
----- Message from Ivon Echols PA-C sent at 2/17/2025  4:09 PM EST -----  DERMATOPATHOLOGY RESULT NOTE    Results reviewed by ordering provider  Called patient to personally discuss results. Discussed results with patient.       Instructions for Clinical Derm Team:   (remember to route Result Note to appropriate staff):    Call patient and schedule for excision. Please let me know when scheduled thank you!    Result & Plan by Specimen:    Specimen A: malignant  Plan: excision      Tissue Exam: [id number]  Order: [id number]   Status: Final result       Dx: Neoplasm of uncertain behavior    Test Result Released: Yes (seen)    View Follow-Up Encounter     Component  Ref Range & Units (hover)   Case Report  Surgical Pathology Report                         Case: [id number]                                  Authorizing Provider:  Ivon Echols PA-C          Collected:           02/12/2025 1650              Ordering Location:     [facility]      Received:            02/12/2025 1650                                     [place]                                                                      Pathologist:           Anthony Payne MD                                                      Specimen:    Skin, Other, Specimen A - Right Clavicle                                                Final Diagnosis  A. Skin, right Clavicle:  BASAL CELL CARCINOMA, NODULAR, PIGMENTED     Electronically signed by Anthony Payne MD on 2/17/2025 at 1457 EST  Note   Interpretation performed at [facility] [address] [place] [address] Way, [place] [address]     Additional Information   All reported additional testing was performed with appropriately reactive controls.  These tests were developed and their performance characteristics determined by [facility] or appropriate performing facility, though some tests may be performed on tissues which have not been validated for performance characteristics (such as  "staining performed on alcohol exposed cell blocks and decalcified tissues).  Results should be interpreted with caution and in the context of the patients' clinical condition. These tests may not be cleared or approved by the U.S. Food and Drug Administration, though the FDA has determined that such clearance or approval is not necessary. These tests are used for clinical purposes and they should not be regarded as investigational or for research. This laboratory has been approved by [place] 88, designated as a high-complexity laboratory and is qualified to perform these tests.  .  Gross Description   A. The specimen is received in formalin, labeled with the patient's name and hospital number, and is designated \"right clavicle\".  The specimen consists of a shave biopsy of pale-white skin measuring 0.6 x 0.5 cm.  The epithelial surface exhibits a brown crusted papule measuring 0.3 x 0.3 x 0.1 cm.  The skin surface is inked red and the margin of resection is inked green.  The specimen is bisected.  Entirely submitted. One cassette.  Between sponges     Note: The estimated total formalin fixation time based upon information provided by the submitting clinician and the standard processing schedule is under 72 hours.        Joaquín  Clinical Information   Specimen A; Right clavicle; Skin; Shave Biopsy; 75 y/o female with a 0.4x0.3cm pink brown papule; DDX: rule out Basal Cell Carcinoma    ATTN: DERM PATH  Resulting Agency [facility]          Specimen Collected: 02/12/25  4:50 PM Last Resulted: 02/17/25  2:57 PM      Order Details        View Encounter        Lab and Collection Details        Routing        Result History    Scans on Order [id number]    Lab Result Document - Document on 2/17/2025  2:57 PM           "

## 2025-02-19 NOTE — TELEPHONE ENCOUNTER
Contacted patient to verify if she was okay going all the way to the [place] office as she lives in the [facility] I reassured her it would be okay to wait and have to procedure done in May here at the [place] office with Dr. Garcia and she agreed she would rather come to his office.     Appointment moved to [place] on May 7th.

## 2025-02-19 NOTE — TELEPHONE ENCOUNTER
Received call from patient to [facility] excision appt.    Patient is [facility] in [place] on 03/25/2025    Please let Ivon know.

## 2025-02-27 ENCOUNTER — APPOINTMENT (OUTPATIENT)
Dept: LAB | Facility: CLINIC | Age: 75
End: 2025-02-27
Payer: MEDICARE

## 2025-02-27 DIAGNOSIS — E78.2 MIXED HYPERLIPIDEMIA: ICD-10-CM

## 2025-02-27 DIAGNOSIS — I10 ESSENTIAL HYPERTENSION: ICD-10-CM

## 2025-02-27 DIAGNOSIS — E89.0 POSTABLATIVE HYPOTHYROIDISM: ICD-10-CM

## 2025-02-27 LAB
ALBUMIN SERPL BCG-MCNC: 4.4 G/DL (ref 3.5–5)
ALP SERPL-CCNC: 110 U/L (ref 34–104)
ALT SERPL W P-5'-P-CCNC: 10 U/L (ref 7–52)
ANION GAP SERPL CALCULATED.3IONS-SCNC: 8 MMOL/L (ref 4–13)
AST SERPL W P-5'-P-CCNC: 18 U/L (ref 13–39)
BASOPHILS # BLD AUTO: 0.13 THOUSANDS/ÂΜL (ref 0–0.1)
BASOPHILS NFR BLD AUTO: 1 % (ref 0–1)
BILIRUB SERPL-MCNC: 0.44 MG/DL (ref 0.2–1)
BUN SERPL-MCNC: 17 MG/DL (ref 5–25)
CALCIUM SERPL-MCNC: 9.6 MG/DL (ref 8.4–10.2)
CHLORIDE SERPL-SCNC: 104 MMOL/L (ref 96–108)
CHOLEST SERPL-MCNC: 209 MG/DL (ref ?–200)
CO2 SERPL-SCNC: 27 MMOL/L (ref 21–32)
CREAT SERPL-MCNC: 0.78 MG/DL (ref 0.6–1.3)
CREAT UR-MCNC: 64.9 MG/DL
EOSINOPHIL # BLD AUTO: 0.29 THOUSAND/ÂΜL (ref 0–0.61)
EOSINOPHIL NFR BLD AUTO: 3 % (ref 0–6)
ERYTHROCYTE [DISTWIDTH] IN BLOOD BY AUTOMATED COUNT: 13 % (ref 11.6–15.1)
GFR SERPL CREATININE-BSD FRML MDRD: 74 ML/MIN/1.73SQ M
GLUCOSE P FAST SERPL-MCNC: 95 MG/DL (ref 65–99)
HCT VFR BLD AUTO: 47.9 % (ref 34.8–46.1)
HDLC SERPL-MCNC: 61 MG/DL
HGB BLD-MCNC: 15.7 G/DL (ref 11.5–15.4)
IMM GRANULOCYTES # BLD AUTO: 0.03 THOUSAND/UL (ref 0–0.2)
IMM GRANULOCYTES NFR BLD AUTO: 0 % (ref 0–2)
LDLC SERPL CALC-MCNC: 122 MG/DL (ref 0–100)
LYMPHOCYTES # BLD AUTO: 2.14 THOUSANDS/ÂΜL (ref 0.6–4.47)
LYMPHOCYTES NFR BLD AUTO: 22 % (ref 14–44)
MCH RBC QN AUTO: 30.8 PG (ref 26.8–34.3)
MCHC RBC AUTO-ENTMCNC: 32.8 G/DL (ref 31.4–37.4)
MCV RBC AUTO: 94 FL (ref 82–98)
MICROALBUMIN UR-MCNC: 16.4 MG/L
MICROALBUMIN/CREAT 24H UR: 25 MG/G CREATININE (ref 0–30)
MONOCYTES # BLD AUTO: 0.63 THOUSAND/ÂΜL (ref 0.17–1.22)
MONOCYTES NFR BLD AUTO: 7 % (ref 4–12)
NEUTROPHILS # BLD AUTO: 6.32 THOUSANDS/ÂΜL (ref 1.85–7.62)
NEUTS SEG NFR BLD AUTO: 67 % (ref 43–75)
NONHDLC SERPL-MCNC: 148 MG/DL
NRBC BLD AUTO-RTO: 0 /100 WBCS
PLATELET # BLD AUTO: 312 THOUSANDS/UL (ref 149–390)
PMV BLD AUTO: 9.5 FL (ref 8.9–12.7)
POTASSIUM SERPL-SCNC: 4.3 MMOL/L (ref 3.5–5.3)
PROT SERPL-MCNC: 7 G/DL (ref 6.4–8.4)
RBC # BLD AUTO: 5.1 MILLION/UL (ref 3.81–5.12)
SODIUM SERPL-SCNC: 139 MMOL/L (ref 135–147)
TRIGL SERPL-MCNC: 129 MG/DL (ref ?–150)
TSH SERPL DL<=0.05 MIU/L-ACNC: 1.37 UIU/ML (ref 0.45–4.5)
WBC # BLD AUTO: 9.54 THOUSAND/UL (ref 4.31–10.16)

## 2025-02-27 PROCEDURE — 82570 ASSAY OF URINE CREATININE: CPT

## 2025-02-27 PROCEDURE — 85025 COMPLETE CBC W/AUTO DIFF WBC: CPT

## 2025-02-27 PROCEDURE — 84443 ASSAY THYROID STIM HORMONE: CPT

## 2025-02-27 PROCEDURE — 80053 COMPREHEN METABOLIC PANEL: CPT

## 2025-02-27 PROCEDURE — 80061 LIPID PANEL: CPT

## 2025-02-27 PROCEDURE — 36415 COLL VENOUS BLD VENIPUNCTURE: CPT

## 2025-02-27 PROCEDURE — 82043 UR ALBUMIN QUANTITATIVE: CPT

## 2025-03-04 ENCOUNTER — RESULTS FOLLOW-UP (OUTPATIENT)
Dept: FAMILY MEDICINE CLINIC | Facility: CLINIC | Age: 75
End: 2025-03-04

## 2025-03-06 ENCOUNTER — RA CDI HCC (OUTPATIENT)
Dept: OTHER | Facility: HOSPITAL | Age: 75
End: 2025-03-06

## 2025-03-06 PROBLEM — Z76.89 ENCOUNTER TO ESTABLISH CARE: Status: RESOLVED | Noted: 2022-11-02 | Resolved: 2025-03-06

## 2025-03-11 ENCOUNTER — OFFICE VISIT (OUTPATIENT)
Dept: FAMILY MEDICINE CLINIC | Facility: CLINIC | Age: 75
End: 2025-03-11
Payer: MEDICARE

## 2025-03-11 VITALS
WEIGHT: 108 LBS | SYSTOLIC BLOOD PRESSURE: 140 MMHG | BODY MASS INDEX: 19.88 KG/M2 | RESPIRATION RATE: 14 BRPM | TEMPERATURE: 97.5 F | HEART RATE: 81 BPM | OXYGEN SATURATION: 97 % | DIASTOLIC BLOOD PRESSURE: 80 MMHG | HEIGHT: 62 IN

## 2025-03-11 DIAGNOSIS — H04.123 DRY EYES: ICD-10-CM

## 2025-03-11 DIAGNOSIS — E78.2 MIXED HYPERLIPIDEMIA: ICD-10-CM

## 2025-03-11 DIAGNOSIS — E89.0 POSTABLATIVE HYPOTHYROIDISM: ICD-10-CM

## 2025-03-11 DIAGNOSIS — F43.21 GRIEF REACTION: ICD-10-CM

## 2025-03-11 DIAGNOSIS — J31.0 CHRONIC RHINITIS: ICD-10-CM

## 2025-03-11 DIAGNOSIS — K21.9 GASTROESOPHAGEAL REFLUX DISEASE, UNSPECIFIED WHETHER ESOPHAGITIS PRESENT: ICD-10-CM

## 2025-03-11 DIAGNOSIS — I10 ESSENTIAL HYPERTENSION: ICD-10-CM

## 2025-03-11 DIAGNOSIS — E05.00 GRAVES DISEASE: ICD-10-CM

## 2025-03-11 DIAGNOSIS — M81.0 POST-MENOPAUSAL OSTEOPOROSIS: Primary | ICD-10-CM

## 2025-03-11 PROCEDURE — G2211 COMPLEX E/M VISIT ADD ON: HCPCS | Performed by: NURSE PRACTITIONER

## 2025-03-11 PROCEDURE — 99214 OFFICE O/P EST MOD 30 MIN: CPT | Performed by: NURSE PRACTITIONER

## 2025-03-11 RX ORDER — FLUTICASONE PROPIONATE 50 MCG
1 SPRAY, SUSPENSION (ML) NASAL DAILY
Qty: 1 G | Refills: 0 | Status: SHIPPED | OUTPATIENT
Start: 2025-03-11

## 2025-03-11 RX ORDER — LIFITEGRAST 50 MG/ML
1 SOLUTION/ DROPS OPHTHALMIC 2 TIMES DAILY
Qty: 180 EACH | Refills: 3 | Status: SHIPPED | OUTPATIENT
Start: 2025-03-11 | End: 2025-04-10

## 2025-03-11 RX ORDER — LEVOTHYROXINE SODIUM 88 UG/1
88 TABLET ORAL DAILY
Qty: 90 TABLET | Refills: 3 | Status: SHIPPED | OUTPATIENT
Start: 2025-03-11

## 2025-03-11 RX ORDER — ROSUVASTATIN CALCIUM 20 MG/1
20 TABLET, COATED ORAL DAILY
Qty: 90 TABLET | Refills: 3 | Status: SHIPPED | OUTPATIENT
Start: 2025-03-11

## 2025-03-11 RX ORDER — LOSARTAN POTASSIUM 50 MG/1
100 TABLET ORAL DAILY
Qty: 180 TABLET | Refills: 3 | Status: SHIPPED | OUTPATIENT
Start: 2025-03-11

## 2025-03-11 RX ORDER — OMEPRAZOLE 20 MG/1
20 CAPSULE, DELAYED RELEASE ORAL 2 TIMES DAILY
Qty: 180 CAPSULE | Refills: 1 | Status: SHIPPED | OUTPATIENT
Start: 2025-03-11

## 2025-03-11 NOTE — ASSESSMENT & PLAN NOTE
Cholesterol level is stable continue medication denies any side effects of the Crestor  Orders:    rosuvastatin (CRESTOR) 20 MG tablet; Take 1 tablet (20 mg total) by mouth daily

## 2025-03-11 NOTE — ASSESSMENT & PLAN NOTE
Thyroid level is stable we will continue same dosage of medication thyroid level is stable continue same dosage of medication  Orders:    levothyroxine 88 mcg tablet; Take 1 tablet (88 mcg total) by mouth daily

## 2025-03-11 NOTE — PROGRESS NOTES
Name: Roxanne Hopper      : 1950      MRN: [id number]  Encounter Provider: CANDIDA Royal  Encounter Date: 3/11/2025   Encounter department: [facility]  :  Assessment & Plan  Post-menopausal osteoporosis  Discussed preventative screening DEXA scan ordered  Orders:    DXA bone density spine hip and pelvis    Grief reaction  Reports Zoloft has been working well declined to increase dose.  Orders:    sertraline (ZOLOFT) 50 mg tablet; Take 1 tablet (50 mg total) by mouth daily    Mixed hyperlipidemia  Cholesterol level is stable continue medication denies any side effects of the Crestor  Orders:    rosuvastatin (CRESTOR) 20 MG tablet; Take 1 tablet (20 mg total) by mouth daily    Gastroesophageal reflux disease, unspecified whether esophagitis present    Orders:    omeprazole (PriLOSEC) 20 mg delayed release capsule; Take 1 capsule (20 mg total) by mouth 2 (two) times a day    Essential hypertension  Blood pressure is stable.  Continue heart healthy diet.  Continue medication reviewed blood work  Orders:    losartan (COZAAR) 50 mg tablet; Take 2 tablets (100 mg total) by mouth daily    Postablative hypothyroidism  Thyroid level is stable we will continue same dosage of medication thyroid level is stable continue same dosage of medication  Orders:    levothyroxine 88 mcg tablet; Take 1 tablet (88 mcg total) by mouth daily    Graves disease  Continue with eyedrops  Orders:    lifitegrast (Xiidra) 5 % op solution; Administer 1 drop to both eyes 2 (two) times a day    Dry eyes    Orders:    lifitegrast (Xiidra) 5 % op solution; Administer 1 drop to both eyes 2 (two) times a day    Chronic rhinitis  Continue with Flonase use steam to help decongest  Orders:    fluticasone (FLONASE) 50 mcg/act nasal spray; 1 spray into each nostril daily          Depression Screening and Follow-up Plan: Patient's depression screening was positive with a PHQ-9 score of 6.   Patient with underlying  "depression and was advised to continue current medications as prescribed.       History of Present Illness   Patient is here for follow-up of chronic health conditions.  Generally doing well      Review of Systems   Constitutional: Negative.    HENT: Negative.     Eyes: Negative.    Respiratory: Negative.     Cardiovascular: Negative.    Gastrointestinal: Negative.    Endocrine: Negative.    Genitourinary: Negative.    Musculoskeletal: Negative.    Skin: Negative.    Allergic/Immunologic: Negative.    Neurological: Negative.    Psychiatric/Behavioral:  Positive for dysphoric mood.        Objective   /80 (BP Location: Left arm, Patient Position: Sitting, Cuff Size: Large)   Pulse 81   Temp 97.5 °F (36.4 °C) (Temporal)   Resp 14   Ht 5' 2\" (1.575 m)   Wt 49 kg (108 lb)   SpO2 97%   BMI 19.75 kg/m²      Physical Exam  Constitutional:       General: She is not in acute distress.     Appearance: Normal appearance. She is not ill-appearing.   HENT:      Head: Normocephalic and atraumatic.      Nose: Nose normal.   Cardiovascular:      Rate and Rhythm: Normal rate and regular rhythm.      Pulses: Normal pulses.      Heart sounds: Normal heart sounds.   Pulmonary:      Effort: Pulmonary effort is normal. No respiratory distress.      Breath sounds: Normal breath sounds.   Chest:      Chest wall: No tenderness.   Abdominal:      General: Abdomen is flat. Bowel sounds are normal. There is no distension.      Palpations: There is no mass.      Tenderness: There is no abdominal tenderness.   Musculoskeletal:         General: Normal range of motion.      Cervical back: Normal range of motion and neck supple.   Skin:     General: Skin is warm and dry.   Neurological:      General: No focal deficit present.      Mental Status: She is alert and oriented to person, place, and time.   Psychiatric:         Mood and Affect: Mood normal.         Behavior: Behavior normal.         Thought Content: Thought content normal.    "      Judgment: Judgment normal.

## 2025-03-11 NOTE — ASSESSMENT & PLAN NOTE
Reports Zoloft has been working well declined to increase dose.  Orders:    sertraline (ZOLOFT) 50 mg tablet; Take 1 tablet (50 mg total) by mouth daily

## 2025-03-11 NOTE — ASSESSMENT & PLAN NOTE
Continue with eyedrops  Orders:    lifitegrast (Xiidra) 5 % op solution; Administer 1 drop to both eyes 2 (two) times a day

## 2025-03-11 NOTE — ASSESSMENT & PLAN NOTE
Blood pressure is stable.  Continue heart healthy diet.  Continue medication reviewed blood work  Orders:    losartan (COZAAR) 50 mg tablet; Take 2 tablets (100 mg total) by mouth daily

## 2025-03-18 ENCOUNTER — PROCEDURE VISIT (OUTPATIENT)
Dept: NEUROLOGY | Facility: CLINIC | Age: 75
End: 2025-03-18
Payer: MEDICARE

## 2025-03-18 VITALS — TEMPERATURE: 98.4 F | SYSTOLIC BLOOD PRESSURE: 154 MMHG | HEART RATE: 70 BPM | DIASTOLIC BLOOD PRESSURE: 78 MMHG

## 2025-03-18 DIAGNOSIS — G43.709 CHRONIC MIGRAINE WITHOUT AURA WITHOUT STATUS MIGRAINOSUS, NOT INTRACTABLE: Primary | ICD-10-CM

## 2025-03-18 PROCEDURE — 64615 CHEMODENERV MUSC MIGRAINE: CPT | Performed by: PHYSICIAN ASSISTANT

## 2025-03-18 NOTE — PROGRESS NOTES
"Universal Protocol   procedure performed by consultantConsent: Verbal consent obtained. Written consent obtained.  Risks and benefits: risks, benefits and alternatives were discussed  Consent given by: patient  Time out: Immediately prior to procedure a \"time out\" was called to verify the correct patient, procedure, equipment, support staff and site/side marked as required.  Patient understanding: patient states understanding of the procedure being performed  Patient consent: the patient's understanding of the procedure matches consent given  Procedure consent: procedure consent matches procedure scheduled  Relevant documents: relevant documents present and verified  Patient identity confirmed: verbally with patient      Chemodenervation     Date/Time  3/18/2025 12:00 PM     Performed by  Ambika José PA-C   Authorized by  Ambika José PA-C     Pre-procedure details      Preparation: Patient was prepped and draped in usual sterile fashion     Anesthesia  (see MAR for exact dosages):     Anesthesia method:  None   Procedure details      Position:  Upright   Botox      Botox Type:  Type A    Brand:  Botox    mL's of Botulinum Toxin:  200    mL's of preservative free sterile saline:  4    Final Concentration per CC:  50 units    Needle Gauge:  30 G 2.5 inch   Procedures      Botox Procedures: chronic headache     Injection Location      Head / Face:  L superior cervical paraspinal, R superior cervical paraspinal, R medial occipitalis, L medial occipitalis, R temporalis, L superior trapezius, R superior trapezius and L temporalis    L temporalis injection amount:  25 unit(s)    R temporalis injection amount:  25 unit(s)    L medial occipitalis injection amount:  20 unit(s)    R medial occipitalis injection amount:  20 unit(s)    L superior cervical paraspinal injection amount:  10 unit(s)    R superior cervical paraspinal injection amount:  10 unit(s)    L superior trapezius injection amount:  20 unit(s)    R superior " trapezius injection amount:  20 unit(s)   Total Units      Total units used:  200   Post-procedure details      Chemodenervation:  Chronic migraine    Facial Nerve Location::  Bilateral facial nerve    Patient tolerance of procedure:  Tolerated well, no immediate complications   Comments       30 units apex  10 units *temporalis bilaterally  All medically necessary

## 2025-03-26 DIAGNOSIS — J31.0 CHRONIC RHINITIS: ICD-10-CM

## 2025-03-26 RX ORDER — FLUTICASONE PROPIONATE 50 MCG
1 SPRAY, SUSPENSION (ML) NASAL DAILY
Qty: 1 G | Refills: 0 | Status: SHIPPED | OUTPATIENT
Start: 2025-03-26

## 2025-04-09 ENCOUNTER — OFFICE VISIT (OUTPATIENT)
Dept: FAMILY MEDICINE CLINIC | Facility: CLINIC | Age: 75
End: 2025-04-09
Payer: MEDICARE

## 2025-04-09 VITALS
RESPIRATION RATE: 12 BRPM | DIASTOLIC BLOOD PRESSURE: 90 MMHG | WEIGHT: 107 LBS | HEIGHT: 62 IN | OXYGEN SATURATION: 98 % | SYSTOLIC BLOOD PRESSURE: 140 MMHG | HEART RATE: 73 BPM | TEMPERATURE: 97.7 F | BODY MASS INDEX: 19.69 KG/M2

## 2025-04-09 DIAGNOSIS — I10 ESSENTIAL HYPERTENSION: ICD-10-CM

## 2025-04-09 DIAGNOSIS — E78.2 MIXED HYPERLIPIDEMIA: ICD-10-CM

## 2025-04-09 DIAGNOSIS — J31.0 CHRONIC RHINITIS: ICD-10-CM

## 2025-04-09 DIAGNOSIS — Z12.31 ENCOUNTER FOR SCREENING MAMMOGRAM FOR BREAST CANCER: ICD-10-CM

## 2025-04-09 DIAGNOSIS — Z00.00 MEDICARE ANNUAL WELLNESS VISIT, SUBSEQUENT: Primary | ICD-10-CM

## 2025-04-09 PROCEDURE — G0439 PPPS, SUBSEQ VISIT: HCPCS | Performed by: NURSE PRACTITIONER

## 2025-04-09 RX ORDER — FLUTICASONE PROPIONATE 50 MCG
1 SPRAY, SUSPENSION (ML) NASAL DAILY
Qty: 1 G | Refills: 3 | Status: SHIPPED | OUTPATIENT
Start: 2025-04-09

## 2025-04-09 NOTE — PROGRESS NOTES
Name: Roxanne Hopper      : 1950      MRN: [id number]  Encounter Provider: CANDIDA Royal  Encounter Date: 2025   Encounter department: [facility]  :  Assessment & Plan  Medicare annual wellness visit, subsequent  Medicare wellness completed.  Mammogram ordered.  Declined DEXA scan.  Continue with self-care.  Generally doing well.         Essential hypertension    Orders:    CBC and differential; Future    Comprehensive metabolic panel; Future    Mixed hyperlipidemia    Orders:    Lipid panel; Future    Chronic rhinitis    Orders:    fluticasone (FLONASE) 50 mcg/act nasal spray; 1 spray into each nostril daily    Encounter for screening mammogram for breast cancer    Orders:    Mammo screening bilateral w 3d and cad; Future      Depression Screening and Follow-up Plan: Patient's depression screening was positive with a PHQ-9 score of 6.   Patient with underlying depression and was advised to continue current medications as prescribed.       Preventive health issues were discussed with patient, and age appropriate screening tests were ordered as noted in patient's After Visit Summary. Personalized health advice and appropriate referrals for health education or preventive services given if needed, as noted in patient's After Visit Summary.    History of Present Illness     Patient is here for medicare wellness       Patient Care Team:  CANDIDA Royal as PCP - General (Family Medicine)  DO Natasha Zuluaga MD Alan Westheim, MD Charles F Cohan, DO (Gastroenterology)    Review of Systems   Constitutional: Negative.    HENT:  Positive for congestion.    Eyes: Negative.    Respiratory: Negative.     Cardiovascular: Negative.    Gastrointestinal: Negative.    Endocrine: Negative.    Genitourinary: Negative.    Musculoskeletal: Negative.    Skin: Negative.    Allergic/Immunologic: Negative.    Neurological: Negative.    Psychiatric/Behavioral: Negative.       Medical  History Reviewed by provider this encounter:  Tobacco  Allergies  Meds  Problems  Med Hx  Surg Hx  Fam Hx       Annual Wellness Visit Questionnaire   Roxanne is here for her Subsequent Wellness visit. Last Medicare Wellness visit information reviewed, patient interviewed and updates made to the record today.      Health Risk Assessment:   Patient rates overall health as good. Patient feels that their physical health rating is same. Patient is dissatisfied with their life. Eyesight was rated as same. Hearing was rated as same. Patient feels that their emotional and mental health rating is same. Patients states they are never, rarely angry. Patient states they are sometimes unusually tired/fatigued. Pain experienced in the last 7 days has been none. Patient states that she has experienced no weight loss or gain in last 6 months.     Depression Screening:   PHQ-9 Score: 6      Fall Risk Screening:   In the past year, patient has experienced: no history of falling in past year      Urinary Incontinence Screening:   Patient has not leaked urine accidently in the last six months.     Home Safety:  Patient does not have trouble with stairs inside or outside of their home. Patient has working smoke alarms and has no working carbon monoxide detector. Home safety hazards include: none.     Nutrition:   Current diet is Regular.     Medications:   Patient is currently taking over-the-counter supplements. OTC medications include: see medication list. Patient is able to manage medications.     Activities of Daily Living (ADLs)/Instrumental Activities of Daily Living (IADLs):   Walk and transfer into and out of bed and chair?: No  Dress and groom yourself?: No    Bathe or shower yourself?: No    Feed yourself? No  Do your laundry/housekeeping?: No  Manage your money, pay your bills and track your expenses?: No  Make your own meals?: No    Do your own shopping?: No    Previous Hospitalizations:   Any hospitalizations or ED  visits within the last 12 months?: No      Advance Care Planning:   Living will: No    Durable POA for healthcare: No    Advanced directive: Yes      Cognitive Screening:   Provider or family/friend/caregiver concerned regarding cognition?: No    Preventive Screenings      Cardiovascular Screening:    General: Screening Not Indicated and History Lipid Disorder      Diabetes Screening:     General: Screening Current      Colorectal Cancer Screening:     General: Screening Not Indicated      Breast Cancer Screening:     General: Screening Current      Cervical Cancer Screening:    General: Screening Not Indicated      Osteoporosis Screening:    General: Screening Not Indicated and History Osteoporosis      Abdominal Aortic Aneurysm (AAA) Screening:        General: Screening Not Indicated      Lung Cancer Screening:     General: Screening Not Indicated      Hepatitis C Screening:    General: Screening Current    Screening, Brief Intervention, and Referral to Treatment (SBIRT)     Screening  Typical number of drinks in a day: 0  Typical number of drinks in a week: 0  Interpretation: Low risk drinking behavior.    AUDIT-C Screenin) How often did you have a drink containing alcohol in the past year? never  2) How many drinks did you have on a typical day when you were drinking in the past year? 0  3) How often did you have 6 or more drinks on one occasion in the past year? never    AUDIT-C Score: 0  Interpretation: Score 0-2 (female): Negative screen for alcohol misuse    Single Item Drug Screening:  How often have you used an illegal drug (including marijuana) or a prescription medication for non-medical reasons in the past year? never    Single Item Drug Screen Score: 0  Interpretation: Negative screen for possible drug use disorder    Other Counseling Topics:   Car/seat belt/driving safety, skin self-exam, sunscreen and calcium and vitamin D intake and regular weightbearing exercise.     Social Drivers of Health  "    Financial Resource Strain: Low Risk  (3/15/2023)    Overall Financial Resource Strain (CARDIA)     Difficulty of Paying Living Expenses: Not very hard   Food Insecurity: No Food Insecurity (4/9/2025)    Hunger Vital Sign     Worried About Running Out of Food in the Last Year: Never true     Ran Out of Food in the Last Year: Never true   Transportation Needs: No Transportation Needs (4/9/2025)    PRAPARE - Transportation     Lack of Transportation (Medical): No     Lack of Transportation (Non-Medical): No   Housing Stability: Low Risk  (4/9/2025)    Housing Stability Vital Sign     Unable to Pay for Housing in the Last Year: No     Number of Times Moved in the Last Year: 0     Homeless in the Last Year: No   Utilities: Not At Risk (4/9/2025)    [facility] Utilities     Threatened with loss of utilities: No     No results found.    Objective   /90 (BP Location: Left arm, Patient Position: Sitting, Cuff Size: Large)   Pulse 73   Temp 97.7 °F (36.5 °C) (Temporal)   Resp 12   Ht 5' 2\" (1.575 m)   Wt 48.5 kg (107 lb)   SpO2 98%   BMI 19.57 kg/m²     Physical Exam  Constitutional:       General: She is not in acute distress.     Appearance: Normal appearance. She is not ill-appearing.   HENT:      Head: Normocephalic and atraumatic.      Nose: Nose normal.      Mouth/Throat:      Mouth: Mucous membranes are moist.   Eyes:      Pupils: Pupils are equal, round, and reactive to light.   Cardiovascular:      Rate and Rhythm: Normal rate and regular rhythm.      Pulses: Normal pulses.   Pulmonary:      Effort: Pulmonary effort is normal. No respiratory distress.      Breath sounds: Normal breath sounds.   Chest:      Chest wall: No tenderness.   Abdominal:      General: Abdomen is flat. Bowel sounds are normal. There is no distension.      Palpations: There is no mass.      Tenderness: There is no abdominal tenderness.   Musculoskeletal:         General: Normal range of motion.      Cervical back: Normal range of " motion and neck supple.   Skin:     General: Skin is warm and dry.   Neurological:      General: No focal deficit present.      Mental Status: She is alert and oriented to person, place, and time.   Psychiatric:         Mood and Affect: Mood normal.         Behavior: Behavior normal.         Thought Content: Thought content normal.         Judgment: Judgment normal.

## 2025-04-09 NOTE — ASSESSMENT & PLAN NOTE
Medicare wellness completed.  Mammogram ordered.  Declined DEXA scan.  Continue with self-care.  Generally doing well.

## 2025-04-09 NOTE — PATIENT INSTRUCTIONS
Medicare Preventive Visit Patient Instructions  Thank you for completing your Welcome to Medicare Visit or Medicare Annual Wellness Visit today. Your next wellness visit will be due in one year (4/10/2026).  The screening/preventive services that you may require over the next 5-10 years are detailed below. Some tests may not apply to you based off risk factors and/or age. Screening tests ordered at today's visit but not completed yet may show as past due. Also, please note that scanned in results may not display below.  Preventive Screenings:  Service Recommendations Previous Testing/Comments   Colorectal Cancer Screening  * Colonoscopy    * Fecal Occult Blood Test (FOBT)/Fecal Immunochemical Test (FIT)  * Fecal DNA/Cologuard Test  * Flexible Sigmoidoscopy Age: 45-75 years old   Colonoscopy: every 10 years (may be performed more frequently if at higher risk)  OR  FOBT/FIT: every 1 year  OR  Cologuard: every 3 years  OR  Sigmoidoscopy: every 5 years  Screening may be recommended earlier than age 45 if at higher risk for colorectal cancer. Also, an individualized decision between you and your healthcare provider will decide whether screening between the ages of 76-85 would be appropriate. Colonoscopy: 09/28/2018  FOBT/FIT: Not on file  Cologuard: Not on file  Sigmoidoscopy: Not on file          Breast Cancer Screening Age: 40+ years old  Frequency: every 1-2 years  Not required if history of left and right mastectomy Mammogram: 10/21/2024    Screening Current   Cervical Cancer Screening Between the ages of 21-29, pap smear recommended once every 3 years.   Between the ages of 30-65, can perform pap smear with HPV co-testing every 5 years.   Recommendations may differ for women with a history of total hysterectomy, cervical cancer, or abnormal pap smears in past. Pap Smear: 05/10/2021    Screening Not Indicated   Hepatitis C Screening Once for adults born between 1945 and 1965  More frequently in patients at high risk  for Hepatitis C Hep C Antibody: 03/09/2021    Screening Current   Diabetes Screening 1-2 times per year if you're at risk for diabetes or have pre-diabetes Fasting glucose: 95 mg/dL (2/27/2025)  A1C: No results in last 5 years (No results in last 5 years)  Screening Current   Cholesterol Screening Once every 5 years if you don't have a lipid disorder. May order more often based on risk factors. Lipid panel: 02/27/2025    Screening Not Indicated  History Lipid Disorder     Other Preventive Screenings Covered by Medicare:  Abdominal Aortic Aneurysm (AAA) Screening: covered once if your at risk. You're considered to be at risk if you have a family history of AAA.  Lung Cancer Screening: covers low dose CT scan once per year if you meet all of the following conditions: (1) Age 55-77; (2) No signs or symptoms of lung cancer; (3) Current smoker or have quit smoking within the last 15 years; (4) You have a tobacco smoking history of at least 20 pack years (packs per day multiplied by number of years you smoked); (5) You get a written order from a healthcare provider.  Glaucoma Screening: covered annually if you're considered high risk: (1) You have diabetes OR (2) Family history of glaucoma OR (3)  aged 50 and older OR (4)  American aged 65 and older  Osteoporosis Screening: covered every 2 years if you meet one of the following conditions: (1) You're estrogen deficient and at risk for osteoporosis based off medical history and other findings; (2) Have a vertebral abnormality; (3) On glucocorticoid therapy for more than 3 months; (4) Have primary hyperparathyroidism; (5) On osteoporosis medications and need to assess response to drug therapy.   Last bone density test (DXA Scan): Not on file.  HIV Screening: covered annually if you're between the age of 15-65. Also covered annually if you are younger than 15 and older than 65 with risk factors for HIV infection. For pregnant patients, it is covered  up to 3 times per pregnancy.    Immunizations:  Immunization Recommendations   Influenza Vaccine Annual influenza vaccination during flu season is recommended for all persons aged >= 6 months who do not have contraindications   Pneumococcal Vaccine   * Pneumococcal conjugate vaccine = PCV13 (Prevnar 13), PCV15 (Vaxneuvance), PCV20 (Prevnar 20)  * Pneumococcal polysaccharide vaccine = PPSV23 (Pneumovax) Adults 19-65 yo with certain risk factors or if 65+ yo  If never received any pneumonia vaccine: recommend Prevnar 20 (PCV20)  Give PCV20 if previously received 1 dose of PCV13 or PPSV23   Hepatitis B Vaccine 3 dose series if at intermediate or high risk (ex: diabetes, end stage renal disease, liver disease)   Respiratory syncytial virus (RSV) Vaccine - COVERED BY MEDICARE PART D  * RSVPreF3 (Arexvy) CDC recommends that adults 60 years of age and older may receive a single dose of RSV vaccine using shared clinical decision-making (SCDM)   Tetanus (Td) Vaccine - COST NOT COVERED BY MEDICARE PART B Following completion of primary series, a booster dose should be given every 10 years to maintain immunity against tetanus. Td may also be given as tetanus wound prophylaxis.   Tdap Vaccine - COST NOT COVERED BY MEDICARE PART B Recommended at least once for all adults. For pregnant patients, recommended with each pregnancy.   Shingles Vaccine (Shingrix) - COST NOT COVERED BY MEDICARE PART B  2 shot series recommended in those 19 years and older who have or will have weakened immune systems or those 50 years and older     Health Maintenance Due:      Topic Date Due   • DXA SCAN  Never done   • Colorectal Cancer Screening  03/11/2026 (Originally 11/25/2016)   • Breast Cancer Screening: Mammogram  10/21/2025   • Hepatitis C Screening  Completed     Immunizations Due:      Topic Date Due   • COVID-19 Vaccine (9 - 2024-25 season) 03/25/2025     Advance Directives   What are advance directives?  Advance directives are legal  documents that state your wishes and plans for medical care. These plans are made ahead of time in case you lose your ability to make decisions for yourself. Advance directives can apply to any medical decision, such as the treatments you want, and if you want to donate organs.   What are the types of advance directives?  There are many types of advance directives, and each state has rules about how to use them. You may choose a combination of any of the following:  Living will:  This is a written record of the treatment you want. You can also choose which treatments you do not want, which to limit, and which to stop at a certain time. This includes surgery, medicine, IV fluid, and tube feedings.   Durable power of  for healthcare (DPAHC):  This is a written record that states who you want to make healthcare choices for you when you are unable to make them for yourself. This person, called a proxy, is usually a family member or a friend. You may choose more than 1 proxy.  Do not resuscitate (DNR) order:  A DNR order is used in case your heart stops beating or you stop breathing. It is a request not to have certain forms of treatment, such as CPR. A DNR order may be included in other types of advance directives.  Medical directive:  This covers the care that you want if you are in a coma, near death, or unable to make decisions for yourself. You can list the treatments you want for each condition. Treatment may include pain medicine, surgery, blood transfusions, dialysis, IV or tube feedings, and a ventilator (breathing machine).  Values history:  This document has questions about your views, beliefs, and how you feel and think about life. This information can help others choose the care that you would choose.  Why are advance directives important?  An advance directive helps you control your care. Although spoken wishes may be used, it is better to have your wishes written down. Spoken wishes can be  misunderstood, or not followed. Treatments may be given even if you do not want them. An advance directive may make it easier for your family to make difficult choices about your care.       © Copyright [employer] 2018 Information is for End User's use only and may not be sold, redistributed or otherwise used for commercial purposes. All illustrations and images included in CareNotes® are the copyrighted property of [employer] or [employer]

## 2025-04-22 ENCOUNTER — TELEMEDICINE (OUTPATIENT)
Dept: NEUROLOGY | Facility: CLINIC | Age: 75
End: 2025-04-22
Payer: MEDICARE

## 2025-04-22 DIAGNOSIS — G44.209 TENSION HEADACHE: ICD-10-CM

## 2025-04-22 DIAGNOSIS — G43.709 CHRONIC MIGRAINE WITHOUT AURA WITHOUT STATUS MIGRAINOSUS, NOT INTRACTABLE: ICD-10-CM

## 2025-04-22 PROCEDURE — 99213 OFFICE O/P EST LOW 20 MIN: CPT | Performed by: PHYSICIAN ASSISTANT

## 2025-04-22 NOTE — PROGRESS NOTES
Roxanne Hopper is a 75 y.o. female with arthritis, GERD, hypothyroidism, diastolic dysfunction, hypertension, hyperlipidemia, insomnia, Graves' disease and depression.  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     Her  passed away with sudden cardiac arrest while skiing     What medications do you take or have you taken for your headaches?   Current preventative:  Aspirin  Probiotic  Losartan  Sertraline  Emgality  Botox     Current abortive:  Ubrelvy  Prochlorperazine  Ketorolac     Prior preventive:  amitriptyline, venlafaxine,  Gabapentin,   Tizanidine, methocarbamol,   Atenolol, Diltiazem     Prior abortive:  dexamethasone,   Fioricet,   Compazine,   Tylenol     Non-Medical/Alternative Treatments used in the past for headaches: PT  Any warning prior to headache and how long do they last - sparkles in her peripheral vision intermittently     Current Pain 0/10     How often do the headaches occur - 1-2 every 3 months, prior to botox was daily  What time of the day do the headaches start - varies  How long do the headaches last - with ubrlevy within a few hours; prior was days  Are you ever headache free - YES  Where are they located - Top and back of head, neck  What is the intensity of pain - 7-8/10     Describe your usual headache - Throbbing, Pounding     Associated symptoms:   -       Decrease of appetite, nausea  -       Photophobia, phonophobia  -       Problem with concentration  -       Dizziness  -       Tinnitus  -       Stiff and sore neck  -       prefer to be alone and in a dark room, unable to work     Headache trigger: smells, weather, lack of sleep, sunlight/bright lights     Number of days missed per month because of headaches:  Work (or school) days: NA  Social or Family activities: sometimes with bad migraines but less than before     What time of the year do headaches occur more frequently? do not seem to be related to any time of day or  year  Have you seen someone else for headaches or pain? Yes  Have you had trigger point injection performed and how often? No  Have you had Botox injection performed and how often? Yes, works well   Have you had epidural injections or transforaminal injections performed? Yes, c section     Have you used CBD or THC for your headaches and how often? No     Are you current pregnant or planning on getting pregnant? No     Have you ever had any Brain imaging? no I personally reviewed these images.  Recent laboratory data was reviewed.  Medications and allergies were reviewed.     With botox has had a reduction of at least 7 migraine days with less abortive medication, less ER visits which correlates to headache diary    Reviewed old notes from physician seen in the past- see above HPI for summary of previous encounters.

## 2025-04-22 NOTE — ASSESSMENT & PLAN NOTE
Continue Botox every 3 months  Continue Emgality 1 injection     Abortive:  At onset of migraine, take Ubrelvy 100 mg.  May repeat in 2 hours if needed  Prochlorperazine 10 mg as needed for migraines and dizziness  Orders:  •  Galcanezumab-gnlm 120 MG/ML SOAJ; Inject 120 mg under the skin every 30 (thirty) days  •  Ubrogepant (UBRELVY) 100 MG tablet; Take 1 tab once prn for migraine. May repeat in 2 hours if needed. Max 2 doses per day. 90 day supply.

## 2025-04-22 NOTE — ASSESSMENT & PLAN NOTE
Use as needed  Orders:  •  Diclofenac Sodium (VOLTAREN) 1 %; Apply 4 g topically 4 (four) times a day as needed (arthritis)

## 2025-04-22 NOTE — PROGRESS NOTES
Virtual Regular VisitName: Roxanne Hopper      : 1950      MRN: [id number]  Encounter Provider: Ambika José PA-C  Encounter Date: 2025   Encounter department: NEUROLOGY [facility]  :  Assessment & Plan  Chronic migraine without aura without status migrainosus, not intractable  Continue Botox every 3 months  Continue Emgality 1 injection     Abortive:  At onset of migraine, take Ubrelvy 100 mg.  May repeat in 2 hours if needed  Prochlorperazine 10 mg as needed for migraines and dizziness  Orders:  •  Galcanezumab-gnlm 120 MG/ML SOAJ; Inject 120 mg under the skin every 30 (thirty) days  •  Ubrogepant (UBRELVY) 100 MG tablet; Take 1 tab once prn for migraine. May repeat in 2 hours if needed. Max 2 doses per day. 90 day supply.    Tension headache  Use as needed  Orders:  •  Diclofenac Sodium (VOLTAREN) 1 %; Apply 4 g topically 4 (four) times a day as needed (arthritis)        History of Present Illness     HPI    Roxanne Hopper is a 75 y.o. female with arthritis, GERD, hypothyroidism, diastolic dysfunction, hypertension, hyperlipidemia, insomnia, Graves' disease and depression.  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     Her  passed away with sudden cardiac arrest while skiing     What medications do you take or have you taken for your headaches?   Current preventative:  Aspirin  Probiotic  Losartan  Sertraline  Emgality  Botox     Current abortive:  Ubrelvy  Prochlorperazine  Ketorolac     Prior preventive:  amitriptyline, venlafaxine,  Gabapentin,   Tizanidine, methocarbamol,   Atenolol, Diltiazem     Prior abortive:  dexamethasone,   Fioricet,   Compazine,   Tylenol     Non-Medical/Alternative Treatments used in the past for headaches: PT  Any warning prior to headache and how long do they last - sparkles in her peripheral vision intermittently     Current Pain 0/10     How often do the headaches occur - 1-2 every 3 months, prior to  botox was daily  What time of the day do the headaches start - varies  How long do the headaches last - with ubrlevy within a few hours; prior was days  Are you ever headache free - YES  Where are they located - Top and back of head, neck  What is the intensity of pain - 7-8/10     Describe your usual headache - Throbbing, Pounding     Associated symptoms:   -       Decrease of appetite, nausea  -       Photophobia, phonophobia  -       Problem with concentration  -       Dizziness  -       Tinnitus  -       Stiff and sore neck  -       prefer to be alone and in a dark room, unable to work     Headache trigger: smells, weather, lack of sleep, sunlight/bright lights     Number of days missed per month because of headaches:  Work (or school) days: NA  Social or Family activities: sometimes with bad migraines but less than before     What time of the year do headaches occur more frequently? do not seem to be related to any time of day or year  Have you seen someone else for headaches or pain? Yes  Have you had trigger point injection performed and how often? No  Have you had Botox injection performed and how often? Yes, works well   Have you had epidural injections or transforaminal injections performed? Yes, c section     Have you used CBD or THC for your headaches and how often? No     Are you current pregnant or planning on getting pregnant? No     Have you ever had any Brain imaging? no I personally reviewed these images.  Recent laboratory data was reviewed.  Medications and allergies were reviewed.     With botox has had a reduction of at least 7 migraine days with less abortive medication, less ER visits which correlates to headache diary    Reviewed old notes from physician seen in the past- see above HPI for summary of previous encounters.   Review of Systems   Constitutional: Negative.    HENT: Negative.     Eyes: Negative.    Respiratory: Negative.     Cardiovascular: Negative.    Gastrointestinal: Negative.     Endocrine: Negative.    Genitourinary: Negative.    Musculoskeletal: Negative.    Skin: Negative.    Allergic/Immunologic: Negative.    Neurological:  Positive for headaches.   Hematological: Negative.    Psychiatric/Behavioral: Negative.     I personally reviewed and updated the ROS that was entered by the medical assistant      Objective   There were no vitals taken for this visit.    Physical Exam  CONSTITUTIONAL: Well developed, well nourished, well groomed. No dysmorphic features.     HEENT:  Normocephalic atraumatic.    Chest:  Respirations regular and unlabored.    Psychiatric:  Normal behavior and appropriate affect      MENTAL STATUS  Orientation: Alert and oriented x 3  Fund of knowledge: Intact.    Administrative Statements   Encounter provider Ambika José PA-C    The Patient is located at Home and in the following state in which I hold an active license [place].    The patient was identified by name and date of birth. Roxanne Hopper was informed that this is a telemedicine visit and that the visit is being conducted through the Epic Embedded platform. She agrees to proceed..  My office door was closed. No one else was in the room.  She acknowledged consent and understanding of privacy and security of the video platform. The patient has agreed to participate and understands they can discontinue the visit at any time.    I have spent a total time of 22 minutes in caring for this patient on the day of the visit/encounter including Prognosis, Impressions, Counseling / Coordination of care, Documenting in the medical record, Reviewing/placing orders in the medical record (including tests, medications, and/or procedures), and Obtaining or reviewing history  , not including the time spent for establishing the audio/video connection.

## 2025-05-07 ENCOUNTER — PROCEDURE VISIT (OUTPATIENT)
Age: 75
End: 2025-05-07
Payer: MEDICARE

## 2025-05-07 VITALS
OXYGEN SATURATION: 97 % | HEART RATE: 84 BPM | DIASTOLIC BLOOD PRESSURE: 74 MMHG | HEIGHT: 62 IN | BODY MASS INDEX: 19.58 KG/M2 | TEMPERATURE: 98.8 F | SYSTOLIC BLOOD PRESSURE: 112 MMHG | WEIGHT: 106.4 LBS

## 2025-05-07 DIAGNOSIS — C44.511 BASAL CELL CARCINOMA (BCC) OF SKIN OF RIGHT BREAST: Primary | ICD-10-CM

## 2025-05-07 PROCEDURE — 11602 EXC TR-EXT MAL+MARG 1.1-2 CM: CPT | Performed by: STUDENT IN AN ORGANIZED HEALTH CARE EDUCATION/TRAINING PROGRAM

## 2025-05-07 PROCEDURE — 12031 INTMD RPR S/A/T/EXT 2.5 CM/<: CPT | Performed by: STUDENT IN AN ORGANIZED HEALTH CARE EDUCATION/TRAINING PROGRAM

## 2025-05-07 PROCEDURE — 88305 TISSUE EXAM BY PATHOLOGIST: CPT | Performed by: STUDENT IN AN ORGANIZED HEALTH CARE EDUCATION/TRAINING PROGRAM

## 2025-05-07 NOTE — PROGRESS NOTES
"[facility] Clinic Note     Patient Name: Roxanne Hopper  Encounter Date: May 7,2025       Have you been cared for by a [facility] Dermatologist in the last 3 years and, if so, which description applies to you? Yes. I have been here within the last 3 years, and my medical history has NOT changed since that time. I am of child-bearing potential.     REVIEW OF SYSTEMS:  Have you recently had or currently have any of the following? No changes in my recent health.   PAST MEDICAL HISTORY:  Have you personally ever had or currently have any of the following?  If \"YES,\" then please provide more detail. No changes in my medical history.   HISTORY OF IMMUNOSUPPRESSION: Do you have a history of any of the following:  Systemic Immunosuppression such as Diabetes, Biologic or Immunotherapy, Chemotherapy, Organ Transplantation, Bone Marrow Transplantation or Prednisone?  No     Answering \"YES\" requires the addition of the dotphrase \"IMMUNOSUPPRESSED\" as the first diagnosis of the patient's visit.   FAMILY HISTORY:  Any \"first degree relatives\" (parent, brother, sister, or child) with the following?    No changes in my family's known health.   PATIENT EXPERIENCE:    Do you want the Dermatologist to perform a COMPLETE skin exam today including a clinical examination under the \"bra and underwear\" areas?  NO  If necessary, do we have your permission to call and leave a detailed message on your Preferred Phone number that includes your specific medical information?  Yes      Allergies   Allergen Reactions   • Fentanyl Vomiting     Action Taken: vomiting; Category: Adverse Reaction;   Protracted   • Methimazole Hives and Other (See Comments)     Tapazole   • Codeine GI Intolerance and Anxiety     Other reaction(s): Nausea and/or vomiting      Current Outpatient Medications:   •  acetaminophen (TYLENOL) 500 mg tablet, Take 500 mg by mouth every 6 (six) hours as needed, Disp: , Rfl:   •  aspirin 81 MG tablet, Take 1 tablet by " mouth daily, Disp: , Rfl:   •  Diclofenac Sodium (VOLTAREN) 1 %, Apply 4 g topically 4 (four) times a day as needed (arthritis), Disp: 4 g, Rfl: 2  •  fluticasone (FLONASE) 50 mcg/act nasal spray, 1 spray into each nostril daily, Disp: 1 g, Rfl: 3  •  Galcanezumab-gnlm 120 MG/ML SOAJ, Inject 120 mg under the skin every 30 (thirty) days, Disp: 1 mL, Rfl: 11  •  levothyroxine 88 mcg tablet, Take 1 tablet (88 mcg total) by mouth daily, Disp: 90 tablet, Rfl: 3  •  losartan (COZAAR) 50 mg tablet, Take 2 tablets (100 mg total) by mouth daily, Disp: 180 tablet, Rfl: 3  •  omeprazole (PriLOSEC) 20 mg delayed release capsule, Take 1 capsule (20 mg total) by mouth 2 (two) times a day, Disp: 180 capsule, Rfl: 1  •  Probiotic Product (PROBIOTIC-10 PO), Take 1 tablet by mouth in the morning, Disp: , Rfl:   •  prochlorperazine (COMPAZINE) 10 mg tablet, Take 1 tablet (10 mg total) by mouth every 8 (eight) hours as needed for nausea or vomiting., Disp: 30 tablet, Rfl: 3  •  rosuvastatin (CRESTOR) 20 MG tablet, Take 1 tablet (20 mg total) by mouth daily, Disp: 90 tablet, Rfl: 3  •  sertraline (ZOLOFT) 50 mg tablet, Take 1 tablet (50 mg total) by mouth daily, Disp: 90 tablet, Rfl: 3  •  Ubrogepant (UBRELVY) 100 MG tablet, Take 1 tab once prn for migraine. May repeat in 2 hours if needed. Max 2 doses per day. 90 day supply., Disp: 48 tablet, Rfl: 3  •  fluorouracil (EFUDEX) 5 % cream, Apply topically 2 (two) times a day Apply to chest twice a day for a maximum of 14 days after biopsy site has healed. Wash hands after application. Keep medication away from children and pets. (Patient not taking: Reported on 5/7/2025), Disp: 40 g, Rfl: 0  •  lifitegrast (Xiidra) 5 % op solution, Administer 1 drop to both eyes 2 (two) times a day, Disp: 180 each, Rfl: 3              Whom besides the patient is providing clinical information about today's encounter?   NO ADDITIONAL HISTORIAN (patient alone provided history)    Physical Exam and  "Assessment/Plan by Diagnosis:      PROCEDURE:  EXCISION NOTE     Procedural Plan:     Attending:   Assistant:  Loreto ASCENCIO  Lesion Anatomic Location (use description from previous biopsy if applicable): Right clavicle  Pre-Op Diagnosis: Basal Cell Carcinoma,Nodular  Lesion is being treated as \"benign\" or \"MALIGNANT\":   Accession Number of any associated previous biopsy/excision: [id number]    Written and verbal (witnessed) informed consent was obtained. We discussed that \"excision\" is a method of removing lesions, both benign and malignant lesions.  A portion of normal skin is often taken to ensure completeness of removal.  I reviewed that this procedure will include numbing the area, cutting around and under the skin lesion, undermining (\"freeing up\") surrounding tissue, and closing the wound with sutures (stitches) both inside and out.  Risks include and are not limited to the following:  Bleeding, pain, infection, scarring, recurrence, more required treatment, no additional information, numbness and/or loss of function (if nerves are damaged).  These risks were considered against the benefits that we discussed, and the patient opted to continue with the procedure. It was discussed with patient that every effort is made to minimize scarring, but scarring is influenced also by extrinsic factor such as location, age and genetics.     Procedural Time Out:      Correct patient? yes  Correct site per Clinic Report? yes  Correct site per previous Path Report? yes  Correct site per Patient's recollection? yes    Anesthesia:      Local anesthesia: 1% xylocaine with epi     Excision Description:      Post-Op Diagnosis: Same as Pre-Op Diagnosis (above)  Pre-op Size: 0.6 cm  Margins (enter \"0\" for lipoma/cyst or similar diagnosis): 0.4 cm  TOTAL POSTOPERATIVE DEFECT SIZE (I.e., Pre-op Size + Margins on both sides):  1.4 cm    The patient was seated in the procedure/exam room, anesthetized locally, prepped and draped in the " "usual fashion. Using a #15 blade with a scalpel, the lesion was excised in elliptical fashion.     REQUIRED Kirk MELANOMA DATA      This procedure was not performed to treat primary cutaneous melanoma through wide local excision      Closure Description:      The specific type of closure that was utilized:     INTERMEDIATE Closure  INTERMEDIATE CLOSURE     The patient was brought back into the procedure room, anesthetized locally, prepped and draped in the usual fashion. Using a #15 blade with a scalpel, the lesion was excised in elliptical fashion. The wound was  undermined in the fascial plane.  Purpose of undermining was to decrease wound tension and facilitate closure. Hemostasis was achieved with light electrocoagulation.    The wound was closed with subcutaneous sutures as follows:    Deep Suture Throw, Size, and Type (select all that apply):  Interrupted Deeps; 4-0; vicryl     Epidermal edge closure was accomplished with superficial sutures as follows:    Superficial Suture Throw, Size and Type (select all that apply):  Simple Running; 4-0; Ethilon    FINAL LENGTH OF CLOSURE (please enter a length even for lipoma/cyst or similar diagnosis): 2.5 cm       Postoperative Care:      The wound was cleaned with sterile saline, dried off, surgical vaseline ointment was applied, and the wound was covered. A pressure dressing was applied for stabilization and light pressure.    Estimated blood loss:  Less than 3ml.  Complications: none  Post-op medications: none  Additional notes: none    Discharge Plans:      Discharge plans: Plan for return to us for suture removal, as scheduled in 14 days.   Patient condition at discharge: STABLE    The patient was given detailed oral and written instructions on postoperative care as detailed in consent. We urged the patient to call us if any problems or question should arise.         Please complete this section and then \"cut and paste\" it into the Patient Instructions section.  " "These notes should be printed and shared directly with the patient for review PRIOR TO leaving our office.         YOUR \"AFTER SURGERY\" REVIEW & INSTRUCTIONS    What to Know About Your Procedure  An \"excision\" was performed today to allow the dermatologist to remove a skin lesion. The procedure involves a local numbing medication and removing the entire lesion (or as much as possible). Typically, the lesion is being removed because it does not look \"normal,\" because it is becoming irritated and traumatized, or for significant appearance reasons.  The skin was cut deeply and then repaired - usually with sutures (stitches).  The removed tissue has been sent to the pathologist who will confirm the diagnosis and verify if the lesion has been completely removed.  Surgical “Vaseline-type” ointment has been applied after the procedure to help create a barrier between your wound and the outside world.     The advantage of using sutures (stitches) to repair a skin excision is that it allows the lesion to heal as quickly as possible with the least amount of scarring and risk of infection, Still, there are some risks and potential complications that you should watch out for that include but are not limited to the following:    Some bleeding is normal at the time of procedure and some bleeding on the gauze bandage after the procedure is normal for the first few days after surgery.  Profuse bleeding or bleeding with swelling and pain is NOT normal and should be reported as detailed below.  Infection is uncommon after skin surgery.  Infection should be reported and is indicated by pain, redness, and discharge of purulent material.  Some pain may occur initially the day after surgery.  Persistent pain or increasing pain days after surgery is not expected and should be reported.  Every effort is made to minimize scar, but location, size, and genetics do play a role in scar appearance.  A surgical wound does not achieve its optimal " "appearance until 6 months.  There are several treatments available if scarring would be problematic including scar creams, silicone pad, laser and scar revision.  Skin discoloration can occur especially in people of color.  Its important to avoid sun on wound in first 6 months after surgery.  Treatment is available if pigment is problematic.  Incomplete removal of the lesion or recurrence of lesion can occur and - depending on the lesion - this would then require further treatment and more surgery.  Nerve damage/numbness and/or loss of function is very rare, but is most likely to occur if the lesion being removed is large or if it is in a \"high risk\" location.  Allergic reaction to lidocaine is rare.  More commonly, epinephrine is used with the lidocaine, and, occasionally, epinephrine (a.k.a., adrenalin) may cause a brief feeling of anxiety or jitteriness.  The person at the microscope (pathologist) may provide additional information that was unexpected. This unexpected finding could prompt the need for additional treatment or evaluation.    At-Home Wound Care  Try NOT to remove the pressure bandage for 48 hours. Keep the area clean and dry while this bandage is on.   After removing the bandage for the first time, gently clean the area with soap and water. If the bandage is difficult to remove, getting the bandage wet in the shower will sometimes help soften the adhesive and allow it to be removed more easily.   You will now need to cleanse this area daily in the shower with gentle soap. There is no need to scrub the area. You will need to apply plain Vaseline ointment (this is over the counter and not a prescription) to the site for up to 2 weeks followed by a clean appropriately sized bandage to area.  Non stick dressing and paper tape (or Hypafix) are recommended for sensitive skin but a bandaid is fine if it covers the area well.  In general, sutures (stitches) are removed in about 5-7 days for face wounds and " "in about 12-14 days for the body wounds.  Your dermatologist wants you to return for suture removal in 14 DAYS.     General Restrictions  For TWO (2) DAYS:  You will need to take it very easy as this time is highest risk for bleeding. Being a \"couch potato\" during these two days is generally recommended.   For surgeries on the face/neck/scalp: Avoid leaning down to pick things up off the floor as this brings blood up to your head. Instead, squat down to pick things up.     For TWO WEEKS (14 DAYS):   No heavy lifting (anything greater than 10 pounds)   You can start to do slow, gentle activities such as slow walking but nothing to increase your heart rate and blood pressure too much (such as cardiovascular exercise).  It is important to take it easy as there is still a risk for bleeding and a high risk popping of stitches open during this time.     Site Specific Restrictions  If we did surgery near your eyes (including the nose, forehead, front part of your scalp, cheeks): It is VERY common to get a large amount of swelling around your eyes (puffy eyes). Although less frequent, this can be enough to swell your eyes shut and can also come along with bruising. This should not hurt and is very expected and normal. It is typically worst at ~ 3 days out from your surgery and dramatically better 1 week post-operatively.   If we did surgery around your nose: No blowing your nose as this puts you at higher risk of popping stitches durign this time. Instead dab under your nose with a tissue or use a Q-tip inside your nose.  If we did surgery on the skin above or below your lip or your lip itself: No sipping from straws as this uses a lot of the muscles around your mouth and increases the risk of popping stitches during this time.    Managing Your Pain After Surgery  You can expect to have some pain after surgery. This is normal. The pain is typically worse the first two days after surgery, and quickly begins to get better. " "  You can use heating pads or ice packs on your incisions to help reduce your pain.   The best strategy for controlling your pain after surgery is \"around the clock\" pain control. You can take \"over-the-counter\" (non-prescription) Acetaminophen (Tylenol) for discomfort, unless you have been told not to by your physician.  If you are taking Tylenol at the maximum dose, you can alternate Tylenol with Advil/Motrin (ibuprofen) as long as there are no contraindications.  Alternating these medications with each other (I.e., Tylenol followed by Motrin/Advil) allows you to maximize your pain control.  To alternate these medications properly, you will take a dose of pain medication every three hours, alternating Tylenol (acetaminophen) and Advil/Motrin (ibuprofen).  Start by taking 650 mg of Tylenol (2 pills of 325 mg)  3 hours later take 600 mg of Motrin (3 pills of 200 mg)  3 hours after taking the Motrin take 650 mg of Tylenol  3 hours after that take 600 mg of Motrin.    As an example, if your first dose of Tylenol (acetaminophen) is at 12:00 PM, then you would alternate with Motrin as directed below, continuing usually for no more than a total of 48 hours straight:     12:00 PM  Tylenol 650 mg (2 pills of 325 mg)    3:00 PM  Motrin 600 mg (3 pills of 200 mg)    6:00 PM  Tylenol 650 mg (2 pills of 325 mg)    9:00 PM  Motrin 600 mg (3 pills of 200 mg)      WARNING:  Do NOT take more than 4000 mg of Tylenol or 3200 mg of Motrin in a \"24-hour\" period.       What if you still have pain?    If you have pain that is not controlled with the over-the-counter pain medications (Tylenol and Motrin/Advil), do not hesitate to call our staff using the number provided. We will help make sure you are managing your pain in the best way possible, and if necessary, we can provide a prescription for additional pain medication.     Call our office IMMEDIATELY with any signs of wound infection.  This includes fever, chills, increasing " redness, warmth, tenderness, severe discomfort/pain, or pus or foul smell coming from the wound. [facility] can be directly at [phone] (SKIN) and ask for the on-call Dermatologist covering surgery/Mohs.    If Bleeding is Noticed  If bleeding is soaking through the bandage, remove the bandage and see where the bleeding is coming from.  Place a clean cloth over the area and apply firm pressure directly to the area that is bleeding for thirty minutes.    Check the wound ONLY after 30 minutes of direct pressure; do not cheat and sneak a peak, as that does not count (i.e., resets the clock back to zero).  If bleeding persists after 30 minutes of legitimate direct pressure, then try one more round of direct pressure to the area.    Should bleeding become heavier or not stop after the second application of direct pressure for 30 minutes, then call [facility] directly at [phone] (SKIN) and ask to speak with the on-call Dermatologist covering surgery/Mohs.  If after hours, go to your nearest Emergency Room or Urgent Care and have the team call [facility] directly at [phone] (SKIN); you will be connected to our after hours team.          Scribe Attestation    I,:  Loreto Segovia MA am acting as a scribe while in the presence of the attending physician.:       I,:  Jonnathan Garcia DO personally performed the services described in this documentation    as scribed in my presence.:

## 2025-05-09 PROBLEM — Z00.00 MEDICARE ANNUAL WELLNESS VISIT, SUBSEQUENT: Status: RESOLVED | Noted: 2023-03-22 | Resolved: 2025-05-09

## 2025-05-12 PROCEDURE — 88305 TISSUE EXAM BY PATHOLOGIST: CPT | Performed by: STUDENT IN AN ORGANIZED HEALTH CARE EDUCATION/TRAINING PROGRAM

## 2025-05-13 ENCOUNTER — RESULTS FOLLOW-UP (OUTPATIENT)
Age: 75
End: 2025-05-13

## 2025-05-15 NOTE — TELEPHONE ENCOUNTER
Spoke to patient and gave clear margin results per Dr Garcia. Patient confirmed 5/21/25 nurse visit appt for a suture removal. Will put in the appt notes to make a follow up skin check.

## 2025-05-21 ENCOUNTER — CLINICAL SUPPORT (OUTPATIENT)
Age: 75
End: 2025-05-21

## 2025-05-21 VITALS
BODY MASS INDEX: 19.51 KG/M2 | SYSTOLIC BLOOD PRESSURE: 120 MMHG | HEART RATE: 71 BPM | WEIGHT: 106 LBS | DIASTOLIC BLOOD PRESSURE: 82 MMHG | TEMPERATURE: 97.9 F | OXYGEN SATURATION: 97 % | HEIGHT: 62 IN

## 2025-05-21 DIAGNOSIS — Z48.02 ENCOUNTER FOR REMOVAL OF SUTURES: ICD-10-CM

## 2025-05-21 DIAGNOSIS — C44.511 BASAL CELL CARCINOMA (BCC) OF SKIN OF RIGHT BREAST: Primary | ICD-10-CM

## 2025-05-21 NOTE — PROGRESS NOTES
"[facility]at[facility] Clinic Note     Patient Name: Roxanne Hopper  Encounter Date: 05/21/25       Have you been cared for by a [facility] Dermatologist in the last 3 years and, if so, which description applies to you? Yes. I have been here within the last 3 years, and my medical history has NOT changed since that time. I am not of child-bearing potential.     REVIEW OF SYSTEMS:  Have you recently had or currently have any of the following? No changes in my recent health.   PAST MEDICAL HISTORY:  Have you personally ever had or currently have any of the following?  If \"YES,\" then please provide more detail. No changes in my medical history.   HISTORY OF IMMUNOSUPPRESSION: Do you have a history of any of the following:  Systemic Immunosuppression such as Diabetes, Biologic or Immunotherapy, Chemotherapy, Organ Transplantation, Bone Marrow Transplantation or Prednisone?  No     Answering \"YES\" requires the addition of the dotphrase \"IMMUNOSUPPRESSED\" as the first diagnosis of the patient's visit.   FAMILY HISTORY:  Any \"first degree relatives\" (parent, brother, sister, or child) with the following?    No changes in my family's known health.   PATIENT EXPERIENCE:    Do you want the Dermatologist to perform a COMPLETE skin exam today including a clinical examination under the \"bra and underwear\" areas?  NO  If necessary, do we have your permission to call and leave a detailed message on your Preferred Phone number that includes your specific medical information?  Yes      Allergies[1] Current Medications[2]              Whom besides the patient is providing clinical information about today's encounter?   NO ADDITIONAL HISTORIAN (patient alone provided history)    Physical Exam and Assessment/Plan by Diagnosis:    Suture removal    Date/Time: 5/21/2025 11:00 AM    Performed by: Karo Bolanos MA  Authorized by: Jonnathan Garcia DO    Universal Protocol:  procedure performed by consultantConsent: Verbal consent obtained. " Written consent not obtained  Risks and benefits: risks, benefits and alternatives were discussed  Consent given by: patient  Timeout called at: 5/21/2025 10:30 AM.  Patient understanding: patient states understanding of the procedure being performed  Patient consent: the patient's understanding of the procedure matches consent given  Procedure consent: procedure consent matches procedure scheduled  Relevant documents: relevant documents present and verified  Test results: test results available and properly labeled (BCC Margins Cleared)  Site marked: the operative site was not marked  Radiology Images displayed and confirmed. If images not available, report reviewed: imaging studies not available  Patient identity confirmed: verbally with patient      Patient location:  Clinic  Location:     Laterality:  Right    Location:  Trunk    Trunk location: Right Clavicle.  Procedure details:     Tools used:  Suture removal kit    Wound appearance:  No sign(s) of infection, good wound healing and clean  Post-procedure details:     Post-removal:  No dressing applied    Patient tolerance of procedure:  Tolerated well, no immediate complications  Comments:      FIFI Zhou             [1]   Allergies  Allergen Reactions    Fentanyl Vomiting     Action Taken: vomiting; Category: Adverse Reaction;   Protracted    Methimazole Hives and Other (See Comments)     Tapazole    Codeine GI Intolerance and Anxiety     Other reaction(s): Nausea and/or vomiting   [2]   Current Outpatient Medications:     acetaminophen (TYLENOL) 500 mg tablet, Take 500 mg by mouth every 6 (six) hours as needed, Disp: , Rfl:     aspirin 81 MG tablet, Take 1 tablet by mouth daily, Disp: , Rfl:     Diclofenac Sodium (VOLTAREN) 1 %, Apply 4 g topically 4 (four) times a day as needed (arthritis), Disp: 4 g, Rfl: 2    fluorouracil (EFUDEX) 5 % cream, Apply topically 2 (two) times a day Apply to chest twice a day for a maximum of 14 days after biopsy site  has healed. Wash hands after application. Keep medication away from children and pets. (Patient not taking: Reported on 5/7/2025), Disp: 40 g, Rfl: 0    fluticasone (FLONASE) 50 mcg/act nasal spray, 1 spray into each nostril daily, Disp: 1 g, Rfl: 3    Galcanezumab-gnlm 120 MG/ML SOAJ, Inject 120 mg under the skin every 30 (thirty) days, Disp: 1 mL, Rfl: 11    levothyroxine 88 mcg tablet, Take 1 tablet (88 mcg total) by mouth daily, Disp: 90 tablet, Rfl: 3    lifitegrast (Xiidra) 5 % op solution, Administer 1 drop to both eyes 2 (two) times a day, Disp: 180 each, Rfl: 3    losartan (COZAAR) 50 mg tablet, Take 2 tablets (100 mg total) by mouth daily, Disp: 180 tablet, Rfl: 3    omeprazole (PriLOSEC) 20 mg delayed release capsule, Take 1 capsule (20 mg total) by mouth 2 (two) times a day, Disp: 180 capsule, Rfl: 1    Probiotic Product (PROBIOTIC-10 PO), Take 1 tablet by mouth in the morning, Disp: , Rfl:     prochlorperazine (COMPAZINE) 10 mg tablet, Take 1 tablet (10 mg total) by mouth every 8 (eight) hours as needed for nausea or vomiting., Disp: 30 tablet, Rfl: 3    rosuvastatin (CRESTOR) 20 MG tablet, Take 1 tablet (20 mg total) by mouth daily, Disp: 90 tablet, Rfl: 3    sertraline (ZOLOFT) 50 mg tablet, Take 1 tablet (50 mg total) by mouth daily, Disp: 90 tablet, Rfl: 3    Ubrogepant (UBRELVY) 100 MG tablet, Take 1 tab once prn for migraine. May repeat in 2 hours if needed. Max 2 doses per day. 90 day supply., Disp: 48 tablet, Rfl: 3

## 2025-06-24 ENCOUNTER — PROCEDURE VISIT (OUTPATIENT)
Dept: NEUROLOGY | Facility: CLINIC | Age: 75
End: 2025-06-24
Payer: MEDICARE

## 2025-06-24 VITALS — TEMPERATURE: 98 F | SYSTOLIC BLOOD PRESSURE: 122 MMHG | HEART RATE: 71 BPM | DIASTOLIC BLOOD PRESSURE: 74 MMHG

## 2025-06-24 DIAGNOSIS — G43.709 CHRONIC MIGRAINE WITHOUT AURA WITHOUT STATUS MIGRAINOSUS, NOT INTRACTABLE: Primary | ICD-10-CM

## 2025-06-24 PROCEDURE — 64615 CHEMODENERV MUSC MIGRAINE: CPT | Performed by: PHYSICIAN ASSISTANT

## 2025-06-24 NOTE — PROGRESS NOTES
"Universal Protocol   procedure performed by consultantConsent: Verbal consent obtained. Written consent obtained  Risks and benefits: risks, benefits and alternatives were discussed  Consent given by: patient  Time out: Immediately prior to procedure a \"time out\" was called to verify the correct patient, procedure, equipment, support staff and site/side marked as required.  Patient understanding: patient states understanding of the procedure being performed  Patient consent: the patient's understanding of the procedure matches consent given  Procedure consent: procedure consent matches procedure scheduled  Relevant documents: relevant documents present and verified  Patient identity confirmed: verbally with patient      Chemodenervation     Date/Time  6/24/2025 9:30 AM     Performed by  Ambika José PA-C   Authorized by  Ambika José PA-C     Pre-procedure details      Preparation: Patient was prepped and draped in usual sterile fashion     Anesthesia  (see MAR for exact dosages):     Anesthesia method:  None   Procedure details      Position:  Upright   Botox      Botox Type:  Type A    Brand:  Botox    mL's of Botulinum Toxin:  200    mL's of preservative free sterile saline:  4    Final Concentration per CC:  50 units    Needle Gauge:  30 G 2.5 inch   Procedures      Botox Procedures: chronic headache     Injection Location      Head / Face:  L superior cervical paraspinal, R superior cervical paraspinal, R medial occipitalis, L medial occipitalis, R temporalis, L superior trapezius, R superior trapezius and L temporalis    L temporalis injection amount:  25 unit(s)    R temporalis injection amount:  25 unit(s)    L medial occipitalis injection amount:  20 unit(s)    R medial occipitalis injection amount:  20 unit(s)    L superior cervical paraspinal injection amount:  10 unit(s)    R superior cervical paraspinal injection amount:  10 unit(s)    L superior trapezius injection amount:  20 unit(s)    R superior " trapezius injection amount:  20 unit(s)   Total Units      Total units used:  200   Post-procedure details      Chemodenervation:  Chronic migraine    Facial Nerve Location::  Bilateral facial nerve    Patient tolerance of procedure:  Tolerated well, no immediate complications   Comments       30 units apex  10 units *temporalis bilaterally  All medically necessary

## 2025-06-25 ENCOUNTER — TELEPHONE (OUTPATIENT)
Age: 75
End: 2025-06-25

## 2025-06-25 NOTE — TELEPHONE ENCOUNTER
called patient to reschedule cancelled appointment from 8/13/25. patient opted to wait until spring time to follow up. scheduled for 4/21/26

## (undated) DEVICE — GLOVE SRG BIOGEL 6.5

## (undated) DEVICE — COBAN 2 IN UNSTERILE

## (undated) DEVICE — SUT PROLENE 4-0 PS-2 18 IN 8682G

## (undated) DEVICE — IMPERVIOUS STOCKINETTE: Brand: DEROYAL

## (undated) DEVICE — GLOVE INDICATOR PI UNDERGLOVE SZ 6.5 BLUE

## (undated) DEVICE — INTENDED FOR TISSUE SEPARATION, AND OTHER PROCEDURES THAT REQUIRE A SHARP SURGICAL BLADE TO PUNCTURE OR CUT.: Brand: BARD-PARKER ® CARBON RIB-BACK BLADES

## (undated) DEVICE — SPONGE PVP SCRUB WING STERILE

## (undated) DEVICE — CUFF TOURNIQUET 18 X 4 IN QUICK CONNECT DISP 1 BLADDER

## (undated) DEVICE — OCCLUSIVE GAUZE STRIP,3% BISMUTH TRIBROMOPHENATE IN PETROLATUM BLEND: Brand: XEROFORM

## (undated) DEVICE — CURITY STRETCH BANDAGE: Brand: CURITY

## (undated) DEVICE — GAUZE SPONGES,16 PLY: Brand: CURITY

## (undated) DEVICE — DISPOSABLE EQUIPMENT COVER: Brand: SMALL TOWEL DRAPE

## (undated) DEVICE — STERILE BETHLEHEM PLASTIC HAND: Brand: CARDINAL HEALTH

## (undated) DEVICE — MINI BLADE ROUND TIP SHARP ON ONE SIDE

## (undated) DEVICE — ACE WRAP 3 IN STERILE

## (undated) DEVICE — GLOVE SRG BIOGEL 7

## (undated) DEVICE — GLOVE INDICATOR PI UNDERGLOVE SZ 7 BLUE